# Patient Record
Sex: FEMALE | Race: WHITE | NOT HISPANIC OR LATINO | Employment: OTHER | URBAN - METROPOLITAN AREA
[De-identification: names, ages, dates, MRNs, and addresses within clinical notes are randomized per-mention and may not be internally consistent; named-entity substitution may affect disease eponyms.]

---

## 2017-01-05 RX ORDER — LEVETIRACETAM 250 MG/1
TABLET ORAL
Qty: 180 TABLET | Refills: 1 | Status: SHIPPED | OUTPATIENT
Start: 2017-01-05 | End: 2017-02-13

## 2017-01-31 RX ORDER — APIXABAN 5 MG/1
TABLET, FILM COATED ORAL
Qty: 180 TABLET | Refills: 1 | Status: SHIPPED | OUTPATIENT
Start: 2017-01-31 | End: 2017-02-13

## 2017-02-13 ENCOUNTER — OFFICE VISIT (OUTPATIENT)
Dept: INTERNAL MEDICINE | Facility: CLINIC | Age: 77
End: 2017-02-13

## 2017-02-13 VITALS
RESPIRATION RATE: 16 BRPM | TEMPERATURE: 98.7 F | WEIGHT: 101 LBS | HEART RATE: 76 BPM | BODY MASS INDEX: 16.3 KG/M2 | OXYGEN SATURATION: 97 % | DIASTOLIC BLOOD PRESSURE: 70 MMHG | SYSTOLIC BLOOD PRESSURE: 140 MMHG

## 2017-02-13 DIAGNOSIS — R63.6 LOW WEIGHT: ICD-10-CM

## 2017-02-13 DIAGNOSIS — R55 VASOVAGAL SYNCOPE: ICD-10-CM

## 2017-02-13 DIAGNOSIS — R53.83 FATIGUE, UNSPECIFIED TYPE: ICD-10-CM

## 2017-02-13 DIAGNOSIS — R26.9 ABNORMAL GAIT: ICD-10-CM

## 2017-02-13 DIAGNOSIS — I10 ESSENTIAL HYPERTENSION: ICD-10-CM

## 2017-02-13 DIAGNOSIS — R54 FRAILTY: Primary | ICD-10-CM

## 2017-02-13 DIAGNOSIS — I48.91 ATRIAL FIBRILLATION WITH RVR (HCC): ICD-10-CM

## 2017-02-13 DIAGNOSIS — K25.7 CHRONIC GASTRIC ULCER: ICD-10-CM

## 2017-02-13 PROCEDURE — 99213 OFFICE O/P EST LOW 20 MIN: CPT | Performed by: INTERNAL MEDICINE

## 2017-02-13 RX ORDER — PANTOPRAZOLE SODIUM 20 MG/1
20 TABLET, DELAYED RELEASE ORAL 2 TIMES WEEKLY
Qty: 90 TABLET | Refills: 1
Start: 2017-02-13 | End: 2017-05-11 | Stop reason: SDUPTHER

## 2017-02-13 RX ORDER — RANITIDINE 150 MG/1
150 CAPSULE ORAL NIGHTLY
Qty: 90 CAPSULE | Refills: 3 | Status: SHIPPED | OUTPATIENT
Start: 2017-02-13 | End: 2018-02-24 | Stop reason: SDUPTHER

## 2017-02-13 NOTE — PATIENT INSTRUCTIONS
1.  Limit coffee - no more than 1 cup in the morning.    2.  Drink 8 oz water - 2 - 3 times extra - every day.    3.  Use walker at all times - to maintain safety.    4.  Decrease pantoprazole - Monday & Friday morning only.  Plan to stop this medication this year.    5.  Resume ranitidine 150 mg at bedtime - for acid suppression.    6.  Use Pepto-Bismol - as needed for indigestion.    7.  Return March 15 - annual checkup fasting.

## 2017-02-13 NOTE — PROGRESS NOTES
Subjective   Mirian Sy is a 77 y.o. female.     History of Present Illness     The patient had a syncopal episode at home yesterday.  She was sitting on a chair in the kitchen and just fell to the side.  Her son who is a paramedic took her pulse rate and blood pressure and found them normal.  She quickly woke up.  He felt that she was dehydrated and did not transport her to the hospital.  She has done well since then his had no lightheadedness with change of position.  She does have hypertension and recently atrial fibrillation.  She was started on diltiazem in December and has had average blood pressures of 130/70.  She has moderate GERD but has had no indigestion nausea or heartburn.  She drinks 3-4 cups of coffee daily and feels it does make her urinate more.    The following portions of the patient's history were reviewed and updated as appropriate: allergies, current medications, past family history, past medical history, past social history, past surgical history and problem list.    Review of Systems   Constitutional: Negative for appetite change and fatigue.   Gastrointestinal: Negative for abdominal distention and nausea.        Minimal indigestion   Neurological: Positive for seizures and syncope. Negative for dizziness and light-headedness.        No seizures in the last year   Psychiatric/Behavioral: Positive for dysphoric mood. Negative for sleep disturbance.        Anxiety and depression are stable on medication       Objective   Blood pressure 140/70, pulse 76, temperature 98.7 °F (37.1 °C), temperature source Oral, resp. rate 16, weight 101 lb (45.8 kg), SpO2 97 %.    Physical Exam   Constitutional: She is oriented to person, place, and time. She appears well-developed and well-nourished. No distress.   HENT:   Right Ear: External ear normal.   Left Ear: External ear normal.   Mouth/Throat: Oropharynx is clear and moist.   Eyes: EOM are normal. Pupils are equal, round, and reactive to light.  No scleral icterus.   Neck: Normal range of motion. Neck supple. No JVD present.   Cardiovascular: Normal rate, regular rhythm and normal heart sounds.    Pulmonary/Chest: Effort normal and breath sounds normal. She has no wheezes. She has no rales.   Abdominal: Soft. Bowel sounds are normal. She exhibits no distension.   Lymphadenopathy:     She has no cervical adenopathy.   Neurological: She is alert and oriented to person, place, and time. She exhibits normal muscle tone. Coordination normal.   Psychiatric: She has a normal mood and affect. Her behavior is normal. Judgment and thought content normal.   Nursing note and vitals reviewed.    Procedures  Assessment/Plan   Mirian was seen today for fall.    Diagnoses and all orders for this visit:    Frailty    Abnormal gait    Fatigue, unspecified type    Low weight    Essential hypertension    Atrial fibrillation with RVR    Vasovagal syncope    Other orders  -     ranitidine (ZANTAC) 150 MG capsule; Take 1 capsule by mouth Every Night.  -     pantoprazole (PROTONIX) 20 MG EC tablet; Take 1 tablet by mouth 2 (Two) Times a Week. Monday & Friday morning    The patient may have had a vasovagal simple episode associated with dehydration.  I've asked her to limit coffee and increase water intake to prevent further dehydration.    The patient has moderate GERD which is in excellent control.  For long-term safety of asked her to decrease Protonix to twice weekly.  I've asked her to use ranitidine for her long-term acid suppression and use Pepto-Bismol as needed.    The patient's son is with her today and both been counseled about the above recommendations and her need for close monitoring.  The patient has a sense of frailty but her body weight has been increasing with encouragement of improve nutrition.  Mirtazapine and megestrol have both helped.    Patient Instructions   1.  Limit coffee - no more than 1 cup in the morning.    2.  Drink 8 oz water - 2 - 3 times extra -  every day.    3.  Use walker at all times - to maintain safety.    4.  Decrease pantoprazole - Monday & Friday morning only.  Plan to stop this medication this year.    5.  Resume ranitidine 150 mg at bedtime - for acid suppression.    6.  Use Pepto-Bismol - as needed for indigestion.    7.  Return March 15 - annual checkup fasting.        Electronically signed Sriram Springer M.D.2/15/2017 7:22 AM

## 2017-02-27 RX ORDER — MIRTAZAPINE 7.5 MG/1
TABLET, FILM COATED ORAL
Qty: 90 TABLET | Refills: 0 | Status: SHIPPED | OUTPATIENT
Start: 2017-02-27 | End: 2017-05-11 | Stop reason: SDUPTHER

## 2017-03-01 ENCOUNTER — TELEPHONE (OUTPATIENT)
Dept: INTERNAL MEDICINE | Facility: CLINIC | Age: 77
End: 2017-03-01

## 2017-03-01 DIAGNOSIS — I48.0 PAF (PAROXYSMAL ATRIAL FIBRILLATION) (HCC): ICD-10-CM

## 2017-03-01 RX ORDER — FLECAINIDE ACETATE 50 MG/1
25 TABLET ORAL EVERY 12 HOURS
Qty: 90 TABLET | Refills: 0 | Status: SHIPPED | OUTPATIENT
Start: 2017-03-01 | End: 2017-11-27 | Stop reason: SDUPTHER

## 2017-03-01 NOTE — TELEPHONE ENCOUNTER
----- Message from Radha Spencer sent at 3/1/2017  9:08 AM EST -----  Contact: RICHIE LAKEIDE 51 [facility]- [employer]

## 2017-03-13 ENCOUNTER — TRANSCRIBE ORDERS (OUTPATIENT)
Dept: INTERNAL MEDICINE | Facility: CLINIC | Age: 77
End: 2017-03-13

## 2017-03-13 DIAGNOSIS — Z12.31 VISIT FOR SCREENING MAMMOGRAM: Primary | ICD-10-CM

## 2017-03-15 ENCOUNTER — OFFICE VISIT (OUTPATIENT)
Dept: INTERNAL MEDICINE | Facility: CLINIC | Age: 77
End: 2017-03-15

## 2017-03-15 VITALS
SYSTOLIC BLOOD PRESSURE: 130 MMHG | RESPIRATION RATE: 16 BRPM | DIASTOLIC BLOOD PRESSURE: 70 MMHG | WEIGHT: 103 LBS | HEART RATE: 64 BPM | OXYGEN SATURATION: 97 % | TEMPERATURE: 97.4 F | BODY MASS INDEX: 17.58 KG/M2 | HEIGHT: 64 IN

## 2017-03-15 DIAGNOSIS — F41.9 ANXIETY: ICD-10-CM

## 2017-03-15 DIAGNOSIS — R55 VASOVAGAL SYNCOPE: ICD-10-CM

## 2017-03-15 DIAGNOSIS — Z00.00 PREVENTATIVE HEALTH CARE: ICD-10-CM

## 2017-03-15 DIAGNOSIS — R26.9 ABNORMAL GAIT: ICD-10-CM

## 2017-03-15 DIAGNOSIS — G40.209 PARTIAL SYMPTOMATIC EPILEPSY WITH COMPLEX PARTIAL SEIZURES, NOT INTRACTABLE, WITHOUT STATUS EPILEPTICUS (HCC): ICD-10-CM

## 2017-03-15 DIAGNOSIS — K25.7 CHRONIC GASTRIC ULCER: ICD-10-CM

## 2017-03-15 DIAGNOSIS — E55.9 VITAMIN D DEFICIENCY: ICD-10-CM

## 2017-03-15 DIAGNOSIS — R63.0 ANOREXIA: ICD-10-CM

## 2017-03-15 DIAGNOSIS — D53.9 NUTRITIONAL ANEMIA: ICD-10-CM

## 2017-03-15 DIAGNOSIS — I10 ESSENTIAL HYPERTENSION: ICD-10-CM

## 2017-03-15 DIAGNOSIS — F32.89 OTHER DEPRESSION: ICD-10-CM

## 2017-03-15 DIAGNOSIS — R63.6 LOW WEIGHT: ICD-10-CM

## 2017-03-15 DIAGNOSIS — E78.2 MIXED HYPERLIPIDEMIA: ICD-10-CM

## 2017-03-15 DIAGNOSIS — G47.9 DYSSOMNIA: ICD-10-CM

## 2017-03-15 DIAGNOSIS — I48.91 ATRIAL FIBRILLATION WITH RVR (HCC): Primary | ICD-10-CM

## 2017-03-15 DIAGNOSIS — R54 FRAILTY: ICD-10-CM

## 2017-03-15 LAB
25(OH)D3 SERPL-MCNC: 48.8 NG/ML
ALBUMIN SERPL-MCNC: 4 G/DL (ref 3.2–4.8)
ALBUMIN/GLOB SERPL: 1.3 G/DL (ref 1.5–2.5)
ALP SERPL-CCNC: 180 U/L (ref 25–100)
ALT SERPL W P-5'-P-CCNC: 12 U/L (ref 7–40)
ANION GAP SERPL CALCULATED.3IONS-SCNC: 3 MMOL/L (ref 3–11)
AST SERPL-CCNC: 28 U/L (ref 0–33)
BASOPHILS # BLD AUTO: 0.03 10*3/MM3 (ref 0–0.2)
BASOPHILS NFR BLD AUTO: 0.7 % (ref 0–1)
BILIRUB SERPL-MCNC: 0.4 MG/DL (ref 0.3–1.2)
BILIRUB UR QL STRIP: NEGATIVE
BUN BLD-MCNC: 10 MG/DL (ref 9–23)
BUN/CREAT SERPL: 12.5 (ref 7–25)
CALCIUM SPEC-SCNC: 9.9 MG/DL (ref 8.7–10.4)
CHLORIDE SERPL-SCNC: 98 MMOL/L (ref 99–109)
CLARITY UR: CLEAR
CO2 SERPL-SCNC: 32 MMOL/L (ref 20–31)
COLOR UR: YELLOW
CREAT BLD-MCNC: 0.8 MG/DL (ref 0.6–1.3)
CRP SERPL-MCNC: 4.7 MG/DL (ref 0–10)
DEPRECATED RDW RBC AUTO: 45.7 FL (ref 37–54)
EOSINOPHIL # BLD AUTO: 0.1 10*3/MM3 (ref 0.1–0.3)
EOSINOPHIL NFR BLD AUTO: 2.2 % (ref 0–3)
ERYTHROCYTE [DISTWIDTH] IN BLOOD BY AUTOMATED COUNT: 13.3 % (ref 11.3–14.5)
GFR SERPL CREATININE-BSD FRML MDRD: 70 ML/MIN/1.73
GLOBULIN UR ELPH-MCNC: 3.2 GM/DL
GLUCOSE BLD-MCNC: 77 MG/DL (ref 70–100)
GLUCOSE UR STRIP-MCNC: NEGATIVE MG/DL
HCT VFR BLD AUTO: 34.5 % (ref 34.5–44)
HGB BLD-MCNC: 11.3 G/DL (ref 11.5–15.5)
HGB UR QL STRIP.AUTO: NEGATIVE
IMM GRANULOCYTES # BLD: 0.01 10*3/MM3 (ref 0–0.03)
IMM GRANULOCYTES NFR BLD: 0.2 % (ref 0–0.6)
KETONES UR QL STRIP: NEGATIVE
LEUKOCYTE ESTERASE UR QL STRIP.AUTO: NEGATIVE
LYMPHOCYTES # BLD AUTO: 1.25 10*3/MM3 (ref 0.6–4.8)
LYMPHOCYTES NFR BLD AUTO: 27.7 % (ref 24–44)
MAGNESIUM SERPL-MCNC: 2 MG/DL (ref 1.3–2.7)
MCH RBC QN AUTO: 30.5 PG (ref 27–31)
MCHC RBC AUTO-ENTMCNC: 32.8 G/DL (ref 32–36)
MCV RBC AUTO: 93.2 FL (ref 80–99)
MONOCYTES # BLD AUTO: 0.56 10*3/MM3 (ref 0–1)
MONOCYTES NFR BLD AUTO: 12.4 % (ref 0–12)
NEUTROPHILS # BLD AUTO: 2.57 10*3/MM3 (ref 1.5–8.3)
NEUTROPHILS NFR BLD AUTO: 56.8 % (ref 41–71)
NITRITE UR QL STRIP: NEGATIVE
PH UR STRIP.AUTO: 8 [PH] (ref 5–8)
PLATELET # BLD AUTO: 299 10*3/MM3 (ref 150–450)
PMV BLD AUTO: 10.1 FL (ref 6–12)
POTASSIUM BLD-SCNC: 4.5 MMOL/L (ref 3.5–5.5)
PROT SERPL-MCNC: 7.2 G/DL (ref 5.7–8.2)
PROT UR QL STRIP: NEGATIVE
RBC # BLD AUTO: 3.7 10*6/MM3 (ref 3.89–5.14)
SODIUM BLD-SCNC: 133 MMOL/L (ref 132–146)
SP GR UR STRIP: 1.01 (ref 1–1.03)
TSH SERPL DL<=0.05 MIU/L-ACNC: 0.96 MIU/ML (ref 0.35–5.35)
UROBILINOGEN UR QL STRIP: NORMAL
WBC NRBC COR # BLD: 4.52 10*3/MM3 (ref 3.5–10.8)

## 2017-03-15 PROCEDURE — 81003 URINALYSIS AUTO W/O SCOPE: CPT | Performed by: INTERNAL MEDICINE

## 2017-03-15 PROCEDURE — 82306 VITAMIN D 25 HYDROXY: CPT | Performed by: INTERNAL MEDICINE

## 2017-03-15 PROCEDURE — 82542 COL CHROMOTOGRAPHY QUAL/QUAN: CPT | Performed by: INTERNAL MEDICINE

## 2017-03-15 PROCEDURE — 83735 ASSAY OF MAGNESIUM: CPT | Performed by: INTERNAL MEDICINE

## 2017-03-15 PROCEDURE — 80177 DRUG SCRN QUAN LEVETIRACETAM: CPT | Performed by: INTERNAL MEDICINE

## 2017-03-15 PROCEDURE — 80053 COMPREHEN METABOLIC PANEL: CPT | Performed by: INTERNAL MEDICINE

## 2017-03-15 PROCEDURE — 84443 ASSAY THYROID STIM HORMONE: CPT | Performed by: INTERNAL MEDICINE

## 2017-03-15 PROCEDURE — 93000 ELECTROCARDIOGRAM COMPLETE: CPT | Performed by: INTERNAL MEDICINE

## 2017-03-15 PROCEDURE — 85025 COMPLETE CBC W/AUTO DIFF WBC: CPT | Performed by: INTERNAL MEDICINE

## 2017-03-15 PROCEDURE — 86140 C-REACTIVE PROTEIN: CPT | Performed by: INTERNAL MEDICINE

## 2017-03-15 PROCEDURE — 36415 COLL VENOUS BLD VENIPUNCTURE: CPT | Performed by: INTERNAL MEDICINE

## 2017-03-15 PROCEDURE — 99215 OFFICE O/P EST HI 40 MIN: CPT | Performed by: INTERNAL MEDICINE

## 2017-03-15 NOTE — PROGRESS NOTES
Subjective   Mirian Sy is a 77 y.o. female.     Chief Complaint   Patient presents with   • Atrial Fibrillation       History of Present Illness     The patient has a 12 year history of atrial fibrillation.  She did initially experienced a CVA but had good recovery.  She is maintaining sinus rhythm with flecainide.  Diltiazem has helped manage her blood pressure and rate control.  She has been taken off of beta blockers.  She has been on anticoagulation in recent years and has had no further embolic events.  She did experience syncope 1 month ago and no clear reason has been discerned.  She explains coronary spasm approximately 10 years ago and had a dilated cardiomyopathy.  Her most recent echocardiogram last year showed an ejection fraction of 60% with severe mitral regurgitation.  The patient is able to take short walks without excessive shortness of breath.  Her pressures been averaging about 130/70.    The following portions of the patient's history were reviewed and updated as appropriate: allergies, current medications, past family history, past medical history, past social history, past surgical history and problem list.    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/16/2016   • Depression 05/16/2016   • Gastric ulcer 05/16/2016   • Hyperlipidemia 05/16/2016   • Hypertension 05/16/2016   • Hyponatremia 05/16/2016   • Nutritional anemia 05/16/2016   • Dyssomnia 05/16/2016   • Visual disturbance 05/16/2016   • Xeroderma 05/16/2016   • Preventative health care 07/18/2016   • Frailty 09/15/2016   • Fatigue 09/15/2016   • Tricuspid regurgitation 09/15/2016   • Cerebrovascular accident 09/22/2016   • Syncope 09/22/2016   • DVT (deep venous thrombosis) 09/22/2016   • Anorexia 09/22/2016   • Anxiety 09/22/2016   • Patent foramen ovale    • Mitral regurgitation 09/30/2016   •  Atrial fibrillation with RVR 10/09/2016   • Low weight 2016     Resolved Ambulatory Problems     Diagnosis Date Noted   • Abnormal weight loss 2016   • Atrial fibrillation 2016   • Fracture of femur 2016   • Shaky 09/15/2016   • Shortness of breath 09/15/2016   • Urinary tract infection without hematuria 2016   • Tobacco abuse 2016   • VHD (valvular heart disease) 2016   • Atrial fibrillation with rapid ventricular response 10/11/2016     Past Medical History   Diagnosis Date   • Arteriosclerotic cardiovascular disease    • Basal cell carcinoma    • Coronary artery vasospasm    • Decubitus ulcer of buttock    • Fracture of bone of forefoot    • Mitral valve prolapse    • Osteoarthritis    • Osteoporosis    • Ovarian cyst    • Pneumonia    • Scoliosis    • Senile atrophic vaginitis    • Systolic hypertension    • Varicose veins      Past Surgical History   Procedure Laterality Date   • Hip fracture surgery Left      left hip fracture with pin   • Achilles tendon surgery Left      Repair of rupture left tendon with screws   • Femur fracture surgery Right      Repair of shaft fracture   • Cardiac catheterization       Severe acute coronary spasm   • Breast surgery Left      Excision benign cyst   • Lumbar disc surgery     • Ovarian cyst removal Left      Family History   Problem Relation Age of Onset   • Breast cancer Mother       age 59   • Diabetes Mother    • Hypertension Mother    • Heart disease Father       age 80   • Other Sister      Arthrodesis cervical   • Basal cell carcinoma Sister    • Bone cancer Sister    • Breast cancer Sister    • Diabetes Sister       age 56   • Hypertension Sister    • Melanoma Sister    • Arrhythmia Sister      Sinus   • Stroke Sister    • Allergies Son      Hay fever   • Diabetes Maternal Uncle      Social History     Social History   • Marital status:      Spouse name: N/A   •  Number of children: N/A   • Years of education: N/A     Occupational History   • Not on file.     Social History Main Topics   • Smoking status: Former Smoker     Packs/day: 1.00     Years: 20.00     Types: Cigarettes   • Smokeless tobacco: Never Used      Comment: Remote history   • Alcohol use No   • Drug use: No   • Sexual activity: Defer     Other Topics Concern   • Not on file     Social History Narrative    Domestic life   lives in private home alone with good support from family locally        Jew    [employer]        Sleep hygiene  in bed 9 PM to 7 AM for 10 hours of sleep        Caffeine use 1 1/2 cups coffee daily        Exercise habits  walks daily as tolerated        Diet   well-balanced diet with protein supplementations        Occupation    retired         Hearing  no impairment        Vision  corrects with glasses        Driving   daytime only - good weather - local traffic             Review of Systems   Constitutional: Negative for appetite change and fatigue.   HENT: Negative for ear pain and sore throat.    Eyes: Negative for itching and visual disturbance.   Respiratory: Negative for cough and shortness of breath.    Cardiovascular: Negative for chest pain and palpitations.   Gastrointestinal: Negative for abdominal pain and nausea.   Endocrine: Negative for cold intolerance and heat intolerance.   Genitourinary: Negative for dysuria and hematuria.   Musculoskeletal: Negative for arthralgias and back pain.   Skin: Negative for rash and wound.   Allergic/Immunologic: Negative for environmental allergies and food allergies.   Neurological: Negative for dizziness and headaches.   Hematological: Negative for adenopathy. Does not bruise/bleed easily.   Psychiatric/Behavioral: Positive for dysphoric mood. Negative for sleep disturbance. The patient is nervous/anxious.         Depression and anxiety responding well to citalopram       Objective   Blood pressure 130/70, pulse 64,  "temperature 97.4 °F (36.3 °C), temperature source Oral, resp. rate 16, height 63.5\" (161.3 cm), weight 103 lb (46.7 kg), SpO2 97 %.    Physical Exam   Constitutional: She is oriented to person, place, and time. She appears well-developed and well-nourished. No distress.   HENT:   Right Ear: External ear normal.   Left Ear: External ear normal.   Nose: Nose normal.   Mouth/Throat: Oropharynx is clear and moist.   Upper and lower dentures   Eyes: EOM are normal. Pupils are equal, round, and reactive to light. No scleral icterus.   Neck: Normal range of motion. Neck supple. No JVD present. No thyromegaly present.   Cardiovascular: Normal rate, regular rhythm and intact distal pulses.    Systolic murmur and click at apex   Pulmonary/Chest: Effort normal and breath sounds normal. She has no wheezes. She has no rales.   Abdominal: Soft. Bowel sounds are normal. She exhibits no distension and no mass. No hernia.   Genitourinary:   Genitourinary Comments: Per women's clinic   Lymphadenopathy:     She has no cervical adenopathy.   Neurological: She is alert and oriented to person, place, and time. She displays normal reflexes. No cranial nerve deficit. She exhibits normal muscle tone. Coordination normal.   Vibratory normal  Romberg negative  Gait wide-based and shuffling but independent and safe   Plantars downgoing     Skin: Skin is warm and dry. No rash noted.   Psychiatric: She has a normal mood and affect. Her behavior is normal. Judgment and thought content normal.   Nursing note and vitals reviewed.      ECG 12 Lead  Date/Time: 3/15/2017 10:07 AM  Performed by: ESTHER FLORES  Authorized by: ESTHER FLORES   Comparison: compared with previous ECG from 12/8/2016  Similar to previous ECG  Rhythm: sinus rhythm  Rate: normal  Conduction: conduction normal  ST Segments: ST segments normal  T Waves: T waves normal  QRS axis: normal  Other findings: LAE and PRWP  Clinical impression: non-specific ECG  Comments: " Indication - atrial fibrillation  Moderate 60 cycle artifact  Baseline EKG          Assessment/Plan   Mirian was seen today for atrial fibrillation.    Diagnoses and all orders for this visit:    Atrial fibrillation with RVR  -     ECG 12 Lead  -     Magnesium  -     Flecainide Level    Mixed hyperlipidemia  -     Comprehensive Metabolic Panel  -     Cancel: Lipid Panel    Essential hypertension  -     Urinalysis With / Microscopic If Indicated    Vasovagal syncope    Nutritional anemia  -     CBC & Differential  -     C-reactive Protein  -     CBC Auto Differential    Abnormal gait    Anorexia  -     TSH    Anxiety    Other depression    Dyssomnia    Low weight    Preventative health care    Frailty    Partial symptomatic epilepsy with complex partial seizures, not intractable, without status epilepticus  -     Levetiracetam Level (Keppra)    Chronic gastric ulcer    Vitamin D deficiency  -     Vitamin D 25 Hydroxy      The patient's had severe progressive cardiovascular disease with paroxysmal atrial fibrillation, cardiomyopathy, severe mitral regurgitation, and hypertension.  Her medications must be adjusted cautiously for proper rate control and blood pressure control.  She has also had complications of vasovagal syncope apparently related to GI distress.    The patient lives in her own home but has marginal  capacity for maintaining independent living.  She has good support from local children.  I've cautioned her however could not be isolated and to get out at least 3 days a week for physical fitness and socializing.  I've encouraged her to stay well connected with her [employer].    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  She has been for remission on Keppra.  She will need to continue this regularly.    The patient's had a moderate degree of chronic anxiety and reactive depression.  She is done well on citalopram.  Because of lifelong thinness, poor appetite, and recurring spells  of weight loss she has done well on the cholesterol.  Mirtazapine has stabilized a great deal of her clinical depression and improved weight maintenance.    The patient has moderate chronic GERD.  I've asked her to gradually wean off of Protonix.  Long-term safety.  I've encouraged her to stay with ranitidine and use antacids as needed.    Patient Instructions   1.  Continue same medications and supplements - as listed.    2.  Walk every day - maintain physical fitness.    3.  Go to [place] 2 days weekly - for exercise and socializing.    4.  Maintain a well-balanced diet - maintain weight over 100 pounds.    5.  Return in 6 weeks - women's clinic, schedule ovarian screen, and wellness exam.    6.  Return in 3 months - fasting checkup.    7.  All laboratory testing is acceptable requires no change in treatment.    Current Outpatient Prescriptions:   •  apixaban (ELIQUIS) 5 MG tablet tablet, Take 1 tablet by mouth every 12 (twelve) hours., Disp: 180 tablet, Rfl: 1  •  calcium-vitamin D (OSCAL-500) 500-200 MG-UNIT per tablet, Take 1 tablet by mouth every morning., Disp: , Rfl:   •  citalopram (CeleXA) 20 MG tablet, TAKE 1 TABLET EVERY MORNING, Disp: 90 tablet, Rfl: 1  •  diltiazem XR (DILACOR XR) 120 MG 24 hr capsule, Take 1 capsule by mouth Every Night., Disp: 90 capsule, Rfl: 3  •  flecainide (TAMBOCOR) 50 MG tablet, Take 0.5 tablets by mouth Every 12 (Twelve) Hours., Disp: 90 tablet, Rfl: 0  •  levETIRAcetam (KEPPRA) 250 MG tablet, Take 1 tablet by mouth Every 12 (Twelve) Hours., Disp: 180 tablet, Rfl: 1  •  magnesium oxide (MAGOX) 400 (241.3 MG) MG tablet tablet, Take 400 mg by mouth Daily., Disp: , Rfl:   •  megestrol acetate (MEGACE) 400 MG/10ML suspension oral suspension, Take 5 mL by mouth every morning., Disp: 450 mL, Rfl: 1  •  mirtazapine (REMERON) 7.5 MG tablet, TAKE 1 TABLET AT BEDTIME (DOSE CHANGE), Disp: 90 tablet, Rfl: 0  •  pantoprazole (PROTONIX) 20 MG EC tablet, Take 1 tablet by mouth  2 (Two) Times a Week. Monday & Friday morning, Disp: 90 tablet, Rfl: 1  •  ranitidine (ZANTAC) 150 MG capsule, Take 1 capsule by mouth Every Night., Disp: 90 capsule, Rfl: 3    Allergies   Allergen Reactions   • Actonel [Risedronate Sodium] GI Intolerance   • Hctz [Hydrochlorothiazide]      hyponatremia   • Influenza Vaccines Myalgia   • Lisinopril      hyperkalemia       Immunization History   Administered Date(s) Administered   • Influenza Split High Dose Preservative Free IM 09/30/2016   • Influenza, Quadrivalent 10/28/2015   • Pneumococcal Polysaccharide 03/27/2014   • Td 02/25/2008     Electronically signed Sriram Springer M.D.3/17/2017 7:24 AM

## 2017-03-16 RX ORDER — CITALOPRAM 20 MG/1
TABLET ORAL
Qty: 90 TABLET | Refills: 1 | Status: SHIPPED | OUTPATIENT
Start: 2017-03-16 | End: 2017-09-13 | Stop reason: SDUPTHER

## 2017-03-17 LAB — FLECAINIDE SERPL-MCNC: 0.29 UG/ML (ref 0.2–1)

## 2017-03-19 LAB — LEVETIRACETAM SERPL-MCNC: 18.1 UG/ML (ref 10–40)

## 2017-03-22 ENCOUNTER — TELEPHONE (OUTPATIENT)
Dept: INTERNAL MEDICINE | Facility: CLINIC | Age: 77
End: 2017-03-22

## 2017-04-21 ENCOUNTER — HOSPITAL ENCOUNTER (OUTPATIENT)
Dept: MAMMOGRAPHY | Facility: HOSPITAL | Age: 77
Discharge: HOME | End: 2017-04-21
Attending: INTERNAL MEDICINE | Admitting: INTERNAL MEDICINE

## 2017-04-21 ENCOUNTER — TELEPHONE (OUTPATIENT)
Dept: INTERNAL MEDICINE | Facility: CLINIC | Age: 77
End: 2017-04-21

## 2017-04-21 DIAGNOSIS — Z12.31 VISIT FOR SCREENING MAMMOGRAM: ICD-10-CM

## 2017-04-21 PROCEDURE — 77063 BREAST TOMOSYNTHESIS BI: CPT | Performed by: RADIOLOGY

## 2017-04-21 PROCEDURE — G0202 SCR MAMMO BI INCL CAD: HCPCS

## 2017-04-21 PROCEDURE — G0202 SCR MAMMO BI INCL CAD: HCPCS | Performed by: RADIOLOGY

## 2017-04-21 PROCEDURE — 77063 BREAST TOMOSYNTHESIS BI: CPT

## 2017-05-11 RX ORDER — PANTOPRAZOLE SODIUM 20 MG/1
TABLET, DELAYED RELEASE ORAL
Qty: 30 TABLET | Refills: 1 | Status: SHIPPED | OUTPATIENT
Start: 2017-05-11 | End: 2017-11-04 | Stop reason: SDUPTHER

## 2017-05-11 RX ORDER — MIRTAZAPINE 7.5 MG/1
TABLET, FILM COATED ORAL
Qty: 90 TABLET | Refills: 1 | Status: SHIPPED | OUTPATIENT
Start: 2017-05-11 | End: 2017-11-04 | Stop reason: SDUPTHER

## 2017-05-17 ENCOUNTER — TELEPHONE (OUTPATIENT)
Dept: INTERNAL MEDICINE | Facility: CLINIC | Age: 77
End: 2017-05-17

## 2017-05-24 ENCOUNTER — OFFICE VISIT (OUTPATIENT)
Dept: CARDIOLOGY | Facility: CLINIC | Age: 77
End: 2017-05-24

## 2017-05-24 VITALS
DIASTOLIC BLOOD PRESSURE: 70 MMHG | SYSTOLIC BLOOD PRESSURE: 134 MMHG | HEIGHT: 66 IN | BODY MASS INDEX: 17.78 KG/M2 | HEART RATE: 57 BPM | WEIGHT: 110.6 LBS

## 2017-05-24 DIAGNOSIS — E78.2 MIXED HYPERLIPIDEMIA: ICD-10-CM

## 2017-05-24 DIAGNOSIS — Q21.12 PATENT FORAMEN OVALE: ICD-10-CM

## 2017-05-24 DIAGNOSIS — I10 ESSENTIAL HYPERTENSION: ICD-10-CM

## 2017-05-24 DIAGNOSIS — I42.9 CARDIOMYOPATHY (HCC): ICD-10-CM

## 2017-05-24 DIAGNOSIS — I48.91 ATRIAL FIBRILLATION WITH RVR (HCC): Primary | ICD-10-CM

## 2017-05-24 PROCEDURE — 99213 OFFICE O/P EST LOW 20 MIN: CPT | Performed by: NURSE PRACTITIONER

## 2017-05-24 PROCEDURE — 93000 ELECTROCARDIOGRAM COMPLETE: CPT | Performed by: INTERNAL MEDICINE

## 2017-06-07 ENCOUNTER — OFFICE VISIT (OUTPATIENT)
Dept: INTERNAL MEDICINE | Facility: CLINIC | Age: 77
End: 2017-06-07

## 2017-06-07 ENCOUNTER — APPOINTMENT (OUTPATIENT)
Dept: LAB | Facility: HOSPITAL | Age: 77
End: 2017-06-07

## 2017-06-07 VITALS
WEIGHT: 108 LBS | OXYGEN SATURATION: 98 % | TEMPERATURE: 98.1 F | HEIGHT: 62 IN | BODY MASS INDEX: 19.88 KG/M2 | SYSTOLIC BLOOD PRESSURE: 114 MMHG | HEART RATE: 88 BPM | RESPIRATION RATE: 16 BRPM | DIASTOLIC BLOOD PRESSURE: 70 MMHG

## 2017-06-07 DIAGNOSIS — N95.2 VAGINAL ATROPHY: ICD-10-CM

## 2017-06-07 DIAGNOSIS — I48.91 ATRIAL FIBRILLATION WITH RVR (HCC): Primary | ICD-10-CM

## 2017-06-07 DIAGNOSIS — M81.0 SENILE OSTEOPOROSIS: ICD-10-CM

## 2017-06-07 DIAGNOSIS — M81.0 OSTEOPOROSIS: ICD-10-CM

## 2017-06-07 DIAGNOSIS — I10 ESSENTIAL HYPERTENSION: ICD-10-CM

## 2017-06-07 DIAGNOSIS — Z00.00 PREVENTATIVE HEALTH CARE: ICD-10-CM

## 2017-06-07 LAB
ALBUMIN SERPL-MCNC: 4.3 G/DL (ref 3.2–4.8)
ALBUMIN/GLOB SERPL: 1.2 G/DL (ref 1.5–2.5)
ALP SERPL-CCNC: 153 U/L (ref 25–100)
ALT SERPL W P-5'-P-CCNC: 13 U/L (ref 7–40)
ANION GAP SERPL CALCULATED.3IONS-SCNC: 4 MMOL/L (ref 3–11)
AST SERPL-CCNC: 26 U/L (ref 0–33)
BASOPHILS # BLD AUTO: 0.04 10*3/MM3 (ref 0–0.2)
BASOPHILS NFR BLD AUTO: 0.7 % (ref 0–1)
BILIRUB SERPL-MCNC: 0.5 MG/DL (ref 0.3–1.2)
BUN BLD-MCNC: 11 MG/DL (ref 9–23)
BUN/CREAT SERPL: 12.2 (ref 7–25)
CALCIUM SPEC-SCNC: 10 MG/DL (ref 8.7–10.4)
CHLORIDE SERPL-SCNC: 99 MMOL/L (ref 99–109)
CO2 SERPL-SCNC: 32 MMOL/L (ref 20–31)
CREAT BLD-MCNC: 0.9 MG/DL (ref 0.6–1.3)
DEPRECATED RDW RBC AUTO: 46.9 FL (ref 37–54)
EOSINOPHIL # BLD AUTO: 0.11 10*3/MM3 (ref 0.1–0.3)
EOSINOPHIL NFR BLD AUTO: 1.9 % (ref 0–3)
ERYTHROCYTE [DISTWIDTH] IN BLOOD BY AUTOMATED COUNT: 13.6 % (ref 11.3–14.5)
GFR SERPL CREATININE-BSD FRML MDRD: 61 ML/MIN/1.73
GLOBULIN UR ELPH-MCNC: 3.6 GM/DL
GLUCOSE BLD-MCNC: 88 MG/DL (ref 70–100)
HCT VFR BLD AUTO: 38.6 % (ref 34.5–44)
HGB BLD-MCNC: 12.5 G/DL (ref 11.5–15.5)
IMM GRANULOCYTES # BLD: 0 10*3/MM3 (ref 0–0.03)
IMM GRANULOCYTES NFR BLD: 0 % (ref 0–0.6)
LYMPHOCYTES # BLD AUTO: 1.92 10*3/MM3 (ref 0.6–4.8)
LYMPHOCYTES NFR BLD AUTO: 32.6 % (ref 24–44)
MCH RBC QN AUTO: 30.5 PG (ref 27–31)
MCHC RBC AUTO-ENTMCNC: 32.4 G/DL (ref 32–36)
MCV RBC AUTO: 94.1 FL (ref 80–99)
MONOCYTES # BLD AUTO: 0.6 10*3/MM3 (ref 0–1)
MONOCYTES NFR BLD AUTO: 10.2 % (ref 0–12)
NEUTROPHILS # BLD AUTO: 3.22 10*3/MM3 (ref 1.5–8.3)
NEUTROPHILS NFR BLD AUTO: 54.6 % (ref 41–71)
PLATELET # BLD AUTO: 313 10*3/MM3 (ref 150–450)
PMV BLD AUTO: 10.1 FL (ref 6–12)
POTASSIUM BLD-SCNC: 4.7 MMOL/L (ref 3.5–5.5)
PROT SERPL-MCNC: 7.9 G/DL (ref 5.7–8.2)
RBC # BLD AUTO: 4.1 10*6/MM3 (ref 3.89–5.14)
SODIUM BLD-SCNC: 135 MMOL/L (ref 132–146)
WBC NRBC COR # BLD: 5.89 10*3/MM3 (ref 3.5–10.8)

## 2017-06-07 PROCEDURE — 36415 COLL VENOUS BLD VENIPUNCTURE: CPT | Performed by: NURSE PRACTITIONER

## 2017-06-07 PROCEDURE — 80053 COMPREHEN METABOLIC PANEL: CPT | Performed by: NURSE PRACTITIONER

## 2017-06-07 PROCEDURE — 99213 OFFICE O/P EST LOW 20 MIN: CPT | Performed by: NURSE PRACTITIONER

## 2017-06-07 PROCEDURE — G0439 PPPS, SUBSEQ VISIT: HCPCS | Performed by: NURSE PRACTITIONER

## 2017-06-07 PROCEDURE — 85025 COMPLETE CBC W/AUTO DIFF WBC: CPT | Performed by: NURSE PRACTITIONER

## 2017-06-07 RX ORDER — ZOLEDRONIC ACID 5 MG/100ML
5 INJECTION, SOLUTION INTRAVENOUS ONCE
Status: DISCONTINUED | OUTPATIENT
Start: 2017-06-07 | End: 2018-05-11 | Stop reason: HOSPADM

## 2017-06-07 NOTE — PROGRESS NOTES
QUICK REFERENCE INFORMATION:  The ABCs of the Annual Wellness Visit    Subsequent Medicare Wellness Visit    HEALTH RISK ASSESSMENT    1940    Recent Hospitalizations:  Recently treated at the following:  [facility].        Current Medical Providers:  Patient Care Team:  Sriram Springer MD as PCP - General  Sriram Springer MD as PCP - Family Medicine  Sriram Springer MD as PCP - Claims Attributed   Bri Sharif - Cardiology        Smoking Status:  History   Smoking Status   • Former Smoker   • Packs/day: 1.00   • Years: 20.00   • Types: Cigarettes   Smokeless Tobacco   • Never Used     Comment: Remote history       Alcohol Consumption:  History   Alcohol Use No       Depression Screen:   No flowsheet data found.    Health Habits and Functional and Cognitive Screening:  No flowsheet data found.           Does the patient have evidence of cognitive impairment? No    Aspirin use counseling: Does not need ASA (and currently is not on it)      Recent Lab Results:  CMP:  Lab Results   Component Value Date    BUN 10 03/15/2017    CREATININE 0.80 03/15/2017    EGFRIFNONA 70 03/15/2017    BCR 12.5 03/15/2017     03/15/2017    K 4.5 03/15/2017    CO2 32.0 (H) 03/15/2017    CALCIUM 9.9 03/15/2017    ALBUMIN 4.00 03/15/2017    LABIL2 1.3 (L) 03/15/2017    BILITOT 0.4 03/15/2017    ALKPHOS 180 (H) 03/15/2017    AST 28 03/15/2017    ALT 12 03/15/2017     Lipid Panel:  Lab Results   Component Value Date    CHLPL 148 05/16/2016    TRIG 77 12/08/2016    HDL 59 12/08/2016     HbA1c:  Lab Results   Component Value Date    HGBA1C 5.3 11/04/2015       Visual Acuity:  No exam data present    Age-appropriate Screening Schedule:  Refer to the list below for future screening recommendations based on patient's age, sex and/or medical conditions. Orders for these recommended tests are listed in the plan section. The patient has been provided with a written plan.    Health Maintenance   Topic Date Due    • TDAP/TD VACCINES (1 - Tdap) 02/26/2008   • PNEUMOCOCCAL VACCINES (65+ LOW/MEDIUM RISK) (2 of 2 - PCV13) 03/27/2015   • ZOSTER VACCINE  04/28/2016   • INFLUENZA VACCINE  08/01/2017   • LIPID PANEL  12/08/2017   • DXA SCAN  04/20/2018   • COLONOSCOPY  01/01/2019   • MAMMOGRAM  04/21/2019        Subjective   History of Present Illness    Mirian Sy is a 77 y.o. female who presents for an Subsequent Wellness Visit.    The following portions of the patient's history were reviewed and updated as appropriate: allergies, current medications, past family history, past medical history, past social history, past surgical history and problem list.    Outpatient Medications Prior to Visit   Medication Sig Dispense Refill   • apixaban (ELIQUIS) 5 MG tablet tablet Take 1 tablet by mouth every 12 (twelve) hours. 180 tablet 1   • calcium-vitamin D (OSCAL-500) 500-200 MG-UNIT per tablet Take 1 tablet by mouth every morning.     • citalopram (CeleXA) 20 MG tablet TAKE 1 TABLET EVERY MORNING 90 tablet 1   • diltiazem XR (DILACOR XR) 120 MG 24 hr capsule Take 1 capsule by mouth Every Night. 90 capsule 3   • flecainide (TAMBOCOR) 50 MG tablet Take 0.5 tablets by mouth Every 12 (Twelve) Hours. 90 tablet 0   • levETIRAcetam (KEPPRA) 250 MG tablet Take 1 tablet by mouth Every 12 (Twelve) Hours. 180 tablet 1   • magnesium oxide (MAGOX) 400 (241.3 MG) MG tablet tablet Take 400 mg by mouth Daily.     • megestrol acetate (MEGACE) 400 MG/10ML suspension oral suspension Take 5 mL by mouth every morning. 450 mL 1   • mirtazapine (REMERON) 7.5 MG tablet TAKE 1 TABLET AT BEDTIME 90 tablet 1   • pantoprazole (PROTONIX) 20 MG EC tablet Take two times per week Monday and Fridays 30 tablet 1   • ranitidine (ZANTAC) 150 MG capsule Take 1 capsule by mouth Every Night. 90 capsule 3     No facility-administered medications prior to visit.        Patient Active Problem List   Diagnosis   • Abnormal gait   • Cardiomyopathy   • Carpal tunnel syndrome  "  • Complex partial epilepsy   • Depression   • Gastric ulcer   • Hyperlipidemia   • Hypertension   • Hyponatremia   • Nutritional anemia   • Dyssomnia   • Visual disturbance   • Xeroderma   • Preventative health care   • Frailty   • Fatigue   • Tricuspid regurgitation   • Cerebrovascular accident   • Syncope   • DVT (deep venous thrombosis)   • Anorexia   • Anxiety   • Patent foramen ovale   • Mitral regurgitation   • Atrial fibrillation with RVR   • Low weight       Advance Care Planning:  has an advance directive - a copy HAS NOT been provided    Identification of Risk Factors:  Risk factors include: weight  and cardiovascular risk.    Review of Systems    Compared to one year ago, the patient feels her physical health is better.  Compared to one year ago, the patient feels her mental health is better.    Objective     Physical Exam    Vitals:    06/07/17 0900   BP: 94/60   BP Location: Left arm   Patient Position: Sitting   Pulse: 84  Comment: irregular   Resp: 16  Comment: normal   Temp: 98.1 °F (36.7 °C)   TempSrc: Oral   SpO2: 98%  Comment: RA   Weight: 108 lb (49 kg)   Height: 62\" (157.5 cm)       Body mass index is 19.75 kg/(m^2).  Discussed the patient's BMI with her. The BMI is in the acceptable range.    Assessment/Plan   Patient Self-Management and Personalized Health Advice  The patient has been provided with information about: diet, exercise and weight management and preventive services including:   · Colorectal cancer screening, colonoscopy referral placed, Exercise counseling provided, Fall Risk assessment done, Pneumococcal vaccine , TdaP vaccine, Zostavax vaccine (Herpes Zoster).    Visit Diagnoses:  No diagnosis found.    No orders of the defined types were placed in this encounter.      Outpatient Encounter Prescriptions as of 6/7/2017   Medication Sig Dispense Refill   • apixaban (ELIQUIS) 5 MG tablet tablet Take 1 tablet by mouth every 12 (twelve) hours. 180 tablet 1   • calcium-vitamin D " (OSCAL-500) 500-200 MG-UNIT per tablet Take 1 tablet by mouth every morning.     • citalopram (CeleXA) 20 MG tablet TAKE 1 TABLET EVERY MORNING 90 tablet 1   • diltiazem XR (DILACOR XR) 120 MG 24 hr capsule Take 1 capsule by mouth Every Night. 90 capsule 3   • flecainide (TAMBOCOR) 50 MG tablet Take 0.5 tablets by mouth Every 12 (Twelve) Hours. 90 tablet 0   • levETIRAcetam (KEPPRA) 250 MG tablet Take 1 tablet by mouth Every 12 (Twelve) Hours. 180 tablet 1   • magnesium oxide (MAGOX) 400 (241.3 MG) MG tablet tablet Take 400 mg by mouth Daily.     • megestrol acetate (MEGACE) 400 MG/10ML suspension oral suspension Take 5 mL by mouth every morning. 450 mL 1   • mirtazapine (REMERON) 7.5 MG tablet TAKE 1 TABLET AT BEDTIME 90 tablet 1   • pantoprazole (PROTONIX) 20 MG EC tablet Take two times per week Monday and Fridays 30 tablet 1   • ranitidine (ZANTAC) 150 MG capsule Take 1 capsule by mouth Every Night. 90 capsule 3     No facility-administered encounter medications on file as of 6/7/2017.        Reviewed use of high risk medication in the elderly: not applicable  Reviewed for potential of harmful drug interactions in the elderly: not applicable    Follow Up:  No Follow-up on file.     An After Visit Summary and PPPS with all of these plans were given to the patient.       The wellness exam has been reviewed in detail.  The patient has been fully counseled on preventative guidelines for vaccines, cancer screenings, and other health maintenance needs.  Functional testing has been performed to assess capacity for independent living and need for other medical interventions.   The patient was counseled on maintaining a lifestyle to promote good health and to minimize chronic diseases.  The patient has been assisted with scheduling healthcare procedures for the coming year and given a written document outlining these recommendations.    Plan colonoscopy, Prevnar vaccine, Tdap vaccine, this year.     Electronically signed  by Dana Charles, APRN 6/7/2017 12:49 PM

## 2017-06-07 NOTE — PROGRESS NOTES
Subjective   Mirian Sy is a 77 y.o. female.     History of Present Illness     1. Pt here for pelvic exam. Last pelvic was done 3 years ago. No history of abnormal Pap. Pt has ovarian cyst removal in 1996. Last mammogram was 4/21/17 - normal.     2. Osteoporosis: Pt had DEXA done in April 2016, which showed t-score of -3.7 in wrist. She has failed Alendronate due to GI distress. She has history of fractured right femur in 2015 and left hip fracture in 2104. She tries to walk 1/2 mile daily. Takes Calcium/Vit D daily, eats 1-2 servings of dairy daily.     3. Pt c/o lump on left upper back, which seems to be getting larger. No pain or itching. She has history of basal cell cancers.     4. Hypertension: Pt reports her home BP has been below 120 systolic. She does not think she has been taking Diltiazem 120 mg daily as on her med list.     The following portions of the patient's history were reviewed and updated as appropriate: allergies, current medications, past family history, past medical history, past social history, past surgical history and problem list.    Review of Systems   Constitutional: Negative for fatigue and fever.   HENT: Negative for congestion and sore throat.    Eyes: Negative for pain and itching.   Respiratory: Negative for cough and shortness of breath.    Cardiovascular: Negative for chest pain, palpitations and leg swelling.   Gastrointestinal: Negative for abdominal pain, constipation, diarrhea and nausea.   Endocrine: Negative for cold intolerance and heat intolerance.   Genitourinary: Negative for dysuria, frequency, hematuria, pelvic pain, vaginal bleeding and vaginal discharge.   Musculoskeletal: Negative for arthralgias and back pain.   Skin: Negative for rash and wound.   Allergic/Immunologic: Negative for environmental allergies and food allergies.   Neurological: Negative for dizziness and headaches.   Hematological: Negative for adenopathy. Does not bruise/bleed easily.  "  Psychiatric/Behavioral: Negative for sleep disturbance. The patient is not nervous/anxious.        Objective   Blood pressure 94/60, pulse 84, temperature 98.1 °F (36.7 °C), temperature source Oral, resp. rate 16, height 62\" (157.5 cm), weight 108 lb (49 kg), SpO2 98 %.    Physical Exam   Constitutional: She is oriented to person, place, and time. She appears well-developed and well-nourished.   HENT:   Right Ear: Tympanic membrane and ear canal normal.   Left Ear: Tympanic membrane and ear canal normal.   Mouth/Throat: Oropharynx is clear and moist.   Cardiovascular: Normal rate, regular rhythm and normal heart sounds.    No murmur heard.  Pulses:       Femoral pulses are 2+ on the right side, and 2+ on the left side.  No edema   Pulmonary/Chest: Effort normal and breath sounds normal. Right breast exhibits no tenderness. Left breast exhibits no tenderness.   2+ fibrocystic changes bilateral breasts   Abdominal: Soft. Normal appearance and bowel sounds are normal. There is no hepatosplenomegaly. There is no tenderness. Hernia confirmed negative in the right inguinal area and confirmed negative in the left inguinal area.   Genitourinary: Pelvic exam was performed with patient supine. There is no rash, tenderness or lesion on the right labia. There is no rash, tenderness or lesion on the left labia.   Lymphadenopathy:     She has no cervical adenopathy.     She has no axillary adenopathy.        Right: No inguinal adenopathy present.        Left: No inguinal adenopathy present.   Neurological: She is alert and oriented to person, place, and time.   Skin: Skin is warm and dry. Lesion (6mm raised dark pink lesion with depressed center left upper back; no drainage or bleeding) noted.   Psychiatric: She has a normal mood and affect. Her speech is normal and behavior is normal.   Vitals reviewed.    Procedures  Assessment/Plan   Mirian was seen today for annual exam.    Diagnoses and all orders for this visit:    Atrial " fibrillation with RVR  -     CBC & Differential  -     CBC Auto Differential    Essential hypertension  -     Comprehensive Metabolic Panel    Preventative health care    Osteoporosis  -     zoledronic acid (RECLAST) infusion 5 mg; Infuse 100 mL into a venous catheter 1 (One) Time.    Senile osteoporosis    Vaginal atrophy  -     Pap IG, Rfx HPV ASCU      1. Pelvic and Pap test done today. Pt has moderate dryness of vaginal mucosa. Pt was given contact information to schedule Ovarian Cancer Screening at  this Spring.     2. Osteoporosis: Pt has significant osteoporosis of wrist per DEXA with history of right femur and left hip fracture in the past 3 years. She has failed Alendronate due to GI distress. Will refer for Reclast infusion this Spring. She is to continue walking daily, taking calcium/Vit D daily and eating 2 servings of dairy daily. Plan to repeat DEXA in 2 years.    3. Hypertension: Pt's BP is adequately controlled currently. She is to check to see if she is taking Diltiazem. If not, will hold this medication at this time. She is to continue tracking her BP & P at home 3-4 times/week with readings to each visit.     4. Skin Lesion left upper back: Pt is to make appt with dermatologist soon for evaluation of lesion left upper back.    Continue other meds.    Fasting f/u in 3 months.    Patient Instructions   1. Pelvic and Pap done today.    2. Wellness done today.    3. Eye exam due.    4. Plan referral for Reclast infusion.     5. Get Shingles vaccine at pharmacy.    6. Check home medications to see if you are taking Diltiazem - will discuss when called about lab results.    7. Track BP & P 3-4 times/week with readings to each visit.    8. Ovarian Cancer Screening at  due - call [phone] to schedule.    9. Make appt with dermatologist for evaluation of skin lesion left upper back.    10. Continue other meds.    11. Fasting f/u in 3 months.              Electronically signed by Dana Charles  APRN 6/7/2017 1:06 PM

## 2017-06-07 NOTE — PATIENT INSTRUCTIONS
1. Pelvic and Pap done today.    2. Wellness done today.    3. Eye exam due.    4. Plan referral for Reclast infusion.     5. Get Shingles vaccine at pharmacy.    6. Check home medications to see if you are taking Diltiazem - will discuss when called about lab results.    7. Track BP & P 3-4 times/week with readings to each visit.    8. Ovarian Cancer Screening at  due - call [phone] to schedule.    9. Make appt with dermatologist for evaluation of skin lesion left upper back.    10. Continue other meds.    11. Fasting f/u in 3 months.

## 2017-06-08 ENCOUNTER — TELEPHONE (OUTPATIENT)
Dept: INTERNAL MEDICINE | Facility: CLINIC | Age: 77
End: 2017-06-08

## 2017-06-10 LAB
CONV .: NORMAL
CYTOLOGIST CVX/VAG CYTO: NORMAL
CYTOLOGY CVX/VAG DOC THIN PREP: NORMAL
DX ICD CODE: NORMAL
HIV 1 & 2 AB SER-IMP: NORMAL
OTHER STN SPEC: NORMAL
PATH REPORT.FINAL DX SPEC: NORMAL
STAT OF ADQ CVX/VAG CYTO-IMP: NORMAL

## 2017-06-12 ENCOUNTER — TELEPHONE (OUTPATIENT)
Dept: INTERNAL MEDICINE | Facility: CLINIC | Age: 77
End: 2017-06-12

## 2017-06-13 ENCOUNTER — TELEPHONE (OUTPATIENT)
Dept: INTERNAL MEDICINE | Facility: CLINIC | Age: 77
End: 2017-06-13

## 2017-06-22 ENCOUNTER — TELEPHONE (OUTPATIENT)
Dept: INTERNAL MEDICINE | Facility: CLINIC | Age: 77
End: 2017-06-22

## 2017-06-22 DIAGNOSIS — M81.0 OSTEOPOROSIS: Primary | ICD-10-CM

## 2017-06-22 NOTE — TELEPHONE ENCOUNTER
----- Message from Radha Spencer sent at 6/22/2017  1:56 PM EDT -----  Contact: [facility]  NEEDS REFERRAL PUT INTO [facility] FOR IV INFUSION. RECLAST ORDER IS ALREADY IN CHART. [phone]

## 2017-06-23 ENCOUNTER — INFUSION (OUTPATIENT)
Dept: ONCOLOGY | Facility: HOSPITAL | Age: 77
End: 2017-06-23

## 2017-06-23 VITALS
BODY MASS INDEX: 20.06 KG/M2 | DIASTOLIC BLOOD PRESSURE: 86 MMHG | HEART RATE: 126 BPM | TEMPERATURE: 98.9 F | SYSTOLIC BLOOD PRESSURE: 135 MMHG | WEIGHT: 109 LBS | RESPIRATION RATE: 18 BRPM | HEIGHT: 62 IN

## 2017-06-23 DIAGNOSIS — M81.0 OSTEOPOROSIS: Primary | ICD-10-CM

## 2017-06-23 LAB — CREAT BLDA-MCNC: 0.8 MG/DL (ref 0.6–1.3)

## 2017-06-23 PROCEDURE — 25010000002 ZOLEDRONIC ACID 5 MG/100ML SOLUTION: Performed by: NURSE PRACTITIONER

## 2017-06-23 PROCEDURE — 96365 THER/PROPH/DIAG IV INF INIT: CPT

## 2017-06-23 PROCEDURE — 82565 ASSAY OF CREATININE: CPT

## 2017-06-23 RX ORDER — ZOLEDRONIC ACID 5 MG/100ML
5 INJECTION, SOLUTION INTRAVENOUS ONCE
Status: CANCELLED | OUTPATIENT
Start: 2017-06-23

## 2017-06-23 RX ORDER — ZOLEDRONIC ACID 5 MG/100ML
5 INJECTION, SOLUTION INTRAVENOUS ONCE
Status: COMPLETED | OUTPATIENT
Start: 2017-06-23 | End: 2017-06-23

## 2017-06-23 RX ADMIN — ZOLEDRONIC ACID 5 MG: 0.05 INJECTION, SOLUTION INTRAVENOUS at 14:02

## 2017-07-13 RX ORDER — APIXABAN 5 MG/1
TABLET, FILM COATED ORAL
Qty: 180 TABLET | Refills: 1 | Status: SHIPPED | OUTPATIENT
Start: 2017-07-13 | End: 2018-01-03 | Stop reason: SDUPTHER

## 2017-08-16 RX ORDER — LEVETIRACETAM 250 MG/1
TABLET ORAL
Qty: 180 TABLET | Refills: 1 | Status: SHIPPED | OUTPATIENT
Start: 2017-08-16 | End: 2018-01-24 | Stop reason: SDUPTHER

## 2017-09-07 ENCOUNTER — APPOINTMENT (OUTPATIENT)
Dept: LAB | Facility: HOSPITAL | Age: 77
End: 2017-09-07

## 2017-09-07 ENCOUNTER — OFFICE VISIT (OUTPATIENT)
Dept: INTERNAL MEDICINE | Facility: CLINIC | Age: 77
End: 2017-09-07

## 2017-09-07 VITALS
WEIGHT: 106 LBS | BODY MASS INDEX: 19.39 KG/M2 | TEMPERATURE: 97.6 F | RESPIRATION RATE: 16 BRPM | HEART RATE: 100 BPM | DIASTOLIC BLOOD PRESSURE: 70 MMHG | SYSTOLIC BLOOD PRESSURE: 104 MMHG | OXYGEN SATURATION: 98 %

## 2017-09-07 DIAGNOSIS — G40.209 PARTIAL SYMPTOMATIC EPILEPSY WITH COMPLEX PARTIAL SEIZURES, NOT INTRACTABLE, WITHOUT STATUS EPILEPTICUS (HCC): ICD-10-CM

## 2017-09-07 DIAGNOSIS — F32.89 OTHER DEPRESSION: ICD-10-CM

## 2017-09-07 DIAGNOSIS — I10 ESSENTIAL HYPERTENSION: ICD-10-CM

## 2017-09-07 DIAGNOSIS — R54 FRAILTY: ICD-10-CM

## 2017-09-07 DIAGNOSIS — R26.9 ABNORMAL GAIT: ICD-10-CM

## 2017-09-07 DIAGNOSIS — I48.91 ATRIAL FIBRILLATION WITH RVR (HCC): Primary | ICD-10-CM

## 2017-09-07 DIAGNOSIS — D53.9 NUTRITIONAL ANEMIA: ICD-10-CM

## 2017-09-07 DIAGNOSIS — R63.6 LOW WEIGHT: ICD-10-CM

## 2017-09-07 LAB
ALBUMIN SERPL-MCNC: 4.3 G/DL (ref 3.2–4.8)
ALBUMIN/GLOB SERPL: 1.2 G/DL (ref 1.5–2.5)
ALP SERPL-CCNC: 131 U/L (ref 25–100)
ALT SERPL W P-5'-P-CCNC: 11 U/L (ref 7–40)
ANION GAP SERPL CALCULATED.3IONS-SCNC: 7 MMOL/L (ref 3–11)
AST SERPL-CCNC: 26 U/L (ref 0–33)
BASOPHILS # BLD AUTO: 0.05 10*3/MM3 (ref 0–0.2)
BASOPHILS NFR BLD AUTO: 0.8 % (ref 0–1)
BILIRUB SERPL-MCNC: 0.6 MG/DL (ref 0.3–1.2)
BUN BLD-MCNC: 11 MG/DL (ref 9–23)
BUN/CREAT SERPL: 12.2 (ref 7–25)
CALCIUM SPEC-SCNC: 9.9 MG/DL (ref 8.7–10.4)
CHLORIDE SERPL-SCNC: 98 MMOL/L (ref 99–109)
CO2 SERPL-SCNC: 29 MMOL/L (ref 20–31)
CREAT BLD-MCNC: 0.9 MG/DL (ref 0.6–1.3)
DEPRECATED RDW RBC AUTO: 45.9 FL (ref 37–54)
EOSINOPHIL # BLD AUTO: 0.08 10*3/MM3 (ref 0–0.3)
EOSINOPHIL NFR BLD AUTO: 1.3 % (ref 0–3)
ERYTHROCYTE [DISTWIDTH] IN BLOOD BY AUTOMATED COUNT: 13.9 % (ref 11.3–14.5)
GFR SERPL CREATININE-BSD FRML MDRD: 61 ML/MIN/1.73
GLOBULIN UR ELPH-MCNC: 3.5 GM/DL
GLUCOSE BLD-MCNC: 94 MG/DL (ref 70–100)
HCT VFR BLD AUTO: 38 % (ref 34.5–44)
HGB BLD-MCNC: 12.4 G/DL (ref 11.5–15.5)
IMM GRANULOCYTES # BLD: 0.01 10*3/MM3 (ref 0–0.03)
IMM GRANULOCYTES NFR BLD: 0.2 % (ref 0–0.6)
LYMPHOCYTES # BLD AUTO: 1.73 10*3/MM3 (ref 0.6–4.8)
LYMPHOCYTES NFR BLD AUTO: 27.6 % (ref 24–44)
MCH RBC QN AUTO: 29.7 PG (ref 27–31)
MCHC RBC AUTO-ENTMCNC: 32.6 G/DL (ref 32–36)
MCV RBC AUTO: 91.1 FL (ref 80–99)
MONOCYTES # BLD AUTO: 0.66 10*3/MM3 (ref 0–1)
MONOCYTES NFR BLD AUTO: 10.5 % (ref 0–12)
NEUTROPHILS # BLD AUTO: 3.74 10*3/MM3 (ref 1.5–8.3)
NEUTROPHILS NFR BLD AUTO: 59.6 % (ref 41–71)
PLATELET # BLD AUTO: 304 10*3/MM3 (ref 150–450)
PMV BLD AUTO: 9.8 FL (ref 6–12)
POTASSIUM BLD-SCNC: 4.4 MMOL/L (ref 3.5–5.5)
PROT SERPL-MCNC: 7.8 G/DL (ref 5.7–8.2)
RBC # BLD AUTO: 4.17 10*6/MM3 (ref 3.89–5.14)
SODIUM BLD-SCNC: 134 MMOL/L (ref 132–146)
WBC NRBC COR # BLD: 6.27 10*3/MM3 (ref 3.5–10.8)

## 2017-09-07 PROCEDURE — 36415 COLL VENOUS BLD VENIPUNCTURE: CPT | Performed by: INTERNAL MEDICINE

## 2017-09-07 PROCEDURE — 80053 COMPREHEN METABOLIC PANEL: CPT | Performed by: INTERNAL MEDICINE

## 2017-09-07 PROCEDURE — 99214 OFFICE O/P EST MOD 30 MIN: CPT | Performed by: INTERNAL MEDICINE

## 2017-09-07 PROCEDURE — 85025 COMPLETE CBC W/AUTO DIFF WBC: CPT | Performed by: INTERNAL MEDICINE

## 2017-09-07 NOTE — PROGRESS NOTES
Subjective   Mirian Sy is a 77 y.o. female.     History of Present Illness     Patient has a 12 year history of persistent hypertension and a defibrillation.  She is done well on diltiazem with blood pressures averaging 120 systolic.  She has failed HCTZ due to hyponatremia.  She has had a CVA associated with atrial fibrillation and remains on Eliquis and flecainide.  She has had no recent palpitations chest pains or dyspnea on exertion.     The following portions of the patient's history were reviewed and updated as appropriate: allergies, current medications, past family history, past medical history, past social history, past surgical history and problem list.    Review of Systems   Constitutional: Negative for appetite change and fatigue.   HENT: Negative for ear pain and sore throat.    Respiratory: Negative for cough and shortness of breath.    Cardiovascular: Negative for chest pain and palpitations.   Gastrointestinal: Negative for abdominal pain and nausea.   Musculoskeletal: Negative for arthralgias and back pain.   Neurological: Negative for dizziness, seizures and headaches.        No Seizures this year   Psychiatric/Behavioral: Positive for sleep disturbance. The patient is nervous/anxious.         Anxiety and sleep disturbance responding well to mirtazapine and citalopram       Objective   Blood pressure 104/70, pulse 100, temperature 97.6 °F (36.4 °C), temperature source Oral, resp. rate 16, weight 106 lb (48.1 kg), SpO2 98 %.    Physical Exam   Constitutional: She is oriented to person, place, and time. She appears well-developed and well-nourished.   Neck: Normal range of motion. Neck supple. No JVD present.   Cardiovascular: Normal rate and regular rhythm.    Systolic click and murmur at apex   Pulmonary/Chest: Effort normal and breath sounds normal. She has no wheezes. She has no rales.   Abdominal: Soft. Bowel sounds are normal. She exhibits no distension and no mass. There is no  tenderness.   Lymphadenopathy:     She has no cervical adenopathy.   Neurological: She is alert and oriented to person, place, and time. She exhibits normal muscle tone. Coordination normal.   Psychiatric: She has a normal mood and affect. Her behavior is normal. Judgment and thought content normal.   Nursing note and vitals reviewed.    Procedures  Assessment/Plan   Mirian was seen today for hyperlipidemia.    Diagnoses and all orders for this visit:    Atrial fibrillation with RVR    Essential hypertension  -     Comprehensive Metabolic Panel    Abnormal gait    Other depression    Partial symptomatic epilepsy with complex partial seizures, not intractable, without status epilepticus    Low weight    Frailty    Nutritional anemia  -     CBC & Differential  -     CBC Auto Differential    The patient's hypertension and a defibrillation have become a progressive cardiovascular risk for her in recent years.  Her blood pressure and heart rate of responding well to diltiazem and flecainide.  She must remain on long-term anticoagulation.    The patient's atrial fibrillation has been complicated by a CVA with associated seizure disorder.  Those have both been responsive to medical nutritional therapy including long-term Keppra.    Her health is been extremely complicated with insomnia and clinical depression.  Citalopram and mirtazapine have greatly stabilized the symptom complex.    The patient's had many years of GERD with indigestion and heartburn.  I've asked her to continue weaning off of pantoprazole for long-term safety.    Patient Instructions   1.  Continue usual medicines and supplements - as listed.    2.  Follow well-balanced diet  -  with 3 meals daily.    3.  Walk daily through your home - maintain strength and balance.    4.  The nurse will call with test results.    5.  Return visit December - nonfasting checkup.    6.  Blood count and chemistry panel are normal.    Electronically signed Sriram Springer  M.D.9/9/2017 10:45 AM

## 2017-09-07 NOTE — PATIENT INSTRUCTIONS
1.  Continue usual medicines and supplements - as listed.    2.  Follow well-balanced diet  -  with 3 meals daily.    3.  Walk daily through your home - maintain strength and balance.    4.  The nurse will call with test results.    5.  Return visit December - nonfasting checkup.

## 2017-09-12 ENCOUNTER — TELEPHONE (OUTPATIENT)
Dept: INTERNAL MEDICINE | Facility: CLINIC | Age: 77
End: 2017-09-12

## 2017-09-14 RX ORDER — CITALOPRAM 20 MG/1
TABLET ORAL
Qty: 90 TABLET | Refills: 1 | Status: SHIPPED | OUTPATIENT
Start: 2017-09-14 | End: 2018-03-30 | Stop reason: SDUPTHER

## 2017-11-06 RX ORDER — MIRTAZAPINE 7.5 MG/1
TABLET, FILM COATED ORAL
Qty: 90 TABLET | Refills: 1 | Status: SHIPPED | OUTPATIENT
Start: 2017-11-06 | End: 2018-06-05 | Stop reason: SDUPTHER

## 2017-11-06 RX ORDER — PANTOPRAZOLE SODIUM 20 MG/1
TABLET, DELAYED RELEASE ORAL
Qty: 26 TABLET | Refills: 1 | Status: SHIPPED | OUTPATIENT
Start: 2017-11-06 | End: 2017-12-08

## 2017-11-24 RX ORDER — DILTIAZEM HYDROCHLORIDE 120 MG/1
CAPSULE, EXTENDED RELEASE ORAL
Qty: 90 CAPSULE | Refills: 3 | Status: CANCELLED | OUTPATIENT
Start: 2017-11-24

## 2017-11-27 ENCOUNTER — TELEPHONE (OUTPATIENT)
Dept: INTERNAL MEDICINE | Facility: CLINIC | Age: 77
End: 2017-11-27

## 2017-11-27 DIAGNOSIS — I48.0 PAF (PAROXYSMAL ATRIAL FIBRILLATION) (HCC): ICD-10-CM

## 2017-11-27 RX ORDER — FLECAINIDE ACETATE 50 MG/1
25 TABLET ORAL EVERY 12 HOURS
Qty: 90 TABLET | Refills: 0 | Status: SHIPPED | OUTPATIENT
Start: 2017-11-27 | End: 2018-02-23 | Stop reason: SDUPTHER

## 2017-11-27 RX ORDER — DILTIAZEM HYDROCHLORIDE 120 MG/1
120 CAPSULE, EXTENDED RELEASE ORAL NIGHTLY
Qty: 90 CAPSULE | Refills: 3 | Status: SHIPPED | OUTPATIENT
Start: 2017-11-27 | End: 2019-02-05 | Stop reason: SDUPTHER

## 2017-11-27 NOTE — TELEPHONE ENCOUNTER
----- Message from Radha Spencer sent at 11/27/2017  9:04 AM EST -----  Contact: [facility]  [facility] MED RENEWAL- FLECAINIDE 50 MG- [facility]

## 2017-12-08 ENCOUNTER — OFFICE VISIT (OUTPATIENT)
Dept: INTERNAL MEDICINE | Facility: CLINIC | Age: 77
End: 2017-12-08

## 2017-12-08 VITALS
HEIGHT: 62 IN | HEART RATE: 116 BPM | WEIGHT: 108 LBS | RESPIRATION RATE: 16 BRPM | SYSTOLIC BLOOD PRESSURE: 130 MMHG | OXYGEN SATURATION: 96 % | BODY MASS INDEX: 19.88 KG/M2 | TEMPERATURE: 97.8 F | DIASTOLIC BLOOD PRESSURE: 86 MMHG

## 2017-12-08 DIAGNOSIS — R26.9 ABNORMAL GAIT: ICD-10-CM

## 2017-12-08 DIAGNOSIS — R63.6 LOW WEIGHT: ICD-10-CM

## 2017-12-08 DIAGNOSIS — G47.9 DYSSOMNIA: ICD-10-CM

## 2017-12-08 DIAGNOSIS — I48.91 ATRIAL FIBRILLATION WITH RVR (HCC): ICD-10-CM

## 2017-12-08 DIAGNOSIS — I10 ESSENTIAL HYPERTENSION: Primary | ICD-10-CM

## 2017-12-08 DIAGNOSIS — G40.209 PARTIAL SYMPTOMATIC EPILEPSY WITH COMPLEX PARTIAL SEIZURES, NOT INTRACTABLE, WITHOUT STATUS EPILEPTICUS (HCC): ICD-10-CM

## 2017-12-08 PROCEDURE — 90662 IIV NO PRSV INCREASED AG IM: CPT | Performed by: INTERNAL MEDICINE

## 2017-12-08 PROCEDURE — 99213 OFFICE O/P EST LOW 20 MIN: CPT | Performed by: INTERNAL MEDICINE

## 2017-12-08 PROCEDURE — G0008 ADMIN INFLUENZA VIRUS VAC: HCPCS | Performed by: INTERNAL MEDICINE

## 2017-12-08 NOTE — PROGRESS NOTES
"Subjective   Mirian Sy is a 77 y.o. female.     History of Present Illness     The patient's had several years of persistent GERD symptoms.  She has been on Protonix with good success.  She has been cutting down off of Protonix for long-term safety.  She does have MCI associated with a CVA.  She also has postmenopausal osteoporosis.  Her symptoms have been minimal in recent months.    The following portions of the patient's history were reviewed and updated as appropriate: allergies, current medications, past family history, past medical history, past social history, past surgical history and problem list.    Review of Systems   Constitutional: Negative for appetite change and fatigue.   Respiratory: Negative for cough and shortness of breath.    Gastrointestinal: Negative for abdominal distention and nausea.   Neurological: Negative for dizziness, seizures and light-headedness.   Psychiatric/Behavioral: Negative for sleep disturbance. The patient is not nervous/anxious.        Objective   Blood pressure 130/86, pulse 116, temperature 97.8 °F (36.6 °C), temperature source Oral, resp. rate 16, height 157.5 cm (62\"), weight 49 kg (108 lb), SpO2 96 %.    Physical Exam   Constitutional: She is oriented to person, place, and time. She appears well-developed and well-nourished. No distress.   Cardiovascular: Normal rate and regular rhythm.    Systolic click and murmur at apex   Pulmonary/Chest: Effort normal and breath sounds normal. She has no wheezes. She has no rales.   Neurological: She is alert and oriented to person, place, and time.   Psychiatric: She has a normal mood and affect. Her behavior is normal. Judgment and thought content normal.   Nursing note and vitals reviewed.    Procedures  Assessment/Plan   Mirian was seen today for heartburn.    Diagnoses and all orders for this visit:    Essential hypertension    Partial symptomatic epilepsy with complex partial seizures, not intractable, without status " epilepticus    Abnormal gait    Dyssomnia    Low weight    Atrial fibrillation with RVR    Other orders  -     Flu Vaccine High Dose PF 65YR+ ([id number])      I've asked the patient to stop Protonix completely now and rely on ranitidine and an acids.  PPIs poses significant risk to this warm and at 77 years old.    The patient's seizures are in complete remission on Keppra.  She has had no falls or loss of consciousness in recent months.    The patient has substantial clinical depression and sleep disturbance.  She is doing remarkably well on citalopram and mirtazapine.  Quality of life is excellent living in her independent home.    The patient has substantial essential hypertension and paroxysmal atrial flecainide.  Her blood pressure is doing well on diltiazem.  Her a defibrillation is in remission on flecainide.  She should continue on Eliquis to prevent repeated embolization and CVAs.    Patient Instructions   1.  Usual medicines and supplements - as listed.    2.  Stop Protonix - continue ranitidine every day - for acid suppression.    3.  Use Pepto-Bismol 1 tablespoon as needed - for indigestion.    4.  Walk daily - maintain physical fitness.    5.  Return visit 3 months - annual checkup fasting.    Electronically signed Sriram Springer M.D.12/10/2017 2:36 PM

## 2017-12-08 NOTE — PATIENT INSTRUCTIONS
1.  Usual medicines and supplements - as listed.    2.  Stop Protonix - continue ranitidine every day - for acid suppression.    3.  Use Pepto-Bismol 1 tablespoon as needed - for indigestion.    4.  Walk daily - maintain physical fitness.    5.  Return visit 3 months - annual checkup fasting.

## 2018-01-03 RX ORDER — APIXABAN 5 MG/1
TABLET, FILM COATED ORAL
Qty: 180 TABLET | Refills: 1 | Status: SHIPPED | OUTPATIENT
Start: 2018-01-03 | End: 2018-06-13 | Stop reason: SDUPTHER

## 2018-01-25 RX ORDER — LEVETIRACETAM 250 MG/1
TABLET ORAL
Qty: 180 TABLET | Refills: 1 | Status: SHIPPED | OUTPATIENT
Start: 2018-01-25 | End: 2018-09-10 | Stop reason: SDUPTHER

## 2018-02-23 DIAGNOSIS — I48.0 PAF (PAROXYSMAL ATRIAL FIBRILLATION) (HCC): ICD-10-CM

## 2018-02-23 RX ORDER — FLECAINIDE ACETATE 50 MG/1
TABLET ORAL
Qty: 90 TABLET | Refills: 1 | Status: SHIPPED | OUTPATIENT
Start: 2018-02-23 | End: 2018-03-30 | Stop reason: SDUPTHER

## 2018-02-26 RX ORDER — RANITIDINE 150 MG/1
CAPSULE ORAL
Qty: 90 CAPSULE | Refills: 1 | Status: SHIPPED | OUTPATIENT
Start: 2018-02-26 | End: 2018-08-08 | Stop reason: SDUPTHER

## 2018-03-06 ENCOUNTER — OFFICE VISIT (OUTPATIENT)
Dept: INTERNAL MEDICINE | Facility: CLINIC | Age: 78
End: 2018-03-06

## 2018-03-06 ENCOUNTER — APPOINTMENT (OUTPATIENT)
Dept: LAB | Facility: HOSPITAL | Age: 78
End: 2018-03-06

## 2018-03-06 VITALS
OXYGEN SATURATION: 96 % | DIASTOLIC BLOOD PRESSURE: 80 MMHG | WEIGHT: 107 LBS | HEIGHT: 62 IN | RESPIRATION RATE: 16 BRPM | HEART RATE: 108 BPM | BODY MASS INDEX: 19.69 KG/M2 | TEMPERATURE: 97.9 F | SYSTOLIC BLOOD PRESSURE: 124 MMHG

## 2018-03-06 DIAGNOSIS — F41.9 ANXIETY: ICD-10-CM

## 2018-03-06 DIAGNOSIS — G47.9 DYSSOMNIA: ICD-10-CM

## 2018-03-06 DIAGNOSIS — R63.0 ANOREXIA: ICD-10-CM

## 2018-03-06 DIAGNOSIS — K29.30 CHRONIC SUPERFICIAL GASTRITIS WITHOUT BLEEDING: ICD-10-CM

## 2018-03-06 DIAGNOSIS — E78.2 MIXED HYPERLIPIDEMIA: ICD-10-CM

## 2018-03-06 DIAGNOSIS — M81.0 SENILE OSTEOPOROSIS: ICD-10-CM

## 2018-03-06 DIAGNOSIS — D53.9 NUTRITIONAL ANEMIA: ICD-10-CM

## 2018-03-06 DIAGNOSIS — F32.89 OTHER DEPRESSION: ICD-10-CM

## 2018-03-06 DIAGNOSIS — I48.91 ATRIAL FIBRILLATION WITH RVR (HCC): Primary | ICD-10-CM

## 2018-03-06 DIAGNOSIS — I10 ESSENTIAL HYPERTENSION: ICD-10-CM

## 2018-03-06 DIAGNOSIS — E53.9 VITAMIN B DEFICIENCY: ICD-10-CM

## 2018-03-06 DIAGNOSIS — I42.9 CARDIOMYOPATHY, UNSPECIFIED TYPE (HCC): ICD-10-CM

## 2018-03-06 DIAGNOSIS — R63.6 LOW WEIGHT: ICD-10-CM

## 2018-03-06 DIAGNOSIS — R26.9 ABNORMAL GAIT: ICD-10-CM

## 2018-03-06 DIAGNOSIS — G40.209 PARTIAL SYMPTOMATIC EPILEPSY WITH COMPLEX PARTIAL SEIZURES, NOT INTRACTABLE, WITHOUT STATUS EPILEPTICUS (HCC): ICD-10-CM

## 2018-03-06 LAB
ALBUMIN SERPL-MCNC: 4.3 G/DL (ref 3.2–4.8)
ALBUMIN/GLOB SERPL: 1.3 G/DL (ref 1.5–2.5)
ALP SERPL-CCNC: 124 U/L (ref 25–100)
ALT SERPL W P-5'-P-CCNC: 13 U/L (ref 7–40)
ANION GAP SERPL CALCULATED.3IONS-SCNC: 9 MMOL/L (ref 3–11)
ARTICHOKE IGE QN: 130 MG/DL (ref 0–130)
AST SERPL-CCNC: 26 U/L (ref 0–33)
BACTERIA UR QL AUTO: ABNORMAL /HPF
BASOPHILS # BLD AUTO: 0.05 10*3/MM3 (ref 0–0.2)
BASOPHILS NFR BLD AUTO: 0.7 % (ref 0–1)
BILIRUB SERPL-MCNC: 0.6 MG/DL (ref 0.3–1.2)
BILIRUB UR QL STRIP: NEGATIVE
BUN BLD-MCNC: 14 MG/DL (ref 9–23)
BUN/CREAT SERPL: 14 (ref 7–25)
CALCIUM SPEC-SCNC: 9.3 MG/DL (ref 8.7–10.4)
CHLORIDE SERPL-SCNC: 97 MMOL/L (ref 99–109)
CHOLEST SERPL-MCNC: 170 MG/DL (ref 0–200)
CLARITY UR: CLEAR
CO2 SERPL-SCNC: 30 MMOL/L (ref 20–31)
COLOR UR: ABNORMAL
CREAT BLD-MCNC: 1 MG/DL (ref 0.6–1.3)
CRP SERPL-MCNC: 4.54 MG/DL (ref 0–1)
DEPRECATED RDW RBC AUTO: 44.9 FL (ref 37–54)
EOSINOPHIL # BLD AUTO: 0.27 10*3/MM3 (ref 0–0.3)
EOSINOPHIL NFR BLD AUTO: 4 % (ref 0–3)
ERYTHROCYTE [DISTWIDTH] IN BLOOD BY AUTOMATED COUNT: 13.7 % (ref 11.3–14.5)
GFR SERPL CREATININE-BSD FRML MDRD: 54 ML/MIN/1.73
GLOBULIN UR ELPH-MCNC: 3.2 GM/DL
GLUCOSE BLD-MCNC: 89 MG/DL (ref 70–100)
GLUCOSE UR STRIP-MCNC: NEGATIVE MG/DL
HCT VFR BLD AUTO: 36.2 % (ref 34.5–44)
HDLC SERPL-MCNC: 52 MG/DL (ref 40–60)
HGB BLD-MCNC: 11.8 G/DL (ref 11.5–15.5)
HGB UR QL STRIP.AUTO: NEGATIVE
HYALINE CASTS UR QL AUTO: ABNORMAL /LPF
IMM GRANULOCYTES # BLD: 0.01 10*3/MM3 (ref 0–0.03)
IMM GRANULOCYTES NFR BLD: 0.1 % (ref 0–0.6)
IRON 24H UR-MRATE: 34 MCG/DL (ref 50–175)
IRON SATN MFR SERPL: 13 % (ref 15–50)
KETONES UR QL STRIP: NEGATIVE
LEUKOCYTE ESTERASE UR QL STRIP.AUTO: ABNORMAL
LYMPHOCYTES # BLD AUTO: 1.45 10*3/MM3 (ref 0.6–4.8)
LYMPHOCYTES NFR BLD AUTO: 21.6 % (ref 24–44)
MCH RBC QN AUTO: 29.1 PG (ref 27–31)
MCHC RBC AUTO-ENTMCNC: 32.6 G/DL (ref 32–36)
MCV RBC AUTO: 89.2 FL (ref 80–99)
MONOCYTES # BLD AUTO: 0.84 10*3/MM3 (ref 0–1)
MONOCYTES NFR BLD AUTO: 12.5 % (ref 0–12)
MUCOUS THREADS URNS QL MICRO: ABNORMAL /HPF
NEUTROPHILS # BLD AUTO: 4.1 10*3/MM3 (ref 1.5–8.3)
NEUTROPHILS NFR BLD AUTO: 61.1 % (ref 41–71)
NITRITE UR QL STRIP: NEGATIVE
PH UR STRIP.AUTO: 6.5 [PH] (ref 5–8)
PLATELET # BLD AUTO: 325 10*3/MM3 (ref 150–450)
PMV BLD AUTO: 10.1 FL (ref 6–12)
POTASSIUM BLD-SCNC: 4 MMOL/L (ref 3.5–5.5)
PREALB SERPL-MCNC: 7.6 MG/DL (ref 10–40)
PROT SERPL-MCNC: 7.5 G/DL (ref 5.7–8.2)
PROT UR QL STRIP: NEGATIVE
RBC # BLD AUTO: 4.06 10*6/MM3 (ref 3.89–5.14)
RBC # UR: ABNORMAL /HPF
REF LAB TEST METHOD: ABNORMAL
SODIUM BLD-SCNC: 136 MMOL/L (ref 132–146)
SP GR UR STRIP: 1.02 (ref 1–1.03)
SQUAMOUS #/AREA URNS HPF: ABNORMAL /HPF
TIBC SERPL-MCNC: 259 MCG/DL (ref 250–450)
TRIGL SERPL-MCNC: 76 MG/DL (ref 0–150)
TSH SERPL DL<=0.05 MIU/L-ACNC: 0.88 MIU/ML (ref 0.35–5.35)
UROBILINOGEN UR QL STRIP: ABNORMAL
VIT B12 BLD-MCNC: 773 PG/ML (ref 211–911)
WBC NRBC COR # BLD: 6.72 10*3/MM3 (ref 3.5–10.8)
WBC UR QL AUTO: ABNORMAL /HPF

## 2018-03-06 PROCEDURE — 80299 QUANTITATIVE ASSAY DRUG: CPT | Performed by: INTERNAL MEDICINE

## 2018-03-06 PROCEDURE — 84134 ASSAY OF PREALBUMIN: CPT | Performed by: INTERNAL MEDICINE

## 2018-03-06 PROCEDURE — 81001 URINALYSIS AUTO W/SCOPE: CPT | Performed by: INTERNAL MEDICINE

## 2018-03-06 PROCEDURE — 85025 COMPLETE CBC W/AUTO DIFF WBC: CPT | Performed by: INTERNAL MEDICINE

## 2018-03-06 PROCEDURE — 93000 ELECTROCARDIOGRAM COMPLETE: CPT | Performed by: INTERNAL MEDICINE

## 2018-03-06 PROCEDURE — 83550 IRON BINDING TEST: CPT | Performed by: INTERNAL MEDICINE

## 2018-03-06 PROCEDURE — 83540 ASSAY OF IRON: CPT | Performed by: INTERNAL MEDICINE

## 2018-03-06 PROCEDURE — 99215 OFFICE O/P EST HI 40 MIN: CPT | Performed by: INTERNAL MEDICINE

## 2018-03-06 PROCEDURE — 86140 C-REACTIVE PROTEIN: CPT | Performed by: INTERNAL MEDICINE

## 2018-03-06 PROCEDURE — 80177 DRUG SCRN QUAN LEVETIRACETAM: CPT | Performed by: INTERNAL MEDICINE

## 2018-03-06 PROCEDURE — 36415 COLL VENOUS BLD VENIPUNCTURE: CPT | Performed by: INTERNAL MEDICINE

## 2018-03-06 PROCEDURE — 84443 ASSAY THYROID STIM HORMONE: CPT | Performed by: INTERNAL MEDICINE

## 2018-03-06 PROCEDURE — 82607 VITAMIN B-12: CPT | Performed by: INTERNAL MEDICINE

## 2018-03-06 PROCEDURE — 80061 LIPID PANEL: CPT | Performed by: INTERNAL MEDICINE

## 2018-03-06 PROCEDURE — 80053 COMPREHEN METABOLIC PANEL: CPT | Performed by: INTERNAL MEDICINE

## 2018-03-06 NOTE — PROGRESS NOTES
Subjective   Mirian Sy is a 78 y.o. female.     Chief Complaint   Patient presents with   • Atrial Fibrillation       History of Present Illness     The patient developed atrial fibrillation in 2005 and had intermittent progressive cardiovascular disease since.  She suffered a CVA - apparently embolic.  She suffered severe coronary spasm in 2006 verified with cardiac catheterization.  She has worked with cardiologists and has stabilized on flecainide.  She was in sinus rhythm last year.  She has been on anticoagulation for several years.  She has no recent chest pains or palpitations.  Recent echocardiograms have shown ejection fraction 60% with severe mitral regurgitation.    The following portions of the patient's history were reviewed and updated as appropriate: allergies, current medications, past family history, past medical history, past social history, past surgical history and problem list.    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/16/2016   • Depression 05/16/2016   • Chronic gastritis 05/16/2016   • Hyperlipidemia 05/16/2016   • Hypertension 05/16/2016   • Hyponatremia 05/16/2016   • Nutritional anemia 05/16/2016   • Dyssomnia 05/16/2016   • Xeroderma 05/16/2016   • Preventative health care 07/18/2016   • Frailty 09/15/2016   • Tricuspid regurgitation 09/15/2016   • Cerebrovascular accident 09/22/2016   • Syncope 09/22/2016   • DVT (deep venous thrombosis) 09/22/2016   • Anorexia 09/22/2016   • Anxiety 09/22/2016   • Patent foramen ovale    • Mitral regurgitation 09/30/2016   • Atrial fibrillation with RVR 10/09/2016   • Low weight 12/08/2016   • Senile osteoporosis 06/07/2017     Resolved Ambulatory Problems     Diagnosis Date Noted   • Abnormal weight loss 05/16/2016   • Atrial fibrillation 05/16/2016   • Fracture of femur 05/16/2016   • Visual disturbance  2016   • Fatigue 09/15/2016   • Shaky 09/15/2016   • Shortness of breath 09/15/2016   • Urinary tract infection without hematuria 2016   • Tobacco abuse 2016   • VHD (valvular heart disease) 2016   • Atrial fibrillation with rapid ventricular response 10/11/2016   • Osteoporosis 2017     Past Medical History:   Diagnosis Date   • Atrial fibrillation    • Basal cell carcinoma    • Cardiomyopathy    • Cerebrovascular accident 2005   • Complex partial epilepsy    • Coronary artery vasospasm    • Decubitus ulcer of buttock 2014   • Depression    • DVT (deep venous thrombosis)    • Fracture of bone of forefoot    • GERD (gastroesophageal reflux disease)    • HTN (hypertension)    • Low body weight due to inadequate caloric intake Adulthood   • Mitral valve prolapse    • Osteoarthritis    • Osteoporosis    • Patent foramen ovale    • Pneumonia    • SCC (squamous cell carcinoma), arm, right 2017   • Scoliosis    • Syncope    • Varicose veins      Past Surgical History:   Procedure Laterality Date   • ACHILLES TENDON SURGERY Left     Repair of rupture left tendon with screws   • BREAST SURGERY Left     Excision benign cyst   • CARDIAC CATHETERIZATION      Severe acute coronary spasm   • CATARACT EXTRACTION WITH INTRAOCULAR LENS IMPLANT Bilateral    • EYE CAPSULOTOMY WITH LASER Left     Marked visual improvement   • FEMUR FRACTURE SURGERY Right     Repair of shaft fracture   • HIP FRACTURE SURGERY Left     left hip fracture with pin   • LUMBAR DISC SURGERY     • OVARIAN CYST REMOVAL Left    • SKIN CANCER EXCISION Right     SCC - arm     Family History   Problem Relation Age of Onset   • Breast cancer Mother       age 59   • Diabetes Mother    • Hypertension Mother    • Heart disease Father       age 80   • Other Sister      Arthrodesis cervical   • Basal cell carcinoma Sister    • Bone  cancer Sister    • Breast cancer Sister 57   • Diabetes Sister       age 56   • Hypertension Sister    • Melanoma Sister    • Arrhythmia Sister      Sinus   • Stroke Sister    • Allergies Son      Hay fever   • Diabetes Maternal Uncle    • Breast cancer Sister 59   • Breast cancer Sister 66     Social History     Social History   • Marital status:      Spouse name: N/A   • Number of children: N/A   • Years of education: N/A     Occupational History   • Not on file.     Social History Main Topics   • Smoking status: Former Smoker     Packs/day: 1.00     Years: 20.00     Types: Cigarettes   • Smokeless tobacco: Never Used      Comment: Remote history   • Alcohol use No   • Drug use: No   • Sexual activity: Not on file     Other Topics Concern   • Not on file     Social History Narrative    Domestic life   lives in private home alone - good support from local children        Taoist    [employer]        Sleep hygiene  in bed 9 PM to 6 AM for 9 hours of sleep        Caffeine use   1 1/2 cups coffee daily        Exercise habits  walks most days of the week. - She will remain normal.        Diet   well-balanced diet with protein supplementations        Occupation    retired         Hearing  no impairment        Vision    no glasses needed after cataract surgery.          Driving   daytime only - good weather - local traffic             Review of Systems   Constitutional: Negative for appetite change and fatigue.   HENT: Negative for ear pain and sore throat.    Eyes: Negative for itching and visual disturbance.   Respiratory: Negative for cough and shortness of breath.    Cardiovascular: Negative for chest pain and palpitations.   Gastrointestinal: Negative for abdominal pain and nausea.   Endocrine: Negative for cold intolerance and heat intolerance.   Genitourinary: Negative for dysuria and hematuria.   Musculoskeletal: Negative for arthralgias and back pain.   Skin: Negative for rash and wound.  "  Allergic/Immunologic: Negative for environmental allergies and food allergies.   Neurological: Negative for dizziness, seizures and headaches.        No seizures in the last year   Hematological: Negative for adenopathy. Does not bruise/bleed easily.   Psychiatric/Behavioral: Positive for dysphoric mood. Negative for sleep disturbance. The patient is nervous/anxious.         Anxiety and depression controlled with citalopram       Objective   Blood pressure 124/80, pulse 108, temperature 97.9 °F (36.6 °C), temperature source Oral, resp. rate 16, height 157.5 cm (62\"), weight 48.5 kg (107 lb), SpO2 96 %.    Physical Exam   Constitutional: She is oriented to person, place, and time. She appears well-developed and well-nourished. No distress.   HENT:   Right Ear: External ear normal.   Left Ear: External ear normal.   Nose: Nose normal.   Mouth/Throat: Oropharynx is clear and moist.   Upper and lower dentures   Eyes: EOM are normal. Pupils are equal, round, and reactive to light. No scleral icterus.   Neck: Normal range of motion. Neck supple. No JVD present. No thyromegaly present.   Cardiovascular: Intact distal pulses.    Start murmur and click at apex.  Rhythm is irregularly irregular with mild tachycardia    Pulmonary/Chest: Effort normal and breath sounds normal. She has no wheezes. She has no rales.   Abdominal: Soft. Bowel sounds are normal. She exhibits no distension and no mass. There is no tenderness.   Genitourinary:   Genitourinary Comments: Deferred   Musculoskeletal: Normal range of motion. She exhibits no edema or tenderness.   Lymphadenopathy:     She has no cervical adenopathy.   Neurological: She is alert and oriented to person, place, and time. She displays normal reflexes. No cranial nerve deficit. She exhibits normal muscle tone. Coordination normal.   Vibratory normal  Romberg negative  Gait wide-based and shuffling but independent and safe   Plantars downgoing     Skin: Skin is warm and dry. No " rash noted.   Breast exam normal   Psychiatric: She has a normal mood and affect. Her behavior is normal. Judgment and thought content normal.   Nursing note and vitals reviewed.      ECG 12 Lead  Date/Time: 3/6/2018 11:00 AM  Performed by: SRIRAM SPRINGER  Authorized by: SRIRAM SPRINGER   Interpreted by ED physician: Sriram Springer M.D.  Comparison: compared with previous ECG from 5/20/2017  Comparison to previous ECG: Atrial fibrillation is new  Rhythm: atrial fibrillation  Rate: tachycardic  BPM: 100  Conduction: conduction normal  T flattening: I, III, II, aVR, aVL and aVF  QRS axis: normal  Other findings: PRWP  Clinical impression: abnormal ECG  Comments: Indication - atrial fibrillation  Atrial fibrillation rhythm has recurred          Assessment/Plan   Mirian was seen today for atrial fibrillation.    Diagnoses and all orders for this visit:    Atrial fibrillation with RVR  -     Flecainide Level  -     ECG 12 Lead    Cardiomyopathy, unspecified type    Mixed hyperlipidemia  -     Comprehensive Metabolic Panel  -     Lipid Panel    Essential hypertension  -     Urinalysis With / Microscopic If Indicated - Urine, Clean Catch  -     Urinalysis, Microscopic Only - Urine, Clean Catch; Future  -     Urinalysis, Microscopic Only - Urine, Clean Catch    Chronic superficial gastritis without bleeding  -     C-reactive Protein    Partial symptomatic epilepsy with complex partial seizures, not intractable, without status epilepticus  -     Levetiracetam Level (Keppra)    Senile osteoporosis    Nutritional anemia  -     CBC & Differential  -     Iron Profile  -     CBC Auto Differential    Abnormal gait    Anorexia  -     TSH    Anxiety    Other depression    Dyssomnia    Low weight  -     Prealbumin    Vitamin B deficiency  -     Vitamin B12      The patient's cardiovascular disease has severely worse and now with her back in atrial fibrillation while on flecainide.  Her heart rate is accelerated.  Flecainide  blood levels are pending.  She may benefit from higher doses, digoxin, or possibly sotalol.  She should remain on anticoagulation.    Patient's had many years of hypertension.  Diltiazem has done well for blood pressure control and has also helped maintain an appropriate heart rate.    The patient's had lifelong history of low body weight.  Her BMI remains below 20.  Her prealbumin level is mildly low and she should increase protein intake.    The patient's had moderate chronic depression since her CVA.  She has stabilized on citalopram in the day and mirtazapine at night.  Her overall quality of life is good - living in her home alone with close support from her family.    The patient has experienced partial epilepsy in the last 4 years.  It has been for remission on Keppra.      The patient's had many years of GERD and chronic superficial gastritis.  She has excellent control of symptoms with ranitidine.    Patient Instructions   1.  Continue usual medicines and supplements - as listed.    2.  Eat a well-balanced diet - maintain current weight.    3.  Walk daily - maintain strength and fitness.    4.  Obtain the Prevnar 13 vaccine - at local pharmacy.    5.  Return visit 3 months - fasting checkup and wellness exam.    6.  Consider increasing flecainide for rhythm control.    7.  Consider digoxin for rate control.    8.  Pre-albumin remains low at 7.6.  Eat one extra Glucerna protein bars every day.    9.  Iron blood level mildly low.  Start ferrous sulfate 325 MG each morning.    10.  Urinalysis has 12 white blood cells.  Drink 1 glass of water extra every morning.  No antibiotics unless significant symptoms.    11.  Keppra and flecainide blood tests are pending.    12.  Other laboratory tests are acceptable and require no change in treatment.    Current Outpatient Prescriptions:   •  ferrous sulfate 325 (65 FE) MG tablet, Take 1 tablet by mouth Daily With Breakfast., Disp: , Rfl:   •  calcium-vitamin D  (OSCAL-500) 500-200 MG-UNIT per tablet, Take 1 tablet by mouth every morning., Disp: , Rfl:   •  citalopram (CeleXA) 20 MG tablet, TAKE 1 TABLET EVERY MORNING, Disp: 90 tablet, Rfl: 1  •  diltiazem XR (DILACOR XR) 120 MG 24 hr capsule, Take 1 capsule by mouth Every Night., Disp: 90 capsule, Rfl: 3  •  ELIQUIS 5 MG tablet tablet, TAKE 1 TABLET TWICE A DAY, Disp: 180 tablet, Rfl: 1  •  flecainide (TAMBOCOR) 50 MG tablet, TAKE ONE-HALF (1/2) TABLET EVERY 12 HOURS, Disp: 90 tablet, Rfl: 1  •  levETIRAcetam (KEPPRA) 250 MG tablet, TAKE 1 TABLET EVERY 12 HOURS, Disp: 180 tablet, Rfl: 1  •  magnesium oxide (MAGOX) 400 (241.3 MG) MG tablet tablet, Take 400 mg by mouth Daily., Disp: , Rfl:   •  megestrol acetate (MEGACE) 400 MG/10ML suspension oral suspension, Take 5 mL by mouth every morning., Disp: 450 mL, Rfl: 1  •  mirtazapine (REMERON) 7.5 MG tablet, TAKE 1 TABLET AT BEDTIME, Disp: 90 tablet, Rfl: 1  •  ranitidine (ZANTAC) 150 MG capsule, TAKE 1 CAPSULE EVERY NIGHT, Disp: 90 capsule, Rfl: 1    Current Facility-Administered Medications:   •  zoledronic acid (RECLAST) infusion 5 mg, 5 mg, Intravenous, Once, GREG Lee    Allergies   Allergen Reactions   • Actonel [Risedronate Sodium] GI Intolerance   • Hctz [Hydrochlorothiazide]      hyponatremia   • Influenza Vaccines Myalgia   • Lisinopril      hyperkalemia       Immunization History   Administered Date(s) Administered   • Flu Vaccine High Dose PF 65YR+ 09/30/2016, 12/08/2017   • Influenza, Quadrivalent 10/28/2015   • Pneumococcal Polysaccharide (PPSV23) 10/05/2005, 03/27/2014   • Td 01/01/1998, 02/25/2008   • Zoster 09/01/2017     Electronically signed Sriram Springer M.D.3/7/2018 6:58 AM

## 2018-03-06 NOTE — PATIENT INSTRUCTIONS
1.  Continue usual medicines and supplements - as listed.    2.  Eat a well-balanced diet - maintain current weight.    3.  Walk daily - maintain strength and fitness.    4.  Obtain the Prevnar 13 vaccine - at local pharmacy.    5.  Return visit 3 months - fasting checkup and wellness exam.

## 2018-03-07 RX ORDER — FERROUS SULFATE 325(65) MG
1 TABLET ORAL
COMMUNITY
End: 2018-04-20 | Stop reason: SDUPTHER

## 2018-03-08 ENCOUNTER — TELEPHONE (OUTPATIENT)
Dept: INTERNAL MEDICINE | Facility: CLINIC | Age: 78
End: 2018-03-08

## 2018-03-08 NOTE — TELEPHONE ENCOUNTER
Called labs to pt.  Per TGF:    -Pre-albumin remains low at 7.6.  Eat one extra Glucerna protein bars every day.     - Iron blood level mildly low.  Start ferrous sulfate 325 MG each morning.     -Urinalysis has 12 white blood cells.  Drink 1 glass of water extra every morning.  No antibiotics unless significant symptoms.     -Keppra and flecainide blood tests are pending.  We will call pt when we have those.     -Other laboratory tests are acceptable and require no change in treatment.    Pt verb understanding.

## 2018-03-09 LAB
FLECAINIDE SERPL-MCNC: 0.3 UG/ML (ref 0.2–1)
LEVETIRACETAM SERPL-MCNC: 18.1 UG/ML (ref 10–40)

## 2018-03-09 NOTE — TELEPHONE ENCOUNTER
TGF aware of levels for flecanaide 18.1 and keppra 0.30.  He states he will f/up w adjustments this weekend .

## 2018-03-12 ENCOUNTER — TRANSCRIBE ORDERS (OUTPATIENT)
Dept: ADMINISTRATIVE | Facility: HOSPITAL | Age: 78
End: 2018-03-12

## 2018-03-12 DIAGNOSIS — Z12.31 VISIT FOR SCREENING MAMMOGRAM: Primary | ICD-10-CM

## 2018-03-30 ENCOUNTER — TELEPHONE (OUTPATIENT)
Dept: INTERNAL MEDICINE | Facility: CLINIC | Age: 78
End: 2018-03-30

## 2018-03-30 DIAGNOSIS — I48.0 PAF (PAROXYSMAL ATRIAL FIBRILLATION) (HCC): ICD-10-CM

## 2018-03-30 RX ORDER — FLECAINIDE ACETATE 50 MG/1
50 TABLET ORAL EVERY 12 HOURS SCHEDULED
Qty: 180 TABLET | Refills: 1
Start: 2018-03-30 | End: 2018-07-10 | Stop reason: SDUPTHER

## 2018-03-30 RX ORDER — CITALOPRAM 20 MG/1
TABLET ORAL
Qty: 90 TABLET | Refills: 1 | Status: SHIPPED | OUTPATIENT
Start: 2018-03-30 | End: 2018-10-17 | Stop reason: SDUPTHER

## 2018-03-30 NOTE — TELEPHONE ENCOUNTER
Called pt per TGF.  Flecanaid titer 0.3.  TGF discussed w/ Dr Olguin.  Pt to increase flecanaid to whole 50 mg table Q 12 hrs. Ox this month.  Pt verb understanding re dose increase. Transferred to  for April ox.

## 2018-04-19 RX ORDER — PANTOPRAZOLE SODIUM 20 MG/1
TABLET, DELAYED RELEASE ORAL
Qty: 26 TABLET | Refills: 1 | OUTPATIENT
Start: 2018-04-19

## 2018-04-20 ENCOUNTER — OFFICE VISIT (OUTPATIENT)
Dept: INTERNAL MEDICINE | Facility: CLINIC | Age: 78
End: 2018-04-20

## 2018-04-20 VITALS
WEIGHT: 105 LBS | HEART RATE: 80 BPM | BODY MASS INDEX: 19.2 KG/M2 | TEMPERATURE: 97.7 F | RESPIRATION RATE: 16 BRPM | OXYGEN SATURATION: 97 % | DIASTOLIC BLOOD PRESSURE: 70 MMHG | SYSTOLIC BLOOD PRESSURE: 110 MMHG

## 2018-04-20 DIAGNOSIS — I48.91 ATRIAL FIBRILLATION WITH RVR (HCC): Primary | ICD-10-CM

## 2018-04-20 DIAGNOSIS — I10 ESSENTIAL HYPERTENSION: ICD-10-CM

## 2018-04-20 DIAGNOSIS — E61.1 IRON DEFICIENCY: ICD-10-CM

## 2018-04-20 DIAGNOSIS — D53.9 NUTRITIONAL ANEMIA: ICD-10-CM

## 2018-04-20 PROCEDURE — 99213 OFFICE O/P EST LOW 20 MIN: CPT | Performed by: INTERNAL MEDICINE

## 2018-04-20 RX ORDER — FERROUS SULFATE 325(65) MG
1 TABLET ORAL
Qty: 90 TABLET | Refills: 3 | Status: SHIPPED | OUTPATIENT
Start: 2018-04-20 | End: 2018-04-30 | Stop reason: SDUPTHER

## 2018-04-20 NOTE — PATIENT INSTRUCTIONS
1.  Continue usual medicines and supplements - as listed.    2.  Follow well-balanced diet - drink Glucerna every day.    3.  Start ferrous sulfate one tablet daily - to improve iron.    4.  Walk with a walking camilla every day - maintain strength.    5.  Return visit June 6 - fasting checkup.

## 2018-04-20 NOTE — PROGRESS NOTES
Subjective   Mirian Sy is a 78 y.o. female.     History of Present Illness     The patient's had many year history of recurring anemia.  She has had iron deficiency and is on chronic anticoagulation.  She was admitted in October with acute prostration and acute anemia.  She has had episodes of severe tachycardia and is been better regulated on diltiazem.  She has had regular visits with the cardiologist.  She has had no evidence of acute bleeding this winter.    The following portions of the patient's history were reviewed and updated as appropriate: allergies, current medications, past family history, past medical history, past social history, past surgical history and problem list.    Review of Systems   Constitutional: Negative for appetite change and fatigue.   Respiratory: Negative for cough and shortness of breath.    Cardiovascular: Negative for chest pain and palpitations.   Gastrointestinal: Negative for abdominal distention and nausea.   Neurological: Negative for dizziness, seizures and light-headedness.   Psychiatric/Behavioral:        Sleep adequate on mirtazapine       Objective   Blood pressure 110/70, pulse 80, temperature 97.7 °F (36.5 °C), temperature source Oral, resp. rate 16, weight 47.6 kg (105 lb), SpO2 97 %.    Physical Exam   Constitutional: She is oriented to person, place, and time. She appears well-developed and well-nourished. No distress.   HENT:   Right Ear: External ear normal.   Left Ear: External ear normal.   Mouth/Throat: Oropharynx is clear and moist.   Neck: Normal range of motion. Neck supple. No JVD present. No thyromegaly present.   Cardiovascular: Normal rate.    Heart rhythm is irregularly irregular.  Systolic click and murmur at apex   Pulmonary/Chest: Effort normal and breath sounds normal. She has no wheezes. She has no rales.   Lymphadenopathy:     She has no cervical adenopathy.   Neurological: She is alert and oriented to person, place, and time. She exhibits  normal muscle tone. Coordination normal.   Psychiatric: She has a normal mood and affect. Her behavior is normal. Judgment and thought content normal.   Nursing note and vitals reviewed.    Procedures  Assessment/Plan   Mirian was seen today for anemia.    Diagnoses and all orders for this visit:    Atrial fibrillation with RVR    Essential hypertension    Iron deficiency    Nutritional anemia    Other orders  -     ferrous sulfate 325 (65 FE) MG tablet; Take 1 tablet by mouth Daily With Breakfast.      The patient remains significantly iron deficient.  Her hematocrit has improved this winter but she is at high risk of recurring anemia particularly when she has no iron reserve.  I've asked her to take ferrous sulfate for the remainder of this year.    The patient has chronic atrial fibrillation and remains in a regular rhythm.  It is unclear whether flecainide offers her benefit at this time.    Patient has essential hypertension which is well controlled on diltiazem.  He has chronic low body weight and likely develops dehydration easily.  She requires caution with new cardiology medication.    Patient Instructions   1.  Continue usual medicines and supplements - as listed.    2.  Follow well-balanced diet - drink Glucerna every day.    3.  Start ferrous sulfate one tablet daily - to improve iron.    4.  Walk with a walking camilla every day - maintain strength.    5.  Return visit June 6 - fasting checkup.    Electronically signed Sriram Springer M.D.4/22/2018 3:50 PM

## 2018-04-23 ENCOUNTER — HOSPITAL ENCOUNTER (OUTPATIENT)
Dept: MAMMOGRAPHY | Facility: HOSPITAL | Age: 78
Discharge: HOME | End: 2018-04-23
Attending: INTERNAL MEDICINE | Admitting: INTERNAL MEDICINE

## 2018-04-23 DIAGNOSIS — Z12.31 VISIT FOR SCREENING MAMMOGRAM: ICD-10-CM

## 2018-04-23 PROCEDURE — 77067 SCR MAMMO BI INCL CAD: CPT

## 2018-04-23 PROCEDURE — 77067 SCR MAMMO BI INCL CAD: CPT | Performed by: RADIOLOGY

## 2018-04-23 PROCEDURE — 77063 BREAST TOMOSYNTHESIS BI: CPT | Performed by: RADIOLOGY

## 2018-04-23 PROCEDURE — 77063 BREAST TOMOSYNTHESIS BI: CPT

## 2018-04-30 ENCOUNTER — TELEPHONE (OUTPATIENT)
Dept: INTERNAL MEDICINE | Facility: CLINIC | Age: 78
End: 2018-04-30

## 2018-04-30 RX ORDER — FERROUS SULFATE 325(65) MG
1 TABLET ORAL
Qty: 90 TABLET | Refills: 3 | Status: SHIPPED | OUTPATIENT
Start: 2018-04-30 | End: 2019-06-28 | Stop reason: SDUPTHER

## 2018-04-30 NOTE — TELEPHONE ENCOUNTER
PT IS CALLING AND TGF WAS GOING TO CALL HER IRON PILLS INTO [facility] AND SHE DOESN'T THINK THEY WERE CALLED IN - CAN WE PLEASE DO THIS FOR HER?

## 2018-05-08 ENCOUNTER — APPOINTMENT (OUTPATIENT)
Dept: GENERAL RADIOLOGY | Facility: HOSPITAL | Age: 78
End: 2018-05-08

## 2018-05-08 ENCOUNTER — APPOINTMENT (OUTPATIENT)
Dept: CT IMAGING | Facility: HOSPITAL | Age: 78
End: 2018-05-08

## 2018-05-08 ENCOUNTER — HOSPITAL ENCOUNTER (OUTPATIENT)
Facility: HOSPITAL | Age: 78
Setting detail: OBSERVATION
Discharge: NURSING, REHAB OR LONG-TERM CARE | End: 2018-05-11
Attending: EMERGENCY MEDICINE | Admitting: HOSPITALIST

## 2018-05-08 DIAGNOSIS — S32.392A: ICD-10-CM
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
## 2018-05-08 DIAGNOSIS — Z78.9 IMPAIRED MOBILITY AND ADLS: ICD-10-CM

## 2018-05-08 DIAGNOSIS — Z74.09 IMPAIRED MOBILITY AND ADLS: ICD-10-CM

## 2018-05-08 DIAGNOSIS — R03.0 ELEVATED BLOOD PRESSURE READING: ICD-10-CM
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
## 2018-05-08 DIAGNOSIS — Z74.09 IMPAIRED FUNCTIONAL MOBILITY, BALANCE, GAIT, AND ENDURANCE: ICD-10-CM

## 2018-05-08 PROBLEM — D64.9 ANEMIA: Status: ACTIVE | Noted: 2018-05-08

## 2018-05-08 PROBLEM — S32.302A CLOSED FRACTURE OF LEFT ILIUM (HCC): Status: ACTIVE | Noted: 2018-05-08

## 2018-05-08 PROBLEM — K21.9 GERD (GASTROESOPHAGEAL REFLUX DISEASE): Status: ACTIVE | Noted: 2018-05-08

## 2018-05-08 PROBLEM — I48.0 PAF (PAROXYSMAL ATRIAL FIBRILLATION) (HCC): Status: ACTIVE | Noted: 2018-05-08

## 2018-05-08 LAB
ALBUMIN SERPL-MCNC: 3.9 G/DL (ref 3.2–4.8)
ALBUMIN/GLOB SERPL: 1.1 G/DL (ref 1.5–2.5)
ALP SERPL-CCNC: 102 U/L (ref 25–100)
ALT SERPL W P-5'-P-CCNC: 13 U/L (ref 7–40)
ANION GAP SERPL CALCULATED.3IONS-SCNC: 5 MMOL/L (ref 3–11)
AST SERPL-CCNC: 27 U/L (ref 0–33)
BASOPHILS # BLD AUTO: 0.02 10*3/MM3 (ref 0–0.2)
BASOPHILS NFR BLD AUTO: 0.3 % (ref 0–1)
BILIRUB SERPL-MCNC: 0.7 MG/DL (ref 0.3–1.2)
BUN BLD-MCNC: 12 MG/DL (ref 9–23)
BUN/CREAT SERPL: 13.3 (ref 7–25)
CALCIUM SPEC-SCNC: 9.2 MG/DL (ref 8.7–10.4)
CHLORIDE SERPL-SCNC: 99 MMOL/L (ref 99–109)
CO2 SERPL-SCNC: 26 MMOL/L (ref 20–31)
CREAT BLD-MCNC: 0.9 MG/DL (ref 0.6–1.3)
DEPRECATED RDW RBC AUTO: 46 FL (ref 37–54)
EOSINOPHIL # BLD AUTO: 0.02 10*3/MM3 (ref 0–0.3)
EOSINOPHIL NFR BLD AUTO: 0.3 % (ref 0–3)
ERYTHROCYTE [DISTWIDTH] IN BLOOD BY AUTOMATED COUNT: 14.4 % (ref 11.3–14.5)
GFR SERPL CREATININE-BSD FRML MDRD: 61 ML/MIN/1.73
GLOBULIN UR ELPH-MCNC: 3.4 GM/DL
GLUCOSE BLD-MCNC: 117 MG/DL (ref 70–100)
HCT VFR BLD AUTO: 33.8 % (ref 34.5–44)
HGB BLD-MCNC: 11.4 G/DL (ref 11.5–15.5)
IMM GRANULOCYTES # BLD: 0.02 10*3/MM3 (ref 0–0.03)
IMM GRANULOCYTES NFR BLD: 0.3 % (ref 0–0.6)
LYMPHOCYTES # BLD AUTO: 0.83 10*3/MM3 (ref 0.6–4.8)
LYMPHOCYTES NFR BLD AUTO: 10.4 % (ref 24–44)
MCH RBC QN AUTO: 29.5 PG (ref 27–31)
MCHC RBC AUTO-ENTMCNC: 33.7 G/DL (ref 32–36)
MCV RBC AUTO: 87.6 FL (ref 80–99)
MONOCYTES # BLD AUTO: 0.6 10*3/MM3 (ref 0–1)
MONOCYTES NFR BLD AUTO: 7.5 % (ref 0–12)
NEUTROPHILS # BLD AUTO: 6.49 10*3/MM3 (ref 1.5–8.3)
NEUTROPHILS NFR BLD AUTO: 81.2 % (ref 41–71)
PLATELET # BLD AUTO: 207 10*3/MM3 (ref 150–450)
PMV BLD AUTO: 8.7 FL (ref 6–12)
POTASSIUM BLD-SCNC: 4 MMOL/L (ref 3.5–5.5)
PROT SERPL-MCNC: 7.3 G/DL (ref 5.7–8.2)
RBC # BLD AUTO: 3.86 10*6/MM3 (ref 3.89–5.14)
SODIUM BLD-SCNC: 130 MMOL/L (ref 132–146)
WBC NRBC COR # BLD: 7.98 10*3/MM3 (ref 3.5–10.8)

## 2018-05-08 PROCEDURE — G0378 HOSPITAL OBSERVATION PER HR: HCPCS

## 2018-05-08 PROCEDURE — 73552 X-RAY EXAM OF FEMUR 2/>: CPT

## 2018-05-08 PROCEDURE — 25010000002 KETOROLAC TROMETHAMINE PER 15 MG: Performed by: EMERGENCY MEDICINE

## 2018-05-08 PROCEDURE — 96374 THER/PROPH/DIAG INJ IV PUSH: CPT

## 2018-05-08 PROCEDURE — 85025 COMPLETE CBC W/AUTO DIFF WBC: CPT | Performed by: EMERGENCY MEDICINE

## 2018-05-08 PROCEDURE — 99285 EMERGENCY DEPT VISIT HI MDM: CPT

## 2018-05-08 PROCEDURE — 72192 CT PELVIS W/O DYE: CPT

## 2018-05-08 PROCEDURE — 93005 ELECTROCARDIOGRAM TRACING: CPT | Performed by: EMERGENCY MEDICINE

## 2018-05-08 PROCEDURE — 80053 COMPREHEN METABOLIC PANEL: CPT | Performed by: EMERGENCY MEDICINE

## 2018-05-08 PROCEDURE — 99219 PR INITIAL OBSERVATION CARE/DAY 50 MINUTES: CPT | Performed by: FAMILY MEDICINE

## 2018-05-08 RX ORDER — ACETAMINOPHEN 325 MG/1
650 TABLET ORAL EVERY 4 HOURS PRN
Status: DISCONTINUED | OUTPATIENT
Start: 2018-05-08 | End: 2018-05-11 | Stop reason: HOSPADM

## 2018-05-08 RX ORDER — CITALOPRAM 20 MG/1
20 TABLET ORAL EVERY MORNING
Status: DISCONTINUED | OUTPATIENT
Start: 2018-05-09 | End: 2018-05-11 | Stop reason: HOSPADM

## 2018-05-08 RX ORDER — SODIUM CHLORIDE 0.9 % (FLUSH) 0.9 %
10 SYRINGE (ML) INJECTION AS NEEDED
Status: DISCONTINUED | OUTPATIENT
Start: 2018-05-08 | End: 2018-05-08

## 2018-05-08 RX ORDER — HYDROCODONE BITARTRATE AND ACETAMINOPHEN 7.5; 325 MG/1; MG/1
1 TABLET ORAL EVERY 6 HOURS PRN
Status: DISCONTINUED | OUTPATIENT
Start: 2018-05-08 | End: 2018-05-10

## 2018-05-08 RX ORDER — ACETAMINOPHEN 500 MG
500 TABLET ORAL ONCE
Status: COMPLETED | OUTPATIENT
Start: 2018-05-08 | End: 2018-05-08

## 2018-05-08 RX ORDER — TRAMADOL HYDROCHLORIDE 50 MG/1
100 TABLET ORAL ONCE
Status: COMPLETED | OUTPATIENT
Start: 2018-05-08 | End: 2018-05-08

## 2018-05-08 RX ORDER — HYDROMORPHONE HYDROCHLORIDE 1 MG/ML
0.5 INJECTION, SOLUTION INTRAMUSCULAR; INTRAVENOUS; SUBCUTANEOUS EVERY 4 HOURS PRN
Status: DISCONTINUED | OUTPATIENT
Start: 2018-05-08 | End: 2018-05-10

## 2018-05-08 RX ORDER — KETOROLAC TROMETHAMINE 15 MG/ML
7.5 INJECTION, SOLUTION INTRAMUSCULAR; INTRAVENOUS ONCE
Status: COMPLETED | OUTPATIENT
Start: 2018-05-08 | End: 2018-05-08

## 2018-05-08 RX ORDER — DILTIAZEM HYDROCHLORIDE 120 MG/1
120 CAPSULE, COATED, EXTENDED RELEASE ORAL NIGHTLY
Status: DISCONTINUED | OUTPATIENT
Start: 2018-05-09 | End: 2018-05-11 | Stop reason: HOSPADM

## 2018-05-08 RX ORDER — FAMOTIDINE 20 MG/1
20 TABLET, FILM COATED ORAL 2 TIMES DAILY
Status: DISCONTINUED | OUTPATIENT
Start: 2018-05-09 | End: 2018-05-11 | Stop reason: HOSPADM

## 2018-05-08 RX ORDER — MIRTAZAPINE 15 MG/1
7.5 TABLET, FILM COATED ORAL NIGHTLY
Status: DISCONTINUED | OUTPATIENT
Start: 2018-05-09 | End: 2018-05-11 | Stop reason: HOSPADM

## 2018-05-08 RX ORDER — SODIUM CHLORIDE 0.9 % (FLUSH) 0.9 %
1-10 SYRINGE (ML) INJECTION AS NEEDED
Status: DISCONTINUED | OUTPATIENT
Start: 2018-05-08 | End: 2018-05-11 | Stop reason: HOSPADM

## 2018-05-08 RX ORDER — FERROUS SULFATE 325(65) MG
325 TABLET ORAL
Status: DISCONTINUED | OUTPATIENT
Start: 2018-05-09 | End: 2018-05-11 | Stop reason: HOSPADM

## 2018-05-08 RX ORDER — LEVETIRACETAM 250 MG/1
250 TABLET ORAL EVERY 12 HOURS SCHEDULED
Status: DISCONTINUED | OUTPATIENT
Start: 2018-05-08 | End: 2018-05-11 | Stop reason: HOSPADM

## 2018-05-08 RX ORDER — FLECAINIDE ACETATE 50 MG/1
50 TABLET ORAL EVERY 12 HOURS SCHEDULED
Status: DISCONTINUED | OUTPATIENT
Start: 2018-05-09 | End: 2018-05-11 | Stop reason: HOSPADM

## 2018-05-08 RX ADMIN — KETOROLAC TROMETHAMINE 7.5 MG: 15 INJECTION, SOLUTION INTRAMUSCULAR; INTRAVENOUS at 17:41

## 2018-05-08 RX ADMIN — ACETAMINOPHEN 500 MG: 500 TABLET ORAL at 17:42

## 2018-05-08 RX ADMIN — Medication 10 ML: at 17:34

## 2018-05-08 RX ADMIN — Medication 10 ML: at 17:42

## 2018-05-08 RX ADMIN — TRAMADOL HYDROCHLORIDE 100 MG: 50 TABLET, COATED ORAL at 17:42

## 2018-05-08 NOTE — ED PROVIDER NOTES
Subjective   Mirian Sy is a 78 y.o. female who presents to the ED with c/o hip pain secondary to a fall which occurred around 1200 today. The patient states that when walking out to her car today she forgot that her porch was under construction, causing her to slip through a gap in between boards of the porch floor. She expresses that she landed on her left hip and bottom after falling about 3 ft. The patient adamantly denies striking her head or sustaining any trauma during the incident. She states that following her fall she was able to crawl over to her vehicle and drive herself to the bank, experiencing only mild pain within her left hip. When going to get out of her vehicle at the bank she claims she could not walk independently due to exacerbated left hip pain, prompting her to go home to receive help from her  and ultimately report to the ED. She does note that she was able to ambulate to the restroom with the use of a walker since arrival in the ED. The patient denies any noticed ecchymosis, pain within her neck, back, chest, right hip, or any other acute complaints at this time. Additionally, it is noted that the patient has PMHx of a broken left femur which was repaired through kristina placement.           History provided by:  Patient  Fall   Mechanism of injury: fall    Injury location:  Pelvis  Pelvic injury location:  L hip  Incident location:  Outdoors  Time since incident:  5 hours  Arrived directly from scene: no    Fall:     Fall occurred:  Walking (From a porch )    Height of fall:  Appr. 3 ft    Impact surface:  Unable to specify    Point of impact:  Buttocks    Entrapped after fall: no    Protective equipment: none    Suspicion of alcohol use: no    Suspicion of drug use: no    Prior to arrival data:     Loss of consciousness: no      Amnesic to event: no    Associated symptoms: no back pain, no chest pain, no headaches, no loss of consciousness and no neck pain    Risk factors: not  pregnant        Review of Systems   Cardiovascular: Negative for chest pain.   Musculoskeletal: Positive for arthralgias (Left hip pain secondary to fall.). Negative for back pain and neck pain.   Skin: Negative for color change (No ecchymosis).   Neurological: Negative for loss of consciousness and headaches.   All other systems reviewed and are negative.      Past Medical History:   Diagnosis Date   • Atrial fibrillation 2005    Chronic anticoagulation and rate control   • Basal cell carcinoma 2007    Left leg and nose   • Cardiomyopathy 2006   • Cerebrovascular accident 01/2005    CT right parietal CVA. Carotid duplex -50% to 79% left ICS 15% right ICS stenosis. MRI of the neck showed no significant carotid bifurcations of these. Negative CT of the head, 09/10/2012. Carotid duplex, 02/24/2014:  Shelf-like plaque in the CECE without significant elevation and velocities.  Patent vertebral arteries.   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     • Fracture of bone of forefoot 1972    Right foot   • GERD (gastroesophageal reflux disease) 2014    Chronic superficial gastritis   • HTN (hypertension) 2005   • Iron deficiency anemia due to chronic blood loss 2018    Predisposed by chronic anticoagulation and GERD   • Low body weight due to inadequate caloric intake Adulthood   • Mitral valve prolapse 1997    Mild MR   • Osteoarthritis    • Osteoporosis    • Patent foramen ovale 2005   • Pneumonia 1947   • SCC (squamous cell carcinoma), arm, right 2017   • Scoliosis    • Syncope 2014     undermined cause - [facility]   • Varicose veins 2005    Symptomatic       Allergies   Allergen Reactions   • Actonel [Risedronate  Sodium] GI Intolerance   • Hctz [Hydrochlorothiazide]      hyponatremia   • Influenza Vaccines Myalgia   • Lisinopril      hyperkalemia       Past Surgical History:   Procedure Laterality Date   • ACHILLES TENDON SURGERY Left     Repair of rupture left tendon with screws   • BREAST CYST EXCISION Left    • BREAST SURGERY Left     Excision benign cyst   • CARDIAC CATHETERIZATION  2006    Severe acute coronary spasm   • CATARACT EXTRACTION WITH INTRAOCULAR LENS IMPLANT Bilateral 2015   • EYE CAPSULOTOMY WITH LASER Left 2016    Marked visual improvement   • FEMUR FRACTURE SURGERY Right 2015    Repair of shaft fracture   • HIP FRACTURE SURGERY Left     left hip fracture with pin   • LUMBAR DISC SURGERY  1983   • OVARIAN CYST REMOVAL Left    • SKIN CANCER EXCISION Right 2017    SCC - arm       Family History   Problem Relation Age of Onset   • Breast cancer Mother       age 59   • Diabetes Mother    • Hypertension Mother    • Heart disease Father       age 80   • Other Sister      Arthrodesis cervical   • Basal cell carcinoma Sister    • Bone cancer Sister    • Breast cancer Sister 57   • Diabetes Sister       age 56   • Hypertension Sister    • Melanoma Sister    • Arrhythmia Sister      Sinus   • Stroke Sister    • Allergies Son      Hay fever   • Diabetes Maternal Uncle    • Breast cancer Sister 59   • Breast cancer Sister 66   • Ovarian cancer Sister      DX AGE UNKNOWN       Social History     Social History   • Marital status:      Social History Main Topics   • Smoking status: Former Smoker     Packs/day: 1.00     Years: 20.00     Types: Cigarettes   • Smokeless tobacco: Never Used      Comment: Remote history   • Alcohol use No   • Drug use: No     Other Topics Concern   • Not on file     Social History Narrative    Domestic life   lives in private home alone - good support from local children        Catholic    Gnosticism        Sleep hygiene  in bed 9 PM to 6 AM for 9 hours of  sleep        Caffeine use   1 1/2 cups coffee daily        Exercise habits  walks most days of the week. - She will remain normal.        Diet   well-balanced diet with protein supplementations        Occupation    retired         Hearing  no impairment        Vision    no glasses needed after cataract surgery.          Driving   daytime only - good weather - local traffic             Objective   Physical Exam   Constitutional: She is oriented to person, place, and time. She appears well-developed and well-nourished. No distress.   HENT:   Head: Normocephalic and atraumatic.   Right Ear: External ear normal.   Left Ear: External ear normal.   Nose: Nose normal.   Eyes: Conjunctivae are normal. No scleral icterus.   Neck: Normal range of motion. Neck supple.   Cardiovascular: Normal rate, regular rhythm, normal heart sounds and intact distal pulses.    No murmur heard.  Pulmonary/Chest: Effort normal and breath sounds normal. No respiratory distress.   Abdominal: Soft. Bowel sounds are normal. There is no tenderness.   Musculoskeletal: Normal range of motion. She exhibits no edema, tenderness or deformity.   No TTP within the LLE. Pain is mentioned with medial rotation and adduction of the LLE/hip. No foreshortening of the LLE when compared to the RLE. Patient localizes pain exclusively within the left hip.    Neurological: She is alert and oriented to person, place, and time.   BLE neuro-vascular function intact.   Skin: Skin is warm and dry.   Psychiatric: She has a normal mood and affect. Her behavior is normal.   Nursing note and vitals reviewed.      Procedures         ED Course  ED Course   Comment By Time   I discussed the case with Dr. Stover who is currently operating room.  He advised that he will come down to the ER and evaluated the patient and review the images. Radhames Helm MD 05/08 6760   Dr. Stover reviewed the images and advised the patient can be maintained as partial  weightbearing on that left lower extremity.  Advised the patient can go home if she has adequate assistance and also can stay in the hospital if needed if she does not have adequate resources.  I'll talk with the patient about her preferences with the discussion of disposition. Radhames Helm MD 05/08 1937   Case discussed with Dr. Cotton who agrees to plan to admit.  I talked with the patient and family and they do not feel she will be safe to go home.  Feel the patient will likely need some rehabilitation and then discharge planning. Radhames Helm MD 05/08 2020       Recent Results (from the past 24 hour(s))   CBC Auto Differential    Collection Time: 05/08/18  8:55 PM   Result Value Ref Range    WBC 7.98 3.50 - 10.80 10*3/mm3    RBC 3.86 (L) 3.89 - 5.14 10*6/mm3    Hemoglobin 11.4 (L) 11.5 - 15.5 g/dL    Hematocrit 33.8 (L) 34.5 - 44.0 %    MCV 87.6 80.0 - 99.0 fL    MCH 29.5 27.0 - 31.0 pg    MCHC 33.7 32.0 - 36.0 g/dL    RDW 14.4 11.3 - 14.5 %    RDW-SD 46.0 37.0 - 54.0 fl    MPV 8.7 6.0 - 12.0 fL    Platelets 207 150 - 450 10*3/mm3    Neutrophil % 81.2 (H) 41.0 - 71.0 %    Lymphocyte % 10.4 (L) 24.0 - 44.0 %    Monocyte % 7.5 0.0 - 12.0 %    Eosinophil % 0.3 0.0 - 3.0 %    Basophil % 0.3 0.0 - 1.0 %    Immature Grans % 0.3 0.0 - 0.6 %    Neutrophils, Absolute 6.49 1.50 - 8.30 10*3/mm3    Lymphocytes, Absolute 0.83 0.60 - 4.80 10*3/mm3    Monocytes, Absolute 0.60 0.00 - 1.00 10*3/mm3    Eosinophils, Absolute 0.02 0.00 - 0.30 10*3/mm3    Basophils, Absolute 0.02 0.00 - 0.20 10*3/mm3    Immature Grans, Absolute 0.02 0.00 - 0.03 10*3/mm3     Note: In addition to lab results from this visit, the labs listed above may include labs taken at another facility or during a different encounter within the last 24 hours. Please correlate lab times with ED admission and discharge times for further clarification of the services performed during this visit.    CT Pelvis Without Contrast   Preliminary Result  "  Nondisplaced fracture of the mid left ilium.              XR Femur 2 View Left    (Results Pending)     Vitals:    05/08/18 1729 05/08/18 1930   BP: (!) 162/114 158/99   BP Location: Right arm    Patient Position: Lying    Pulse: 118 113   Resp: 18 18   Temp: 98.6 °F (37 °C)    TempSrc: Oral    SpO2: 91% 93%   Weight: 47.6 kg (105 lb)    Height: 170.2 cm (67\")      Medications   traMADol (ULTRAM) tablet 100 mg (100 mg Oral Given 5/8/18 1742)   acetaminophen (TYLENOL) tablet 500 mg (500 mg Oral Given 5/8/18 1742)   ketorolac (TORADOL) injection 7.5 mg (7.5 mg Intravenous Given 5/8/18 1741)     ECG/EMG Results (last 24 hours)     ** No results found for the last 24 hours. **                        MDM  Number of Diagnoses or Management Options  Elevated blood pressure reading:   Fall in home, initial encounter:   Other closed fracture of left ilium, initial encounter:      Amount and/or Complexity of Data Reviewed  Tests in the radiology section of CPT®: reviewed  Independent visualization of images, tracings, or specimens: yes        Final diagnoses:   Fall in home, initial encounter   Other closed fracture of left ilium, initial encounter   Elevated blood pressure reading       Documentation assistance provided by derian Lopez.  Information recorded by the scribe was done at my direction and has been verified and validated by me.     Aryan Lopez  05/08/18 0153       Radhames Helm MD  05/08/18 2125    "

## 2018-05-09 LAB
ANION GAP SERPL CALCULATED.3IONS-SCNC: 8 MMOL/L (ref 3–11)
BILIRUB UR QL STRIP: NEGATIVE
BUN BLD-MCNC: 13 MG/DL (ref 9–23)
BUN/CREAT SERPL: 13 (ref 7–25)
CALCIUM SPEC-SCNC: 9.3 MG/DL (ref 8.7–10.4)
CHLORIDE SERPL-SCNC: 96 MMOL/L (ref 99–109)
CLARITY UR: CLEAR
CO2 SERPL-SCNC: 28 MMOL/L (ref 20–31)
COLOR UR: YELLOW
CREAT BLD-MCNC: 1 MG/DL (ref 0.6–1.3)
DEPRECATED RDW RBC AUTO: 47.9 FL (ref 37–54)
ERYTHROCYTE [DISTWIDTH] IN BLOOD BY AUTOMATED COUNT: 14.9 % (ref 11.3–14.5)
GFR SERPL CREATININE-BSD FRML MDRD: 54 ML/MIN/1.73
GLUCOSE BLD-MCNC: 97 MG/DL (ref 70–100)
GLUCOSE UR STRIP-MCNC: NEGATIVE MG/DL
HCT VFR BLD AUTO: 34.9 % (ref 34.5–44)
HGB BLD-MCNC: 11.5 G/DL (ref 11.5–15.5)
HGB UR QL STRIP.AUTO: NEGATIVE
KETONES UR QL STRIP: NEGATIVE
LEUKOCYTE ESTERASE UR QL STRIP.AUTO: NEGATIVE
MCH RBC QN AUTO: 29 PG (ref 27–31)
MCHC RBC AUTO-ENTMCNC: 33 G/DL (ref 32–36)
MCV RBC AUTO: 88.1 FL (ref 80–99)
NITRITE UR QL STRIP: NEGATIVE
PH UR STRIP.AUTO: 8.5 [PH] (ref 5–8)
PLATELET # BLD AUTO: 217 10*3/MM3 (ref 150–450)
PMV BLD AUTO: 9.4 FL (ref 6–12)
POTASSIUM BLD-SCNC: 4.1 MMOL/L (ref 3.5–5.5)
PROT UR QL STRIP: NEGATIVE
RBC # BLD AUTO: 3.96 10*6/MM3 (ref 3.89–5.14)
SODIUM BLD-SCNC: 132 MMOL/L (ref 132–146)
SP GR UR STRIP: 1.01 (ref 1–1.03)
UROBILINOGEN UR QL STRIP: ABNORMAL
WBC NRBC COR # BLD: 6.53 10*3/MM3 (ref 3.5–10.8)

## 2018-05-09 PROCEDURE — G0378 HOSPITAL OBSERVATION PER HR: HCPCS

## 2018-05-09 PROCEDURE — 85027 COMPLETE CBC AUTOMATED: CPT | Performed by: PHYSICIAN ASSISTANT

## 2018-05-09 PROCEDURE — G8978 MOBILITY CURRENT STATUS: HCPCS

## 2018-05-09 PROCEDURE — 81003 URINALYSIS AUTO W/O SCOPE: CPT | Performed by: EMERGENCY MEDICINE

## 2018-05-09 PROCEDURE — 99225 PR SBSQ OBSERVATION CARE/DAY 25 MINUTES: CPT | Performed by: INTERNAL MEDICINE

## 2018-05-09 PROCEDURE — 80048 BASIC METABOLIC PNL TOTAL CA: CPT | Performed by: PHYSICIAN ASSISTANT

## 2018-05-09 PROCEDURE — 97161 PT EVAL LOW COMPLEX 20 MIN: CPT

## 2018-05-09 PROCEDURE — G8979 MOBILITY GOAL STATUS: HCPCS

## 2018-05-09 RX ADMIN — MIRTAZAPINE 7.5 MG: 15 TABLET, FILM COATED ORAL at 20:49

## 2018-05-09 RX ADMIN — APIXABAN 5 MG: 5 TABLET, FILM COATED ORAL at 00:16

## 2018-05-09 RX ADMIN — CITALOPRAM HYDROBROMIDE 20 MG: 20 TABLET ORAL at 08:36

## 2018-05-09 RX ADMIN — LEVETIRACETAM 250 MG: 250 TABLET, FILM COATED ORAL at 20:49

## 2018-05-09 RX ADMIN — DILTIAZEM HYDROCHLORIDE 120 MG: 120 CAPSULE, COATED, EXTENDED RELEASE ORAL at 00:16

## 2018-05-09 RX ADMIN — FLECAINIDE ACETATE 50 MG: 50 TABLET ORAL at 20:50

## 2018-05-09 RX ADMIN — HYDROCODONE BITARTRATE AND ACETAMINOPHEN 1 TABLET: 7.5; 325 TABLET ORAL at 16:41

## 2018-05-09 RX ADMIN — DILTIAZEM HYDROCHLORIDE 120 MG: 120 CAPSULE, COATED, EXTENDED RELEASE ORAL at 20:49

## 2018-05-09 RX ADMIN — FAMOTIDINE 20 MG: 20 TABLET ORAL at 20:50

## 2018-05-09 RX ADMIN — APIXABAN 5 MG: 5 TABLET, FILM COATED ORAL at 08:36

## 2018-05-09 RX ADMIN — FLECAINIDE ACETATE 50 MG: 50 TABLET ORAL at 00:16

## 2018-05-09 RX ADMIN — APIXABAN 5 MG: 5 TABLET, FILM COATED ORAL at 20:49

## 2018-05-09 RX ADMIN — LEVETIRACETAM 250 MG: 250 TABLET, FILM COATED ORAL at 08:36

## 2018-05-09 RX ADMIN — HYDROCODONE BITARTRATE AND ACETAMINOPHEN 1 TABLET: 7.5; 325 TABLET ORAL at 08:40

## 2018-05-09 RX ADMIN — MIRTAZAPINE 7.5 MG: 15 TABLET, FILM COATED ORAL at 00:16

## 2018-05-09 RX ADMIN — FLECAINIDE ACETATE 50 MG: 50 TABLET ORAL at 08:36

## 2018-05-09 RX ADMIN — FERROUS SULFATE TAB 325 MG (65 MG ELEMENTAL FE) 325 MG: 325 (65 FE) TAB at 08:36

## 2018-05-09 RX ADMIN — FAMOTIDINE 20 MG: 20 TABLET ORAL at 00:16

## 2018-05-09 RX ADMIN — LEVETIRACETAM 250 MG: 250 TABLET, FILM COATED ORAL at 00:21

## 2018-05-09 RX ADMIN — FAMOTIDINE 20 MG: 20 TABLET ORAL at 08:36

## 2018-05-09 NOTE — PROGRESS NOTES
Clinical Nutrition   Reason For Visit: Identified at risk by screening criteria, BMI    Patient Name: Mirian Sy  YOB: 1940  MRN: [id number]  Date of Encounter: 05/09/18 10:27 AM  Admission date: 5/8/2018      Nutrition Assessment     Hospital Problem List  Principal Problem:    Closed fracture of left ilium  Active Problems:    Hypertension    Patent foramen ovale    PAF (paroxysmal atrial fibrillation)    GERD (gastroesophageal reflux disease)    Fall at home      PMH: She  has a past medical history of Atrial fibrillation (2005); Basal cell carcinoma (2007); Cardiomyopathy (2006); Cerebrovascular accident (01/2005); Complex partial epilepsy (2014); Coronary artery vasospasm (2006); Decubitus ulcer of buttock (2014); Depression (2002); DVT (deep venous thrombosis); Fracture of bone of forefoot (1972); GERD (gastroesophageal reflux disease) (2014); HTN (hypertension) (2005); Iron deficiency anemia due to chronic blood loss (2018); Low body weight due to inadequate caloric intake (Adulthood); Mitral valve prolapse (1997); Osteoarthritis; Osteoporosis; Patent foramen ovale (2005); Pneumonia (1947); SCC (squamous cell carcinoma), arm, right (2017); Scoliosis; Syncope (2014); and Varicose veins (2005).   PSxH: She  has a past surgical history that includes Hip fracture surgery (Left, 2014); Achilles tendon surgery (Left, 1997); Femur fracture surgery (Right, 2015); Cardiac catheterization (2006); Breast surgery (Left, 1982); Lumbar disc surgery (1983); Ovarian cyst removal (Left, 1996); Skin cancer excision (Right, 2017); Cataract extraction w/  intraocular lens implant (Bilateral, 2015); Eye capsulotomy (Left, 2016); and Breast cyst excision (Left).     Other Applicable Diagnosis:  S/p fall  L iliac wing fracture- conservative treatment      Reported/Observed/Food/Nutrition Related History     Pt reports that she ate well this Am. Family states that pt doesn't eat a lot which is pt's baseline.        Anthropometrics   Height: 67 in  Weight: 105 lb per states wt on (5/8)  BMI: 16.5  BMI classification: Underweight:<18.5kg/m2   UBW: 100-105 lb per pt  Weight change: Pt reports no recent wt loss or wt changes. EMR wt hx reviewed.     Date Weight (lb) Weight Method   5/8/2018 105 lb Stated   4/20/2018 105 lb -   3/6/2018 107 lb -   12/8/2017 108 lb -   9/7/2017 106 lb -   6/23/2017 109 lb -   6/7/2017 108 lb -   5/24/2017 110 lb 9.6 oz -   3/15/2017 103 lb -   2/13/2017 101 lb -   12/8/2016 100 lb 2 oz -   11/16/2016 99 lb 6.4 oz -   10/27/2016 93 lb -   10/10/2016 101 lb -   10/9/2016 101 lb 4.8 oz -       Labs reviewed   Labs reviewed: Yes    Medications reviewed   Medications reviewed: Yes    Current Nutrition Prescription   PO: Diet Regular    Evaluation of Received Nutrient/Fluid Intake: Insufficient data      Nutrition Diagnosis     No nutrition diagnosis at this time. Pt is underweight according to BMI,  however pt reports WWW=[id number] lb, no recent wt loss.     Intervention     Goal:   General: Nutrition support treatment  PO: Establish PO    Intervention: Follow treatment progress, Care plan reviewed, Menu provided, Encourage intake, Supplement provided   -Will send Boost Plus 1x/day- vary flavors      Monitoring/Evaluation:       Monitoring/Evaluation: Per protocol, PO intake, Supplement intake, Weight, Symptoms  Will Continue to follow per protocol  Mylene Hylton, MS RD/LD CNSC  Time Spent: 30 minutes

## 2018-05-09 NOTE — THERAPY EVALUATION
Acute Care - Physical Therapy Initial Evaluation  [facility]     Patient Name: Mirian Sy  : 1940  MRN: [id number]  Today's Date: 2018   Onset of Illness/Injury or Date of Surgery: 18     Referring Physician: Rafi QUIROZ      Admit Date: 2018    Visit Dx:     ICD-10-CM ICD-9-CM   1. Fall in home, initial encounter W19.XXXA E888.9    Y92.099 E849.0   2. Other closed fracture of left ilium, initial encounter S32.392A 808.41   3. Elevated blood pressure reading R03.0 796.2   4. Impaired functional mobility, balance, gait, and endurance Z74.09 V49.89     Patient Active Problem List   Diagnosis   • Abnormal gait   • Cardiomyopathy   • Carpal tunnel syndrome   • Complex partial epilepsy   • Depression   • Chronic gastritis   • Hypertension   • Hyponatremia   • Nutritional anemia   • Dyssomnia   • Xeroderma   • Preventative health care   • Frailty   • Tricuspid regurgitation   • Cerebrovascular accident   • Syncope   • DVT (deep venous thrombosis)   • Anorexia   • Anxiety   • Patent foramen ovale   • Mitral regurgitation   • Atrial fibrillation with RVR   • Low weight   • Senile osteoporosis   • Iron deficiency   • PAF (paroxysmal atrial fibrillation)   • Closed fracture of left ilium   • GERD (gastroesophageal reflux disease)   • Fall at home     Past Medical History:   Diagnosis Date   • Atrial fibrillation     Chronic anticoagulation and rate control   • Basal cell carcinoma     Left leg and nose   • Cardiomyopathy    • Cerebrovascular accident 2005    CT right parietal CVA. Carotid duplex -50% to 79% left ICS 15% right ICS stenosis. MRI of the neck showed no significant carotid bifurcations of these. Negative CT of the head, 09/10/2012. Carotid duplex, 2014:  Shelf-like plaque in the CECE without significant elevation and velocities.  Patent vertebral arteries.   • Complex partial epilepsy    • Coronary artery vasospasm     With dilated cardiomyopathy   •  Decubitus ulcer of buttock 2014    Transient during fracture rehabilitation   • Depression 2002    Good response to citalopram and mirtazapine   • DVT (deep venous thrombosis)     Superficial - right lesser saphenus vein following ORIF of the left hip.     • Fracture of bone of forefoot 1972    Right foot   • GERD (gastroesophageal reflux disease) 2014    Chronic superficial gastritis   • HTN (hypertension) 2005   • Iron deficiency anemia due to chronic blood loss 2018    Predisposed by chronic anticoagulation and GERD   • Low body weight due to inadequate caloric intake Adulthood   • Mitral valve prolapse 1997    Mild MR   • Osteoarthritis    • Osteoporosis    • Patent foramen ovale 2005   • Pneumonia 1947   • SCC (squamous cell carcinoma), arm, right 2017   • Scoliosis    • Syncope 2014     undermined cause - [facility]   • Varicose veins 2005    Symptomatic     Past Surgical History:   Procedure Laterality Date   • ACHILLES TENDON SURGERY Left 1997    Repair of rupture left tendon with screws   • BREAST CYST EXCISION Left    • BREAST SURGERY Left 1982    Excision benign cyst   • CARDIAC CATHETERIZATION  2006    Severe acute coronary spasm   • CATARACT EXTRACTION WITH INTRAOCULAR LENS IMPLANT Bilateral 2015   • EYE CAPSULOTOMY WITH LASER Left 2016    Marked visual improvement   • FEMUR FRACTURE SURGERY Right 2015    Repair of shaft fracture   • HIP FRACTURE SURGERY Left 2014    left hip fracture with pin   • LUMBAR DISC SURGERY  1983   • OVARIAN CYST REMOVAL Left 1996   • SKIN CANCER EXCISION Right 2017    SCC - arm        PT ASSESSMENT (last 12 hours)      Physical Therapy Evaluation     Row Name 05/09/18 1000          PT Evaluation Time/Intention    Subjective Information complains of;pain  -SC     Document Type evaluation  -SC     Mode of Treatment physical therapy  -SC     Patient Effort good  -SC     Symptoms Noted During/After Treatment increased pain;fatigue;dizziness  -SC     Row Name 05/09/18 1000           General Information    Patient Profile Reviewed? yes  -SC     Onset of Illness/Injury or Date of Surgery 05/08/18  -SC     Referring Physician Rafi QUIROZ  -SC     Patient Observations alert;agree to therapy;cooperative  -SC     Patient/Family Observations sister  -SC     General Observations of Patient in bed  -SC     Prior Level of Function independent:;all household mobility  -SC     Equipment Currently Used at Home walker, rolling  -SC     Pertinent History of Current Functional Problem fell at home resulting in L ilium fx  -SC     Existing Precautions/Restrictions fall  -SC     Risks Reviewed patient:;increased discomfort;change in vital signs  -SC     Benefits Reviewed patient:;improve function;increase independence;increase strength  -SC     Barriers to Rehab none identified  -SC     Row Name 05/09/18 1000          Relationship/Environment    Primary Source of Support/Comfort sibling(s)   sister  -SC     Lives With alone  -SC     Row Name 05/09/18 1000          Resource/Environmental Concerns    Current Living Arrangements home/apartment/condo  -SC     Row Name 05/09/18 1000          Cognitive Assessment/Intervention- PT/OT    Orientation Status (Cognition) oriented x 4  -SC     Follows Commands (Cognition) WNL  -SC     Row Name 05/09/18 1000          Safety Issues, Functional Mobility    Impairments Affecting Function (Mobility) balance;pain  -SC     Row Name 05/09/18 1000          Mobility Assessment/Treatment    Extremity Weight-bearing Status left lower extremity  -SC     Left Lower Extremity (Weight-bearing Status) weight-bearing as tolerated (WBAT)  -SC     Row Name 05/09/18 1000          Bed Mobility Assessment/Treatment    Bed Mobility Assessment/Treatment supine-sit  -SC     Supine-Sit [place] (Bed Mobility) minimum assist (75% patient effort);verbal cues  -SC     Bed Mobility, Safety Issues decreased use of legs for bridging/pushing  -SC     Assistive Device (Bed Mobility) bed rails  -SC      Comment (Bed Mobility) got up to edge of bed with some cueing  -[facility] Name 05/09/18 1000          Transfer Assessment/Treatment    Transfer Assessment/Treatment sit-stand transfer;stand-sit transfer  -SC     Comment (Transfers) able to stand up from edge of bed wtih cueing for safety.   -SC     Sit-Stand [place] (Transfers) contact guard;verbal cues  -SC     Stand-Sit [place] (Transfers) contact guard;verbal cues  -[facility] Name 05/09/18 1000          Sit-Stand Transfer    Assistive Device (Sit-Stand Transfers) walker, front-wheeled  -[facility] Name 05/09/18 1000          Stand-Sit Transfer    Assistive Device (Stand-Sit Transfers) walker, front-wheeled  -[facility] Name 05/09/18 1000          Gait/Stairs Assessment/Training    Gait/Stairs Assessment/Training gait/ambulation independence  -SC     [place] Level (Gait) contact guard;verbal cues  -SC     Assistive Device (Gait) walker, front-wheeled  -SC     Distance in Feet (Gait) 30  -SC     Pattern (Gait) step-to  -SC     Deviations/Abnormal Patterns (Gait) antalgic;el decreased  -SC     Comment (Gait/Stairs) required cues for sequencing walker and for wbat.  Cues to stay in walker on turns.  Some fatigue and  SOA noted at end of walk  -[facility] Name 05/09/18 1000          General ROM    LT Lower Ext Lt Hip Flexion;Comment   full hip flexion limited by pain 20%  -[facility] Name 05/09/18 1000          General Assessment (Manual Muscle Testing)    General Manual Muscle Testing (MMT) Assessment lower extremity strength deficits identified  -SC     Row Name 05/09/18 1000          Lower Extremity (Manual Muscle Testing)    Lower Extremity: Manual Muscle Testing (MMT) left hip strength deficit  -[facility] Name 05/09/18 1000          Left Hip (Manual Muscle Testing)    MMT, Left Hip: Manual Muscle Testing (MMT) hip flexion -3/5 -painful  -[facility] Name 05/09/18 1000          Motor Assessment/Intervention    Additional Documentation Balance  (Group);Therapeutic Exercise (Group);Therapeutic Exercise Interventions (Group)   L quads 3+/5, l tib and 3+/5  -SC     [facility] Name 05/09/18 1000          Therapeutic Exercise    Lower Extremity (Therapeutic Exercise) gluteal sets;heel slides, bilateral;LAQ (long arc quad), bilateral;quad sets, bilateral  -SC     Exercise Type (Therapeutic Exercise) AROM (active range of motion);isometric contraction, static  -SC     Position (Therapeutic Exercise) seated;supine  -SC     Sets/Reps (Therapeutic Exercise) 10  -[facility] Name 05/09/18 1000          Balance    Balance dynamic standing balance  -SC     Row Name 05/09/18 1000          Dynamic Standing Balance    Level of [place], Reaches Outside Midline (Standing, Dynamic Balance) contact guard assist   walker  -[facility] Name 05/09/18 1000          Sensory Assessment/Intervention    Sensory General Assessment no sensation deficits identified  -SC     Row Name 05/09/18 1000          Pain Assessment    Additional Documentation Pain Scale: Numbers Pre/Post-Treatment (Group)  -SC     Row Name 05/09/18 1000          Pain Scale: Numbers Pre/Post-Treatment    Pain Scale: Numbers, Pretreatment 7/10  -SC     Pain Scale: Numbers, Post-Treatment 7/10  -SC     Pain Location - Side Left  -SC     Pain Location - Orientation lower  -SC     Pain Location back  -SC     Pain Intervention(s) Repositioned  -SC     Row Name 05/09/18 1000          Physical Therapy Goals    Bed Mobility Goal Selection (PT) bed mobility, PT goal 1  -SC     Transfer Goal Selection (PT) transfer, PT goal 1  -SC     Gait Training Goal Selection (PT) gait training, PT goal 1  -SC     Row Name 05/09/18 1000          Bed Mobility Goal 1 (PT)    Activity/Assistive Device (Bed Mobility Goal 1, PT) sit to supine  -SC     [place] Level/Cues Needed (Bed Mobility Goal 1, PT) conditional independence  -SC     Time Frame (Bed Mobility Goal 1, PT) long term goal (LTG);10 days  -[facility] Name 05/09/18 1000           Transfer Goal 1 (PT)    Activity/Assistive Device (Transfer Goal 1, PT) sit-to-stand/stand-to-sit;walker, rolling  -SC     [place] Level/Cues Needed (Transfer Goal 1, PT) supervision required  -SC     Time Frame (Transfer Goal 1, PT) long term goal (LTG);10 days  -SC     Row Name 05/09/18 1000          Gait Training Goal 1 (PT)    Activity/Assistive Device (Gait Training Goal 1, PT) gait (walking locomotion);walker, rolling  -SC     [place] Level (Gait Training Goal 1, PT) contact guard assist  -SC     Distance (Gait Goal 1, PT) 75  -SC     Time Frame (Gait Training Goal 1, PT) long term goal (LTG);10 days  -SC     Row Name 05/09/18 1000          Positioning and Restraints    Pre-Treatment Position in bed  -SC     Post Treatment Position chair  -SC     In Chair notified nsg;reclined;sitting;call light within reach;encouraged to call for assist;exit alarm on;with family/caregiver  -SC       User Key  (r) = Recorded By, (t) = Taken By, (c) = Cosigned By    Initials Name Provider Type    SC Eduardo Rowe, PT Physical Therapist          Physical Therapy Education     Title: PT OT SLP Therapies (Active)     Topic: Physical Therapy (Active)     Point: Mobility training (Active)    Learning Progress Summary     Learner Status Readiness Method Response Comment Documented by    Patient Active Eager E NR reviewed safety with mobility and benefits of activity SC 05/09/18 1326          Point: Home exercise program (Active)    Learning Progress Summary     Learner Status Readiness Method Response Comment Documented by    Patient Active Eager E NR reviewed safety with mobility and benefits of activity SC 05/09/18 1326          Point: Body mechanics (Active)    Learning Progress Summary     Learner Status Readiness Method Response Comment Documented by    Patient Active Eager E NR reviewed safety with mobility and benefits of activity SC 05/09/18 1326          Point: Precautions (Active)    Learning Progress Summary      Learner Status Readiness Method Response Comment Documented by    Patient Active Omi KRAFT NR reviewed safety with mobility and benefits of activity SC 05/09/18 1326                      User Key     Initials Effective Dates Name Provider Type Discipline    SC 06/19/15 -  Eduardo Rowe, PT Physical Therapist PT                PT Recommendation and Plan  Planned Therapy Interventions (PT Eval): bed mobility training, gait training, home exercise program, patient/family education, strengthening, transfer training  Plan of Care Reviewed With: patient  Progress: improving  Outcome Summary: Patient demonstrated ability to mobilize out of bed and in room with walker. She is llimited by  pain and  is below her baseline. Recommend continued skilled PT.          Outcome Measures     Row Name 05/09/18 1000             How much help from another person do you currently need...    Turning from your back to your side while in flat bed without using bedrails? 4  -SC      Moving from lying on back to sitting on the side of a flat bed without bedrails? 3  -SC      Moving to and from a bed to a chair (including a wheelchair)? 3  -SC      Standing up from a chair using your arms (e.g., wheelchair, bedside chair)? 3  -SC      Climbing 3-5 steps with a railing? 2  -SC      To walk in hospital room? 3  -SC      AM-PAC 6 Clicks Score 18  -SC         Functional Assessment    Outcome Measure Options AM-PAC 6 Clicks Basic Mobility (PT)  -SC        User Key  (r) = Recorded By, (t) = Taken By, (c) = Cosigned By    Initials Name Provider Type    SC Eduardo A Soledad, PT Physical Therapist           Time Calculation:         PT Charges     Row Name 05/09/18 1330             Time Calculation    Start Time 1000  -SC      PT Received On 05/09/18  -SC      PT Goal Re-Cert Due Date 05/19/18  -SC        User Key  (r) = Recorded By, (t) = Taken By, (c) = Cosigned By    Initials Name Provider Type    SC Eduardo Rowe, PT Physical Therapist           Therapy Charges for Today     Code Description Service Date Service Provider Modifiers Qty    03684131815 HC PT MOBILITY CURRENT 5/9/2018 Shearrobin Gutierrezp, PT GP, CK 1    86715756633 HC PT MOBILITY PROJECTED 5/9/2018 Shearrobin HURT Soledad, PT GP, CJ 1    53988665064 HC PT EVAL LOW COMPLEXITY 4 5/9/2018 Shearrobin Gutierrezp, PT GP 1    57123494777 HC PT THER SUPP EA 15 MIN 5/9/2018 Eduardo Gutierrezp, PT GP 2          PT G-Codes  Outcome Measure Options: AM-PAC 6 Clicks Basic Mobility (PT)  Score: 18  Functional Limitation: Mobility: Walking and moving around  Mobility: Walking and Moving Around Current Status (): At least 40 percent but less than 60 percent impaired, limited or restricted  Mobility: Walking and Moving Around Goal Status (): At least 20 percent but less than 40 percent impaired, limited or restricted      Eduardo Rowe, PT  5/9/2018

## 2018-05-09 NOTE — PROGRESS NOTES
[facility] Medicine Services  PROGRESS NOTE    Patient Name: Mirian Sy  : 1940  MRN: [id number]    Date of Admission: 2018  Length of Stay: 0  Primary Care Physician: Sriram Springer MD    Subjective   Subjective     CC:  Fall    HPI:  Feels okay today. Pain is worse this morning, but has just taken pain pill and waiting for it to work. Okay with rehab. Prefers [facility] as she has been there several times before.    Review of Systems  Gen- No fevers, chills  CV- No chest pain, palpitations  Resp- No cough, dyspnea  GI- No N/V/D, abd pain    Otherwise ROS is negative except as mentioned in the HPI.    Objective   Objective     Vital Signs:   Temp:  [97.8 °F (36.6 °C)-98.6 °F (37 °C)] 98.2 °F (36.8 °C)  Heart Rate:  [100-118] 100  Resp:  [16-18] 18  BP: (129-169)/() 133/78        Physical Exam:  Constitutional: No acute distress, awake, alert  HENT: NCAT, mucous membranes moist  Respiratory: Clear to auscultation bilaterally, respiratory effort normal   Cardiovascular: RRR, no murmurs, rubs, or gallops, palpable pedal pulses bilaterally  Gastrointestinal: Positive bowel sounds, soft, nontender, nondistended  Musculoskeletal: No bilateral ankle edema  Psychiatric: Appropriate affect, cooperative  Neurologic: Oriented x 3, strength symmetric in all extremities, Cranial Nerves grossly intact to confrontation, speech clear  Skin: No rashes    Results Reviewed:  I have personally reviewed current lab, radiology, and data and agree.      Results from last 7 days  Lab Units 18  0618   WBC 10*3/mm3 6.53 7.98   HEMOGLOBIN g/dL 11.5 11.4*   HEMATOCRIT % 34.9 33.8*   PLATELETS 10*3/mm3 217 207       Results from last 7 days  Lab Units 18  0618   SODIUM mmol/L 132 130*   POTASSIUM mmol/L 4.1 4.0   CHLORIDE mmol/L 96* 99   CO2 mmol/L 28.0 26.0   BUN mg/dL 13 12   CREATININE mg/dL 1.00 0.90   GLUCOSE mg/dL 97 117*   CALCIUM mg/dL  9.3 9.2   ALT (SGPT) U/L  --  13   AST (SGOT) U/L  --  27     Estimated Creatinine Clearance: 35.4 mL/min (by C-G formula based on SCr of 1 mg/dL).  No results found for: BNP  No results found for: PHART    Microbiology Results Abnormal     None          Imaging Results (last 24 hours)     Procedure Component Value Units Date/Time    CT Pelvis Without Contrast [[id number]] Collected:  05/08/18 1834     Updated:  05/09/18 0825    Narrative:       EXAMINATION: CT PELVIS WO CONTRAST-      INDICATION: Fall with buttock and left hip pain.      TECHNIQUE: 5 mm unenhanced images of the pelvis and proximal thighs with  sagittal and coronal 2-D reconstructions.     The radiation dose reduction device was turned on for each scan per the  ALARA (As Low as Reasonably Achievable) protocol.     COMPARISON: None.     FINDINGS: Patient history indicates fall with buttock and left hip pain.  Bone window images show a nondisplaced fracture through the mid left  ilium, extending from approximately the same axial level as the upper  margin of the sacrum, in the iliac fossa region inferiorly down to just  above the posterior margin of the left acetabulum, not involving the  acetabulum itself. Fracture extends to the margin of the SI joint, where  there is minimal comminution. No underlying pathologic lesion is  appreciated. No evidence of pubic ramus fracture or proximal femur  fracture is seen. The sacrum, right ilium, and proximal right femur  appear to be intact.     Soft tissue axial images show only mild soft tissue swelling at the  fracture site and no significant hematoma. No evidence of hematoma is  seen elsewhere. No intrapelvic free fluid is identified. A few  gallstones appear to be present in the dependent portion of gallbladder  at the upper margin of this scan.       Impression:       Nondisplaced fracture of the mid left ilium.     D:  05/08/2018  E:  05/09/2018       XR Femur 2 View Left [[id number]] Collected:  05/09/18  0823     Updated:  05/09/18 0823    Narrative:       EXAMINATION: XR FEMUR 2 VW LEFT-      INDICATION: Fall with left hip pain.      COMPARISON: Previous AP pelvis and right femur 06/22/2015.     FINDINGS: Left hip is included on the old right femur series, joint  space remains generally well maintained. No pelvic or proximal femur  fracture is seen. The mid and distal femur appear intact. No stress  riser fracture is identified. There is no obvious loosening of the  prosthesis and appears stable today, with kristina and screw fixation left  hip.       Impression:       Stable postop appearance of left hip. No evidence of new  left femur trauma.     D:  05/08/2018  E:  05/09/2018           Results for orders placed during the hospital encounter of 09/20/16   Adult Transthoracic Echo Complete    Narrative · Left ventricular function is normal. Estimated EF = 60%.  · Moderate-to-severe mitral valve regurgitation is present  · Moderate tricuspid valve regurgitation is present.          I have reviewed the medications.    Assessment/Plan   Assessment / Plan     Hospital Problem List     * (Principal)Closed fracture of left ilium    Hypertension    Patent foramen ovale    Overview Signed 9/26/2016  1:36 PM by GREG Greer     Patent foramen ovale by transesophageal echocardiogram, January 2005, documented again today by echocardiogram, 07/13/2006.         PAF (paroxysmal atrial fibrillation)    GERD (gastroesophageal reflux disease)    Fall at home             Brief Hospital Course to date:  Mirian Sy is a 78 y.o. female with PMH significant for HTN, HLD, PFO, anemia, and PAF anticoagulated on Eliquis.  She presents to [facility] this evening with left hip pain and inability to ambulate well after a fall. Found to have closed fracture of the left ileum. Currently there is no plan for surgical intervention.     Assessment & Plan:  - continue non-operative management per Dr. Stover. TTWB with progression to WBAT. Will  need follow up in 2-3 weeks for repeat XR.  - PT/OT/CM and rehab referrals  - PRN pain control  - continue Eliquis for PAF, rate controlled with Flecainide, cardizem    DVT Prophylaxis:  Eliquis    CODE STATUS: Full Code    Disposition: I expect the patient to be discharged when rehab bed available      Electronically signed by Cristina Lucio DO, 05/09/18, 12:15 PM.

## 2018-05-09 NOTE — H&P
[facility] Medicine Services  HISTORY AND PHYSICAL    Patient Name: Mirian Sy  : 1940  MRN: [id number]  Primary Care Physician: Sriram Springer MD    Subjective   Subjective     Chief Complaint:  Left hip pain    HPI:  Mirian Sy is a 78 y.o. female with a past medical history significant for HTN, HLD, PFO, anemia, and PAF anticoagulated Eliquis who presents s/p mechanical fall this afternoon around 12pm. Patient states she forgot her son was working on her back deck and there are missing boards. States she opened the door and stepped in the gap and landed on her left hip. She denies head trauma or loss of consciousness. No bleeding or bruising. She reports immediate hip pain but was able to lift herself up and bear weight. Patient states she was able to drive to the bank but once she started walking around she realized the pain was unbearable. She called her  to come get her. Was able to ambulate with a walker upon arrival. No complaints of back pain, chest pain, SOB, focal weakness/parathesias or loss of sensation. She has a PMHX of broken left femur which was repaired through kristina placement.     Emergency Department Evaluation; hypertensive. Vitals otherwise stable. Chemistry and hematology favorable. Imagining demonstrates closed left ilium fracture.    Review of Systems   Constitutional: Negative for chills and fever.   HENT: Negative for congestion and trouble swallowing.    Eyes: Negative for photophobia and visual disturbance.   Respiratory: Negative for cough and shortness of breath.    Cardiovascular: Negative for chest pain and leg swelling.   Gastrointestinal: Negative for abdominal pain and nausea.   Endocrine: Negative for cold intolerance and heat intolerance.   Genitourinary: Negative for dysuria and flank pain.   Musculoskeletal: Positive for arthralgias. Negative for back pain.   Skin: Negative for pallor and rash.   Allergic/Immunologic:  Negative for immunocompromised state.   Neurological: Negative for dizziness and headaches.   Hematological: Negative for adenopathy.   Psychiatric/Behavioral: Negative for agitation and confusion.          Otherwise 10-system ROS reviewed and is negative except as mentioned in the HPI.    Personal History     Past Medical History:   Diagnosis Date   • Atrial fibrillation 2005    Chronic anticoagulation and rate control   • Basal cell carcinoma 2007    Left leg and nose   • Cardiomyopathy 2006   • Cerebrovascular accident 01/2005    CT right parietal CVA. Carotid duplex -50% to 79% left ICS 15% right ICS stenosis. MRI of the neck showed no significant carotid bifurcations of these. Negative CT of the head, 09/10/2012. Carotid duplex, 02/24/2014:  Shelf-like plaque in the CECE without significant elevation and velocities.  Patent vertebral arteries.   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     • Fracture of bone of forefoot 1972    Right foot   • GERD (gastroesophageal reflux disease) 2014    Chronic superficial gastritis   • HTN (hypertension) 2005   • Iron deficiency anemia due to chronic blood loss 2018    Predisposed by chronic anticoagulation and GERD   • Low body weight due to inadequate caloric intake Adulthood   • Mitral valve prolapse 1997    Mild MR   • Osteoarthritis    • Osteoporosis    • Patent foramen ovale 2005   • Pneumonia 1947   • SCC (squamous cell carcinoma), arm, right 2017   • Scoliosis    • Syncope 2014     undermined cause - [facility]   • Varicose veins 2005    Symptomatic       Past Surgical History:   Procedure Laterality Date   • ACHILLES TENDON SURGERY Left 1997    Repair of rupture  left tendon with screws   • BREAST CYST EXCISION Left    • BREAST SURGERY Left 1982    Excision benign cyst   • CARDIAC CATHETERIZATION  2006    Severe acute coronary spasm   • CATARACT EXTRACTION WITH INTRAOCULAR LENS IMPLANT Bilateral 2015   • EYE CAPSULOTOMY WITH LASER Left 2016    Marked visual improvement   • FEMUR FRACTURE SURGERY Right 2015    Repair of shaft fracture   • HIP FRACTURE SURGERY Left 2014    left hip fracture with pin   • LUMBAR DISC SURGERY  1983   • OVARIAN CYST REMOVAL Left 1996   • SKIN CANCER EXCISION Right 2017    SCC - arm       Family History: family history includes Allergies in her son; Arrhythmia in her sister; Basal cell carcinoma in her sister; Bone cancer in her sister; Breast cancer in her mother; Breast cancer (age of onset: 57) in her sister; Breast cancer (age of onset: 59) in her sister; Breast cancer (age of onset: 66) in her sister; Diabetes in her maternal uncle, mother, and sister; Heart disease in her father; Hypertension in her mother and sister; Melanoma in her sister; Other in her sister; Ovarian cancer in her sister; Stroke in her sister.     Social History:  reports that she has quit smoking. Her smoking use included Cigarettes. She has a 20.00 pack-year smoking history. She has never used smokeless tobacco. She reports that she does not drink alcohol or use drugs.  Social History     Social History Narrative    Domestic life   lives in private home alone - good support from local children        [employer]    [employer]        Sleep hygiene  in bed 9 PM to 6 AM for 9 hours of sleep        Caffeine use   1 1/2 cups coffee daily        Exercise habits  walks most days of the week. - She will remain normal.        Diet   well-balanced diet with protein supplementations        Occupation    retired         Hearing  no impairment        Vision    no glasses needed after cataract surgery.          Driving   daytime only - good weather - local traffic            Medications:    (Not in a hospital admission)    Allergies   Allergen Reactions   • Actonel [Risedronate Sodium] GI Intolerance   • Hctz [Hydrochlorothiazide]      hyponatremia   • Influenza Vaccines Myalgia   • Lisinopril      hyperkalemia       Objective   Objective     Vital Signs:   Temp:  [98.6 °F (37 °C)] 98.6 °F (37 °C)  Heart Rate:  [113-118] 113  Resp:  [18] 18  BP: (158-162)/() 158/99        Physical Exam   Constitutional: No acute distress, awake, alert  Eyes: PERRLA, sclerae anicteric, no conjunctival injection  HENT: NCAT, mucous membranes moist  Neck: Supple, no thyromegaly, no lymphadenopathy, trachea midline  Respiratory: Clear to auscultation bilaterally, nonlabored respirations   Cardiovascular: RRR, no murmurs, rubs, or gallops, palpable pedal pulses bilaterally  Gastrointestinal: Positive bowel sounds, soft, nontender, nondistended  Musculoskeletal: No bilateral ankle edema, no clubbing or cyanosis to extremities  N/V intact distal to injury  Psychiatric: Appropriate affect, cooperative  Neurologic: Oriented x 3, strength symmetric in all extremities, Cranial Nerves grossly intact to confrontation, speech clear  Skin: No rashes      Results Reviewed:  I have personally reviewed current lab, radiology, and data and agree.      Results from last 7 days  Lab Units 05/08/18  2055   WBC 10*3/mm3 7.98   HEMOGLOBIN g/dL 11.4*   HEMATOCRIT % 33.8*   PLATELETS 10*3/mm3 207           Invalid input(s):  ALKPHOS, TROPONININT  CrCl cannot be calculated (Patient's most recent lab result is older than the maximum 30 days allowed.).  Brief Urine Lab Results  (Last result in the past 365 days)      Color   Clarity   Blood   Leuk Est   Nitrite   Protein   CREAT   Urine HCG        03/06/18 1132 Dark Yellow(A) Clear Negative Small (1+)(A) Negative Negative             No results found for: BNP  No results found for: PHART  Imaging Results (last 24 hours)     Procedure Component Value Units Date/Time     CT Pelvis Without Contrast [[id number]] Collected:  05/08/18 1834     Updated:  05/08/18 1839    Narrative:       EXAMINATION: CT PELVIS WO CONTRAST-      INDICATION: fall with buttock and left hip pain         TECHNIQUE: 5 mm unenhanced images of the pelvis and proximal thighs with  sagittal and coronal 2-D reconstructions     The radiation dose reduction device was turned on for each scan per the  ALARA (As Low as Reasonably Achievable) protocol.     COMPARISON: NONE     FINDINGS: Patient history indicates fall with buttock and left hip pain.  Bone window images show a nondisplaced fracture through the mid left  ilium, extending from approximately the same axial level as the upper  margin of the sacrum, in the iliac fossa region inferiorly down to just  above the posterior margin of the left acetabulum, not involving the  acetabulum itself. Fracture extends to the margin of the SI joint, where  there is minimal comminution. No underlying pathologic lesion is  appreciated. No evidence of pubic ramus fracture or proximal femur  fracture is seen. The sacrum, right ilium, and proximal right femur  appear to be intact.     Soft tissue axial images show only mild soft tissue swelling at the  fracture site and no significant hematoma. No evidence of hematoma is  seen elsewhere. No intrapelvic free fluid is identified. A few  gallstones appear to be present in the dependent portion of gallbladder  at the upper margin of this scan.                   Impression:       Nondisplaced fracture of the mid left ilium.          XR Femur 2 View Left [[id number]] Updated:  05/08/18 1813        Results for orders placed during the hospital encounter of 09/20/16   Adult Transthoracic Echo Complete    Narrative · Left ventricular function is normal. Estimated EF = 60%.  · Moderate-to-severe mitral valve regurgitation is present  · Moderate tricuspid valve regurgitation is present.          Assessment/Plan   Assessment / Plan     Hospital  Problem List     * (Principal)Closed fracture of left ilium    Hypertension    Patent foramen ovale    Overview Signed 9/26/2016  1:36 PM by GREG Greer     Patent foramen ovale by transesophageal echocardiogram, January 2005, documented again today by echocardiogram, 07/13/2006.         PAF (paroxysmal atrial fibrillation)    GERD (gastroesophageal reflux disease)    Fall at home            Assessment & Plan:  1. Closed fracture of left Ilium:  - secondary to mechanical fall.  - patient discussed with ortho in [place]. It was determined that injury was non operable and likely to be managed with conservative measures. Dr. Stover will see in am.  - PT/OT treat and eval  - case management for outpatient PT or rehab placement  - am labs  - pain control as needed  - UA pending    2. PAF:  - stable and rate controlled. Will continue Eliquis.      DVT prophylaxis: eliquis    CODE STATUS:  Full code    Admission Status:  I believe this patient meets OBSERVATION status, however if further evaluation or treatment plans warrant, status may change.  Based upon current information, I predict patient's care encounter to be less than or equal to 2 midnights.    Electronically signed by Missy Mckee PA-C, 05/08/18, 9:12 PM.      Brief Attending Admission Attestation     I have seen and examined the patient, performing an independent face-to-face diagnostic evaluation with plan of care reviewed and developed with the advanced practice clinician SAMIR Mckee PA-C.      Brief Summary Statement/HPI:   Mirian Sy is a delightful 78 y.o. female with PMH significant for HTN, HLD, PFO, anemia, and PAF anticoagulated on Eliquis.  She presents to [facility] this evening with left hip pain and inability to ambulate well after a fall.  Her son is working on her ramp at the front of the house and the patient forgot the board were missing.  She walked out the door and fell.  She had immediate pain in the left hip but was able to  get herself up and make it to her car, after which she drove herself to the bank but was unable to get out of the car.  She drove home and her son had returned; he helped her into the house where she was able to function for a couple of hours on a walker.  Later in the afternoon her pain worsened and she came to the ER because her pain was worse and she was unable to ambulate.      Attending Physical Exam:  Constitutional: No acute distress, awake, alert  Eyes: PERRLA, sclerae anicteric, no conjunctival injection  HENT: NCAT, mucous membranes moist  Neck: Supple, no thyromegaly, no lymphadenopathy, trachea midline  Respiratory: Clear to auscultation bilaterally, nonlabored respirations   Cardiovascular: RRR, palpable pedal pulses bilaterally  Gastrointestinal: Positive bowel sounds, soft, nontender, nondistended  Musculoskeletal: No bilateral ankle edema, no clubbing or cyanosis to extremities  Psychiatric: Appropriate affect, cooperative  Neurologic: Oriented x 3, left leg with limited ROM, Cranial Nerves grossly intact to confrontation, speech clear  Skin: No rashes    Brief Assessment/Plan :  See above for further detailed assessment and plan developed with APC which I have reviewed and/or edited.      Electronically signed by Aure Cotton MD, 05/08/18, 10:24 PM.

## 2018-05-09 NOTE — PROGRESS NOTES
Discharge Planning Assessment  [facility]     Patient Name: Mirian Sy  MRN: [id number]  Today's Date: 5/9/2018    Admit Date: 5/8/2018          Discharge Needs Assessment     Row Name 05/09/18 1111       Living Environment    Lives With alone    Current Living Arrangements home/apartment/condo    Primary Care Provided by self    Provides Primary Care For no one    Family Caregiver if Needed child(tammy), adult    Quality of Family Relationships unable to assess    Able to Return to Prior Arrangements no       Resource/Environmental Concerns    Resource/Environmental Concerns none    Transportation Concerns car, none       Transition Planning    Patient/Family Anticipates Transition to inpatient rehabilitation facility    Patient/Family Anticipated Services at Transition rehabilitation services    Transportation Anticipated family or friend will provide       Discharge Needs Assessment    Equipment Currently Used at Home walker, rolling    Outpatient/Agency/Support Group Needs inpatient rehabilitation facility    Discharge Facility/Level of Care Needs rehabilitation facility    Current Discharge Risk dependent with mobility/activities of daily living            Discharge Plan     Row Name 05/09/18 1120       Plan    Plan to be determined    Patient/Family in Agreement with Plan yes    Plan Comments I met with Ms Sy and sister at bedside to initiate d/c planning. She lives alone, states prior to admit she was independent with all ADL's,driving, etc. She uses a rolling walker prn in her home. Her homeis one level with a ramp access to enter. We discussed d/c options: acute rehab unit at [facility] vs SNF pvt pay vs home with Home Health and hired help. I also spoke with daughter & POA by phone( Kayla Vasquez [phone]) with patient's permission. Ms Gudino would like to go to [facility] short term, daughter is in agreement and stated family could provide assistance at home after rehab.I have spoken with Ewa at  [facility],who accepted referral. Case mgt will follow and assist with final D/C arrangements.        Destination     No service coordination in this encounter.      Durable Medical Equipment     No service coordination in this encounter.      Dialysis/Infusion     No service coordination in this encounter.      Home Medical Care     No service coordination in this encounter.      Social Care     No service coordination in this encounter.        Expected Discharge Date and Time     Expected Discharge Date Expected Discharge Time    May 10, 2018               Demographic Summary     Row Name 05/09/18 1119       Healthcare Power of  Information    Name, Healthcare Power of  Kayla Vasquez ( daughter)    Phone, Healthcare Power of  [phone]    Row Name 05/09/18 1106       General Information    Admission Type observation    Arrived From home    Referral Source physician    Reason for Consult discharge planning    Preferred Language English    General Information Comments PCP- Dr. Sriram Springer       Contact Information    Permission Granted to Share Info With ;family/designee    Contact Information Obtained for power of  for healthcare       Healthcare Power of  Information    Name, Healthcare Power of  Ke Story (son)     Phone, Healthcare Power of  [phone]            Functional Status     Row Name 05/09/18 1110       Functional Status    Usual Activity Tolerance good       Functional Status, IADL    Medications independent    Meal Preparation independent    Laundry independent    Shopping independent       Mental Status    General Appearance WDL WDL       Mental Status Summary    Recent Changes in Mental Status/Cognitive Functioning no changes       Employment/    Employment Status retired    Employment/ Comments insurance- Medicare & [place]            Psychosocial    No documentation.           Abuse/Neglect    No  documentation.           Legal    No documentation.           Substance Abuse    No documentation.           Patient Forms    No documentation.         Sonja C Kellerman, RN

## 2018-05-09 NOTE — CONSULTS
Orthopedic Consult      Patient: Mirian Sy    Date of Admission: 5/8/2018  5:22 PM    YOB: 1940    Medical Record Number: [id number]    Attending Physician: Cristina Lucio DO    Consulting Physician: Lenny Stover Jr., MD      Chief Complaints: Fall in home, initial encounter [W19.XXXA, Y92.099]      History of Present Illness: 78 y.o. female admitted to [facility] with Fall in home, initial encounter [W19.XXXA, Y92.099]. I was consulted for further evaluation and treatment of left iliac wing fracture. Onset of symptoms was abrupt starting several hours ago.  Symptoms are associated with rest.  Symptoms are aggravated by movement of her left lower extremity.   Symptoms improve with rest.        Allergies   Allergen Reactions   • Actonel [Risedronate Sodium] GI Intolerance   • Hctz [Hydrochlorothiazide]      hyponatremia   • Influenza Vaccines Myalgia   • Lisinopril      hyperkalemia        Home Medications:  Facility-Administered Medications Prior to Admission   Medication Dose Route Frequency Provider Last Rate Last Dose   • zoledronic acid (RECLAST) infusion 5 mg  5 mg Intravenous Once GREG Lee         Prescriptions Prior to Admission   Medication Sig Dispense Refill Last Dose   • calcium-vitamin D (OSCAL-500) 500-200 MG-UNIT per tablet Take 1 tablet by mouth every morning.   Taking   • citalopram (CeleXA) 20 MG tablet TAKE 1 TABLET EVERY MORNING 90 tablet 1    • diltiazem XR (DILACOR XR) 120 MG 24 hr capsule Take 1 capsule by mouth Every Night. 90 capsule 3    • ELIQUIS 5 MG tablet tablet TAKE 1 TABLET TWICE A  tablet 1    • ferrous sulfate 325 (65 FE) MG tablet Take 1 tablet by mouth Daily With Breakfast. 90 tablet 3    • flecainide (TAMBOCOR) 50 MG tablet Take 1 tablet by mouth Every 12 (Twelve) Hours. 180 tablet 1    • levETIRAcetam (KEPPRA) 250 MG tablet TAKE 1 TABLET EVERY 12 HOURS 180 tablet 1    • magnesium oxide (MAGOX) 400 (241.3 MG) MG tablet  tablet Take 400 mg by mouth Daily.   Taking   • megestrol acetate (MEGACE) 400 MG/10ML suspension oral suspension Take 5 mL by mouth every morning. 450 mL 1 Taking   • mirtazapine (REMERON) 7.5 MG tablet TAKE 1 TABLET AT BEDTIME 90 tablet 1    • ranitidine (ZANTAC) 150 MG capsule TAKE 1 CAPSULE EVERY NIGHT 90 capsule 1          Past Medical History:   Diagnosis Date   • Atrial fibrillation 2005    Chronic anticoagulation and rate control   • Basal cell carcinoma 2007    Left leg and nose   • Cardiomyopathy 2006   • Cerebrovascular accident 01/2005    CT right parietal CVA. Carotid duplex -50% to 79% left ICS 15% right ICS stenosis. MRI of the neck showed no significant carotid bifurcations of these. Negative CT of the head, 09/10/2012. Carotid duplex, 02/24/2014:  Shelf-like plaque in the CECE without significant elevation and velocities.  Patent vertebral arteries.   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     • Fracture of bone of forefoot 1972    Right foot   • GERD (gastroesophageal reflux disease) 2014    Chronic superficial gastritis   • HTN (hypertension) 2005   • Iron deficiency anemia due to chronic blood loss 2018    Predisposed by chronic anticoagulation and GERD   • Low body weight due to inadequate caloric intake Adulthood   • Mitral valve prolapse 1997    Mild MR   • Osteoarthritis    • Osteoporosis    • Patent foramen ovale 2005   • Pneumonia 1947   • SCC (squamous cell carcinoma), arm, right 2017   • Scoliosis    • Syncope 2014     undermined cause - [facility]   • Varicose veins 2005    Symptomatic        Past Surgical History:   Procedure Laterality Date   • ACHILLES TENDON SURGERY Left 1997     Repair of rupture left tendon with screws   • BREAST CYST EXCISION Left    • BREAST SURGERY Left 1982    Excision benign cyst   • CARDIAC CATHETERIZATION  2006    Severe acute coronary spasm   • CATARACT EXTRACTION WITH INTRAOCULAR LENS IMPLANT Bilateral 2015   • EYE CAPSULOTOMY WITH LASER Left 2016    Marked visual improvement   • FEMUR FRACTURE SURGERY Right 2015    Repair of shaft fracture   • HIP FRACTURE SURGERY Left 2014    left hip fracture with pin   • LUMBAR DISC SURGERY  1983   • OVARIAN CYST REMOVAL Left 1996   • SKIN CANCER EXCISION Right 2017    SCC - arm        Social History     Occupational History   • Not on file.     Social History Main Topics   • Smoking status: Former Smoker     Packs/day: 1.00     Years: 20.00     Types: Cigarettes   • Smokeless tobacco: Never Used      Comment: Remote history   • Alcohol use No   • Drug use: No   • Sexual activity: Not on file    Social History     Social History Narrative    Domestic life   lives in private home alone - good support from local children        Mandaen    Jain        Sleep hygiene  in bed 9 PM to 6 AM for 9 hours of sleep        Caffeine use   1 1/2 cups coffee daily        Exercise habits  walks most days of the week. - She will remain normal.        Diet   well-balanced diet with protein supplementations        Occupation    retired         Hearing  no impairment        Vision    no glasses needed after cataract surgery.          Driving   daytime only - good weather - local traffic            Family History   Problem Relation Age of Onset   • Breast cancer Mother       age 59   • Diabetes Mother    • Hypertension Mother    • Heart disease Father       age 80   • Other Sister      Arthrodesis cervical   • Basal cell carcinoma Sister    • Bone cancer Sister    • Breast cancer Sister 57   • Diabetes Sister       age 56   • Hypertension Sister    • Melanoma Sister    • Arrhythmia Sister      Sinus   • Stroke Sister     • Allergies Son      Hay fever   • Diabetes Maternal Uncle    • Breast cancer Sister 59   • Breast cancer Sister 66   • Ovarian cancer Sister      DX AGE UNKNOWN         Review of Systems:   HEENT: Patient denies any headaches, vision changes, change in hearing, or tinnitus, Patient denies any rhinorrhea,epistaxis, sinus pain, mouth or dental problems, sore throat or hoarseness, or dysphagia  Pulmonary: Patient denies any cough, congestion, SOA, or wheezing  Cardiovascular: Patient denies any chest pain, dyspnea, palpitations, weakness, intolerance of exercise, varicosities, swelling of extremities, known murmur  Gastrointestinal:  Patient denies nausea, vomiting, diarrhea, constipation, loss  of appetite, change in appetite, dysphagia, gas, heartburn, melena, change in bowel habits, use of laxatives or other drugs to alter the function of the gastrointestinal tract.  Genital/Urinary: Patient denies dysuria, change in color of urine, change in frequency of urination, pain with urgency, incontinence, retention, or nocturia.  Musculoskeletal: Patient denies increased warmth; redness; or swelling of joints; limitation of function; deformity; crepitation: pain in a joint or an extremity, the neck, or the back, especially with movement.  Neurological: Patient denies dizziness, tremor, ataxia, difficulty in speaking, change in speech, paresthesia, loss of sensation, seizures, syncope, changes in memory.  Endocrine system: Patient denies tremors, palpitations, intolerance of heat or cold, polyuria, polydipsia, polyphagia, diaphoresis, exophthalmos, or goiter.  Psychological: Patient denies thoughts/plans or harming self or other; depression,  insomnia, night terrors, tyree, memory loss, disorientation.  Skin: Patient denies any bruising, rashes, discoloration, pruritus, wounds, ulcers, decubiti, changes in the hair or nails  Hematopoietic: Patient denies history of spontaneous or excessive bleeding, epistaxis,  hematuria, melena, fatigue, enlarged or tender lymph nodes, pallor, history of anemia.    Physical Exam: 78 y.o. female  General Appearance:    Alert, cooperative, in no acute distress                 Vitals:    05/08/18 2130 05/08/18 2200 05/08/18 2230 05/09/18 0304   BP: (!) 136/103 (!) 147/102 169/83 129/70   BP Location:   Right arm Left arm   Patient Position:   Lying Lying   Pulse:  108 102 107   Resp:  18 16 16   Temp:   97.8 °F (36.6 °C) 97.9 °F (36.6 °C)   TempSrc:   Oral Oral   SpO2: 96% 95% 91% 91%   Weight:       Height:            Head:    Normocephalic, without obvious abnormality, atraumatic      Right Upper Extremity:  No obvious deformity, painless ROM shoulder, elbow, wrist, no joint instability, normal distal strength and sensation to light touch, skin intact without cyanosis, clubbing, edema; +2 radial pulse  Left Upper Extremity:  No obvious deformity, painless ROM shoulder, elbow, wrist, no joint instability, normal distal strength and sensation to light touch, skin intact without cyanosis, clubbing, edema; +2 radial pulse  Right Lower Extremity:  No obvious deformity, painless ROM hip, knee, ankle, compartments soft, normal distal strength and sensation to light touch, skin intact without cyanosis, clubbing, edema; +2 dorsalis pedis pulse  Left Lower Extremity:  No obvious deformity, painless ROM hip, knee, ankle, compartments soft, normal distal strength and sensation to light touch, skin intact without cyanosis, clubbing, edema; +2 dorsalis pedis pulse         Diagnostic Tests:    I have reviewed the labs, radiology results and diagnostic studies:CT of pelvis demonstrates minimally displaced iliac wing fracture on left without signs of SI disruption or joint line involvement      Results from last 7 days  Lab Units 05/09/18  0607   WBC 10*3/mm3 6.53   HEMOGLOBIN g/dL 11.5   PLATELETS 10*3/mm3 217       Results from last 7 days  Lab Units 05/09/18  0607   SODIUM mmol/L 132   POTASSIUM mmol/L  4.1   CO2 mmol/L 28.0   CREATININE mg/dL 1.00   GLUCOSE mg/dL 97           Assessment:  Patient Active Problem List   Diagnosis   • Abnormal gait   • Cardiomyopathy   • Carpal tunnel syndrome   • Complex partial epilepsy   • Depression   • Chronic gastritis   • Hypertension   • Hyponatremia   • Nutritional anemia   • Dyssomnia   • Xeroderma   • Preventative health care   • Frailty   • Tricuspid regurgitation   • Cerebrovascular accident   • Syncope   • DVT (deep venous thrombosis)   • Anorexia   • Anxiety   • Patent foramen ovale   • Mitral regurgitation   • Atrial fibrillation with RVR   • Low weight   • Senile osteoporosis   • Iron deficiency   • PAF (paroxysmal atrial fibrillation)   • Closed fracture of left ilium   • GERD (gastroesophageal reflux disease)   • Fall at home           Plan:  The patient voiced understanding of the risks, benefits, and alternative forms of treatment that were discussed and the patient consents to proceed with conservative treatment of left iliac wing fracture.    TTWB LLE. Can progress to WBAT.  PT/OT  No need for DVT ppx from Ortho standpoint.    F/u in 2-3 weeks for repeat exam and XRs.             Lenny Stover Jr., MD  05/09/18  8:54 AM

## 2018-05-09 NOTE — PLAN OF CARE
Problem: Fractured Hip (Adult)  Goal: Signs and Symptoms of Listed Potential Problems Will be Absent, Minimized or Managed (Fractured Hip)   05/09/18 1328   Goal/Outcome Evaluation   Problems Assessed (Fractured Hip) pain   Problems Present (Fractured Hip) pain       Problem: Patient Care Overview  Goal: Plan of Care Review  Outcome: Ongoing (interventions implemented as appropriate)   05/09/18 1328   Coping/Psychosocial   Plan of Care Reviewed With patient   Coping/Psychosocial   Patient Agreement with Plan of Care agrees   Plan of Care Review   Progress improving   OTHER   Outcome Summary Patient demonstrated ability to mobilize out of bed and in room with walker. She is limited by pain and is below her baseline. Recommend continued skilled PT.

## 2018-05-09 NOTE — PLAN OF CARE
Problem: Fractured Hip (Adult)  Goal: Signs and Symptoms of Listed Potential Problems Will be Absent, Minimized or Managed (Fractured Hip)  Outcome: Ongoing (interventions implemented as appropriate)   05/08/18 2320   Goal/Outcome Evaluation   Problems Assessed (Fractured Hip) all   Problems Present (Fractured Hip) functional deficit/self-care deficit;pain       Problem: Patient Care Overview  Goal: Plan of Care Review  Outcome: Ongoing (interventions implemented as appropriate)    Goal: Individualization and Mutuality  Outcome: Ongoing (interventions implemented as appropriate)      Problem: Fall Risk (Adult)  Goal: Absence of Fall  Outcome: Ongoing (interventions implemented as appropriate)   05/08/18 2320   Fall Risk (Adult)   Absence of Fall making progress toward outcome      05/08/18 2320   Fall Risk (Adult)   Absence of Fall making progress toward outcome

## 2018-05-10 PROCEDURE — G0378 HOSPITAL OBSERVATION PER HR: HCPCS

## 2018-05-10 PROCEDURE — 97116 GAIT TRAINING THERAPY: CPT

## 2018-05-10 PROCEDURE — 97165 OT EVAL LOW COMPLEX 30 MIN: CPT

## 2018-05-10 PROCEDURE — G8988 SELF CARE GOAL STATUS: HCPCS

## 2018-05-10 PROCEDURE — G8987 SELF CARE CURRENT STATUS: HCPCS

## 2018-05-10 PROCEDURE — 94799 UNLISTED PULMONARY SVC/PX: CPT

## 2018-05-10 PROCEDURE — 99225 PR SBSQ OBSERVATION CARE/DAY 25 MINUTES: CPT | Performed by: PHYSICIAN ASSISTANT

## 2018-05-10 RX ORDER — TRAMADOL HYDROCHLORIDE 50 MG/1
50 TABLET ORAL EVERY 6 HOURS PRN
Status: DISCONTINUED | OUTPATIENT
Start: 2018-05-10 | End: 2018-05-11 | Stop reason: HOSPADM

## 2018-05-10 RX ORDER — BISACODYL 10 MG
10 SUPPOSITORY, RECTAL RECTAL DAILY PRN
Status: DISCONTINUED | OUTPATIENT
Start: 2018-05-10 | End: 2018-05-11 | Stop reason: HOSPADM

## 2018-05-10 RX ORDER — SENNA AND DOCUSATE SODIUM 50; 8.6 MG/1; MG/1
2 TABLET, FILM COATED ORAL NIGHTLY
Status: DISCONTINUED | OUTPATIENT
Start: 2018-05-10 | End: 2018-05-11 | Stop reason: HOSPADM

## 2018-05-10 RX ORDER — BISACODYL 5 MG/1
10 TABLET, DELAYED RELEASE ORAL DAILY PRN
Status: DISCONTINUED | OUTPATIENT
Start: 2018-05-10 | End: 2018-05-11 | Stop reason: HOSPADM

## 2018-05-10 RX ADMIN — DILTIAZEM HYDROCHLORIDE 120 MG: 120 CAPSULE, COATED, EXTENDED RELEASE ORAL at 20:16

## 2018-05-10 RX ADMIN — ACETAMINOPHEN 650 MG: 325 TABLET ORAL at 20:16

## 2018-05-10 RX ADMIN — LEVETIRACETAM 250 MG: 250 TABLET, FILM COATED ORAL at 08:22

## 2018-05-10 RX ADMIN — LEVETIRACETAM 250 MG: 250 TABLET, FILM COATED ORAL at 20:15

## 2018-05-10 RX ADMIN — Medication 2 TABLET: at 20:16

## 2018-05-10 RX ADMIN — APIXABAN 5 MG: 5 TABLET, FILM COATED ORAL at 08:23

## 2018-05-10 RX ADMIN — ACETAMINOPHEN 650 MG: 325 TABLET ORAL at 08:23

## 2018-05-10 RX ADMIN — FLECAINIDE ACETATE 50 MG: 50 TABLET ORAL at 08:23

## 2018-05-10 RX ADMIN — MIRTAZAPINE 7.5 MG: 15 TABLET, FILM COATED ORAL at 20:17

## 2018-05-10 RX ADMIN — FERROUS SULFATE TAB 325 MG (65 MG ELEMENTAL FE) 325 MG: 325 (65 FE) TAB at 08:22

## 2018-05-10 RX ADMIN — APIXABAN 5 MG: 5 TABLET, FILM COATED ORAL at 20:16

## 2018-05-10 RX ADMIN — POLYETHYLENE GLYCOL (3350) 17 G: 17 POWDER, FOR SOLUTION ORAL at 13:39

## 2018-05-10 RX ADMIN — FAMOTIDINE 20 MG: 20 TABLET ORAL at 08:23

## 2018-05-10 RX ADMIN — CITALOPRAM HYDROBROMIDE 20 MG: 20 TABLET ORAL at 08:23

## 2018-05-10 RX ADMIN — FLECAINIDE ACETATE 50 MG: 50 TABLET ORAL at 20:16

## 2018-05-10 RX ADMIN — BISACODYL 10 MG: 5 TABLET, COATED ORAL at 13:39

## 2018-05-10 RX ADMIN — FAMOTIDINE 20 MG: 20 TABLET ORAL at 20:16

## 2018-05-10 NOTE — PROGRESS NOTES
Continued Stay Note  [facility]     Patient Name: Mirian Sy  MRN: [id number]  Today's Date: 5/10/2018    Admit Date: 5/8/2018          Discharge Plan     Row Name 05/10/18 1430       Plan    Plan [facility]    Patient/Family in Agreement with Plan yes    Plan Comments Per Ewa at [facility],they have an acute rehab bed available and can accept Ms Sy tomorrow 5/11/18. Ms Sy and family in agreement with plan. Arrangements made for [facility] wheelchair van to transport  at 3pm. Call report to [phone]    Final Discharge Disposition Code 62 - inpatient rehab facility              Discharge Codes    No documentation.       Expected Discharge Date and Time     Expected Discharge Date Expected Discharge Time    May 10, 2018             Sonja C Kellerman, RN

## 2018-05-10 NOTE — PLAN OF CARE
Problem: Fractured Hip (Adult)  Goal: Signs and Symptoms of Listed Potential Problems Will be Absent, Minimized or Managed (Fractured Hip)  Outcome: Ongoing (interventions implemented as appropriate)   05/10/18 0310   Goal/Outcome Evaluation   Problems Assessed (Fractured Hip) all   Problems Present (Fractured Hip) pain       Problem: Patient Care Overview  Goal: Plan of Care Review  Outcome: Ongoing (interventions implemented as appropriate)   05/10/18 0310   Coping/Psychosocial   Plan of Care Reviewed With patient   Plan of Care Review   Progress improving       Problem: Fall Risk (Adult)  Goal: Absence of Fall  Outcome: Ongoing (interventions implemented as appropriate)   05/10/18 0310   Fall Risk (Adult)   Absence of Fall making progress toward outcome       Problem: Patient Care Overview  Goal: Plan of Care Review  Outcome: Ongoing (interventions implemented as appropriate)   05/10/18 0310   Coping/Psychosocial   Plan of Care Reviewed With patient   Plan of Care Review   Progress improving

## 2018-05-10 NOTE — PLAN OF CARE
Problem: Constipation (Adult)  Goal: Identify Related Risk Factors and Signs and Symptoms  Outcome: Ongoing (interventions implemented as appropriate)   05/10/18 1200   Constipation (Adult)   Related Risk Factors (Constipation) diet/fluid restriction;hospitalization;impaired mobility   Signs and Symptoms (Constipation) nausea and vomiting;indigestion

## 2018-05-10 NOTE — PLAN OF CARE
Problem: Fall Risk (Adult)  Goal: Absence of Fall  Outcome: Ongoing (interventions implemented as appropriate)   05/10/18 182   Fall Risk (Adult)   Absence of Fall making progress toward outcome

## 2018-05-10 NOTE — THERAPY EVALUATION
Acute Care - Occupational Therapy Initial Evaluation  [facility]     Patient Name: Mirian Sy  : 1940  MRN: [id number]  Today's Date: 5/10/2018  Onset of Illness/Injury or Date of Surgery: 18  Date of Referral to OT: 18  Referring Physician: Rafi QUIROZ    Admit Date: 2018       ICD-10-CM ICD-9-CM   1. Fall in home, initial encounter W19.XXXA E888.9    Y92.099 E849.0   2. Other closed fracture of left ilium, initial encounter S32.392A 808.41   3. Elevated blood pressure reading R03.0 796.2   4. Impaired functional mobility, balance, gait, and endurance Z74.09 V49.89   5. Impaired mobility and ADLs Z74.09 799.89     Patient Active Problem List   Diagnosis   • Abnormal gait   • Cardiomyopathy   • Carpal tunnel syndrome   • Complex partial epilepsy   • Depression   • Chronic gastritis   • Hypertension   • Hyponatremia   • Nutritional anemia   • Dyssomnia   • Xeroderma   • Preventative health care   • Frailty   • Tricuspid regurgitation   • Cerebrovascular accident   • Syncope   • DVT (deep venous thrombosis)   • Anorexia   • Anxiety   • Patent foramen ovale   • Mitral regurgitation   • Atrial fibrillation with RVR   • Low weight   • Senile osteoporosis   • Iron deficiency   • PAF (paroxysmal atrial fibrillation)   • Closed fracture of left ilium   • GERD (gastroesophageal reflux disease)   • Fall at home     Past Medical History:   Diagnosis Date   • Atrial fibrillation     Chronic anticoagulation and rate control   • Basal cell carcinoma     Left leg and nose   • Cardiomyopathy    • Cerebrovascular accident 2005    CT right parietal CVA. Carotid duplex -50% to 79% left ICS 15% right ICS stenosis. MRI of the neck showed no significant carotid bifurcations of these. Negative CT of the head, 09/10/2012. Carotid duplex, 2014:  Shelf-like plaque in the CECE without significant elevation and velocities.  Patent vertebral arteries.   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     • Fracture of bone of forefoot 1972    Right foot   • GERD (gastroesophageal reflux disease) 2014    Chronic superficial gastritis   • HTN (hypertension) 2005   • Iron deficiency anemia due to chronic blood loss 2018    Predisposed by chronic anticoagulation and GERD   • Low body weight due to inadequate caloric intake Adulthood   • Mitral valve prolapse 1997    Mild MR   • Osteoarthritis    • Osteoporosis    • Patent foramen ovale 2005   • Pneumonia 1947   • SCC (squamous cell carcinoma), arm, right 2017   • Scoliosis    • Syncope 2014     undermined cause - BHL   • Varicose veins 2005    Symptomatic     Past Surgical History:   Procedure Laterality Date   • ACHILLES TENDON SURGERY Left 1997    Repair of rupture left tendon with screws   • BREAST CYST EXCISION Left    • BREAST SURGERY Left 1982    Excision benign cyst   • CARDIAC CATHETERIZATION  2006    Severe acute coronary spasm   • CATARACT EXTRACTION WITH INTRAOCULAR LENS IMPLANT Bilateral 2015   • EYE CAPSULOTOMY WITH LASER Left 2016    Marked visual improvement   • FEMUR FRACTURE SURGERY Right 2015    Repair of shaft fracture   • HIP FRACTURE SURGERY Left 2014    left hip fracture with pin   • LUMBAR DISC SURGERY  1983   • OVARIAN CYST REMOVAL Left 1996   • SKIN CANCER EXCISION Right 2017    SCC - arm          OT ASSESSMENT FLOWSHEET (last 72 hours)      Occupational Therapy Evaluation     Row Name 05/10/18 0918                   OT Evaluation Time/Intention    Subjective Information complains of;pain   with certain mvmts  -SUSANNAH        Document Type evaluation  -SUSANNAH        Mode of Treatment individual therapy;occupational therapy  -SUSANNAH         Patient Effort excellent  -SUSANNAH        Symptoms Noted During/After Treatment increased pain  -SUSANNAH        Comment Pt. got dizzy with low BP earlier with PT when mobilizing a long distance.  -SUSANNAH           General Information    Patient Profile Reviewed? yes  -SUSANNAH        Onset of Illness/Injury or Date of Surgery 05/08/18  -SUSANNAH        Referring Physician Rafi QUIROZ  -SUSANNAH        Patient Observations alert;cooperative;agree to therapy  -SUSANNAH        General Observations of Patient Up in recliner.  -SUSANNAH        Prior Level of Function independent:;all household mobility;community mobility;ADL's;home management;driving;shopping;yard work  -SUSANNAH        Equipment Currently Used at Home walker, rolling;sock aid;shoe horn;raised toilet;grab bar;bath bench;cane, straight   was not currently using AD/AD, also with reacher  -SUSANNAH        Pertinent History of Current Functional Problem Fell at home going down steps resulting in a L ilium pelvis fx. L.  -SUSANNAH        Existing Precautions/Restrictions fall  -SUSANNAH        Risks Reviewed patient:;increased discomfort;change in vital signs  -SUSANNAH        Benefits Reviewed patient:;improve function;increase independence;increase balance;increase knowledge  -SUSANNAH        Barriers to Rehab none identified  -SUSANNAH           Relationship/Environment    Primary Source of Support/Comfort sibling(s);child(tammy)   youngest son Ke  -SUSANNAH        Lives With alone  -SUSANNAH        Family Caregiver if Needed child(tammy), adult  -SUSANNAH           Resource/Environmental Concerns    Current Living Arrangements home/apartment/condo  -SUSANNAH           Cognitive Assessment/Intervention- PT/OT    Orientation Status (Cognition) oriented x 4  -SUSANNAH        Follows Commands (Cognition) WNL  -SUSANNAH           Mobility Assessment/Treatment    Extremity Weight-bearing Status left lower extremity  -SUSANNAH        Left Lower Extremity (Weight-bearing Status) weight-bearing as tolerated (WBAT)  -SUSANNAH           Bed Mobility Assessment/Treatment    Comment (Bed Mobility) [facility]   -SUSANNAH           Functional Mobility    Functional Mobility- Ind. Level contact guard assist  -SUSANNAH        Functional Mobility- Device rolling walker  -SUSANNAH        Functional Mobility-Distance (Feet) 24   to bathroom and back to chair  -SUSANNAH           Transfer Assessment/Treatment    Transfer Assessment/Treatment sit-stand transfer;stand-sit transfer;toilet transfer  -SUSANNAH        Comment (Transfers) Pt. needing cues each time for hand placement and to line up to surface.  -SUSANNAH        Sit-Stand Leslie (Transfers) contact guard;verbal cues  -SUSANNAH        Stand-Sit Leslie (Transfers) contact guard;verbal cues  -SUSANNAH        Leslie Level (Toilet Transfer) contact guard;verbal cues  -SUSANNAH        Assistive Device (Toilet Transfer) grab bars/safety frame;raised toilet seat  -SUSANNAH           Sit-Stand Transfer    Assistive Device (Sit-Stand Transfers) walker, front-wheeled  -SUSANNAH           Stand-Sit Transfer    Assistive Device (Stand-Sit Transfers) walker, front-wheeled  -SUSANNAH           Toilet Transfer    Type (Toilet Transfer) sit-stand;stand-sit  -SUSANNAH           ADL Assessment/Intervention    BADL Assessment/Intervention lower body dressing;toileting;grooming  -SUSANNAH           Lower Body Dressing Assessment/Training    Lower Body Dressing Leslie Level doff;don;socks  -        Assistive Devices (Lower Body Dressing) --   pt. with AE at home.  Educated may need use again for pain  -SUSANNAH        Lower Body Dressing Position supported sitting  -SUSANNAH        Comment (Lower Body Dressing) Pt. doffed socks and got L sock on, but with increased pain and could not do right.  -SUSANNAH           Grooming Assessment/Training    Leslie Level (Grooming) oral care regimen;wash face, hands;supervision;verbal cues  -SUSANNAH        Grooming Position supported sitting   sitting on BSC at sink  -SUSANNAH        Comment (Grooming) Pt. needed cues not to miss food on dentures when cleaning.  -SUSANNAH           Toileting Assessment/Training    Leslie Level  (Toileting) adjust/manage clothing;change pad/brief;supervision;minimum assist (75% patient effort)  -SUSANNAH        Assistive Devices (Toileting) raised toilet seat;grab bar/safety frame  -SUSANNAH        Toileting Position unsupported sitting  -SUSANNAH        Comment (Toileting) Pt. able to wipe self, but some assist for back gown.  -SUSANNAH           BADL Safety/Performance    Impairments, BADL Safety/Performance balance;pain  -SUSANNAH           General ROM    GENERAL ROM COMMENTS No UE AROM deficits noted.  -SUSANNAH           General Assessment (Manual Muscle Testing)    Comment, General Manual Muscle Testing (MMT) Assessment BUE strength 4+ to 5/5  -SUSANNAH           Dynamic Standing Balance    Level of [place], Reaches Outside Midline (Standing, Dynamic Balance) contact guard assist  -SUSANNAH           Sensory Assessment/Intervention    Sensory General Assessment no sensation deficits identified  -SUSANNAH           Positioning and Restraints    Pre-Treatment Position sitting in chair/recliner  -SUSANNAH        Post Treatment Position chair  -SUSANNAH        In Chair reclined;call light within reach;encouraged to call for assist;exit alarm on  -SUSANNAH           Pain Scale: Numbers Pre/Post-Treatment    Pain Scale: Numbers, Pretreatment 0/10 - no pain   up to 5/10 with activity  -SUSANNAH        Pain Scale: Numbers, Post-Treatment 3/10  -SUSANNAH        Pain Location - Side Left  -SUSANNAH        Pain Location - Orientation --   pelvis  -SUSANNAH        Pain Intervention(s) Repositioned  -SUSANNAH           Coping    Observed Emotional State calm;cooperative  -SUSANNAH           Plan of Care Review    Plan of Care Reviewed With patient  -SUSANNAH           Clinical Impression (OT)    Date of Referral to OT 05/08/18  -SUSANNAH        OT Diagnosis Impaired ADL and transfer indep.  due to pelvis fx with pain and decreased balance and flexibility.  -SUSANNAH        Patient/Family Goals Statement (OT Eval) Pt. want to continue therapy so can return home  -SUSANNAH        Criteria for Skilled Therapeutic Interventions Met (OT Eval)  yes;treatment indicated  -SUSANNAH        Rehab Potential (OT Eval) good, to achieve stated therapy goals  -SUSANNAH        Therapy Frequency (OT Eval) daily  -SUSANNAH        Care Plan Review (OT) evaluation/treatment results reviewed;care plan/treatment goals reviewed;risks/benefits reviewed;current/potential barriers reviewed;patient/other agree to care plan  -SUSANNAH        Anticipated Discharge Disposition (OT) inpatient rehab facility   due to lives alone and needs to perform IADL tasks.  -SUSANNAH           Vital Signs    Pre Systolic BP Rehab --   no dizziness this session  -SUSANNAH           Planned OT Interventions    Planned Therapy Interventions (OT Eval) activity tolerance training;adaptive equipment training;BADL retraining;IADL retraining;patient/caregiver education/training;transfer/mobility retraining  -SUSANNAH           OT Goals    Bed Mobility Goal Selection (OT) bed mobility, OT goal 1  -SUSANNAH        Transfer Goal Selection (OT) transfer, OT goal 1  -SUSANNAH        Dressing Goal Selection (OT) dressing, OT goal 1  -SUSANNAH        Toileting Goal Selection (OT) toileting, OT goal 1  -SUSANNAH        Balance Goal Selection (OT) balance, OT goal 1  -SUSANNAH        Additional Documentation Balance Goal Selection (OT) (Row)  -SUSANNAH           Bed Mobility Goal 1 (OT)    Activity/Assistive Device (Bed Mobility Goal 1, OT) bed mobility activities, all  -SUSANNAH        [place] Level/Cues Needed (Bed Mobility Goal 1, OT) conditional independence  -SUSANNAH        Time Frame (Bed Mobility Goal 1, OT) long term goal (LTG);5 days  -SUSANNAH        Progress/Outcomes (Bed Mobility Goal 1, OT) goal ongoing  -SUSANNAH           Transfer Goal 1 (OT)    Activity/Assistive Device (Transfer Goal 1, OT) sit-to-stand/stand-to-sit;toilet;walker, rolling   RTS, grab bar  -SUSANNAH        [place] Level/Cues Needed (Transfer Goal 1, OT) supervision required  -SUSANNAH        Time Frame (Transfer Goal 1, OT) long term goal (LTG);5 days  -SUSANNAH        Progress/Outcome (Transfer Goal 1, OT) goal ongoing  -SUSANNAH            Dressing Goal 1 (OT)    Activity/Assistive Device (Dressing Goal 1, OT) lower body dressing   AE prn  -SUSANNAH        [place]/Cues Needed (Dressing Goal 1, OT) supervision required  -SUSANNAH        Time Frame (Dressing Goal 1, OT) long term goal (LTG);5 days  -SUSANNAH        Progress/Outcome (Dressing Goal 1, OT) goal ongoing  -SUSANNAH           Toileting Goal 1 (OT)    Activity/Device (Toileting Goal 1, OT) toileting skills, all  -SUSANNAH        [place] Level/Cues Needed (Toileting Goal 1, OT) supervision required  -SUSANNAH        Time Frame (Toileting Goal 1, OT) long term goal (LTG);5 days  -SUSANNAH        Progress/Outcome (Toileting Goal 1, OT) goal ongoing  -SUSANNAH           Balance Goal 1 (OT)    Activity/Assistive Device (Balance Goal 1, OT) standing, dynamic;walker, rolling  -SUSANNAH        [place] Level/Cues Needed (Balance Goal 1, OT) contact guard assist   with simple item retrieval and basic HM task  -SUSANNAH        Time Frame (Balance Goal 1, OT) long term goal (LTG);5 days  -SUSANNAH        Progress/Outcomes (Balance Goal 1, OT) goal ongoing  -SUSANNAH           Living Environment    Home Accessibility tub/shower is not walk in   bars by toilet and in tub  -SUSANNAH          User Key  (r) = Recorded By, (t) = Taken By, (c) = Cosigned By    Initials Name Effective Dates    SUSANNAH Coronel, OT 06/22/15 -            Occupational Therapy Education     Title: PT OT SLP Therapies (Active)     Topic: Occupational Therapy (Active)     Point: ADL training (Done)     Description: Instruct learner(s) on proper safety adaptation and remediation techniques during self care or transfers.   Instruct in proper use of assistive devices.   Learning Progress Summary     Learner Status Readiness Method Response Comment Documented by    Patient Done Acceptance E VU,NR role OT, reason for consult, AE benefits, transfer safety SUSANNAH 05/10/18 1005          Point: Precautions (Done)     Description: Instruct learner(s) on prescribed precautions during self-care and functional  transfers.   Learning Progress Summary     Learner Status Readiness Method Response Comment Documented by    Patient Done Acceptance E VU,NR role OT, reason for consult, AE benefits, transfer safety  05/10/18 1005                      User Key     Initials Effective Dates Name Provider Type Discipline     06/22/15 -  Jessica Coronel, OT Occupational Therapist OT                  OT Recommendation and Plan  Outcome Summary/Treatment Plan (OT)  Anticipated Discharge Disposition (OT): inpatient rehab facility (due to lives alone and needs to perform IADL tasks.)  Planned Therapy Interventions (OT Eval): activity tolerance training, adaptive equipment training, BADL retraining, IADL retraining, patient/caregiver education/training, transfer/mobility retraining  Therapy Frequency (OT Eval): daily  Plan of Care Review  Plan of Care Reviewed With: patient  Plan of Care Reviewed With: patient  Outcome Summary: OT evaluation completed.  Pt. CGA with trasnfers, but cues for safety.  Min assist overall with ADL task. Pt. will all  needed AD/AE.  Due to lives alone will benefit from rehab.          Outcome Measures     Row Name 05/10/18 0918 05/10/18 0850 05/09/18 1000       How much help from another person do you currently need...    Turning from your back to your side while in flat bed without using bedrails?  -- 4  -EH 4  -SC    Moving from lying on back to sitting on the side of a flat bed without bedrails?  -- 3  -EH 3  -SC    Moving to and from a bed to a chair (including a wheelchair)?  -- 3  -EH 3  -SC    Standing up from a chair using your arms (e.g., wheelchair, bedside chair)?  -- 3  -EH 3  -SC    Climbing 3-5 steps with a railing?  -- 2  -EH 2  -SC    To walk in hospital room?  -- 3  -EH 3  -SC    AM-PAC 6 Clicks Score  -- 18  -EH 18  -SC       How much help from another is currently needed...    Putting on and taking off regular lower body clothing? 3  -SUSANNAH  --  --    Bathing (including washing, rinsing, and  drying) 3  -SUSANNAH  --  --    Toileting (which includes using toilet bed pan or urinal) 3  -SUSANNAH  --  --    Putting on and taking off regular upper body clothing 4  -SUSANNAH  --  --    Taking care of personal grooming (such as brushing teeth) 3  -SUSANNAH  --  --    Eating meals 4  -SUSANNAH  --  --    Score 20  -SUSANNAH  --  --       Functional Assessment    Outcome Measure Options AM-PAC 6 Clicks Daily Activity (OT)  -SUSANNAH AM-PAC 6 Clicks Basic Mobility (PT)  - AM-PAC 6 Clicks Basic Mobility (PT)  -SC      User Key  (r) = Recorded By, (t) = Taken By, (c) = Cosigned By    Initials Name Provider Type    SC Eduardo Rowe, PT Physical Therapist    SUSANNAH Coronel, OT Occupational Therapist    NESTOR Hdz, PT Physical Therapist          Time Calculation:   OT Start Time: 0918    Therapy Charges for Today     Code Description Service Date Service Provider Modifiers Qty    [id number]  OT SELFCARE CURRENT 5/10/2018 Jessica Coronel OT GO, CJ 1    [id number] HC OT SELFCARE PROJECTED 5/10/2018 Jessica Coronel OT GO, CI 1    [id number]  OT EVAL LOW COMPLEXITY 4 5/10/2018 Jessica Coronel OT GO 1          OT G-codes  OT Professional Judgement Used?: Yes  OT Functional Scales Options: AM-PAC 6 Clicks Daily Activity (OT)  Score: 20  Functional Limitation: Self care  Self Care Current Status (): At least 20 percent but less than 40 percent impaired, limited or restricted  Self Care Goal Status (): At least 1 percent but less than 20 percent impaired, limited or restricted    Jessica Coronel OT  5/10/2018

## 2018-05-10 NOTE — THERAPY TREATMENT NOTE
Acute Care - Physical Therapy Treatment Note  [facility]     Patient Name: Mirian Sy  : 1940  MRN: [id number]  Today's Date: 5/10/2018  Onset of Illness/Injury or Date of Surgery: 18     Referring Physician: Rafi QUIROZ    Admit Date: 2018    Visit Dx:    ICD-10-CM ICD-9-CM   1. Fall in home, initial encounter W19.XXXA E888.9    Y92.099 E849.0   2. Other closed fracture of left ilium, initial encounter S32.392A 808.41   3. Elevated blood pressure reading R03.0 796.2   4. Impaired functional mobility, balance, gait, and endurance Z74.09 V49.89     Patient Active Problem List   Diagnosis   • Abnormal gait   • Cardiomyopathy   • Carpal tunnel syndrome   • Complex partial epilepsy   • Depression   • Chronic gastritis   • Hypertension   • Hyponatremia   • Nutritional anemia   • Dyssomnia   • Xeroderma   • Preventative health care   • Frailty   • Tricuspid regurgitation   • Cerebrovascular accident   • Syncope   • DVT (deep venous thrombosis)   • Anorexia   • Anxiety   • Patent foramen ovale   • Mitral regurgitation   • Atrial fibrillation with RVR   • Low weight   • Senile osteoporosis   • Iron deficiency   • PAF (paroxysmal atrial fibrillation)   • Closed fracture of left ilium   • GERD (gastroesophageal reflux disease)   • Fall at home       Therapy Treatment          Rehabilitation Treatment Summary     Row Name 05/10/18 0850             Treatment Time/Intention    Discipline physical therapist  -      Document Type therapy note (daily note)  -EH      Subjective Information complains of   soreness  -EH      Mode of Treatment physical therapy;individual therapy  -      Patient Effort good  -EH      Existing Precautions/Restrictions fall  -EH      Recorded by [] Akanksha Hdz, PT 05/10/18 0922 05/10/18 0922      Row Name 05/10/18 0850             Cognitive Assessment/Intervention- PT/OT    Orientation Status (Cognition) oriented x 4  -EH      Follows Commands (Cognition) WNL  -EH       Recorded by [] Akanksha Hdz, PT 05/10/18 0922 05/10/18 0922      Row Name 05/10/18 0850             Safety Issues, Functional Mobility    Impairments Affecting Function (Mobility) pain;balance  -EH      Recorded by [] Akanksha Hdz, PT 05/10/18 0922 05/10/18 0922      Row Name 05/10/18 0850             Mobility Assessment/Intervention    Extremity Weight-bearing Status left lower extremity  -EH      Left Lower Extremity (Weight-bearing Status) weight-bearing as tolerated (WBAT)  -EH      Recorded by [] Akanksha Hdz, PT 05/10/18 0922 05/10/18 0922      Row Name 05/10/18 0850             Bed Mobility Assessment/Treatment    Bed Mobility Assessment/Treatment supine-sit  -EH      Supine-Sit [place] (Bed Mobility) contact guard  -      Bed Mobility, Safety Issues decreased use of legs for bridging/pushing  -EH      Assistive Device (Bed Mobility) bed rails;head of bed elevated  -EH      Recorded by [] Akanksha Hdz, PT 05/10/18 0922 05/10/18 0922      Row Name 05/10/18 0850             Transfer Assessment/Treatment    Transfer Assessment/Treatment sit-stand transfer;stand-sit transfer  -EH      Sit-Stand [place] (Transfers) contact guard  -EH      Stand-Sit [place] (Transfers) contact guard  -EH      Recorded by [] Akanksha Hdz, PT 05/10/18 0922 05/10/18 0922      Row Name 05/10/18 0850             Sit-Stand Transfer    Assistive Device (Sit-Stand Transfers) walker, front-wheeled  -EH      Recorded by [] Akanksha Hdz, PT 05/10/18 0922 05/10/18 0922      Row Name 05/10/18 0850             Stand-Sit Transfer    Assistive Device (Stand-Sit Transfers) walker, front-wheeled  -EH      Recorded by [] Akanksha Hdz, PT 05/10/18 0922 05/10/18 0922      Row Name 05/10/18 0850             Gait/Stairs Assessment/Training    Gait/Stairs Assessment/Training gait/ambulation independence  -EH      [place] Level (Gait) contact guard  -      Assistive Device  (Gait) walker, front-wheeled  -EH      Distance in Feet (Gait) 35  -EH      Pattern (Gait) step-to  -EH      Deviations/Abnormal Patterns (Gait) antalgic;el decreased  -EH      Recorded by [] Akanksha Hdz, PT 05/10/18 0922 05/10/18 0922      Row Name 05/10/18 0850             General ROM    LT Lower Ext --  -EH      Recorded by [] Akanksha Hdz, PT 05/10/18 0922 05/10/18 0922      Row Name 05/10/18 0850             Therapeutic Exercise    Sets/Reps (Therapeutic Exercise) --   deferred 2/2 low BP  -EH      Recorded by [] Akanksha Hdz, PT 05/10/18 0922 05/10/18 0922      Row Name 05/10/18 0850             Positioning and Restraints    Pre-Treatment Position in bed  -EH      Post Treatment Position chair  -EH      In Chair notified nsg;reclined;encouraged to call for assist;exit alarm on;with family/caregiver  -EH      Recorded by [] Akanksha Hdz, PT 05/10/18 0922 05/10/18 0922      Row Name 05/10/18 0850             Pain Assessment    Additional Documentation Pain Scale: FACES Pre/Post-Treatment (Group)  -EH      Recorded by [] Akanksha Hdz, PT 05/10/18 0922 05/10/18 0922      Row Name 05/10/18 0850             Pain Scale: Numbers Pre/Post-Treatment    Pain Location - Side Left  -EH      Pain Location - Orientation lower  -EH      Pain Location back  -EH      Pain Intervention(s) Repositioned;Ambulation/increased activity  -EH      Recorded by [] Akanksha Hdz, PT 05/10/18 0922 05/10/18 0922      Row Name 05/10/18 0850             Pain Scale: FACES Pre/Post-Treatment    Pain: FACES Scale, Pretreatment 6-->hurts even more  -EH      Pain: FACES Scale, Post-Treatment 6-->hurts even more  -EH      Recorded by [] Akanksha Hdz, PT 05/10/18 0922 05/10/18 0922      Row Name 05/10/18 0850             Sensory Assessment/Intervention    Sensory General Assessment no sensation deficits identified  -EH      Recorded by [] Akanksha Hdz, PT 05/10/18 0922 05/10/18  0922      Row Name 05/10/18 0850             Outcome Summary/Treatment Plan (PT)    Daily Summary of Progress (PT) progress toward functional goals is good   limited by dizziness, low BP  -EH      Recorded by [] Akanksha Hdz, PT 05/10/18 0922 05/10/18 0922        User Key  (r) = Recorded By, (t) = Taken By, (c) = Cosigned By    Initials Name Effective Dates Discipline     Akanksha Hdz, PT 06/19/15 -  PT                     Physical Therapy Education     Title: PT OT SLP Therapies (Active)     Topic: Physical Therapy (Active)     Point: Mobility training (Active)    Learning Progress Summary     Learner Status Readiness Method Response Comment Documented by    Patient Active Acceptance E NR   05/10/18 0925     Active Eager E NR reviewed safety with mobility and benefits of activity SC 05/09/18 1326          Point: Home exercise program (Active)    Learning Progress Summary     Learner Status Readiness Method Response Comment Documented by    Patient Active Acceptance E NR   05/10/18 0925     Active Eager E NR reviewed safety with mobility and benefits of activity SC 05/09/18 1326          Point: Body mechanics (Active)    Learning Progress Summary     Learner Status Readiness Method Response Comment Documented by    Patient Active Acceptance E NR   05/10/18 0925     Active Eager E NR reviewed safety with mobility and benefits of activity SC 05/09/18 1326          Point: Precautions (Active)    Learning Progress Summary     Learner Status Readiness Method Response Comment Documented by    Patient Active Acceptance E NR   05/10/18 0925     Active Eager E NR reviewed safety with mobility and benefits of activity SC 05/09/18 1326                      User Key     Initials Effective Dates Name Provider Type Discipline    SC 06/19/15 -  Eduardo Rowe, PT Physical Therapist PT     06/19/15 -  Akanksha Hdz, PT Physical Therapist PT                    PT Recommendation and Plan     Outcome  Summary/Treatment Plan (PT)  Daily Summary of Progress (PT): progress toward functional goals is good (limited by dizziness, low BP)  Plan of Care Reviewed With: patient  Progress: improving  Outcome Summary: Pt ambulated 35 ft with RWx and CGA for all mobility. Pt limited by dizziness, found to have lower BP. See RN for charting.          Outcome Measures     Row Name 05/10/18 0850 05/09/18 1000          How much help from another person do you currently need...    Turning from your back to your side while in flat bed without using bedrails? 4  - 4  -SC     Moving from lying on back to sitting on the side of a flat bed without bedrails? 3  - 3  -SC     Moving to and from a bed to a chair (including a wheelchair)? 3  - 3  -SC     Standing up from a chair using your arms (e.g., wheelchair, bedside chair)? 3  - 3  -SC     Climbing 3-5 steps with a railing? 2  - 2  -SC     To walk in hospital room? 3  - 3  -SC     AM-PAC 6 Clicks Score 18  - 18  -SC        Functional Assessment    Outcome Measure Options AM-PAC 6 Clicks Basic Mobility (PT)  - AM-[facility] 6 Clicks Basic Mobility (PT)  -SC       User Key  (r) = Recorded By, (t) = Taken By, (c) = Cosigned By    Initials Name Provider Type    SC Eduardo Rowe, PT Physical Therapist     Akanksha Hdz, PT Physical Therapist           Time Calculation:         PT Charges     Row Name 05/10/18 0925             Time Calculation    Start Time 0850  -      PT Received On 05/10/18  [facility]      PT Goal Re-Cert Due Date 05/19/18  [facility]         Time Calculation- PT    Total Timed Code Minutes- PT 24 minute(s)  -        User Key  (r) = Recorded By, (t) = Taken By, (c) = Cosigned By    Initials Name Provider Type     Akanksha Hdz, PT Physical Therapist          Therapy Charges for Today     Code Description Service Date Service Provider Modifiers Qty    [id number]  PT THER SUPP EA 15 MIN 5/10/2018 Akanksha Hdz, PT GP 2    [id number]  GAIT TRAINING  EA 15 MIN 5/10/2018 Akanksha Hdz, PT GP 2          PT G-Codes  Outcome Measure Options: AM-PAC 6 Clicks Basic Mobility (PT)  Score: 18  Functional Limitation: Mobility: Walking and moving around  Mobility: Walking and Moving Around Current Status (): At least 40 percent but less than 60 percent impaired, limited or restricted  Mobility: Walking and Moving Around Goal Status (): At least 20 percent but less than 40 percent impaired, limited or restricted    Akanksha Hdz, PT  5/10/2018

## 2018-05-10 NOTE — PLAN OF CARE
Problem: Patient Care Overview  Goal: Plan of Care Review  Outcome: Ongoing (interventions implemented as appropriate)   05/10/18 0900   Coping/Psychosocial   Plan of Care Reviewed With patient   Plan of Care Review   Progress improving   OTHER   Outcome Summary Pt ambulated 35 ft with RWx and CGA for all mobility. Pt limited by dizziness, found to have lower BP. See RN for charting.

## 2018-05-10 NOTE — PLAN OF CARE
Problem: Patient Care Overview  Goal: Plan of Care Review  Outcome: Ongoing (interventions implemented as appropriate)   05/10/18 1006   Coping/Psychosocial   Plan of Care Reviewed With patient   Plan of Care Review   Progress improving   OTHER   Outcome Summary OT evaluation completed. Pt. CGA with trasnfers, but cues for safety. Min assist overall with ADL task. Pt. will all needed AD/AE. Due to lives alone will benefit from rehab.

## 2018-05-10 NOTE — PROGRESS NOTES
"    [facility] Medicine Services  PROGRESS NOTE    Patient Name: Mirian Sy  : 1940  MRN: [id number]    Date of Admission: 2018  Length of Stay: 0  Primary Care Physician: Sriram Springer MD    Subjective     CC: f/u pelvic fracture    HPI:  Up in chair. Got a Norco overnight and slept \"like I was in a coma.\" Generally doesn't take more than APAP or Tylenol for pain. Reports she got dizzy when working with therapy this morning. Bowels haven't moved but she is passing gas. Patient has a bed at [facility] today. Discussed plan with patient and her son (LUIS Dempsey) and both are in agreement to stay today and transfer tomorrow.     Review of Systems  Gen- No fevers, chills  CV- No chest pain, palpitations  Resp- No cough, dyspnea  GI- No N/V/D, abd pain    Otherwise ROS is negative except as mentioned in the HPI.    Objective     Vital Signs:   Temp:  [96.5 °F (35.8 °C)-98.3 °F (36.8 °C)] 98.3 °F (36.8 °C)  Heart Rate:  [] 110  Resp:  [14-18] 16  BP: (113-130)/(70-78) 125/70        Physical Exam:  Constitutional: No acute distress, awake, alert  HENT: NCAT, mucous membranes moist  Respiratory: Clear to auscultation bilaterally, respiratory effort normal   Cardiovascular: RRR, no murmurs, rubs, or gallops, palpable pedal pulses bilaterally  Gastrointestinal: Positive bowel sounds, soft, nontender, nondistended  Musculoskeletal: No bilateral ankle edema  Psychiatric: Appropriate affect, cooperative  Neurologic: Oriented x 3, strength symmetric in all extremities, Cranial Nerves grossly intact to confrontation, speech clear  Skin: No rashes    Results Reviewed:  I have personally reviewed current lab, radiology, and data and agree.      Results from last 7 days  Lab Units 18   WBC 10*3/mm3 6.53 7.98   HEMOGLOBIN g/dL 11.5 11.4*   HEMATOCRIT % 34.9 33.8*   PLATELETS 10*3/mm3 217 207       Results from last 7 days  Lab Units 18 "   SODIUM mmol/L 132 130*   POTASSIUM mmol/L 4.1 4.0   CHLORIDE mmol/L 96* 99   CO2 mmol/L 28.0 26.0   BUN mg/dL 13 12   CREATININE mg/dL 1.00 0.90   GLUCOSE mg/dL 97 117*   CALCIUM mg/dL 9.3 9.2   ALT (SGPT) U/L  --  13   AST (SGOT) U/L  --  27     Estimated Creatinine Clearance: 35.4 mL/min (by C-G formula based on SCr of 1 mg/dL).     No results found for: BNP  No results found for: PHART    Microbiology Results Abnormal     None        Imaging Results (last 24 hours)     Procedure Component Value Units Date/Time    XR Femur 2 View Left [[id number]] Collected:  05/09/18 0823     Updated:  05/09/18 2245    Narrative:       EXAMINATION: XR FEMUR 2 VW LEFT-      INDICATION: Fall with left hip pain.      COMPARISON: Previous AP pelvis and right femur 06/22/2015.     FINDINGS: Left hip is included on the old right femur series, joint  space remains generally well maintained. No pelvic or proximal femur  fracture is seen. The mid and distal femur appear intact.  There is no  obvious loosening of the prosthesis and it appears stable today, with  kristina and screw fixation left hip. No stress riser fracture is identified.       Impression:       Stable postop appearance of left hip. No evidence of new  left femur trauma.     D:  05/08/2018  E:  05/09/2018           Results for orders placed during the hospital encounter of 09/20/16   Adult Transthoracic Echo Complete    Narrative · Left ventricular function is normal. Estimated EF = 60%.  · Moderate-to-severe mitral valve regurgitation is present  · Moderate tricuspid valve regurgitation is present.        I have reviewed the medications.    Assessment / Plan     Hospital Problem List     * (Principal)Closed fracture of left ilium    Hypertension    Patent foramen ovale    Overview Signed 9/26/2016  1:36 PM by GREG Greer     Patent foramen ovale by transesophageal echocardiogram, January 2005, documented again today by echocardiogram, 07/13/2006.         PAF  (paroxysmal atrial fibrillation)    GERD (gastroesophageal reflux disease)    Fall at home        Brief Hospital Course to date:  Mirian Sy is a 78 y.o. female with PMH significant for HTN, HLD, anemia, PFO and PAF (on Eliquis). She presented to [facility] ED on 5/8/18 with left hip pain and inability to ambulate well after a fall. Found to have closed fracture of the left ileum. Orthopedics recommends non-operative management and rehab.      Assessment & Plan:  - Appreciate orthopedic evaluation. Dr. Stover recommends non-operative management. TTWB with progression to WBAT.   - Follow up with ortho in 2-3 weeks for repeat XR.   - Continue Eliquis for PAF. Rate-controlled with Flecanide and Cardizem.   - Given her HR and afib, hesitate to decrease Cardizem despite her dizziness with ambulation. Will instead, decrease Norco to Tramadol and see how she does.   - Work on bowels today, needs to have a BM before transfter     DVT Prophylaxis:  Eliquis  CODE STATUS: Full Code    Disposition: I expect the patient to be discharged to [facility] tomorrow if doing well     Electronically signed by Carmelita Ontiveros PA-C, 05/10/18, 12:47 PM.

## 2018-05-11 VITALS
BODY MASS INDEX: 16.48 KG/M2 | SYSTOLIC BLOOD PRESSURE: 111 MMHG | HEIGHT: 67 IN | HEART RATE: 109 BPM | DIASTOLIC BLOOD PRESSURE: 73 MMHG | WEIGHT: 105 LBS | RESPIRATION RATE: 17 BRPM | OXYGEN SATURATION: 98 % | TEMPERATURE: 98.2 F

## 2018-05-11 PROCEDURE — 97530 THERAPEUTIC ACTIVITIES: CPT

## 2018-05-11 PROCEDURE — 97116 GAIT TRAINING THERAPY: CPT

## 2018-05-11 PROCEDURE — 94799 UNLISTED PULMONARY SVC/PX: CPT

## 2018-05-11 PROCEDURE — 99217 PR OBSERVATION CARE DISCHARGE MANAGEMENT: CPT | Performed by: PHYSICIAN ASSISTANT

## 2018-05-11 PROCEDURE — G8979 MOBILITY GOAL STATUS: HCPCS

## 2018-05-11 PROCEDURE — G0378 HOSPITAL OBSERVATION PER HR: HCPCS

## 2018-05-11 PROCEDURE — 97110 THERAPEUTIC EXERCISES: CPT

## 2018-05-11 PROCEDURE — G8980 MOBILITY D/C STATUS: HCPCS

## 2018-05-11 PROCEDURE — G8978 MOBILITY CURRENT STATUS: HCPCS

## 2018-05-11 RX ORDER — SENNA AND DOCUSATE SODIUM 50; 8.6 MG/1; MG/1
2 TABLET, FILM COATED ORAL 2 TIMES DAILY PRN
Start: 2018-05-11 | End: 2018-12-18

## 2018-05-11 RX ORDER — TRAMADOL HYDROCHLORIDE 50 MG/1
25-50 TABLET ORAL EVERY 6 HOURS PRN
Qty: 20 TABLET | Refills: 0 | Status: SHIPPED | OUTPATIENT
Start: 2018-05-11 | End: 2018-12-18

## 2018-05-11 RX ORDER — ACETAMINOPHEN 325 MG/1
650 TABLET ORAL EVERY 4 HOURS PRN
Start: 2018-05-11

## 2018-05-11 RX ADMIN — APIXABAN 5 MG: 5 TABLET, FILM COATED ORAL at 08:25

## 2018-05-11 RX ADMIN — ACETAMINOPHEN 650 MG: 325 TABLET ORAL at 08:25

## 2018-05-11 RX ADMIN — POLYETHYLENE GLYCOL (3350) 17 G: 17 POWDER, FOR SOLUTION ORAL at 08:25

## 2018-05-11 RX ADMIN — FLECAINIDE ACETATE 50 MG: 50 TABLET ORAL at 08:25

## 2018-05-11 RX ADMIN — LEVETIRACETAM 250 MG: 250 TABLET, FILM COATED ORAL at 08:25

## 2018-05-11 RX ADMIN — CITALOPRAM HYDROBROMIDE 20 MG: 20 TABLET ORAL at 08:25

## 2018-05-11 RX ADMIN — FERROUS SULFATE TAB 325 MG (65 MG ELEMENTAL FE) 325 MG: 325 (65 FE) TAB at 08:25

## 2018-05-11 RX ADMIN — FAMOTIDINE 20 MG: 20 TABLET ORAL at 08:25

## 2018-05-11 NOTE — THERAPY DISCHARGE NOTE
Acute Care - Occupational Therapy Treatment Note/Discharge   [place]     Patient Name: Mirian Sy  : 1940  MRN: [id number]  Today's Date: 2018  Onset of Illness/Injury or Date of Surgery: 18  Date of Referral to OT: 18  Referring Physician: Rafi QUIROZ      Admit Date: 2018    Visit Dx:     ICD-10-CM ICD-9-CM   1. Fall in home, initial encounter W19.XXXA E888.9    Y92.099 E849.0   2. Other closed fracture of left ilium, initial encounter S32.392A 808.41   3. Elevated blood pressure reading R03.0 796.2   4. Impaired functional mobility, balance, gait, and endurance Z74.09 V49.89   5. Impaired mobility and ADLs Z74.09 799.89     Patient Active Problem List   Diagnosis   • Abnormal gait   • Cardiomyopathy   • Carpal tunnel syndrome   • Complex partial epilepsy   • Depression   • Chronic gastritis   • Hypertension   • Hyponatremia   • Nutritional anemia   • Dyssomnia   • Xeroderma   • Preventative health care   • Frailty   • Tricuspid regurgitation   • Cerebrovascular accident   • Syncope   • DVT (deep venous thrombosis)   • Anorexia   • Anxiety   • Patent foramen ovale   • Mitral regurgitation   • Atrial fibrillation with RVR   • Low weight   • Senile osteoporosis   • Iron deficiency   • PAF (paroxysmal atrial fibrillation)   • Closed fracture of left ilium   • GERD (gastroesophageal reflux disease)   • Fall at home       Therapy Treatment          Rehabilitation Treatment Summary     Row Name 18 1145 18 0848          Treatment Time/Intention    Discipline occupational therapist  -SUSANNAH physical therapist  -LR     Document Type therapy note (daily note);discharge treatment  -JB2 therapy note (daily note);discharge evaluation/summary  -LR     Subjective Information no complaints  -SUSANNAH no complaints   denies pain at rest  -LR     Mode of Treatment individual therapy;occupational therapy  -SUSANNAH physical therapy;individual therapy  -LR     Patient/Family Observations Pt. up in  recliner.  -SUSANNAH Patient supine in bed upon arrival. Exit alarm, SCDs.   -LR     Care Plan Review  -- care plan/treatment goals reviewed;risks/benefits reviewed;current/potential barriers reviewed;patient/other agree to care plan  -LR     Patient Effort excellent  -SUSANNAH excellent  -LR     Comment Per ortho now pt. TTWB LLE per PT note and progress to WBAT  -JB2  --     Existing Precautions/Restrictions fall  -SUSANNAH fall;weight bearing;other (see comments)   TTWB per ortho MD, may progress to WBAT later  -LR     Equipment Issued to Patient dressing equipment   reacher and sockaid, needs shoe horn and elastic laces also.  -JB2  --     Recorded by [SUSANNAH] Jessica Coronel, OT 05/11/18 1308 05/11/18 1308  [JB2] Jessica Coronel, OT 05/11/18 1316 05/11/18 1316 [LR] Aliza Colón, PT 05/11/18 0924 05/11/18 0924     Row Name 05/11/18 1145             Vital Signs    Pre Systolic BP Rehab 111  -SUSANNAH      Pre Treatment Diastolic BP 73  -SUSANNAH      Pretreatment Heart Rate (beats/min) 86  -SUSANNAH      Recorded by [SUSANNAH] Jessica Coronel, OT 05/11/18 1316 05/11/18 1316      Row Name 05/11/18 0848             Cognitive Assessment/Intervention- PT/OT    Orientation Status (Cognition) oriented x 4  -LR      Follows Commands (Cognition) WFL;follows one step commands;over 90% accuracy;verbal cues/prompting required;repetition of directions required  -LR      Recorded by [LR] Aliza Colón, PT 05/11/18 0924 05/11/18 0924      Row Name 05/11/18 0848             Safety Issues, Functional Mobility    Safety Issues Affecting Function (Mobility) other (see comments)   none  -LR      Recorded by [LR] Aliza Colón, PT 05/11/18 0924 05/11/18 0924      Row Name 05/11/18 1145 05/11/18 0848          Mobility Assessment/Intervention    Extremity Weight-bearing Status left lower extremity  -SUSANNAH left lower extremity  -LR     Left Lower Extremity (Weight-bearing Status) toe touch weight-bearing (TTWB)  -SUSANNAH toe touch weight-bearing (TTWB);other (see  comments)   TTWB, may progress to WBAT  -LR     Recorded by [SUSANNAH] Jessica Coronel, OT 05/11/18 1316 05/11/18 1316 [LR] Aliza Colón, PT 05/11/18 0924 05/11/18 0924     Row Name 05/11/18 0848             Bed Mobility Assessment/Treatment    Supine-Sit [place] (Bed Mobility) verbal cues;contact guard  -LR      Bed Mobility, Safety Issues decreased use of legs for bridging/pushing  -LR      Assistive Device (Bed Mobility) head of bed elevated  -LR      Comment (Bed Mobility) Verbal cues to move LEs towards EOB and to push up from bed from behind to raise trunk into sitting and to scoot hips out to get feet on floor. Required increased time to perform with rest breaks. Denied dizziness upon sitting up.   -LR      Recorded by [LR] Aliza Colón, PT 05/11/18 0924 05/11/18 0924      Row Name 05/11/18 1145             Functional Mobility    Functional Mobility- Comment deferred lunch arrived  -SUSANNAH      Recorded by [SUSANNAH] Jessica Coronel, OT 05/11/18 1316 05/11/18 1316      Row Name 05/11/18 1145 05/11/18 0848          Transfer Assessment/Treatment    Transfer Assessment/Treatment sit-stand transfer;stand-sit transfer  -SUSANNAH sit-stand transfer;stand-sit transfer  -LR     Maintains Weight-bearing Status (Transfers)  -- able to maintain;verbal cues to maintain  -LR     Comment (Transfers)  -- Verbal cues to push up from bed to stand and to reach back for chair to lower into sitting. Verbal cues to step L LE out before t/f for comfort. Mild unsteadiness upon standing initially.   -LR     Sit-Stand [place] (Transfers) contact guard  -SUSANNAH verbal cues;contact guard  -LR     Stand-Sit [place] (Transfers) contact guard  -SUSANNAH verbal cues;contact guard  -LR     Recorded by [SUSANNAH] Jessica Coronel, OT 05/11/18 1316 05/11/18 1316 [LR] Aliza Colón, PT 05/11/18 0924 05/11/18 0924     Row Name 05/11/18 1145 05/11/18 0848          Sit-Stand Transfer    Assistive Device (Sit-Stand Transfers) walker, front-wheeled   -SUSANNAH walker, front-wheeled  -LR     Recorded by [SUSANNAH] Jessica Coronel, OT 05/11/18 1316 05/11/18 1316 [LR] Aliza Colón, PT 05/11/18 0924 05/11/18 0924     Row Name 05/11/18 1145 05/11/18 0848          Stand-Sit Transfer    Assistive Device (Stand-Sit Transfers) walker, front-wheeled  -SUSANNAH walker, front-wheeled  -LR     Recorded by [SUSANNAH] Jessica Coronel, OT 05/11/18 1316 05/11/18 1316 [LR] Aliza Colón, PT 05/11/18 0924 05/11/18 0924     Row Name 05/11/18 0848             Gait/Stairs Assessment/Training    [place] Level (Gait) verbal cues;contact guard  -LR      Assistive Device (Gait) walker, front-wheeled  -LR      Distance in Feet (Gait) 70  -LR      Pattern (Gait) step-to;other (see comments)   periods of step through  -LR      Deviations/Abnormal Patterns (Gait) left sided deviations;antalgic;bilateral deviations;el decreased;gait speed decreased;stride length decreased  -LR      Bilateral Gait Deviations forward flexed posture;heel strike decreased  -LR      Left Sided Gait Deviations weight shift ability decreased  -LR      Maintains Weight-bearing Status (Gait) verbal cues to maintain;able to maintain  -LR      Comment (Gait/Stairs) Patient ambulated with step to gait pattern at slow pace, periods of step through gait. Required verbal cues for correct sequencing of steps and for TTWB on L. Gait limited by fatigue.   -LR      Recorded by [LR] Aliza Colón, PT 05/11/18 0924 05/11/18 0924      Row Name 05/11/18 1145             ADL Assessment/Intervention    BADL Assessment/Intervention upper body dressing  -SUSANNAH      Recorded by [SUSANNAH] Jessica Coronel, OT 05/11/18 1320 05/11/18 1320      Row Name 05/11/18 1145             Upper Body Dressing Assessment/Training    Upper Body Dressing [place] Level pull-over garment;independent  -SUSANNAH      Upper Body Dressing Position supported sitting  -SUSANNAH      Recorded by [SUSANNAH] Jessica Coronel, OT 05/11/18 1320 05/11/18 1320      Row Name 05/11/18  1145             Lower Body Dressing Assessment/Training    Lower Body Dressing [place] Level doff;don;socks;pants/bottoms;verbal cues  -SUSANNAH      Assistive Devices (Lower Body Dressing) one hand technique;sock-aid  -SUSANNAH      Lower Body Dressing Position supported sitting  -SUSANNAH      Comment (Lower Body Dressing) Pt. shown how to tonya and doff pants and socks using AE.  Pt. first raising LLE with leg  to get underwear on, but then underwear with leg  inside it.  Pt. with reacher able to start pants legs over feet, but neena hose clinging inside pants leg and could not pull on without assist.  Pt. doffed socks verbal and tactile cues with reacher.  Pt. donned socks with cues using sock aid.  MOd assist donning shoes without shoe horn and elastic laces.  -SUSANNAH      Recorded by [SUSANNAH] Jessica Coronel, OT 05/11/18 1316 05/11/18 1316      Row Name 05/11/18 1145             BADL Safety/Performance    Impairments, BADL Safety/Performance balance;pain  -SUSANNAH      Recorded by [SUSANNAH] Jessica Coronel, OT 05/11/18 1316 05/11/18 1316      Row Name 05/11/18 0848             Motor Skills Assessment/Interventions    Additional Documentation Therapeutic Exercise (Group);Therapeutic Exercise Interventions (Group)  -LR      Recorded by [LR] Aliza Colón, PT 05/11/18 0924 05/11/18 0924      Row Name 05/11/18 0848             Therapeutic Exercise    Lower Extremity (Therapeutic Exercise) gluteal sets;heel slides, left;quad sets, left;SAQ (short arc quad), left  -LR      Lower Extremity Range of Motion (Therapeutic Exercise) hip abduction/adduction, left;ankle dorsiflexion/plantar flexion, left  -LR      Exercise Type (Therapeutic Exercise) AAROM (active assistive range of motion);AROM (active range of motion);isometric contraction, static;isotonic contraction, concentric  -LR      Position (Therapeutic Exercise) seated  -LR      Sets/Reps (Therapeutic Exercise) x15 reps each  -LR      Comment (Therapeutic Exercise) cues for  technique; CGA only  -LR      Recorded by [LR] Aliza Colón, PT 05/11/18 0924 05/11/18 0924      Row Name 05/11/18 1145 05/11/18 0848          Positioning and Restraints    Pre-Treatment Position sitting in chair/recliner  -SUSANNAH in bed  -LR     Post Treatment Position chair  -SUSANNAH chair  -LR     In Chair sitting;call light within reach;encouraged to call for assist;exit alarm on   eating lunch  -SUSANNAH notified nsg;reclined;sitting;call light within reach;encouraged to call for assist;exit alarm on;legs elevated;compression device   pillow under knees  -LR     Recorded by [SUSANNAH] Jessica Coronel, OT 05/11/18 1316 05/11/18 1316 [LR] Aliza Colón, PT 05/11/18 0924 05/11/18 0924     Row Name 05/11/18 0848             Pain Assessment    Additional Documentation Pain Scale: Numbers Pre/Post-Treatment (Group)  -LR      Recorded by [LR] Aliza Colón, PT 05/11/18 0924 05/11/18 0924      Row Name 05/11/18 1145 05/11/18 0848          Pain Scale: Numbers Pre/Post-Treatment    Pain Scale: Numbers, Pretreatment 0/10 - no pain  -SUSANNAH 0/10 - no pain  -LR     Pain Scale: Numbers, Post-Treatment 0/10 - no pain  -SUSANNAH 0/10 - no pain  -LR     Pre/Post Treatment Pain Comment  -- Denies pain at rest  -LR     Recorded by [SUSANNAH] Jessica Coronel, OT 05/11/18 1316 05/11/18 1316 [LR] Aliza Colón, PT 05/11/18 0924 05/11/18 0924     Row Name 05/11/18 0848             Coping    Observed Emotional State accepting;cooperative  -LR      Verbalized Emotional State acceptance  -LR      Recorded by [LR] Aliza Colón, PT 05/11/18 0924 05/11/18 0924      Row Name 05/11/18 0848             Plan of Care Review    Plan of Care Reviewed With patient  -LR      Recorded by [LR] Aliza Colón, PT 05/11/18 0924 05/11/18 0924      Row Name 05/11/18 1145             Outcome Summary/Treatment Plan (OT)    Daily Summary of Progress (OT) progress toward functional goals is good  -SUSANNAH      Barriers to Overall Progress (OT) limited  tx sessons prior to dishcarge.  -SUSANNAH      Plan for Continued Treatment (OT) discharging to rehab today  -SUSANNAH      Recorded by [SUSANNAH] Jessica Jessica Coronel, OT 05/11/18 1316 05/11/18 1316      Row Name 05/11/18 0848             Outcome Summary/Treatment Plan (PT)    Daily Summary of Progress (PT) progress toward functional goals as expected  -LR      Recorded by [LR] Aliza Colón, PT 05/11/18 0924 05/11/18 0924        User Key  (r) = Recorded By, (t) = Taken By, (c) = Cosigned By    Initials Name Effective Dates Discipline    SUSANNAH Jessica Lopez [place], OT 06/22/15 -  OT    LR Aliza Colón, PT 06/19/15 -  PT                   OT Rehab Goals     Row Name 05/11/18 1145             Bed Mobility Goal 1 (OT)    Progress/Outcomes (Bed Mobility Goal 1, OT) goal not met;discharged from facility  -SUSANNAH         Transfer Goal 1 (OT)    Progress/Outcome (Transfer Goal 1, OT) goal not met;discharged from facility   pt., changed from WBAT to TTWB  -SUSANNAH         Dressing Goal 1 (OT)    Progress/Outcome (Dressing Goal 1, OT) discharged from facility;goal not met   min level  -SUSANNAH         Toileting Goal 1 (OT)    Progress/Outcome (Toileting Goal 1, OT) goal not met;discharged from facility  -SUSANNAH        User Key  (r) = Recorded By, (t) = Taken By, (c) = Cosigned By    Initials Name Provider Type Discipline    SUSANNAH Jessica Coronel, OT Occupational Therapist OT          Occupational Therapy Education     Title: PT OT SLP Therapies (Active)     Topic: Occupational Therapy (Active)     Point: ADL training (Done)     Description: Instruct learner(s) on proper safety adaptation and remediation techniques during self care or transfers.   Instruct in proper use of assistive devices.   Learning Progress Summary     Learner Status Readiness Method Response Comment Documented by    Patient Done Acceptance E,D KATTY,NR AE educating for increased LBD SUSANNAH 05/11/18 1317     Done Acceptance E KATTY,EDWARD role OT, reason for consult, AE benefits, transfer safety SUSANNAH  05/10/18 1005          Point: Precautions (Done)     Description: Instruct learner(s) on prescribed precautions during self-care and functional transfers.   Learning Progress Summary     Learner Status Readiness Method Response Comment Documented by    Patient Done Acceptance E KATTY,NR role OT, reason for consult, AE benefits, transfer safety  05/10/18 1005                      User Key     Initials Effective Dates Name Provider Type Discipline     06/22/15 -  Jessica Coronel, OT Occupational Therapist OT                OT Recommendation and Plan  Outcome Summary/Treatment Plan (OT)  Daily Summary of Progress (OT): progress toward functional goals is good  Barriers to Overall Progress (OT): limited tx sessons prior to dishcarge.  Plan for Continued Treatment (OT): discharging to rehab today  Anticipated Discharge Disposition (OT): inpatient rehab facility (due to lives alone and needs to perform IADL tasks.)  Planned Therapy Interventions (OT Eval): activity tolerance training, adaptive equipment training, BADL retraining, IADL retraining, patient/caregiver education/training, transfer/mobility retraining  Therapy Frequency (OT Eval): daily  Daily Summary of Progress (OT): progress toward functional goals is good  Plan of Care Review  Plan of Care Reviewed With: patient  Plan of Care Reviewed With: patient  Outcome Summary: Pt. changed from WBAT to TTWB LLE today.  Pt. with less pain and used AE to dress self min assist LBD and setup UBD.  Plans to rehab today.          Outcome Measures     Row Name 05/11/18 1145 05/11/18 0848 05/10/18 0918       How much help from another person do you currently need...    Turning from your back to your side while in flat bed without using bedrails?  -- 3  -LR  --    Moving from lying on back to sitting on the side of a flat bed without bedrails?  -- 3  -LR  --    Moving to and from a bed to a chair (including a wheelchair)?  -- 3  -LR  --    Standing up from a chair using your  arms (e.g., wheelchair, bedside chair)?  -- 3  -LR  --    Climbing 3-5 steps with a railing?  -- 3  -LR  --    To walk in hospital room?  -- 3  -LR  --    AM-PAC 6 Clicks Score  -- 18  -LR  --       How much help from another is currently needed...    Putting on and taking off regular lower body clothing? 3  -SUSANNAH  -- 3  -SUSANNAH    Bathing (including washing, rinsing, and drying) 3  -SUSANNAH  -- 3  -SUSANNAH    Toileting (which includes using toilet bed pan or urinal) 3  -SUSANNAH  -- 3  -SUSANNAH    Putting on and taking off regular upper body clothing 4  -SUSANNAH  -- 4  -SUSANNAH    Taking care of personal grooming (such as brushing teeth) 3  -SUSANNAH  -- 3  -SUSANNAH    Eating meals 4  -SUSANNAH  -- 4  -SUSANNAH    Score 20  -SUSANNAH  -- 20  -SUSANNAH       Functional Assessment    Outcome Measure Options  -- AM-PAC 6 Clicks Basic Mobility (PT)  -LR AM-PAC 6 Clicks Daily Activity (OT)  -SUSANNAH    Row Name 05/10/18 0850 05/09/18 1000          How much help from another person do you currently need...    Turning from your back to your side while in flat bed without using bedrails? 4  - 4  -SC     Moving from lying on back to sitting on the side of a flat bed without bedrails? 3  -EH 3  -SC     Moving to and from a bed to a chair (including a wheelchair)? 3  - 3  -SC     Standing up from a chair using your arms (e.g., wheelchair, bedside chair)? 3  - 3  -SC     Climbing 3-5 steps with a railing? 2  - 2  -SC     To walk in hospital room? 3  -EH 3  -SC     AM-PAC 6 Clicks Score 18  -EH 18  -SC        Functional Assessment    Outcome Measure Options AM-PAC 6 Clicks Basic Mobility (PT)  -EH AM-PAC 6 Clicks Basic Mobility (PT)  -SC       User Key  (r) = Recorded By, (t) = Taken By, (c) = Cosigned By    Initials Name Provider Type    SC Eduardo Rowe, PT Physical Therapist    SUSANNAH Jessica Coronel, OT Occupational Therapist    EH Akanksha Hdz, PT Physical Therapist    LR Aliza Colón, PT Physical Therapist           Time Calculation:          Time Calculation- OT     Row Name  05/11/18 1321             Time Calculation- OT    OT Start Time 1145  -SUSANNAH      Total Timed Code Minutes- OT 21 minute(s)  -SUSANNAH      OT Received On 05/11/18  -SUSANNAH      OT Goal Re-Cert Due Date 05/20/18  -SUSANNAH        User Key  (r) = Recorded By, (t) = Taken By, (c) = Cosigned By    Initials Name Provider Type    SUSANNAH Coronel OT Occupational Therapist          Therapy Charges for Today     Code Description Service Date Service Provider Modifiers Qty    55649213888 HC OT SELFCARE CURRENT 5/10/2018 Jessica Coronel OT GO, CJ 1    01411772365 HC OT SELFCARE PROJECTED 5/10/2018 Jessica Coronel OT GO, CI 1    16599925509 HC OT EVAL LOW COMPLEXITY 4 5/10/2018 Jessica Coronel OT GO 1    [id number]  OT THERAPEUTIC ACT EA 15 MIN 5/11/2018 Jessica Coronel OT GO 1          OT G-codes  OT Professional Judgement Used?: Yes  OT Functional Scales Options: AM-PAC 6 Clicks Daily Activity (OT)  Score: 20  Functional Limitation: Self care  Self Care Current Status (): At least 20 percent but less than 40 percent impaired, limited or restricted  Self Care Goal Status (): At least 1 percent but less than 20 percent impaired, limited or restricted    OT Discharge Summary  Anticipated Discharge Disposition (OT): inpatient rehab facility (due to lives alone and needs to perform IADL tasks.)  Reason for Discharge: Discharge from facility  Outcomes Achieved: Other (Pt. only seen for evaluation and one treatment session prior to dishcarge with WB status change.  NO goals met.)  Discharge Destination: Inpatient rehabilitation facility    Jessica Coronel OT  5/11/2018

## 2018-05-11 NOTE — PLAN OF CARE
Problem: Patient Care Overview  Goal: Plan of Care Review  Outcome: Unable to achieve outcome(s) by discharge Date Met: 05/11/18 05/11/18 2409   Coping/Psychosocial   Plan of Care Reviewed With patient   Plan of Care Review   Progress improving   OTHER   Outcome Summary Pt. changed from WBAT to TTWB LLE today. Pt. with less pain and used AE to dress self min assist LBD and setup UBD. Plans to rehab today.

## 2018-05-11 NOTE — THERAPY DISCHARGE NOTE
Acute Care - Physical Therapy Treatment Note/Discharge  [facility]     Patient Name: Mirian Sy  : 1940  MRN: [id number]  Today's Date: 2018  Onset of Illness/Injury or Date of Surgery: 18     Referring Physician: Rafi QUIROZ    Admit Date: 2018    Visit Dx:    ICD-10-CM ICD-9-CM   1. Fall in home, initial encounter W19.XXXA E888.9    Y92.099 E849.0   2. Other closed fracture of left ilium, initial encounter S32.392A 808.41   3. Elevated blood pressure reading R03.0 796.2   4. Impaired functional mobility, balance, gait, and endurance Z74.09 V49.89   5. Impaired mobility and ADLs Z74.09 799.89     Patient Active Problem List   Diagnosis   • Abnormal gait   • Cardiomyopathy   • Carpal tunnel syndrome   • Complex partial epilepsy   • Depression   • Chronic gastritis   • Hypertension   • Hyponatremia   • Nutritional anemia   • Dyssomnia   • Xeroderma   • Preventative health care   • Frailty   • Tricuspid regurgitation   • Cerebrovascular accident   • Syncope   • DVT (deep venous thrombosis)   • Anorexia   • Anxiety   • Patent foramen ovale   • Mitral regurgitation   • Atrial fibrillation with RVR   • Low weight   • Senile osteoporosis   • Iron deficiency   • PAF (paroxysmal atrial fibrillation)   • Closed fracture of left ilium   • GERD (gastroesophageal reflux disease)   • Fall at home       Physical Therapy Education     Title: PT OT SLP Therapies (Active)     Topic: Physical Therapy (Done)     Point: Mobility training (Done)    Learning Progress Summary     Learner Status Readiness Method Response Comment Documented by    Patient Done Acceptance E,CHIARA PITTS Educated on precautions, TTWB status, HEP, and correct gait mechanics. Educated on correct positioning for comfort. LR 18     Active Acceptance E NR  EH 05/10/18 0925     Active Eager E NR reviewed safety with mobility and benefits of activity SC 18 1326          Point: Home exercise program (Done)    Learning Progress  Summary     Learner Status Readiness Method Response Comment Documented by    Patient Done Acceptance LANEY KRAFT DU Educated on precautions, TTWB status, HEP, and correct gait mechanics. Educated on correct positioning for comfort. LR 05/11/18 0924     Active Acceptance E NR   05/10/18 0925     Active Eager E NR reviewed safety with mobility and benefits of activity SC 05/09/18 1326          Point: Body mechanics (Done)    Learning Progress Summary     Learner Status Readiness Method Response Comment Documented by    Patient Done Acceptance LANEY KRATF DU Educated on precautions, TTWB status, HEP, and correct gait mechanics. Educated on correct positioning for comfort. LR 05/11/18 0924     Active Acceptance E NR   05/10/18 0925     Active Eager E NR reviewed safety with mobility and benefits of activity SC 05/09/18 1326          Point: Precautions (Done)    Learning Progress Summary     Learner Status Readiness Method Response Comment Documented by    Patient Done Acceptance LANEY KRAFT DU Educated on precautions, TTWB status, HEP, and correct gait mechanics. Educated on correct positioning for comfort. LR 05/11/18 0924     Active Acceptance E NR   05/10/18 0925     Active Eager E NR reviewed safety with mobility and benefits of activity SC 05/09/18 1326                      User Key     Initials Effective Dates Name Provider Type Discipline    SC 06/19/15 -  Eduardo Rowe, PT Physical Therapist PT     06/19/15 -  Akanksha Hdz, PT Physical Therapist PT     06/19/15 -  Aliza Colón, PT Physical Therapist PT                    PT Rehab Goals     Row Name 05/11/18 0848             Bed Mobility Goal 1 (PT)    Activity/Assistive Device (Bed Mobility Goal 1, PT) sit to supine  -LR      [place] Level/Cues Needed (Bed Mobility Goal 1, PT) conditional independence  -LR      Time Frame (Bed Mobility Goal 1, PT) long term goal (LTG);10 days  -LR      Progress/Outcomes (Bed Mobility Goal 1, PT) goal not  met;discharged from facility  -LR         Transfer Goal 1 (PT)    Activity/Assistive Device (Transfer Goal 1, PT) sit-to-stand/stand-to-sit;walker, rolling  -LR      [place] Level/Cues Needed (Transfer Goal 1, PT) supervision required  -LR      Time Frame (Transfer Goal 1, PT) long term goal (LTG);10 days  -LR      Progress/Outcome (Transfer Goal 1, PT) goal not met;discharged from facility  -LR         Gait Training Goal 1 (PT)    Activity/Assistive Device (Gait Training Goal 1, PT) gait (walking locomotion);walker, rolling  -LR      [place] Level (Gait Training Goal 1, PT) contact guard assist  -LR      Distance (Gait Goal 1, PT) 75  -LR      Time Frame (Gait Training Goal 1, PT) long term goal (LTG);10 days  -LR      Progress/Outcome (Gait Training Goal 1, PT) goal not met;discharged from facility  -LR        User Key  (r) = Recorded By, (t) = Taken By, (c) = Cosigned By    Initials Name Provider Type Discipline    LR Aliza Colón, PT Physical Therapist PT        Therapy Treatment        Rehabilitation Treatment Summary     Row Name 05/11/18 0848             Treatment Time/Intention    Discipline physical therapist  -LR      Document Type therapy note (daily note);discharge evaluation/summary  -LR      Subjective Information no complaints   denies pain at rest  -LR      Mode of Treatment physical therapy;individual therapy  -LR      Patient/Family Observations Patient supine in bed upon arrival. Exit alarm, SCDs.   -LR      Care Plan Review care plan/treatment goals reviewed;risks/benefits reviewed;current/potential barriers reviewed;patient/other agree to care plan  -LR      Patient Effort excellent  -LR      Existing Precautions/Restrictions fall;weight bearing;other (see comments)   TTWB per ortho MD, may progress to WBAT later  -LR      Recorded by [LR] Aliza Colón, PT 05/11/18 0924 05/11/18 0924      Row Name 05/11/18 0848             Cognitive Assessment/Intervention- PT/OT     Orientation Status (Cognition) oriented x 4  -LR      Follows Commands (Cognition) WFL;follows one step commands;over 90% accuracy;verbal cues/prompting required;repetition of directions required  -LR      Recorded by [LR] Aliza Colón, PT 05/11/18 0924 05/11/18 0924      Row Name 05/11/18 08             Safety Issues, Functional Mobility    Safety Issues Affecting Function (Mobility) other (see comments)   none  -LR      Recorded by [LR] Aliza Colón, PT 05/11/18 0924 05/11/18 0924      Row Name 05/11/18 08             Mobility Assessment/Intervention    Extremity Weight-bearing Status left lower extremity  -LR      Left Lower Extremity (Weight-bearing Status) toe touch weight-bearing (TTWB);other (see comments)   TTWB, may progress to WBAT  -LR      Recorded by [LR] Aliza Colón, PT 05/11/18 0924 05/11/18 0924      Row Name 05/11/18 08             Bed Mobility Assessment/Treatment    Supine-Sit [place] (Bed Mobility) verbal cues;contact guard  -LR      Bed Mobility, Safety Issues decreased use of legs for bridging/pushing  -LR      Assistive Device (Bed Mobility) head of bed elevated  -LR      Comment (Bed Mobility) Verbal cues to move LEs towards EOB and to push up from bed from behind to raise trunk into sitting and to scoot hips out to get feet on floor. Required increased time to perform with rest breaks. Denied dizziness upon sitting up.   -LR      Recorded by [LR] Aliza Colón, PT 05/11/18 0924 05/11/18 0924      Row Name 05/11/18 0848             Transfer Assessment/Treatment    Transfer Assessment/Treatment sit-stand transfer;stand-sit transfer  -LR      Maintains Weight-bearing Status (Transfers) able to maintain;verbal cues to maintain  -LR      Comment (Transfers) Verbal cues to push up from bed to stand and to reach back for chair to lower into sitting. Verbal cues to step L LE out before t/f for comfort. Mild unsteadiness upon standing initially.   -LR       Sit-Stand [place] (Transfers) verbal cues;contact guard  -LR      Stand-Sit [place] (Transfers) verbal cues;contact guard  -LR      Recorded by [LR] Aliza Colón, PT 05/11/18 0924 05/11/18 0924      Row Name 05/11/18 0848             Sit-Stand Transfer    Assistive Device (Sit-Stand Transfers) walker, front-wheeled  -LR      Recorded by [LR] Aliza Colón, PT 05/11/18 0924 05/11/18 0924      Row Name 05/11/18 0848             Stand-Sit Transfer    Assistive Device (Stand-Sit Transfers) walker, front-wheeled  -LR      Recorded by [LR] Aliza Colón, PT 05/11/18 0924 05/11/18 0924      Row Name 05/11/18 0848             Gait/Stairs Assessment/Training    [place] Level (Gait) verbal cues;contact guard  -LR      Assistive Device (Gait) walker, front-wheeled  -LR      Distance in Feet (Gait) 70  -LR      Pattern (Gait) step-to;other (see comments)   periods of step through  -LR      Deviations/Abnormal Patterns (Gait) left sided deviations;antalgic;bilateral deviations;el decreased;gait speed decreased;stride length decreased  -LR      Bilateral Gait Deviations forward flexed posture;heel strike decreased  -LR      Left Sided Gait Deviations weight shift ability decreased  -LR      Maintains Weight-bearing Status (Gait) verbal cues to maintain;able to maintain  -LR      Comment (Gait/Stairs) Patient ambulated with step to gait pattern at slow pace, periods of step through gait. Required verbal cues for correct sequencing of steps and for TTWB on L. Gait limited by fatigue.   -LR      Recorded by [LR] Aliza Colón, PT 05/11/18 0924 05/11/18 0924      Row Name 05/11/18 0848             Motor Skills Assessment/Interventions    Additional Documentation Therapeutic Exercise (Group);Therapeutic Exercise Interventions (Group)  -LR      Recorded by [LR] Aliza Colón, PT 05/11/18 0924 05/11/18 0924      Row Name 05/11/18 0848             Therapeutic Exercise     Lower Extremity (Therapeutic Exercise) gluteal sets;heel slides, left;quad sets, left;SAQ (short arc quad), left  -LR      Lower Extremity Range of Motion (Therapeutic Exercise) hip abduction/adduction, left;ankle dorsiflexion/plantar flexion, left  -LR      Exercise Type (Therapeutic Exercise) AAROM (active assistive range of motion);AROM (active range of motion);isometric contraction, static;isotonic contraction, concentric  -LR      Position (Therapeutic Exercise) seated  -LR      Sets/Reps (Therapeutic Exercise) x15 reps each  -LR      Comment (Therapeutic Exercise) cues for technique; CGA only  -LR      Recorded by [LR] Aliza Colón, PT 05/11/18 0924 05/11/18 0924      Row Name 05/11/18 0848             Positioning and Restraints    Pre-Treatment Position in bed  -LR      Post Treatment Position chair  -LR      In Chair notified nsg;reclined;sitting;call light within reach;encouraged to call for assist;exit alarm on;legs elevated;compression device   pillow under knees  -LR      Recorded by [LR] Aliza Colón, PT 05/11/18 0924 05/11/18 0924      Row Name 05/11/18 0848             Pain Assessment    Additional Documentation Pain Scale: Numbers Pre/Post-Treatment (Group)  -LR      Recorded by [LR] Aliza Colón, PT 05/11/18 0924 05/11/18 0924      Row Name 05/11/18 0848             Pain Scale: Numbers Pre/Post-Treatment    Pain Scale: Numbers, Pretreatment 0/10 - no pain  -LR      Pain Scale: Numbers, Post-Treatment 0/10 - no pain  -LR      Pre/Post Treatment Pain Comment Denies pain at rest  -LR      Recorded by [LR] Aliza Colón, PT 05/11/18 0924 05/11/18 0924      Row Name 05/11/18 0848             Coping    Observed Emotional State accepting;cooperative  -LR      Verbalized Emotional State acceptance  -LR      Recorded by [LR] Aliza Colón, PT 05/11/18 0924 05/11/18 0924      Row Name 05/11/18 0848             Plan of Care Review    Plan of Care Reviewed With patient   -LR      Recorded by [LR] Aliza Colón, PT 05/11/18 0924 05/11/18 0924      Row Name 05/11/18 0848             Outcome Summary/Treatment Plan (PT)    Daily Summary of Progress (PT) progress toward functional goals as expected  -LR      Recorded by [LR] Aliza Colón, PT 05/11/18 0924 05/11/18 0924        User Key  (r) = Recorded By, (t) = Taken By, (c) = Cosigned By    Initials Name Effective Dates Discipline    LR Aliza Colón, PT 06/19/15 -  PT             PT Recommendation and Plan  Anticipated Discharge Disposition (PT): skilled nursing facility (SNF)  Outcome Summary/Treatment Plan (PT)  Daily Summary of Progress (PT): progress toward functional goals as expected  Anticipated Discharge Disposition (PT): skilled nursing facility (SNF)  Plan of Care Reviewed With: patient  Progress: improving  Outcome Summary: Patient ambulated 70 feet with RW and TTWB, limited by fatigue. Denies pain at rest. CGA for all mobility. Patient has been d/c to rehab today.           Outcome Measures     Row Name 05/11/18 0848 05/10/18 0918 05/10/18 0850       How much help from another person do you currently need...    Turning from your back to your side while in flat bed without using bedrails? 3  -LR  -- 4  -EH    Moving from lying on back to sitting on the side of a flat bed without bedrails? 3  -LR  -- 3  -EH    Moving to and from a bed to a chair (including a wheelchair)? 3  -LR  -- 3  -EH    Standing up from a chair using your arms (e.g., wheelchair, bedside chair)? 3  -LR  -- 3  -EH    Climbing 3-5 steps with a railing? 3  -LR  -- 2  -EH    To walk in hospital room? 3  -LR  -- 3  -EH    AM-PAC 6 Clicks Score 18  -LR  -- 18  -EH       How much help from another is currently needed...    Putting on and taking off regular lower body clothing?  -- 3  -SUSANNAH  --    Bathing (including washing, rinsing, and drying)  -- 3  -SUSANNAH  --    Toileting (which includes using toilet bed pan or urinal)  -- 3  -SUSANNAH  --     Putting on and taking off regular upper body clothing  -- 4  -SUSANNAH  --    Taking care of personal grooming (such as brushing teeth)  -- 3  -SUSANNAH  --    Eating meals  -- 4  -SUSANNAH  --    Score  -- 20  -SUSANNAH  --       Functional Assessment    Outcome Measure Options AM-PAC 6 Clicks Basic Mobility (PT)  -LR AM-PAC 6 Clicks Daily Activity (OT)  -SUSANNAH -[facility] 6 Clicks Basic Mobility (PT)  -    Row Name 05/09/18 1000             How much help from another person do you currently need...    Turning from your back to your side while in flat bed without using bedrails? 4  -SC      Moving from lying on back to sitting on the side of a flat bed without bedrails? 3  -SC      Moving to and from a bed to a chair (including a wheelchair)? 3  -SC      Standing up from a chair using your arms (e.g., wheelchair, bedside chair)? 3  -SC      Climbing 3-5 steps with a railing? 2  -SC      To walk in hospital room? 3  -SC      AM-PAC 6 Clicks Score 18  -SC         Functional Assessment    Outcome Measure Options AM-PAC 6 Clicks Basic Mobility (PT)  -SC        User Key  (r) = Recorded By, (t) = Taken By, (c) = Cosigned By    Initials Name Provider Type    SC Eduardo Rowe, PT Physical Therapist    SUSANNAH Coronel, OT Occupational Therapist    EH Akanksha Hdz, PT Physical Therapist    LR Aliza Colón, PT Physical Therapist           Time Calculation:         PT Charges     Row Name 05/11/18 0928             Time Calculation    Start Time 0848  -LR      PT Received On 05/11/18  -LR      PT Goal Re-Cert Due Date 05/19/18  -LR         Time Calculation- PT    Total Timed Code Minutes- PT 26 minute(s)  -LR        User Key  (r) = Recorded By, (t) = Taken By, (c) = Cosigned By    Initials Name Provider Type    LR Aliza Colón, PT Physical Therapist          Therapy Charges for Today     Code Description Service Date Service Provider Modifiers Qty    74186547014  PT MOBILITY CURRENT 5/11/2018 Aliza Colón, PT GP, CK 1     12082742297 HC PT MOBILITY PROJECTED 5/11/2018 Aliza Colón, PT GP, CJ 1    06193695713 HC PT MOBILITY DISCHARGE 5/11/2018 Aliza Colón, PT GP, CK 1    21809625193 HC GAIT TRAINING EA 15 MIN 5/11/2018 Aliza Colón, PT GP 1    66225990326 HC PT THER PROC EA 15 MIN 5/11/2018 Aliza Colón, PT GP 1          PT G-Codes  PT Professional Judgement Used?: Yes  Outcome Measure Options: AM-PAC 6 Clicks Basic Mobility (PT)  Score: 18  Functional Limitation: Mobility: Walking and moving around  Mobility: Walking and Moving Around Current Status (): At least 40 percent but less than 60 percent impaired, limited or restricted  Mobility: Walking and Moving Around Goal Status (): At least 20 percent but less than 40 percent impaired, limited or restricted  Mobility: Walking and Moving Around Discharge Status (): At least 40 percent but less than 60 percent impaired, limited or restricted    PT Discharge Summary  Anticipated Discharge Disposition (PT): skilled nursing facility (SNF)  Reason for Discharge: Discharge from facility  Outcomes Achieved: Refer to plan of care for updates on goals achieved  Discharge Destination: SNF    Aliza Colón, PT  5/11/2018

## 2018-05-11 NOTE — PLAN OF CARE
Problem: Patient Care Overview  Goal: Plan of Care Review  Outcome: Ongoing (interventions implemented as appropriate)   05/11/18 3946   Coping/Psychosocial   Plan of Care Reviewed With patient   Plan of Care Review   Progress improving   OTHER   Outcome Summary Patient ambulated 70 feet with RW and TTWB, limited by fatigue. Denies pain at rest. CGA for all mobility. Patient has been d/c to rehab today.

## 2018-05-11 NOTE — DISCHARGE SUMMARY
[facility] Medicine Services  DISCHARGE SUMMARY    Patient Name: Mirian Sy  : 1940  MRN: [id number]    Date of Admission: 2018  Date of Discharge:  2018  Primary Care Physician: Sriram Springer MD    Consults     Date and Time Order Name Status Description    2018 2315 Inpatient Orthopedic Surgery Consult Completed     2018 1851 Inpatient Orthopedic Surgery Consult          Hospital Course     Presenting Problem:   Fall in home, initial encounter [W19.XXXA, Y92.099]    Active Hospital Problems (** Indicates Principal Problem)    Diagnosis Date Noted   • **Closed fracture of left ilium [S32.302A] 2018   • PAF (paroxysmal atrial fibrillation) [I48.0] 2018   • GERD (gastroesophageal reflux disease) [K21.9] 2018   • Fall at home [W19.XXXA, Y92.099] 2018   • Patent foramen ovale [Q21.1]    • Hypertension [I10] 2016      Resolved Hospital Problems    Diagnosis Date Noted Date Resolved   • Hyperlipidemia [E78.5] 2016      Hospital Course:  Ms. Mirian Sy is a 77yo female with PMH significant for HTN, hyperlipidemia, PFO and PAF (on Eliquis), dilated cardiomyopathy, depression, prior DVT following R hip ORIF, HTN, chronic anemia, osteoarthritis and osteopenia. She presented to [facility] ED on 18 after a fall that afternoon. Patient reported she forgot that her son was working on her back deck and that there were boards missing. She opened the door, stepped outside into a gap in the deck and landed on her left hip. She reported immediate hip pain but was able to lift herself up and bear weight. She was able to drive to the bank but when she started walking around, realized the pain was unbearable.     ED evaluation revealed a left iliac wing fracture. Orthopedics was consulted and Dr. Stover recommended conservative treatment. Patient is toe-touch weight-bearing and can progress to weight bearing as tolerated. Per  Dr. Stover, no need for DVT prophylaxis at this time. Follow up with him in 2-3 weeks for repeat exam and XRs.     Patient's hospitalization was stable. PT/OT recommended rehab and she will transfer to [facility] in stable condition on 5/11/18.     Day of Discharge     HPI:   Feeling better today. No further episodes of light-headedness. Bowels moved yesterday. Denies nausea or vomiting. Pain is controlled with current regimen. She feels ready to go to [facility] today.     Review of Systems  Gen- No fevers, chills  CV- No chest pain, palpitations  Resp- No cough, dyspnea  GI- No N/V/D, abd pain    Otherwise ROS is negative except as mentioned in the HPI.    Vital Signs:   Temp:  [98.1 °F (36.7 °C)-99.5 °F (37.5 °C)] 98.1 °F (36.7 °C)  Heart Rate:  [76-94] 92  Resp:  [16-17] 17  BP: (102-140)/(55-78) 132/68     Physical Exam:  Constitutional: No acute distress, awake, alert  HENT: NCAT, mucous membranes moist  Respiratory: Clear to auscultation bilaterally, respiratory effort normal   Cardiovascular: RRR, no murmurs, rubs, or gallops, palpable pedal pulses bilaterally  Gastrointestinal: Positive bowel sounds, soft, nontender, nondistended  Musculoskeletal: No bilateral ankle edema  Psychiatric: Appropriate affect, cooperative  Neurologic: Oriented x 3, strength symmetric in all extremities, Cranial Nerves grossly intact to confrontation, speech clear  Skin: No rashes    Pertinent  and/or Most Recent Results       Results from last 7 days  Lab Units 05/09/18 0607 05/08/18 2055   WBC 10*3/mm3 6.53 7.98   HEMOGLOBIN g/dL 11.5 11.4*   HEMATOCRIT % 34.9 33.8*   PLATELETS 10*3/mm3 217 207   SODIUM mmol/L 132 130*   POTASSIUM mmol/L 4.1 4.0   CHLORIDE mmol/L 96* 99   CO2 mmol/L 28.0 26.0   BUN mg/dL 13 12   CREATININE mg/dL 1.00 0.90   GLUCOSE mg/dL 97 117*   CALCIUM mg/dL 9.3 9.2       Results from last 7 days  Lab Units 05/08/18 2055   BILIRUBIN mg/dL 0.7   ALK PHOS U/L 102*   ALT (SGPT) U/L 13   AST (SGOT) U/L 27          Brief Urine Lab Results  (Last result in the past 365 days)      Color   Clarity   Blood   Leuk Est   Nitrite   Protein   CREAT   Urine HCG        05/09/18 0051 Yellow Clear Negative Negative Negative Negative             Microbiology Results Abnormal     None        Imaging Results (all)     Procedure Component Value Units Date/Time    CT Pelvis Without Contrast [[id number]] Collected:  05/08/18 1834     Updated:  05/10/18 2239    Narrative:       EXAMINATION: CT PELVIS WO CONTRAST-      INDICATION: Fall with buttock and left hip pain.      TECHNIQUE: 5 mm unenhanced images of the pelvis and proximal thighs with  sagittal and coronal 2-D reconstructions.     The radiation dose reduction device was turned on for each scan per the  ALARA (As Low as Reasonably Achievable) protocol.     COMPARISON: None.     FINDINGS: Patient history indicates fall with buttock and left hip pain.  Bone window images show a nondisplaced fracture through the mid left  ilium, extending from approximately the same axial level as the upper  margin of the sacrum, in the iliac fossa region inferiorly down to just  above the posterior margin of the left acetabulum, not involving the  acetabulum itself. Fracture extends to the margin of the SI joint, where  there is minimal comminution. No underlying pathologic lesion is  appreciated. No evidence of pubic ramus fracture or proximal femur  fracture is seen. The sacrum, right ilium, and proximal right femur  appear to be intact.     Soft tissue axial images show only mild soft tissue swelling at the  fracture site and no significant hematoma. No evidence of hematoma is  seen elsewhere. No intrapelvic free fluid is identified. A few  gallstones appear to be present in the dependent portion of gallbladder  at the upper margin of this scan.       Impression:       Nondisplaced fracture of the mid left ilium.     D:  05/08/2018  E:  05/09/2018     This report was finalized on 5/10/2018 10:37 PM by  DR. Justin Aj MD.       XR Femur 2 View Left [[id number]] Collected:  05/09/18 0823     Updated:  05/10/18 2238    Narrative:       EXAMINATION: XR FEMUR 2 VW LEFT-      INDICATION: Fall with left hip pain.      COMPARISON: Previous AP pelvis and right femur 06/22/2015.     FINDINGS: Left hip is included on the old right femur series, joint  space remains generally well maintained. No pelvic or proximal femur  fracture is seen. The mid and distal femur appear intact.  There is no  obvious loosening of the prosthesis and it appears stable today, with  kristina and screw fixation left hip. No stress riser fracture is identified.       Impression:       Stable postop appearance of left hip. No evidence of new  left femur trauma.     D:  05/08/2018  E:  05/09/2018     This report was finalized on 5/10/2018 10:36 PM by DR. Justin Aj MD.           Results for orders placed during the hospital encounter of 09/20/16   Adult Transthoracic Echo Complete    Narrative · Left ventricular function is normal. Estimated EF = 60%.  · Moderate-to-severe mitral valve regurgitation is present  · Moderate tricuspid valve regurgitation is present.        Discharge Details      Mirian Sy   Home Medication Instructions KRISTI:[id number]    Printed on:05/11/18 0937   Medication Information                      acetaminophen (TYLENOL) 325 MG tablet  Take 2 tablets by mouth Every 4 (Four) Hours As Needed for Mild Pain .             calcium-vitamin D (OSCAL-500) 500-200 MG-UNIT per tablet  Take 1 tablet by mouth every morning.             citalopram (CeleXA) 20 MG tablet  TAKE 1 TABLET EVERY MORNING             diltiazem XR (DILACOR XR) 120 MG 24 hr capsule  Take 1 capsule by mouth Every Night.             ELIQUIS 5 MG tablet tablet  TAKE 1 TABLET TWICE A DAY             ferrous sulfate 325 (65 FE) MG tablet  Take 1 tablet by mouth Daily With Breakfast.             flecainide (TAMBOCOR) 50 MG tablet  Take 1 tablet by mouth Every 12 (Twelve)  Hours.             levETIRAcetam (KEPPRA) 250 MG tablet  TAKE 1 TABLET EVERY 12 HOURS             magnesium oxide (MAGOX) 400 (241.3 MG) MG tablet tablet  Take 400 mg by mouth Daily.             megestrol acetate (MEGACE) 400 MG/10ML suspension oral suspension  Take 5 mL by mouth every morning.             mirtazapine (REMERON) 7.5 MG tablet  TAKE 1 TABLET AT BEDTIME             polyethylene glycol (MIRALAX) pack packet  Take 17 g by mouth Daily.             ranitidine (ZANTAC) 150 MG capsule  TAKE 1 CAPSULE EVERY NIGHT             sennosides-docusate sodium (SENOKOT-S) 8.6-50 MG tablet  Take 2 tablets by mouth 2 (Two) Times a Day As Needed for Constipation.             traMADol (ULTRAM) 50 MG tablet  Take 0.5-1 tablets by mouth Every 6 (Six) Hours As Needed for Moderate Pain  or Severe Pain .               Discharge Disposition:  Rehab Facility or Unit (DC - External)    Discharge Diet:  Diet Instructions     Diet: Regular; Thin       Discharge Diet:  Regular    Fluid Consistency:  Thin        Discharge Activity:   Activity Instructions     Discharge Activity Restrictions       Toe-touch weight bearing, can advance to weight-bearing as tolerated        Future Appointments  Date Time Provider Department Center   5/30/2018 10:15 AM Bri Olguin MD [facility] None   6/7/2018 11:00 AM Sriram Springer MD [facility] None     Additional Instructions for the Follow-ups that You Need to Schedule     Discharge Follow-up with Specified Provider: Follow up with Dr. Lenny Stover in 2-3 weeks    As directed      To:  Follow up with Dr. Lenny Stover in 2-3 weeks             Time Spent on Discharge:  40 minutes    Electronically signed by Carmelita Ontiveros PA-C, 05/11/18, 9:37 AM.

## 2018-05-11 NOTE — PLAN OF CARE
Problem: Fractured Hip (Adult)  Goal: Signs and Symptoms of Listed Potential Problems Will be Absent, Minimized or Managed (Fractured Hip)  Outcome: Ongoing (interventions implemented as appropriate)      Problem: Fall Risk (Adult)  Goal: Identify Related Risk Factors and Signs and Symptoms  Outcome: Ongoing (interventions implemented as appropriate)    Goal: Absence of Fall  Outcome: Ongoing (interventions implemented as appropriate)      Problem: Patient Care Overview  Goal: Plan of Care Review  Outcome: Ongoing (interventions implemented as appropriate)   05/11/18 0311   Coping/Psychosocial   Plan of Care Reviewed With patient   Plan of Care Review   Progress improving   OTHER   Outcome Summary pain not a problem during night; vss; up to bsc; walker and gait belt     Goal: Individualization and Mutuality  Outcome: Ongoing (interventions implemented as appropriate)    Goal: Discharge Needs Assessment  Outcome: Ongoing (interventions implemented as appropriate)    Goal: Interprofessional Rounds/Family Conf  Outcome: Ongoing (interventions implemented as appropriate)      Problem: Constipation (Adult)  Goal: Identify Related Risk Factors and Signs and Symptoms  Outcome: Ongoing (interventions implemented as appropriate)    Goal: Effective Bowel Elimination  Outcome: Ongoing (interventions implemented as appropriate)    Goal: Comfort  Outcome: Ongoing (interventions implemented as appropriate)

## 2018-05-23 ENCOUNTER — TREATMENT (OUTPATIENT)
Dept: INTERNAL MEDICINE | Facility: CLINIC | Age: 78
End: 2018-05-23

## 2018-05-23 ENCOUNTER — TRANSITIONAL CARE MANAGEMENT TELEPHONE ENCOUNTER (OUTPATIENT)
Dept: INTERNAL MEDICINE | Facility: CLINIC | Age: 78
End: 2018-05-23

## 2018-05-23 DIAGNOSIS — I10 IDIOPATHIC HYPERTENSION: ICD-10-CM

## 2018-05-23 DIAGNOSIS — S32.392D: Primary | ICD-10-CM

## 2018-06-05 ENCOUNTER — APPOINTMENT (OUTPATIENT)
Dept: LAB | Facility: HOSPITAL | Age: 78
End: 2018-06-05

## 2018-06-05 ENCOUNTER — OFFICE VISIT (OUTPATIENT)
Dept: INTERNAL MEDICINE | Facility: CLINIC | Age: 78
End: 2018-06-05

## 2018-06-05 VITALS
RESPIRATION RATE: 16 BRPM | BODY MASS INDEX: 16.13 KG/M2 | TEMPERATURE: 98.2 F | SYSTOLIC BLOOD PRESSURE: 130 MMHG | OXYGEN SATURATION: 97 % | WEIGHT: 103 LBS | HEART RATE: 92 BPM | DIASTOLIC BLOOD PRESSURE: 80 MMHG

## 2018-06-05 DIAGNOSIS — R63.6 LOW WEIGHT: ICD-10-CM

## 2018-06-05 DIAGNOSIS — F32.89 OTHER DEPRESSION: ICD-10-CM

## 2018-06-05 DIAGNOSIS — R79.9 ABNORMAL FINDING OF BLOOD CHEMISTRY: ICD-10-CM

## 2018-06-05 DIAGNOSIS — R26.9 ABNORMAL GAIT: ICD-10-CM

## 2018-06-05 DIAGNOSIS — I10 ESSENTIAL HYPERTENSION: ICD-10-CM

## 2018-06-05 DIAGNOSIS — K21.9 GASTROESOPHAGEAL REFLUX DISEASE, ESOPHAGITIS PRESENCE NOT SPECIFIED: ICD-10-CM

## 2018-06-05 DIAGNOSIS — I42.9 CARDIOMYOPATHY, UNSPECIFIED TYPE (HCC): ICD-10-CM

## 2018-06-05 DIAGNOSIS — D53.9 NUTRITIONAL ANEMIA: Primary | ICD-10-CM

## 2018-06-05 DIAGNOSIS — G40.209 PARTIAL SYMPTOMATIC EPILEPSY WITH COMPLEX PARTIAL SEIZURES, NOT INTRACTABLE, WITHOUT STATUS EPILEPTICUS (HCC): ICD-10-CM

## 2018-06-05 DIAGNOSIS — E61.1 IRON DEFICIENCY: ICD-10-CM

## 2018-06-05 LAB
ALBUMIN SERPL-MCNC: 4.3 G/DL (ref 3.2–4.8)
ALBUMIN/GLOB SERPL: 1.3 G/DL (ref 1.5–2.5)
ALP SERPL-CCNC: 155 U/L (ref 25–100)
ALT SERPL W P-5'-P-CCNC: 17 U/L (ref 7–40)
ANION GAP SERPL CALCULATED.3IONS-SCNC: 9 MMOL/L (ref 3–11)
ARTICHOKE IGE QN: 122 MG/DL (ref 0–130)
AST SERPL-CCNC: 27 U/L (ref 0–33)
BASOPHILS # BLD AUTO: 0.03 10*3/MM3 (ref 0–0.2)
BASOPHILS NFR BLD AUTO: 0.5 % (ref 0–1)
BILIRUB SERPL-MCNC: 0.4 MG/DL (ref 0.3–1.2)
BUN BLD-MCNC: 15 MG/DL (ref 9–23)
BUN/CREAT SERPL: 13.5 (ref 7–25)
CALCIUM SPEC-SCNC: 9.4 MG/DL (ref 8.7–10.4)
CHLORIDE SERPL-SCNC: 103 MMOL/L (ref 99–109)
CHOLEST SERPL-MCNC: 165 MG/DL (ref 0–200)
CO2 SERPL-SCNC: 27 MMOL/L (ref 20–31)
CREAT BLD-MCNC: 1.11 MG/DL (ref 0.6–1.3)
CRP SERPL-MCNC: 2.01 MG/DL (ref 0–1)
DEPRECATED RDW RBC AUTO: 48.6 FL (ref 37–54)
EOSINOPHIL # BLD AUTO: 0.17 10*3/MM3 (ref 0–0.3)
EOSINOPHIL NFR BLD AUTO: 2.8 % (ref 0–3)
ERYTHROCYTE [DISTWIDTH] IN BLOOD BY AUTOMATED COUNT: 14.9 % (ref 11.3–14.5)
GFR SERPL CREATININE-BSD FRML MDRD: 48 ML/MIN/1.73
GLOBULIN UR ELPH-MCNC: 3.4 GM/DL
GLUCOSE BLD-MCNC: 95 MG/DL (ref 70–100)
HCT VFR BLD AUTO: 35.6 % (ref 34.5–44)
HDLC SERPL-MCNC: 48 MG/DL (ref 40–60)
HGB BLD-MCNC: 11.5 G/DL (ref 11.5–15.5)
IMM GRANULOCYTES # BLD: 0.01 10*3/MM3 (ref 0–0.03)
IMM GRANULOCYTES NFR BLD: 0.2 % (ref 0–0.6)
IRON 24H UR-MRATE: 23 MCG/DL (ref 50–175)
IRON SATN MFR SERPL: 8 % (ref 15–50)
LYMPHOCYTES # BLD AUTO: 1.8 10*3/MM3 (ref 0.6–4.8)
LYMPHOCYTES NFR BLD AUTO: 29.6 % (ref 24–44)
MAGNESIUM SERPL-MCNC: 2.3 MG/DL (ref 1.3–2.7)
MCH RBC QN AUTO: 28.9 PG (ref 27–31)
MCHC RBC AUTO-ENTMCNC: 32.3 G/DL (ref 32–36)
MCV RBC AUTO: 89.4 FL (ref 80–99)
MONOCYTES # BLD AUTO: 0.7 10*3/MM3 (ref 0–1)
MONOCYTES NFR BLD AUTO: 11.5 % (ref 0–12)
NEUTROPHILS # BLD AUTO: 3.38 10*3/MM3 (ref 1.5–8.3)
NEUTROPHILS NFR BLD AUTO: 55.6 % (ref 41–71)
PLATELET # BLD AUTO: 325 10*3/MM3 (ref 150–450)
PMV BLD AUTO: 10 FL (ref 6–12)
POTASSIUM BLD-SCNC: 4.9 MMOL/L (ref 3.5–5.5)
PROT SERPL-MCNC: 7.7 G/DL (ref 5.7–8.2)
RBC # BLD AUTO: 3.98 10*6/MM3 (ref 3.89–5.14)
SODIUM BLD-SCNC: 139 MMOL/L (ref 132–146)
TIBC SERPL-MCNC: 292 MCG/DL (ref 250–450)
TRIGL SERPL-MCNC: 54 MG/DL (ref 0–150)
WBC NRBC COR # BLD: 6.08 10*3/MM3 (ref 3.5–10.8)

## 2018-06-05 PROCEDURE — 85025 COMPLETE CBC W/AUTO DIFF WBC: CPT | Performed by: INTERNAL MEDICINE

## 2018-06-05 PROCEDURE — 83550 IRON BINDING TEST: CPT | Performed by: INTERNAL MEDICINE

## 2018-06-05 PROCEDURE — 80061 LIPID PANEL: CPT | Performed by: INTERNAL MEDICINE

## 2018-06-05 PROCEDURE — 80177 DRUG SCRN QUAN LEVETIRACETAM: CPT | Performed by: INTERNAL MEDICINE

## 2018-06-05 PROCEDURE — 99495 TRANSJ CARE MGMT MOD F2F 14D: CPT | Performed by: INTERNAL MEDICINE

## 2018-06-05 PROCEDURE — 36415 COLL VENOUS BLD VENIPUNCTURE: CPT | Performed by: INTERNAL MEDICINE

## 2018-06-05 PROCEDURE — 83735 ASSAY OF MAGNESIUM: CPT | Performed by: INTERNAL MEDICINE

## 2018-06-05 PROCEDURE — 80053 COMPREHEN METABOLIC PANEL: CPT | Performed by: INTERNAL MEDICINE

## 2018-06-05 PROCEDURE — 86140 C-REACTIVE PROTEIN: CPT | Performed by: INTERNAL MEDICINE

## 2018-06-05 PROCEDURE — 83540 ASSAY OF IRON: CPT | Performed by: INTERNAL MEDICINE

## 2018-06-05 RX ORDER — MIRTAZAPINE 15 MG/1
7.5 TABLET, FILM COATED ORAL
Start: 2018-06-05 | End: 2018-06-22 | Stop reason: SDUPTHER

## 2018-06-05 NOTE — PROGRESS NOTES
Subjective   Mirian Sy is a 78 y.o. female.     History of Present Illness     The patient had an accidental fall at home one month ago fracturing her left ileum.  She was hospitalized several days and rehabilitated at [facility].  She's been home now for just over 1 week and feels she is making good progress.  She is using the walker at all times.  Her long-term health has been complicated by a previous CVA and an associated complex partial seizure disorder.  She has much less pain and is occasionally taking Tylenol or tramadol.  She has not taken tramadol in 3 days.  She is taking usual medications regularly and feels her wellbeing and strength are approaching normal.    The following portions of the patient's history were reviewed and updated as appropriate: allergies, current medications, past family history, past medical history, past social history, past surgical history and problem list.    Review of Systems   Constitutional: Negative for appetite change and fatigue.   HENT: Negative for ear pain and sore throat.    Respiratory: Negative for cough and shortness of breath.    Cardiovascular: Negative for chest pain and palpitations.   Gastrointestinal: Positive for constipation. Negative for abdominal pain and nausea.   Musculoskeletal: Positive for gait problem. Negative for arthralgias and back pain.   Neurological: Negative for dizziness, seizures and headaches.   Psychiatric/Behavioral:        Sleep and anxiety stable on medication.       Objective   Blood pressure 130/80, pulse 92, temperature 98.2 °F (36.8 °C), temperature source Oral, resp. rate 16, weight 46.7 kg (103 lb), SpO2 97 %.    Physical Exam   Constitutional: She is oriented to person, place, and time. She appears well-developed and well-nourished. No distress.   Cardiovascular: Normal rate and regular rhythm.    Systolic click and murmur at apex.  Rhythm is irregularly irregular   Pulmonary/Chest: Effort normal and breath sounds  normal. She has no wheezes. She has no rales.   Musculoskeletal:   Hips and knees good range of motion without pain.  Motor testing normal without guarding.   Neurological: She is alert and oriented to person, place, and time. She exhibits normal muscle tone. Coordination normal.   Psychiatric: She has a normal mood and affect. Her behavior is normal. Judgment and thought content normal.   Nursing note and vitals reviewed.    Procedures  Assessment/Plan   Mirian was seen today for pelvic pain.    Diagnoses and all orders for this visit:    Nutritional anemia    Low weight  -     Comprehensive Metabolic Panel  -     Lipid Panel  -     Magnesium    Abnormal gait    Other depression    Partial symptomatic epilepsy with complex partial seizures, not intractable, without status epilepticus  -     Levetiracetam Level (Keppra)    Iron deficiency  -     CBC & Differential  -     C-reactive Protein  -     Iron Profile  -     CBC Auto Differential    Gastroesophageal reflux disease, esophagitis presence not specified    Essential hypertension    Cardiomyopathy, unspecified type    Abnormal finding of blood chemistry   -     Lipid Panel    Other orders  -     mirtazapine (REMERON) 15 MG tablet; Take 0.5 tablets by mouth every night at bedtime.      The patient is recovering well from her ileum fracture.  I've told her to stay active each day on the walker to rebuild strength and endurance.  She should lie on Tylenol primarily for pain.    Patient's accidental fall has much to do with her frailty and her instability following complex progressive medical problems.  She may do best in the coming year by living with family.    The patient has a chronic low body weight and has been counseled on adequate calorie intake.  She has generally improved on megestrol which may be a permanent medication.  I've counseled her to increase her daily protein intake.    The patient has chronic atrial fibrillation and his.  Stable on diltiazem,  Eliquis, and his neck and night.  She has routine visits with the cardiologist.  Her anticoagulation will make her appointment risk of iron deficiency which she has experienced in recent years.     Patient Instructions   1.  Continue usual medicines and supplements - as listed.    2.  Follow well-balanced diet - low in salt low in sugar.    3.  Drink 1 can a protein supplement  -  extra every day - gain 2 pounds of weight.    4.  Continue rehabilitation - rebuild strength and endurance.    5.  Next checkup - 3 months fasting.    6.  Resume ranitidine at night for stomach acid control.    7.  Keppra blood level - low therapeutic at 14.    8.  Iron level low at 8% binding.  Continue ferrous sulfate 325 mg supplement daily on a permanent basis.    9.  C-reactive protein mildly elevated at 2.  Monitor at next visit.    10.  CBC, chemistry panel, magnesium, and LDL cholesterol acceptable.    Electronically signed Sriram Springer M.D.6/9/2018 12:55 PM

## 2018-06-09 LAB — LEVETIRACETAM SERPL-MCNC: 13.7 UG/ML (ref 10–40)

## 2018-06-12 ENCOUNTER — TELEPHONE (OUTPATIENT)
Dept: INTERNAL MEDICINE | Facility: CLINIC | Age: 78
End: 2018-06-12

## 2018-06-12 PROCEDURE — G0180 MD CERTIFICATION HHA PATIENT: HCPCS | Performed by: INTERNAL MEDICINE

## 2018-06-12 NOTE — TELEPHONE ENCOUNTER
Called labs to pt. Left VM on home per  TIA. Per TGF:  -Keppra blood level - low therapeutic at 14.  -Iron level low at 8% binding.  Cont ferrous sulfate 325 mg supplement daily on a permanent basis.  -CRP mildly elevated at 2.  Monitor at next visit.  -CBC, chemistry panel, magnesium, and LDL acceptable.  Pt to call w Danis.

## 2018-06-13 RX ORDER — APIXABAN 5 MG/1
TABLET, FILM COATED ORAL
Qty: 180 TABLET | Refills: 1 | Status: SHIPPED | OUTPATIENT
Start: 2018-06-13 | End: 2018-12-18

## 2018-06-22 RX ORDER — MIRTAZAPINE 7.5 MG/1
TABLET, FILM COATED ORAL
Qty: 90 TABLET | Refills: 1 | Status: SHIPPED | OUTPATIENT
Start: 2018-06-22 | End: 2018-12-24 | Stop reason: SDUPTHER

## 2018-07-10 DIAGNOSIS — I48.0 PAF (PAROXYSMAL ATRIAL FIBRILLATION) (HCC): ICD-10-CM

## 2018-07-10 RX ORDER — FLECAINIDE ACETATE 50 MG/1
50 TABLET ORAL EVERY 12 HOURS SCHEDULED
Qty: 180 TABLET | Refills: 1 | Status: SHIPPED | OUTPATIENT
Start: 2018-07-10 | End: 2018-12-24 | Stop reason: SDUPTHER

## 2018-08-09 RX ORDER — RANITIDINE 150 MG/1
CAPSULE ORAL
Qty: 90 CAPSULE | Refills: 0 | Status: SHIPPED | OUTPATIENT
Start: 2018-08-09 | End: 2018-11-02 | Stop reason: SDUPTHER

## 2018-09-12 RX ORDER — LEVETIRACETAM 250 MG/1
TABLET ORAL
Qty: 180 TABLET | Refills: 0 | Status: SHIPPED | OUTPATIENT
Start: 2018-09-12 | End: 2018-11-21 | Stop reason: SDUPTHER

## 2018-09-13 ENCOUNTER — OFFICE VISIT (OUTPATIENT)
Dept: FAMILY MEDICINE CLINIC | Facility: CLINIC | Age: 78
End: 2018-09-13

## 2018-09-13 ENCOUNTER — LAB (OUTPATIENT)
Dept: LAB | Facility: HOSPITAL | Age: 78
End: 2018-09-13

## 2018-09-13 VITALS
HEIGHT: 67 IN | HEART RATE: 97 BPM | OXYGEN SATURATION: 94 % | BODY MASS INDEX: 16.64 KG/M2 | SYSTOLIC BLOOD PRESSURE: 110 MMHG | WEIGHT: 106 LBS | DIASTOLIC BLOOD PRESSURE: 70 MMHG

## 2018-09-13 DIAGNOSIS — G47.9 DYSSOMNIA: ICD-10-CM

## 2018-09-13 DIAGNOSIS — F32.89 OTHER DEPRESSION: ICD-10-CM

## 2018-09-13 DIAGNOSIS — F41.9 ANXIETY: ICD-10-CM

## 2018-09-13 DIAGNOSIS — E78.2 MIXED HYPERLIPIDEMIA: ICD-10-CM

## 2018-09-13 DIAGNOSIS — D53.9 NUTRITIONAL ANEMIA: ICD-10-CM

## 2018-09-13 DIAGNOSIS — D53.9 NUTRITIONAL ANEMIA: Primary | ICD-10-CM

## 2018-09-13 DIAGNOSIS — I10 ESSENTIAL HYPERTENSION: ICD-10-CM

## 2018-09-13 LAB
ALBUMIN SERPL-MCNC: 4.21 G/DL (ref 3.2–4.8)
ALBUMIN/GLOB SERPL: 1.5 G/DL (ref 1.5–2.5)
ALP SERPL-CCNC: 108 U/L (ref 25–100)
ALT SERPL W P-5'-P-CCNC: 9 U/L (ref 7–40)
ANION GAP SERPL CALCULATED.3IONS-SCNC: 8 MMOL/L (ref 3–11)
ARTICHOKE IGE QN: 112 MG/DL (ref 0–130)
AST SERPL-CCNC: 25 U/L (ref 0–33)
BASOPHILS # BLD AUTO: 0.05 10*3/MM3 (ref 0–0.2)
BASOPHILS NFR BLD AUTO: 0.9 % (ref 0–1)
BILIRUB SERPL-MCNC: 0.7 MG/DL (ref 0.3–1.2)
BUN BLD-MCNC: 15 MG/DL (ref 9–23)
BUN/CREAT SERPL: 13.5 (ref 7–25)
CALCIUM SPEC-SCNC: 9.5 MG/DL (ref 8.7–10.4)
CHLORIDE SERPL-SCNC: 98 MMOL/L (ref 99–109)
CHOLEST SERPL-MCNC: 164 MG/DL (ref 0–200)
CO2 SERPL-SCNC: 27 MMOL/L (ref 20–31)
CREAT BLD-MCNC: 1.11 MG/DL (ref 0.6–1.3)
DEPRECATED RDW RBC AUTO: 48.4 FL (ref 37–54)
EOSINOPHIL # BLD AUTO: 0.14 10*3/MM3 (ref 0–0.3)
EOSINOPHIL NFR BLD AUTO: 2.5 % (ref 0–3)
ERYTHROCYTE [DISTWIDTH] IN BLOOD BY AUTOMATED COUNT: 14.3 % (ref 11.3–14.5)
FERRITIN SERPL-MCNC: 156 NG/ML (ref 10–291)
GFR SERPL CREATININE-BSD FRML MDRD: 48 ML/MIN/1.73
GLOBULIN UR ELPH-MCNC: 2.8 GM/DL
GLUCOSE BLD-MCNC: 84 MG/DL (ref 70–100)
HCT VFR BLD AUTO: 35.6 % (ref 34.5–44)
HDLC SERPL-MCNC: 53 MG/DL (ref 40–60)
HGB BLD-MCNC: 11.4 G/DL (ref 11.5–15.5)
IMM GRANULOCYTES # BLD: 0.01 10*3/MM3 (ref 0–0.03)
IMM GRANULOCYTES NFR BLD: 0.2 % (ref 0–0.6)
IRON 24H UR-MRATE: 83 MCG/DL (ref 50–175)
IRON SATN MFR SERPL: 35 % (ref 15–50)
LYMPHOCYTES # BLD AUTO: 1.38 10*3/MM3 (ref 0.6–4.8)
LYMPHOCYTES NFR BLD AUTO: 24.3 % (ref 24–44)
MCH RBC QN AUTO: 29.5 PG (ref 27–31)
MCHC RBC AUTO-ENTMCNC: 32 G/DL (ref 32–36)
MCV RBC AUTO: 92.2 FL (ref 80–99)
MONOCYTES # BLD AUTO: 0.66 10*3/MM3 (ref 0–1)
MONOCYTES NFR BLD AUTO: 11.6 % (ref 0–12)
NEUTROPHILS # BLD AUTO: 3.44 10*3/MM3 (ref 1.5–8.3)
NEUTROPHILS NFR BLD AUTO: 60.5 % (ref 41–71)
PLATELET # BLD AUTO: 287 10*3/MM3 (ref 150–450)
PMV BLD AUTO: 9.8 FL (ref 6–12)
POTASSIUM BLD-SCNC: 4.3 MMOL/L (ref 3.5–5.5)
PROT SERPL-MCNC: 7 G/DL (ref 5.7–8.2)
RBC # BLD AUTO: 3.86 10*6/MM3 (ref 3.89–5.14)
SODIUM BLD-SCNC: 133 MMOL/L (ref 132–146)
TIBC SERPL-MCNC: 240 MCG/DL (ref 250–450)
TRIGL SERPL-MCNC: 57 MG/DL (ref 0–150)
WBC NRBC COR # BLD: 5.68 10*3/MM3 (ref 3.5–10.8)

## 2018-09-13 PROCEDURE — 80053 COMPREHEN METABOLIC PANEL: CPT | Performed by: FAMILY MEDICINE

## 2018-09-13 PROCEDURE — 85025 COMPLETE CBC W/AUTO DIFF WBC: CPT | Performed by: FAMILY MEDICINE

## 2018-09-13 PROCEDURE — 83540 ASSAY OF IRON: CPT | Performed by: FAMILY MEDICINE

## 2018-09-13 PROCEDURE — 80061 LIPID PANEL: CPT | Performed by: FAMILY MEDICINE

## 2018-09-13 PROCEDURE — 99213 OFFICE O/P EST LOW 20 MIN: CPT | Performed by: FAMILY MEDICINE

## 2018-09-13 PROCEDURE — 80177 DRUG SCRN QUAN LEVETIRACETAM: CPT | Performed by: FAMILY MEDICINE

## 2018-09-13 PROCEDURE — 83550 IRON BINDING TEST: CPT | Performed by: FAMILY MEDICINE

## 2018-09-13 PROCEDURE — 36415 COLL VENOUS BLD VENIPUNCTURE: CPT

## 2018-09-13 PROCEDURE — 82728 ASSAY OF FERRITIN: CPT | Performed by: FAMILY MEDICINE

## 2018-09-17 LAB — LEVETIRACETAM SERPL-MCNC: 6.9 UG/ML (ref 10–40)

## 2018-10-17 RX ORDER — CITALOPRAM 20 MG/1
TABLET ORAL
Qty: 90 TABLET | Refills: 1 | Status: SHIPPED | OUTPATIENT
Start: 2018-10-17 | End: 2019-05-07

## 2018-11-02 RX ORDER — RANITIDINE 150 MG/1
CAPSULE ORAL
Qty: 90 CAPSULE | Refills: 1 | Status: SHIPPED | OUTPATIENT
Start: 2018-11-02 | End: 2019-05-07

## 2018-11-21 RX ORDER — LEVETIRACETAM 250 MG/1
TABLET ORAL
Qty: 180 TABLET | Refills: 1 | Status: SHIPPED | OUTPATIENT
Start: 2018-11-21 | End: 2019-05-07

## 2018-12-17 ENCOUNTER — OFFICE VISIT (OUTPATIENT)
Dept: FAMILY MEDICINE CLINIC | Facility: CLINIC | Age: 78
End: 2018-12-17

## 2018-12-17 VITALS
HEART RATE: 94 BPM | BODY MASS INDEX: 16.44 KG/M2 | DIASTOLIC BLOOD PRESSURE: 70 MMHG | WEIGHT: 105 LBS | SYSTOLIC BLOOD PRESSURE: 110 MMHG | OXYGEN SATURATION: 98 % | RESPIRATION RATE: 18 BRPM | TEMPERATURE: 97.4 F

## 2018-12-17 DIAGNOSIS — E78.2 MIXED HYPERLIPIDEMIA: ICD-10-CM

## 2018-12-17 DIAGNOSIS — I10 ESSENTIAL HYPERTENSION: ICD-10-CM

## 2018-12-17 DIAGNOSIS — F32.89 OTHER DEPRESSION: ICD-10-CM

## 2018-12-17 DIAGNOSIS — D53.9 NUTRITIONAL ANEMIA: Primary | ICD-10-CM

## 2018-12-17 LAB
ALBUMIN SERPL-MCNC: 4.28 G/DL (ref 3.2–4.8)
ALBUMIN/GLOB SERPL: 1.6 G/DL (ref 1.5–2.5)
ALP SERPL-CCNC: 92 U/L (ref 25–100)
ALT SERPL W P-5'-P-CCNC: 17 U/L (ref 7–40)
ANION GAP SERPL CALCULATED.3IONS-SCNC: 8 MMOL/L (ref 3–11)
ARTICHOKE IGE QN: 124 MG/DL (ref 0–130)
AST SERPL-CCNC: 26 U/L (ref 0–33)
BASOPHILS # BLD AUTO: 0.04 10*3/MM3 (ref 0–0.2)
BASOPHILS NFR BLD AUTO: 0.6 % (ref 0–1)
BILIRUB SERPL-MCNC: 0.6 MG/DL (ref 0.3–1.2)
BUN BLD-MCNC: 21 MG/DL (ref 9–23)
BUN/CREAT SERPL: 15.6 (ref 7–25)
CALCIUM SPEC-SCNC: 9.6 MG/DL (ref 8.7–10.4)
CHLORIDE SERPL-SCNC: 102 MMOL/L (ref 99–109)
CHOLEST SERPL-MCNC: 182 MG/DL (ref 0–200)
CO2 SERPL-SCNC: 28 MMOL/L (ref 20–31)
CREAT BLD-MCNC: 1.35 MG/DL (ref 0.6–1.3)
DEPRECATED RDW RBC AUTO: 46.1 FL (ref 37–54)
EOSINOPHIL # BLD AUTO: 0.1 10*3/MM3 (ref 0–0.3)
EOSINOPHIL NFR BLD AUTO: 1.6 % (ref 0–3)
ERYTHROCYTE [DISTWIDTH] IN BLOOD BY AUTOMATED COUNT: 13.9 % (ref 11.3–14.5)
GFR SERPL CREATININE-BSD FRML MDRD: 38 ML/MIN/1.73
GLOBULIN UR ELPH-MCNC: 2.6 GM/DL
GLUCOSE BLD-MCNC: 83 MG/DL (ref 70–100)
HCT VFR BLD AUTO: 36.7 % (ref 34.5–44)
HDLC SERPL-MCNC: 58 MG/DL (ref 40–60)
HGB BLD-MCNC: 11.9 G/DL (ref 11.5–15.5)
IMM GRANULOCYTES # BLD: 0 10*3/MM3 (ref 0–0.03)
IMM GRANULOCYTES NFR BLD: 0 % (ref 0–0.6)
LYMPHOCYTES # BLD AUTO: 1.82 10*3/MM3 (ref 0.6–4.8)
LYMPHOCYTES NFR BLD AUTO: 29.4 % (ref 24–44)
MCH RBC QN AUTO: 29.6 PG (ref 27–31)
MCHC RBC AUTO-ENTMCNC: 32.4 G/DL (ref 32–36)
MCV RBC AUTO: 91.3 FL (ref 80–99)
MONOCYTES # BLD AUTO: 0.66 10*3/MM3 (ref 0–1)
MONOCYTES NFR BLD AUTO: 10.7 % (ref 0–12)
NEUTROPHILS # BLD AUTO: 3.57 10*3/MM3 (ref 1.5–8.3)
NEUTROPHILS NFR BLD AUTO: 57.7 % (ref 41–71)
PLATELET # BLD AUTO: 315 10*3/MM3 (ref 150–450)
PMV BLD AUTO: 10.1 FL (ref 6–12)
POTASSIUM BLD-SCNC: 4.8 MMOL/L (ref 3.5–5.5)
PROT SERPL-MCNC: 6.9 G/DL (ref 5.7–8.2)
RBC # BLD AUTO: 4.02 10*6/MM3 (ref 3.89–5.14)
SODIUM BLD-SCNC: 138 MMOL/L (ref 132–146)
TRIGL SERPL-MCNC: 72 MG/DL (ref 0–150)
WBC NRBC COR # BLD: 6.19 10*3/MM3 (ref 3.5–10.8)

## 2018-12-17 PROCEDURE — 80053 COMPREHEN METABOLIC PANEL: CPT | Performed by: FAMILY MEDICINE

## 2018-12-17 PROCEDURE — 80061 LIPID PANEL: CPT | Performed by: FAMILY MEDICINE

## 2018-12-17 PROCEDURE — 80177 DRUG SCRN QUAN LEVETIRACETAM: CPT | Performed by: FAMILY MEDICINE

## 2018-12-17 PROCEDURE — 85025 COMPLETE CBC W/AUTO DIFF WBC: CPT | Performed by: FAMILY MEDICINE

## 2018-12-17 PROCEDURE — 99214 OFFICE O/P EST MOD 30 MIN: CPT | Performed by: FAMILY MEDICINE

## 2018-12-18 ENCOUNTER — APPOINTMENT (OUTPATIENT)
Dept: CARDIOLOGY | Facility: HOSPITAL | Age: 78
End: 2018-12-18
Attending: INTERNAL MEDICINE

## 2018-12-18 ENCOUNTER — APPOINTMENT (OUTPATIENT)
Dept: CT IMAGING | Facility: HOSPITAL | Age: 78
End: 2018-12-18

## 2018-12-18 ENCOUNTER — APPOINTMENT (OUTPATIENT)
Dept: GENERAL RADIOLOGY | Facility: HOSPITAL | Age: 78
End: 2018-12-18

## 2018-12-18 ENCOUNTER — HOSPITAL ENCOUNTER (OUTPATIENT)
Facility: HOSPITAL | Age: 78
Setting detail: OBSERVATION
Discharge: HOME HEALTH | End: 2018-12-19
Attending: EMERGENCY MEDICINE | Admitting: EMERGENCY MEDICINE

## 2018-12-18 DIAGNOSIS — H81.10 BENIGN PAROXYSMAL POSITIONAL VERTIGO, UNSPECIFIED LATERALITY: Primary | ICD-10-CM

## 2018-12-18 DIAGNOSIS — Z74.09 IMPAIRED MOBILITY AND ADLS: ICD-10-CM

## 2018-12-18 DIAGNOSIS — Z74.09 IMPAIRED FUNCTIONAL MOBILITY, BALANCE, GAIT, AND ENDURANCE: ICD-10-CM

## 2018-12-18 DIAGNOSIS — Z78.9 IMPAIRED MOBILITY AND ADLS: ICD-10-CM

## 2018-12-18 PROBLEM — R42 VERTIGO: Status: ACTIVE | Noted: 2018-12-18

## 2018-12-18 PROBLEM — H51.21 INTERNUCLEAR OPHTHALMOPLEGIA, RIGHT EYE: Status: ACTIVE | Noted: 2018-12-18

## 2018-12-18 LAB
ALBUMIN SERPL-MCNC: 4.33 G/DL (ref 3.2–4.8)
ALBUMIN/GLOB SERPL: 1.4 G/DL (ref 1.5–2.5)
ALP SERPL-CCNC: 101 U/L (ref 25–100)
ALT SERPL W P-5'-P-CCNC: 11 U/L (ref 7–40)
ANION GAP SERPL CALCULATED.3IONS-SCNC: 7 MMOL/L (ref 3–11)
AST SERPL-CCNC: 26 U/L (ref 0–33)
BACTERIA UR QL AUTO: ABNORMAL /HPF
BASOPHILS # BLD AUTO: 0.04 10*3/MM3 (ref 0–0.2)
BASOPHILS NFR BLD AUTO: 0.8 % (ref 0–1)
BH CV ECHO MEAS - AO ROOT AREA (BSA CORRECTED): 2.2
BH CV ECHO MEAS - AO ROOT AREA: 8.8 CM^2
BH CV ECHO MEAS - AO ROOT DIAM: 3.3 CM
BH CV ECHO MEAS - BSA(HAYCOCK): 1.5 M^2
BH CV ECHO MEAS - BSA(HAYCOCK): 1.5 M^2
BH CV ECHO MEAS - BSA: 1.5 M^2
BH CV ECHO MEAS - BSA: 1.5 M^2
BH CV ECHO MEAS - BZI_BMI: 17.6 KILOGRAMS/M^2
BH CV ECHO MEAS - BZI_BMI: 17.6 KILOGRAMS/M^2
BH CV ECHO MEAS - BZI_METRIC_HEIGHT: 165.1 CM
BH CV ECHO MEAS - BZI_METRIC_HEIGHT: 165.1 CM
BH CV ECHO MEAS - BZI_METRIC_WEIGHT: 48.1 KG
BH CV ECHO MEAS - BZI_METRIC_WEIGHT: 48.1 KG
BH CV ECHO MEAS - EDV(CUBED): 78.5 ML
BH CV ECHO MEAS - EDV(TEICH): 82.2 ML
BH CV ECHO MEAS - EF(CUBED): 76.4 %
BH CV ECHO MEAS - EF(TEICH): 68.7 %
BH CV ECHO MEAS - ESV(CUBED): 18.5 ML
BH CV ECHO MEAS - ESV(TEICH): 25.7 ML
BH CV ECHO MEAS - FS: 38.2 %
BH CV ECHO MEAS - IVS/LVPW: 1.3
BH CV ECHO MEAS - IVSD: 1.3 CM
BH CV ECHO MEAS - LA DIMENSION: 4.3 CM
BH CV ECHO MEAS - LA/AO: 1.3
BH CV ECHO MEAS - LAD MAJOR: 6.8 CM
BH CV ECHO MEAS - LAT PEAK E' VEL: 11.5 CM/SEC
BH CV ECHO MEAS - LATERAL E/E' RATIO: 7.5
BH CV ECHO MEAS - LV MASS(C)D: 182.1 GRAMS
BH CV ECHO MEAS - LV MASS(C)DI: 120.5 GRAMS/M^2
BH CV ECHO MEAS - LVIDD: 4.3 CM
BH CV ECHO MEAS - LVIDS: 2.6 CM
BH CV ECHO MEAS - LVPWD: 1 CM
BH CV ECHO MEAS - MED PEAK E' VEL: 10.9 CM/SEC
BH CV ECHO MEAS - MEDIAL E/E' RATIO: 7.9
BH CV ECHO MEAS - MV DEC SLOPE: 534.6 CM/SEC^2
BH CV ECHO MEAS - MV DEC TIME: 0.13 SEC
BH CV ECHO MEAS - MV E MAX VEL: 87.4 CM/SEC
BH CV ECHO MEAS - MV P1/2T MAX VEL: 99.9 CM/SEC
BH CV ECHO MEAS - MV P1/2T: 54.7 MSEC
BH CV ECHO MEAS - MVA P1/2T LCG: 2.2 CM^2
BH CV ECHO MEAS - MVA(P1/2T): 4 CM^2
BH CV ECHO MEAS - PA ACC SLOPE: 685.3 CM/SEC^2
BH CV ECHO MEAS - PA ACC TIME: 0.09 SEC
BH CV ECHO MEAS - PA PR(ACCEL): 39.8 MMHG
BH CV ECHO MEAS - PI END-D VEL: 104.5 CM/SEC
BH CV ECHO MEAS - RAP SYSTOLE: 3 MMHG
BH CV ECHO MEAS - RV MAX PG: 0.79 MMHG
BH CV ECHO MEAS - RV V1 MAX: 44.4 CM/SEC
BH CV ECHO MEAS - RVSP: 42 MMHG
BH CV ECHO MEAS - SI(CUBED): 39.7 ML/M^2
BH CV ECHO MEAS - SI(TEICH): 37.4 ML/M^2
BH CV ECHO MEAS - SV(CUBED): 59.9 ML
BH CV ECHO MEAS - SV(TEICH): 56.5 ML
BH CV ECHO MEAS - TAPSE (>1.6): 2.1 CM2
BH CV ECHO MEAS - TR MAX PG: 32 MMHG
BH CV ECHO MEAS - TR MAX VEL: 312 CM/SEC
BH CV ECHO MEASUREMENTS AVERAGE E/E' RATIO: 7.8
BH CV XLRA - RV BASE: 4.1 CM
BH CV XLRA - RV LENGTH: 5.8 CM
BH CV XLRA - RV MID: 2.5 CM
BH CV XLRA - TDI S': 9.87 CM/SEC
BH CV XLRA MEAS LEFT CCA RATIO VEL: 56.5 CM/SEC
BH CV XLRA MEAS LEFT DIST CCA EDV: 13.8 CM/SEC
BH CV XLRA MEAS LEFT DIST CCA PSV: 57 CM/SEC
BH CV XLRA MEAS LEFT DIST ICA EDV: 14 CM/SEC
BH CV XLRA MEAS LEFT DIST ICA PSV: 44.2 CM/SEC
BH CV XLRA MEAS LEFT ICA RATIO VEL: 64.3 CM/SEC
BH CV XLRA MEAS LEFT ICA/CCA RATIO: 1.1
BH CV XLRA MEAS LEFT MID CCA EDV: 15.2 CM/SEC
BH CV XLRA MEAS LEFT MID CCA PSV: 55.5 CM/SEC
BH CV XLRA MEAS LEFT MID ICA EDV: 21.1 CM/SEC
BH CV XLRA MEAS LEFT MID ICA PSV: 64.8 CM/SEC
BH CV XLRA MEAS LEFT PROX CCA EDV: 16.7 CM/SEC
BH CV XLRA MEAS LEFT PROX CCA PSV: 56 CM/SEC
BH CV XLRA MEAS LEFT PROX ECA PSV: 91.3 CM/SEC
BH CV XLRA MEAS LEFT PROX ICA EDV: 13.3 CM/SEC
BH CV XLRA MEAS LEFT PROX ICA PSV: 50.6 CM/SEC
BH CV XLRA MEAS LEFT PROX SCLA PSV: 50.8 CM/SEC
BH CV XLRA MEAS LEFT VERTEBRAL A EDV: 10.8 CM/SEC
BH CV XLRA MEAS LEFT VERTEBRAL A PSV: 58.4 CM/SEC
BH CV XLRA MEAS RIGHT CCA RATIO VEL: 49.3 CM/SEC
BH CV XLRA MEAS RIGHT DIST CCA EDV: 13.5 CM/SEC
BH CV XLRA MEAS RIGHT DIST CCA PSV: 49.8 CM/SEC
BH CV XLRA MEAS RIGHT DIST ICA EDV: 21.4 CM/SEC
BH CV XLRA MEAS RIGHT DIST ICA PSV: 59.1 CM/SEC
BH CV XLRA MEAS RIGHT ICA RATIO VEL: 53.7 CM/SEC
BH CV XLRA MEAS RIGHT ICA/CCA RATIO: 1.1
BH CV XLRA MEAS RIGHT MID CCA EDV: 15.3 CM/SEC
BH CV XLRA MEAS RIGHT MID CCA PSV: 55.9 CM/SEC
BH CV XLRA MEAS RIGHT MID ICA EDV: 18.3 CM/SEC
BH CV XLRA MEAS RIGHT MID ICA PSV: 54.1 CM/SEC
BH CV XLRA MEAS RIGHT PROX CCA EDV: 10 CM/SEC
BH CV XLRA MEAS RIGHT PROX CCA PSV: 42.8 CM/SEC
BH CV XLRA MEAS RIGHT PROX ECA PSV: 77.9 CM/SEC
BH CV XLRA MEAS RIGHT PROX ICA EDV: 13.5 CM/SEC
BH CV XLRA MEAS RIGHT PROX ICA PSV: 43.7 CM/SEC
BH CV XLRA MEAS RIGHT PROX SCLA PSV: 57.3 CM/SEC
BH CV XLRA MEAS RIGHT VERTEBRAL A EDV: 12.3 CM/SEC
BH CV XLRA MEAS RIGHT VERTEBRAL A PSV: 40.3 CM/SEC
BILIRUB SERPL-MCNC: 0.6 MG/DL (ref 0.3–1.2)
BILIRUB UR QL STRIP: NEGATIVE
BUN BLD-MCNC: 20 MG/DL (ref 9–23)
BUN/CREAT SERPL: 15.9 (ref 7–25)
CALCIUM SPEC-SCNC: 9.8 MG/DL (ref 8.7–10.4)
CHLORIDE SERPL-SCNC: 103 MMOL/L (ref 99–109)
CLARITY UR: ABNORMAL
CO2 SERPL-SCNC: 29 MMOL/L (ref 20–31)
COLOR UR: YELLOW
CREAT BLD-MCNC: 1.26 MG/DL (ref 0.6–1.3)
DEPRECATED RDW RBC AUTO: 45.2 FL (ref 37–54)
EOSINOPHIL # BLD AUTO: 0.13 10*3/MM3 (ref 0–0.3)
EOSINOPHIL NFR BLD AUTO: 2.5 % (ref 0–3)
ERYTHROCYTE [DISTWIDTH] IN BLOOD BY AUTOMATED COUNT: 13.7 % (ref 11.3–14.5)
GFR SERPL CREATININE-BSD FRML MDRD: 41 ML/MIN/1.73
GLOBULIN UR ELPH-MCNC: 3.1 GM/DL
GLUCOSE BLD-MCNC: 83 MG/DL (ref 70–100)
GLUCOSE UR STRIP-MCNC: NEGATIVE MG/DL
HCT VFR BLD AUTO: 37 % (ref 34.5–44)
HGB BLD-MCNC: 12 G/DL (ref 11.5–15.5)
HGB UR QL STRIP.AUTO: NEGATIVE
HOLD SPECIMEN: NORMAL
HOLD SPECIMEN: NORMAL
HYALINE CASTS UR QL AUTO: ABNORMAL /LPF
IMM GRANULOCYTES # BLD: 0 10*3/MM3 (ref 0–0.03)
IMM GRANULOCYTES NFR BLD: 0 % (ref 0–0.6)
KETONES UR QL STRIP: NEGATIVE
LEFT ATRIUM VOLUME INDEX: 57 ML/M2
LEUKOCYTE ESTERASE UR QL STRIP.AUTO: NEGATIVE
LV EF 2D ECHO EST: 60 %
LYMPHOCYTES # BLD AUTO: 1.38 10*3/MM3 (ref 0.6–4.8)
LYMPHOCYTES NFR BLD AUTO: 26.7 % (ref 24–44)
MAGNESIUM SERPL-MCNC: 2.2 MG/DL (ref 1.3–2.7)
MAXIMAL PREDICTED HEART RATE: 143 BPM
MCH RBC QN AUTO: 29.3 PG (ref 27–31)
MCHC RBC AUTO-ENTMCNC: 32.4 G/DL (ref 32–36)
MCV RBC AUTO: 90.5 FL (ref 80–99)
MONOCYTES # BLD AUTO: 0.47 10*3/MM3 (ref 0–1)
MONOCYTES NFR BLD AUTO: 9.1 % (ref 0–12)
NEUTROPHILS # BLD AUTO: 3.15 10*3/MM3 (ref 1.5–8.3)
NEUTROPHILS NFR BLD AUTO: 60.9 % (ref 41–71)
NITRITE UR QL STRIP: NEGATIVE
PH UR STRIP.AUTO: 8.5 [PH] (ref 5–8)
PLATELET # BLD AUTO: 286 10*3/MM3 (ref 150–450)
PMV BLD AUTO: 9.4 FL (ref 6–12)
POTASSIUM BLD-SCNC: 4.5 MMOL/L (ref 3.5–5.5)
PROT SERPL-MCNC: 7.4 G/DL (ref 5.7–8.2)
PROT UR QL STRIP: NEGATIVE
RBC # BLD AUTO: 4.09 10*6/MM3 (ref 3.89–5.14)
RBC # UR: ABNORMAL /HPF
REF LAB TEST METHOD: ABNORMAL
SODIUM BLD-SCNC: 139 MMOL/L (ref 132–146)
SP GR UR STRIP: 1.02 (ref 1–1.03)
SQUAMOUS #/AREA URNS HPF: ABNORMAL /HPF
STRESS TARGET HR: 122 BPM
TROPONIN I SERPL-MCNC: 0 NG/ML (ref 0–0.07)
TSH SERPL DL<=0.05 MIU/L-ACNC: 1.06 MIU/ML (ref 0.35–5.35)
UROBILINOGEN UR QL STRIP: ABNORMAL
WBC NRBC COR # BLD: 5.17 10*3/MM3 (ref 3.5–10.8)
WBC UR QL AUTO: ABNORMAL /HPF
WHOLE BLOOD HOLD SPECIMEN: NORMAL
WHOLE BLOOD HOLD SPECIMEN: NORMAL

## 2018-12-18 PROCEDURE — 99220 PR INITIAL OBSERVATION CARE/DAY 70 MINUTES: CPT | Performed by: INTERNAL MEDICINE

## 2018-12-18 PROCEDURE — 93306 TTE W/DOPPLER COMPLETE: CPT | Performed by: INTERNAL MEDICINE

## 2018-12-18 PROCEDURE — 71045 X-RAY EXAM CHEST 1 VIEW: CPT

## 2018-12-18 PROCEDURE — 93306 TTE W/DOPPLER COMPLETE: CPT

## 2018-12-18 PROCEDURE — 83735 ASSAY OF MAGNESIUM: CPT | Performed by: EMERGENCY MEDICINE

## 2018-12-18 PROCEDURE — 96360 HYDRATION IV INFUSION INIT: CPT

## 2018-12-18 PROCEDURE — 93880 EXTRACRANIAL BILAT STUDY: CPT | Performed by: INTERNAL MEDICINE

## 2018-12-18 PROCEDURE — 93880 EXTRACRANIAL BILAT STUDY: CPT

## 2018-12-18 PROCEDURE — G0378 HOSPITAL OBSERVATION PER HR: HCPCS

## 2018-12-18 PROCEDURE — 85025 COMPLETE CBC W/AUTO DIFF WBC: CPT | Performed by: EMERGENCY MEDICINE

## 2018-12-18 PROCEDURE — 80053 COMPREHEN METABOLIC PANEL: CPT | Performed by: EMERGENCY MEDICINE

## 2018-12-18 PROCEDURE — 84484 ASSAY OF TROPONIN QUANT: CPT

## 2018-12-18 PROCEDURE — 84443 ASSAY THYROID STIM HORMONE: CPT | Performed by: INTERNAL MEDICINE

## 2018-12-18 PROCEDURE — 70450 CT HEAD/BRAIN W/O DYE: CPT

## 2018-12-18 PROCEDURE — 99285 EMERGENCY DEPT VISIT HI MDM: CPT

## 2018-12-18 PROCEDURE — 81001 URINALYSIS AUTO W/SCOPE: CPT | Performed by: EMERGENCY MEDICINE

## 2018-12-18 PROCEDURE — 93005 ELECTROCARDIOGRAM TRACING: CPT | Performed by: EMERGENCY MEDICINE

## 2018-12-18 RX ORDER — DILTIAZEM HYDROCHLORIDE 120 MG/1
120 CAPSULE, COATED, EXTENDED RELEASE ORAL NIGHTLY
Status: DISCONTINUED | OUTPATIENT
Start: 2018-12-18 | End: 2018-12-19 | Stop reason: HOSPADM

## 2018-12-18 RX ORDER — SODIUM CHLORIDE 0.9 % (FLUSH) 0.9 %
3-10 SYRINGE (ML) INJECTION AS NEEDED
Status: DISCONTINUED | OUTPATIENT
Start: 2018-12-18 | End: 2018-12-19 | Stop reason: HOSPADM

## 2018-12-18 RX ORDER — SODIUM CHLORIDE 9 MG/ML
125 INJECTION, SOLUTION INTRAVENOUS CONTINUOUS
Status: DISCONTINUED | OUTPATIENT
Start: 2018-12-18 | End: 2018-12-19 | Stop reason: HOSPADM

## 2018-12-18 RX ORDER — MECLIZINE HYDROCHLORIDE 25 MG/1
25 TABLET ORAL 3 TIMES DAILY PRN
Status: DISCONTINUED | OUTPATIENT
Start: 2018-12-18 | End: 2018-12-18

## 2018-12-18 RX ORDER — MIRTAZAPINE 15 MG/1
7.5 TABLET, FILM COATED ORAL NIGHTLY
Status: DISCONTINUED | OUTPATIENT
Start: 2018-12-18 | End: 2018-12-19 | Stop reason: HOSPADM

## 2018-12-18 RX ORDER — ATORVASTATIN CALCIUM 40 MG/1
80 TABLET, FILM COATED ORAL NIGHTLY
Status: DISCONTINUED | OUTPATIENT
Start: 2018-12-18 | End: 2018-12-19 | Stop reason: HOSPADM

## 2018-12-18 RX ORDER — FLECAINIDE ACETATE 50 MG/1
50 TABLET ORAL EVERY 12 HOURS SCHEDULED
Status: DISCONTINUED | OUTPATIENT
Start: 2018-12-18 | End: 2018-12-19 | Stop reason: HOSPADM

## 2018-12-18 RX ORDER — FERROUS SULFATE 325(65) MG
325 TABLET ORAL
Status: DISCONTINUED | OUTPATIENT
Start: 2018-12-18 | End: 2018-12-19 | Stop reason: HOSPADM

## 2018-12-18 RX ORDER — CITALOPRAM 20 MG/1
20 TABLET ORAL EVERY MORNING
Status: DISCONTINUED | OUTPATIENT
Start: 2018-12-19 | End: 2018-12-19 | Stop reason: HOSPADM

## 2018-12-18 RX ORDER — SODIUM CHLORIDE 0.9 % (FLUSH) 0.9 %
10 SYRINGE (ML) INJECTION AS NEEDED
Status: DISCONTINUED | OUTPATIENT
Start: 2018-12-18 | End: 2018-12-19 | Stop reason: HOSPADM

## 2018-12-18 RX ORDER — FAMOTIDINE 20 MG/1
20 TABLET, FILM COATED ORAL NIGHTLY
Status: DISCONTINUED | OUTPATIENT
Start: 2018-12-18 | End: 2018-12-19 | Stop reason: HOSPADM

## 2018-12-18 RX ORDER — ACETAMINOPHEN 325 MG/1
650 TABLET ORAL EVERY 4 HOURS PRN
Status: DISCONTINUED | OUTPATIENT
Start: 2018-12-18 | End: 2018-12-19 | Stop reason: HOSPADM

## 2018-12-18 RX ORDER — LEVETIRACETAM 250 MG/1
250 TABLET ORAL EVERY 12 HOURS
Status: DISCONTINUED | OUTPATIENT
Start: 2018-12-18 | End: 2018-12-19 | Stop reason: HOSPADM

## 2018-12-18 RX ORDER — SODIUM CHLORIDE 0.9 % (FLUSH) 0.9 %
3 SYRINGE (ML) INJECTION EVERY 12 HOURS SCHEDULED
Status: DISCONTINUED | OUTPATIENT
Start: 2018-12-18 | End: 2018-12-19 | Stop reason: HOSPADM

## 2018-12-18 RX ORDER — MECLIZINE HYDROCHLORIDE 25 MG/1
25 TABLET ORAL ONCE
Status: COMPLETED | OUTPATIENT
Start: 2018-12-18 | End: 2018-12-18

## 2018-12-18 RX ORDER — ASPIRIN 300 MG/1
300 SUPPOSITORY RECTAL DAILY
Status: DISCONTINUED | OUTPATIENT
Start: 2018-12-18 | End: 2018-12-19 | Stop reason: HOSPADM

## 2018-12-18 RX ORDER — ASPIRIN 81 MG/1
81 TABLET, CHEWABLE ORAL DAILY
Status: DISCONTINUED | OUTPATIENT
Start: 2018-12-18 | End: 2018-12-19 | Stop reason: HOSPADM

## 2018-12-18 RX ORDER — MEGESTROL ACETATE 40 MG/ML
200 SUSPENSION ORAL EVERY MORNING
Status: DISCONTINUED | OUTPATIENT
Start: 2018-12-19 | End: 2018-12-19 | Stop reason: HOSPADM

## 2018-12-18 RX ORDER — DIAZEPAM 5 MG/1
5 TABLET ORAL EVERY 6 HOURS PRN
Status: DISCONTINUED | OUTPATIENT
Start: 2018-12-18 | End: 2018-12-18

## 2018-12-18 RX ADMIN — Medication 325 MG: at 14:22

## 2018-12-18 RX ADMIN — FLECAINIDE ACETATE 50 MG: 50 TABLET ORAL at 20:38

## 2018-12-18 RX ADMIN — SODIUM CHLORIDE, PRESERVATIVE FREE 3 ML: 5 INJECTION INTRAVENOUS at 14:20

## 2018-12-18 RX ADMIN — SODIUM CHLORIDE 125 ML/HR: 9 INJECTION, SOLUTION INTRAVENOUS at 14:20

## 2018-12-18 RX ADMIN — APIXABAN 5 MG: 5 TABLET, FILM COATED ORAL at 16:54

## 2018-12-18 RX ADMIN — MECLIZINE HYDROCHLORIDE 25 MG: 25 TABLET ORAL at 09:42

## 2018-12-18 RX ADMIN — SODIUM CHLORIDE, PRESERVATIVE FREE 3 ML: 5 INJECTION INTRAVENOUS at 20:41

## 2018-12-18 RX ADMIN — SODIUM CHLORIDE 125 ML/HR: 9 INJECTION, SOLUTION INTRAVENOUS at 11:20

## 2018-12-18 RX ADMIN — DESMOPRESSIN ACETATE 80 MG: 0.2 TABLET ORAL at 20:38

## 2018-12-18 RX ADMIN — SODIUM CHLORIDE 125 ML/HR: 9 INJECTION, SOLUTION INTRAVENOUS at 23:15

## 2018-12-18 RX ADMIN — DILTIAZEM HYDROCHLORIDE 120 MG: 120 CAPSULE, EXTENDED RELEASE ORAL at 20:38

## 2018-12-18 RX ADMIN — Medication 400 MG: at 14:19

## 2018-12-18 RX ADMIN — ASPIRIN 81 MG 81 MG: 81 TABLET ORAL at 16:54

## 2018-12-18 RX ADMIN — SODIUM CHLORIDE, PRESERVATIVE FREE 3 ML: 5 INJECTION INTRAVENOUS at 20:42

## 2018-12-18 RX ADMIN — DIAZEPAM 5 MG: 5 TABLET ORAL at 11:19

## 2018-12-18 RX ADMIN — MIRTAZAPINE 7.5 MG: 15 TABLET, FILM COATED ORAL at 20:38

## 2018-12-18 RX ADMIN — FAMOTIDINE 20 MG: 20 TABLET ORAL at 20:38

## 2018-12-18 RX ADMIN — FLECAINIDE ACETATE 50 MG: 50 TABLET ORAL at 14:19

## 2018-12-18 RX ADMIN — LEVETIRACETAM 250 MG: 250 TABLET, FILM COATED ORAL at 16:54

## 2018-12-18 NOTE — ED PROVIDER NOTES
Subjective   Patient is a 78-year-old female who presents with increased weakness and dizziness accompanied by double vision that began last night prior to bed.  Patient states that the dizziness and double vision is only when she stands and walks around, but feels weak continuously. Has h/o anemia, atrial flutter, CVA, and vavlular heart disease. Denies h/a, numbness or tingling or recent illness or fall.         History provided by:  Patient and relative  Dizziness   Quality:  Lightheadedness and room spinning  Severity:  Moderate  Onset quality:  Sudden  Duration:  12 hours  Timing:  Intermittent  Progression:  Waxing and waning  Chronicity:  Recurrent  Context: standing up    Relieved by: sitting down and being still.  Worsened by:  Standing up  Associated symptoms: vision changes (double vision) and weakness (generalized)    Associated symptoms: no chest pain, no diarrhea, no headaches, no hearing loss, no nausea, no palpitations, no shortness of breath, no syncope and no tinnitus    Risk factors: anemia, heart disease, hx of stroke and hx of vertigo        Review of Systems   Constitutional: Negative for chills and fever.   HENT: Negative for hearing loss and tinnitus.    Eyes: Positive for visual disturbance.   Respiratory: Negative for cough, chest tightness and shortness of breath.    Cardiovascular: Negative for chest pain, palpitations and syncope.   Gastrointestinal: Negative for constipation, diarrhea and nausea.   Genitourinary: Negative for dysuria, flank pain and frequency.   Neurological: Positive for dizziness, weakness (generalized) and light-headedness. Negative for speech difficulty, numbness and headaches.   All other systems reviewed and are negative.      Past Medical History:   Diagnosis Date   • Atrial fibrillation (CMS/HCC) 2005    Chronic anticoagulation and rate control   • Basal cell carcinoma 2007    Left leg and nose   • Cardiomyopathy (CMS/HCC) 2006   • Cerebrovascular accident  (CMS/[facility]) 01/2005    CT right parietal CVA. Carotid duplex -50% to 79% left ICS 15% right ICS stenosis. MRI of the neck showed no significant carotid bifurcations of these. Negative CT of the head, 09/10/2012. Carotid duplex, 02/24/2014:  Shelf-like plaque in the CECE without significant elevation and velocities.  Patent vertebral arteries.   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    With dilated cardiomyopathy   • Decubitus ulcer of buttock 2014    Transient during fracture rehabilitation   • Depression 2002    Good response to citalopram and mirtazapine   • DVT (deep venous thrombosis) (CMS/[facility]) 2014    Superficial - right lesser saphenus vein - after hip fracture    • Fracture of bone of forefoot 1972    Right foot   • Fracture of ilium, left (CMS/[facility]) 05/2018    Accidental fall - Uncomplicated recovery   • GERD (gastroesophageal reflux disease) 2014    Chronic superficial gastritis   • HTN (hypertension) 2005   • Iron deficiency anemia due to chronic blood loss 2018    Predisposed by chronic anticoagulation and GERD   • Low body weight due to inadequate caloric intake Adulthood   • Mitral valve prolapse 1997    Mild MR   • Osteoarthritis    • Osteoporosis    • Patent foramen ovale 2005   • Pneumonia 1947   • SCC (squamous cell carcinoma), arm, right 2017   • Scoliosis    • Syncope 2014     undermined cause - [facility]   • Varicose veins 2005    Symptomatic       Allergies   Allergen Reactions   • Actonel [Risedronate Sodium] GI Intolerance   • Hctz [Hydrochlorothiazide]      hyponatremia   • Influenza Vaccines Myalgia   • Lisinopril      hyperkalemia       Past Surgical History:   Procedure Laterality Date   • ACHILLES TENDON SURGERY Left 1997    Repair of rupture left tendon with screws   • BREAST CYST EXCISION Left    • BREAST SURGERY Left 1982    Excision benign cyst   • CARDIAC CATHETERIZATION  2006    Severe acute coronary spasm   • CATARACT EXTRACTION WITH  INTRAOCULAR LENS IMPLANT Bilateral 2015   • EYE CAPSULOTOMY WITH LASER Left 2016    Marked visual improvement   • FEMUR FRACTURE SURGERY Right 2015    Repair of shaft fracture   • HIP FRACTURE SURGERY Left 2014    left hip fracture with pin   • LUMBAR DISC SURGERY     • OVARIAN CYST REMOVAL Left    • SKIN CANCER EXCISION Right 2017    SCC - arm       Family History   Problem Relation Age of Onset   • Breast cancer Mother          age 59   • Diabetes Mother    • Hypertension Mother    • Heart disease Father          age 80   • Other Sister         Arthrodesis cervical   • Basal cell carcinoma Sister    • Bone cancer Sister    • Breast cancer Sister 57   • Diabetes Sister          age 56   • Hypertension Sister    • Melanoma Sister    • Arrhythmia Sister         Sinus   • Stroke Sister    • Allergies Son         Hay fever   • Diabetes Maternal Uncle    • Breast cancer Sister 59   • Breast cancer Sister 66   • Ovarian cancer Sister         DX AGE UNKNOWN       Social History     Socioeconomic History   • Marital status:      Spouse name: Not on file   • Number of children: Not on file   • Years of education: Not on file   • Highest education level: Not on file   Tobacco Use   • Smoking status: Former Smoker     Packs/day: 1.00     Years: 20.00     Pack years: 20.00     Types: Cigarettes   • Smokeless tobacco: Never Used   • Tobacco comment: Remote history   Substance and Sexual Activity   • Alcohol use: No   • Drug use: No   Social History Narrative    Domestic life   lives in private home alone - good support from local children        Muslim    [employer]        Sleep hygiene  in bed 9 PM to 6 AM for 9 hours of sleep        Caffeine use   1 1/2 cups coffee daily        Exercise habits  walks most days of the week. - Stretching each morning.          Diet   well-balanced diet with protein supplementations        Occupation    retired         Hearing  no impairment         Vision    no glasses needed after cataract surgery.          Driving   daytime only - good weather - local traffic           Objective   Physical Exam   Constitutional: She is oriented to person, place, and time. She appears well-developed. She appears cachectic. She is cooperative.   HENT:   Head: Normocephalic and atraumatic.   Right Ear: External ear normal.   Left Ear: External ear normal.   Nose: Nose normal.   Mouth/Throat: Uvula is midline, oropharynx is clear and moist and mucous membranes are normal.   Multiple small areas of TM erosion Bilateral TMs   Eyes: Conjunctivae are normal. Pupils are equal, round, and reactive to light.   Right eye lateral amblyopia    Neck: Normal range of motion. Neck supple.   Cardiovascular: Normal rate and regular rhythm.   Pulmonary/Chest: Effort normal and breath sounds normal.   Abdominal: Soft. Bowel sounds are normal. She exhibits no distension and no mass. There is no tenderness.   Neurological: She is alert and oriented to person, place, and time.   Ivna-Hallpike test -neg   Skin: Skin is warm and dry. Capillary refill takes less than 2 seconds.   Psychiatric: She has a normal mood and affect. Her behavior is normal.       Procedures           ED Course       Orthostatics within normal limits.  Reexamination: After medications patient still symptomatic and unable to move her head without extreme nausea and dizziness.    Discussed case with Dr. AMOR who will admit to tele.          [facility] [place]      Final diagnoses:   Benign paroxysmal positional vertigo, unspecified laterality            Daria Hartley APRN  12/18/18 [id number]

## 2018-12-18 NOTE — SIGNIFICANT NOTE
12/18/18 1526   SLP Deferred Reason   SLP Deferred Reason Patient unavailable for evaluation  (Received consult for communication eval per stroke protocol. Chart reviewed. Spoke w/ RN. Pt currently unavailable, getting carotid eval and MRI. SLP will f/u tomorrow.)

## 2018-12-18 NOTE — H&P
[facility] Medicine Services  HISTORY AND PHYSICAL    Patient Name: Mirian Sy  : 1940  MRN: [id number]  Primary Care Physician: Oren Engel DO  Date of admission: 2018      Subjective   Subjective     Chief Complaint: Dizziness    HPI:  Mirian Sy is a 78 y.o. female with A-fib, history of CVA, remote history of vertigo presenting to the ED today due to dizziness and weakness.  She reports feeling weak and dizzy since Friday but symptoms were much worse this morning when she awoke with double vision.  Dizziness is worse with upright position.  She denies fever, chills, shortness of breath, chest pain, dysuria.  She was admitted for further evaluation due to weakness.    Review of Systems   Gen- No fevers, chills  CV- No chest pain, palpitations  Resp- No cough, dyspnea  GI- No N/V/D, abd pain    Otherwise 10-system ROS reviewed and is negative except as mentioned in the HPI.    Personal History     Past Medical History:   Diagnosis Date   • Atrial fibrillation (CMS/HCC)     Chronic anticoagulation and rate control   • Basal cell carcinoma     Left leg and nose   • Cardiomyopathy (CMS/HCC)    • Cerebrovascular accident (CMS/HCC) 2005    CT right parietal CVA. Carotid duplex -50% to 79% left ICS 15% right ICS stenosis. MRI of the neck showed no significant carotid bifurcations of these. Negative CT of the head, 09/10/2012. Carotid duplex, 2014:  Shelf-like plaque in the CECE without significant elevation and velocities.  Patent vertebral arteries.   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   • Decubitus ulcer of buttock 2014    Transient during fracture rehabilitation   • Depression 2002    Good response to citalopram and mirtazapine   • DVT (deep venous thrombosis) (CMS/HCC)     Superficial - right lesser saphenus vein - after hip fracture    • Fracture  of bone of forefoot 1972    Right foot   • Fracture of ilium, left (CMS/HCC) 05/2018    Accidental fall - Uncomplicated recovery   • GERD (gastroesophageal reflux disease) 2014    Chronic superficial gastritis   • HTN (hypertension) 2005   • Iron deficiency anemia due to chronic blood loss 2018    Predisposed by chronic anticoagulation and GERD   • Low body weight due to inadequate caloric intake Adulthood   • Mitral valve prolapse 1997    Mild MR   • Osteoarthritis    • Osteoporosis    • Patent foramen ovale 2005   • Pneumonia 1947   • SCC (squamous cell carcinoma), arm, right 2017   • Scoliosis    • Syncope 2014     undermined cause - [facility]   • Varicose veins 2005    Symptomatic       Past Surgical History:   Procedure Laterality Date   • ACHILLES TENDON SURGERY Left 1997    Repair of rupture left tendon with screws   • BREAST CYST EXCISION Left    • BREAST SURGERY Left 1982    Excision benign cyst   • CARDIAC CATHETERIZATION  2006    Severe acute coronary spasm   • CATARACT EXTRACTION WITH INTRAOCULAR LENS IMPLANT Bilateral 2015   • EYE CAPSULOTOMY WITH LASER Left 2016    Marked visual improvement   • FEMUR FRACTURE SURGERY Right 2015    Repair of shaft fracture   • HIP FRACTURE SURGERY Left 2014    left hip fracture with pin   • LUMBAR DISC SURGERY  1983   • OVARIAN CYST REMOVAL Left 1996   • SKIN CANCER EXCISION Right 2017    SCC - arm       Family History: family history includes Allergies in her son; Arrhythmia in her sister; Basal cell carcinoma in her sister; Bone cancer in her sister; Breast cancer in her mother; Breast cancer (age of onset: 57) in her sister; Breast cancer (age of onset: 59) in her sister; Breast cancer (age of onset: 66) in her sister; Diabetes in her maternal uncle, mother, and sister; Heart disease in her father; Hypertension in her mother and sister; Melanoma in her sister; Other in her sister; Ovarian cancer in her sister; Stroke in her sister. Otherwise pertinent FHx was reviewed and  unremarkable.     Social History:  reports that she has quit smoking. Her smoking use included cigarettes. She has a 20.00 pack-year smoking history. she has never used smokeless tobacco. She reports that she does not drink alcohol or use drugs.  Social History     Social History Narrative    Domestic life   lives in private home alone - good support from local children        [employer]    [employer]        Sleep hygiene  in bed 9 PM to 6 AM for 9 hours of sleep        Caffeine use   1 1/2 cups coffee daily        Exercise habits  walks most days of the week. - Stretching each morning.          Diet   well-balanced diet with protein supplementations        Occupation    retired         Hearing  no impairment        Vision    no glasses needed after cataract surgery.          Driving   daytime only - good weather - local traffic       Medications:    Available home medication information reviewed.  Medications Prior to Admission   Medication Sig Dispense Refill Last Dose   • acetaminophen (TYLENOL) 325 MG tablet Take 2 tablets by mouth Every 4 (Four) Hours As Needed for Mild Pain .   Taking   • apixaban (ELIQUIS) 5 MG tablet tablet Take 5 mg by mouth 2 (Two) Times a Day.      • calcium-vitamin D (OSCAL-500) 500-200 MG-UNIT per tablet Take 1 tablet by mouth every morning.   Taking   • citalopram (CeleXA) 20 MG tablet TAKE 1 TABLET EVERY MORNING 90 tablet 1 Taking   • diltiazem XR (DILACOR XR) 120 MG 24 hr capsule Take 1 capsule by mouth Every Night. 90 capsule 3 Taking   • ferrous sulfate 325 (65 FE) MG tablet Take 1 tablet by mouth Daily With Breakfast. 90 tablet 3 Taking   • flecainide (TAMBOCOR) 50 MG tablet Take 1 tablet by mouth Every 12 (Twelve) Hours. 180 tablet 1 Taking   • levETIRAcetam (KEPPRA) 250 MG tablet TAKE 1 TABLET EVERY 12 HOURS 180 tablet 1 Taking   • magnesium oxide (MAGOX) 400 (241.3 MG) MG tablet tablet Take 400 mg by mouth Daily.   Taking   • megestrol acetate (MEGACE) 400 MG/10ML  suspension oral suspension Take 5 mL by mouth every morning. 450 mL 1 Taking   • mirtazapine (REMERON) 7.5 MG tablet TAKE 1 TABLET AT BEDTIME 90 tablet 1 Taking   • ranitidine (ZANTAC) 150 MG capsule TAKE 1 CAPSULE EVERY NIGHT 90 capsule 1 Taking       Allergies   Allergen Reactions   • Actonel [Risedronate Sodium] GI Intolerance   • Hctz [Hydrochlorothiazide]      hyponatremia   • Influenza Vaccines Myalgia   • Lisinopril      hyperkalemia       Objective   Objective     Vital Signs:   Temp:  [97 °F (36.1 °C)-98.3 °F (36.8 °C)] 97 °F (36.1 °C)  Heart Rate:  [] 108  Resp:  [16-18] 16  BP: (121-156)/() 146/90        Physical Exam   Constitutional: No acute distress, awake, alert, laying in bed, thin, elderly female  Eyes: PERRLA, sclerae anicteric, no conjunctival injection, inability to adduct right eye with horizontal nystagmus of left eye with abduction  HENT: NCAT, mucous membranes moist  Neck: Supple, trachea midline  Respiratory: Clear to auscultation bilaterally, nonlabored respirations   Cardiovascular: Irregularly irregular, no murmurs, rubs, or gallops  Gastrointestinal: Positive bowel sounds, soft, nontender, nondistended  Musculoskeletal: No bilateral ankle edema, no clubbing or cyanosis to extremities  Psychiatric: Appropriate affect, cooperative  Neurologic: Oriented x 3, strength symmetric in all extremities, internuclear ophthalmoplegia of right eye, speech clear  Skin: No rashes    Results Reviewed:  I have personally reviewed current lab, radiology, and data and agree.    Results from last 7 days   Lab Units  12/18/18   0754   WBC 10*3/mm3  5.17   HEMOGLOBIN g/dL  12.0   HEMATOCRIT %  37.0   PLATELETS 10*3/mm3  286     Results from last 7 days   Lab Units  12/18/18   0755   SODIUM mmol/L  139   POTASSIUM mmol/L  4.5   CHLORIDE mmol/L  103   CO2 mmol/L  29.0   BUN mg/dL  20   CREATININE mg/dL  1.26   GLUCOSE mg/dL  83   CALCIUM mg/dL  9.8   ALT (SGPT) U/L  11   AST (SGOT) U/L  26      Estimated Creatinine Clearance: 28.4 mL/min (by C-G formula based on SCr of 1.26 mg/dL).  Brief Urine Lab Results  (Last result in the past 365 days)      Color   Clarity   Blood   Leuk Est   Nitrite   Protein   CREAT   Urine HCG        12/18/18 0908 Yellow Cloudy Negative Negative Negative Negative             No results found for: BNP  Imaging Results (last 24 hours)     Procedure Component Value Units Date/Time    XR Chest 1 View [[id number]] Collected:  12/18/18 1209     Updated:  12/18/18 1209    Narrative:       EXAMINATION: XR CHEST 1 VW- 12/18/2018     INDICATION: Weak/Dizzy/AMS triage protocol      COMPARISON: 10/09/2016     FINDINGS: The heart is slightly large. The heart is compensated. There  are chronic obstructive and postinflammatory pulmonary changes. There is  no acute pulmonary process and there has been no change when compared  with 10/09/2016.           Impression:       Advanced chronic obstructive and postinflammatory changes.  The heart is slightly large. There are no acute findings and there has  been no change when compared with 10/09/2016.     D:  12/18/2018  E:  12/18/2018          CT Head Without Contrast [[id number]] Updated:  12/18/18 1111        Results for orders placed during the hospital encounter of 09/20/16   Adult Transthoracic Echo Complete    Narrative · Left ventricular function is normal. Estimated EF = 60%.  · Moderate-to-severe mitral valve regurgitation is present  · Moderate tricuspid valve regurgitation is present.          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] 12/18/2018   • GERD (gastroesophageal reflux disease) [K21.9] 05/08/2018   • Diplopia [H53.2] 05/16/2016   • Complex partial epilepsy (CMS/HCC) [G40.209] 05/16/2016   • Atrial fibrillation (CMS/HCC) [I48.91] 05/16/2016     Chadsvasc 5     • Depression [F32.9] 05/16/2016  "        Dizziness/diplopia/Internuclear ophthalmoplegia of right eye  -Concerning for CVA   -CT head pending but dictated report states \"normal for age\"  -Will obtain MRI brain  -Echo, carotid duplex  -SLP, PT/OT consult  -Add ASA, statin, continue Eliquis  -Neurology consult in am    Epilepsy  -Continue Keppra    Atrial fibrillation  -Continue diltiazem, flecainide, Eliquis  -If she remains in A-fib, may need to discuss with cardiology for recommendations regarding flecainide dosing (follows with Dr. Olguin)    Depression  -Continue citalopram    DVT prophylaxis:  Eliquis    CODE STATUS:    Code Status and Medical Interventions:   Ordered at: 12/18/18 1233     Level Of Support Discussed With:    Patient     Code Status:    CPR     Medical Interventions (Level of Support Prior to Arrest):    Full       Admission Status:  I believe this patient meets OBSERVATION status, however if further evaluation or treatment plans warrant, status may change.  Based upon current information, I predict patient's care encounter to be less than or equal to 2 midnights.    Electronically signed by Sandra Lay MD, 12/18/18, 1:23 PM.      "

## 2018-12-19 ENCOUNTER — APPOINTMENT (OUTPATIENT)
Dept: NEUROLOGY | Facility: HOSPITAL | Age: 78
End: 2018-12-19
Attending: PSYCHIATRY & NEUROLOGY

## 2018-12-19 ENCOUNTER — APPOINTMENT (OUTPATIENT)
Dept: MRI IMAGING | Facility: HOSPITAL | Age: 78
End: 2018-12-19

## 2018-12-19 VITALS
SYSTOLIC BLOOD PRESSURE: 158 MMHG | BODY MASS INDEX: 17.66 KG/M2 | HEART RATE: 97 BPM | OXYGEN SATURATION: 91 % | WEIGHT: 106 LBS | RESPIRATION RATE: 18 BRPM | HEIGHT: 65 IN | TEMPERATURE: 97.5 F | DIASTOLIC BLOOD PRESSURE: 92 MMHG

## 2018-12-19 LAB
ANION GAP SERPL CALCULATED.3IONS-SCNC: 3 MMOL/L (ref 3–11)
ARTICHOKE IGE QN: 115 MG/DL (ref 0–130)
BUN BLD-MCNC: 16 MG/DL (ref 9–23)
BUN/CREAT SERPL: 15.4 (ref 7–25)
CALCIUM SPEC-SCNC: 8.8 MG/DL (ref 8.7–10.4)
CHLORIDE SERPL-SCNC: 107 MMOL/L (ref 99–109)
CHOLEST SERPL-MCNC: 165 MG/DL (ref 0–200)
CO2 SERPL-SCNC: 28 MMOL/L (ref 20–31)
CREAT BLD-MCNC: 1.04 MG/DL (ref 0.6–1.3)
DEPRECATED RDW RBC AUTO: 45.5 FL (ref 37–54)
ERYTHROCYTE [DISTWIDTH] IN BLOOD BY AUTOMATED COUNT: 13.7 % (ref 11.3–14.5)
GFR SERPL CREATININE-BSD FRML MDRD: 51 ML/MIN/1.73
GLUCOSE BLD-MCNC: 86 MG/DL (ref 70–100)
GLUCOSE BLDC GLUCOMTR-MCNC: 87 MG/DL (ref 70–130)
GLUCOSE BLDC GLUCOMTR-MCNC: 95 MG/DL (ref 70–130)
GLUCOSE BLDC GLUCOMTR-MCNC: 95 MG/DL (ref 70–130)
HBA1C MFR BLD: 5.6 % (ref 4.8–5.6)
HCT VFR BLD AUTO: 37.2 % (ref 34.5–44)
HDLC SERPL-MCNC: 51 MG/DL (ref 40–60)
HGB BLD-MCNC: 12 G/DL (ref 11.5–15.5)
MCH RBC QN AUTO: 29.3 PG (ref 27–31)
MCHC RBC AUTO-ENTMCNC: 32.3 G/DL (ref 32–36)
MCV RBC AUTO: 90.7 FL (ref 80–99)
PLATELET # BLD AUTO: 267 10*3/MM3 (ref 150–450)
PMV BLD AUTO: 9.5 FL (ref 6–12)
POTASSIUM BLD-SCNC: 4.3 MMOL/L (ref 3.5–5.5)
RBC # BLD AUTO: 4.1 10*6/MM3 (ref 3.89–5.14)
SODIUM BLD-SCNC: 138 MMOL/L (ref 132–146)
TRIGL SERPL-MCNC: 44 MG/DL (ref 0–150)
WBC NRBC COR # BLD: 7.02 10*3/MM3 (ref 3.5–10.8)

## 2018-12-19 PROCEDURE — G8987 SELF CARE CURRENT STATUS: HCPCS

## 2018-12-19 PROCEDURE — 80061 LIPID PANEL: CPT | Performed by: INTERNAL MEDICINE

## 2018-12-19 PROCEDURE — 97162 PT EVAL MOD COMPLEX 30 MIN: CPT

## 2018-12-19 PROCEDURE — G8978 MOBILITY CURRENT STATUS: HCPCS

## 2018-12-19 PROCEDURE — 82962 GLUCOSE BLOOD TEST: CPT

## 2018-12-19 PROCEDURE — 96361 HYDRATE IV INFUSION ADD-ON: CPT

## 2018-12-19 PROCEDURE — G0378 HOSPITAL OBSERVATION PER HR: HCPCS

## 2018-12-19 PROCEDURE — 85027 COMPLETE CBC AUTOMATED: CPT | Performed by: INTERNAL MEDICINE

## 2018-12-19 PROCEDURE — 95816 EEG AWAKE AND DROWSY: CPT

## 2018-12-19 PROCEDURE — 70551 MRI BRAIN STEM W/O DYE: CPT

## 2018-12-19 PROCEDURE — 99217 PR OBSERVATION CARE DISCHARGE MANAGEMENT: CPT | Performed by: INTERNAL MEDICINE

## 2018-12-19 PROCEDURE — 99204 OFFICE O/P NEW MOD 45 MIN: CPT | Performed by: PSYCHIATRY & NEUROLOGY

## 2018-12-19 PROCEDURE — G8979 MOBILITY GOAL STATUS: HCPCS

## 2018-12-19 PROCEDURE — 80048 BASIC METABOLIC PNL TOTAL CA: CPT | Performed by: INTERNAL MEDICINE

## 2018-12-19 PROCEDURE — 97166 OT EVAL MOD COMPLEX 45 MIN: CPT

## 2018-12-19 PROCEDURE — 83036 HEMOGLOBIN GLYCOSYLATED A1C: CPT | Performed by: INTERNAL MEDICINE

## 2018-12-19 PROCEDURE — G8988 SELF CARE GOAL STATUS: HCPCS

## 2018-12-19 RX ADMIN — LEVETIRACETAM 250 MG: 250 TABLET, FILM COATED ORAL at 02:11

## 2018-12-19 RX ADMIN — FLECAINIDE ACETATE 50 MG: 50 TABLET ORAL at 10:04

## 2018-12-19 RX ADMIN — Medication 325 MG: at 10:05

## 2018-12-19 RX ADMIN — SODIUM CHLORIDE, PRESERVATIVE FREE 3 ML: 5 INJECTION INTRAVENOUS at 10:03

## 2018-12-19 RX ADMIN — LEVETIRACETAM 250 MG: 250 TABLET, FILM COATED ORAL at 10:04

## 2018-12-19 RX ADMIN — LEVETIRACETAM 250 MG: 250 TABLET, FILM COATED ORAL at 14:27

## 2018-12-19 RX ADMIN — APIXABAN 5 MG: 5 TABLET, FILM COATED ORAL at 10:04

## 2018-12-19 RX ADMIN — ASPIRIN 81 MG 81 MG: 81 TABLET ORAL at 10:04

## 2018-12-19 RX ADMIN — CITALOPRAM HYDROBROMIDE 20 MG: 20 TABLET ORAL at 05:44

## 2018-12-19 RX ADMIN — Medication 400 MG: at 10:04

## 2018-12-19 RX ADMIN — MEGESTROL ACETATE 200 MG: 40 SUSPENSION ORAL at 05:43

## 2018-12-19 NOTE — DISCHARGE SUMMARY
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          Hospital Course:    Mirian Sy is a 78 y.o. female with A-fib, history of CVA, remote history of vertigo presenting to the ED today due to dizziness and weakness. She was tachycardic on presentation but otherwise VS were WNl. Exam on admission was positive for internuclear ophthalmoplegia or right eye but otherwise neurological exam was non focal. CT head was negative for acute process. MRI obtained and was negative for acute stroke. Neurology was consulted and recommended EEG and close follow-up with neuro-ophthalmologist as outpatient. EEG was negative for seizure activity.     On discharge, patient is not longer having diplopia but does continue to have intermittent dizziness. Per neurology recommendations, she will follow-up with Dr. Dr. Carmona at  for neuro-ophthalmology. She will continue on her home medications. Follow-up in 1 week with PCP. Patient instructed to contact PCP is she continues to have dizziness. Home health  with PT and OT has been established.     Discharge Follow Up Recommendations for labs/diagnostics:  -- Follow-up with PCP in 1 week.   -- Follow-up with Dr. Carmona first available.     Day of Discharge     HPI:   Diplopia resolved. She continues to have intermittent dizziness.     Review of Systems  General: denies fevers or chills  CV: denies chest pain  Resp: denies shortness of breath  Abd: denies abd pain, nausea      Vital Signs:   Temp:  [97.3 °F (36.3 °C)-97.6 °F (36.4 °C)] 97.5 °F (36.4 °C)  Heart Rate:  [] 97  Resp:  [18] 18  BP: (130-158)/(85-98) 158/92     Physical Exam:  General: No acute distress  Eyes: no scleral icterus, PERRL  CV: RRR, no murmurs, no LE edema  Lungs: CTAB, no wheezing or crackles. Normal respiratory effort  Abd: Soft, non-tender, non-distedned, bowel sounds normoactive   Neuro: impaired adduction or right eye  Psych: Alert and oriented x 3, following commands  Skin: no lesions or rashes noted on exposed arms and legs    Pertinent  and/or Most Recent Results     Results from last 7 days   Lab Units  12/19/18   0544  12/18/18   0755  12/18/18   0754  12/17/18   1150   WBC 10*3/mm3  7.02   --   5.17  6.19   HEMOGLOBIN g/dL  12.0   --   12.0  11.9   HEMATOCRIT %  37.2   --   37.0  36.7   PLATELETS 10*3/mm3  267   --   286  315   SODIUM mmol/L  138  139   --   138   POTASSIUM mmol/L  4.3  4.5   --   4.8   CHLORIDE mmol/L  107  103   --   102   CO2 mmol/L  28.0  29.0   --   28.0   BUN mg/dL  16  20   --   21   CREATININE mg/dL  1.04  1.26   --   1.35*   GLUCOSE mg/dL  86  83   --   83   CALCIUM mg/dL  8.8  9.8   --   9.6     Results from last 7 days   Lab Units  12/18/18   0755  12/17/18   1150   BILIRUBIN mg/dL  0.6  0.6   ALK PHOS U/L  101*  92   ALT (SGPT) U/L  11  17   AST (SGOT) U/L  26  26     Results from last 7 days   Lab Units  12/19/18   0544  12/17/18   1150   CHOLESTEROL mg/dL  165  182   TRIGLYCERIDES mg/dL  44  72   HDL CHOL mg/dL  51  58     Results from last 7 days    Lab Units  12/19/18   0544  12/18/18   0755   TSH mIU/mL   --   1.060   HEMOGLOBIN A1C %  5.60   --      Brief Urine Lab Results  (Last result in the past 365 days)      Color   Clarity   Blood   Leuk Est   Nitrite   Protein   CREAT   Urine HCG        12/18/18 0908 Yellow Cloudy Negative Negative Negative Negative               Microbiology Results Abnormal     None          Imaging Results (all)     Procedure Component Value Units Date/Time    MRI Brain Without Contrast [[id number]] Collected:  12/19/18 1049     Updated:  12/19/18 1100    Narrative:       EXAMINATION: MRI BRAIN WO CONTRAST- 12/19/2018     INDICATION: Cranial nerve palsy; H81.10-Benign paroxysmal vertigo,  unspecified ear     TECHNIQUE:  Multiplanar multisequence MRI of the brain was performed  without contrast.     COMPARISONS:  Noncontrast CT head 12/18/2018, MRI 11/04/2015     FINDINGS:       Advanced parenchymal volume loss is noted by prominence of the  ventricles and sulci. There is extensive confluent T2 prolongation in  the supratentorial white matter, probably attributed to chronic  microangiopathy although nonspecific. Old lacunar infarcts are seen in  the basal ganglia on the right and the right thalamus. Cisternal  portions of the cranial nerves III and V-XII are normal. [place] of  [place]oster flow voids are preserved. Scattered ethmoid opacifications.  Minimal right mastoid effusion.       Impression:       Chronic findings without acute intracranial abnormality.      D:  12/19/2018  E:  12/19/2018     This report was finalized on 12/19/2018 10:57 AM by Vikram Torres.       CT Head Without Contrast [[id number]] Collected:  12/18/18 1500     Updated:  12/18/18 1533    Narrative:       EXAMINATION: CT HEAD WO CONTRAST-      INDICATION: Dizziness.     TECHNIQUE: CT scan of the head was performed at 5 mm intervals. No  intravenous contrast was utilized.     The radiation dose reduction device was turned on for each scan per the  ALARA (As Low as  Reasonably Achievable) protocol.     COMPARISON: 12/22/2015.     FINDINGS: There is no intracranial mass. There is no hemorrhage. There  are periventricular white matter changes as well as central cortical  atrophy, consistent with aging. The calvarium is intact.       Impression:       Age-related change without focal abnormality.     D:  12/18/2018  E:  12/18/2018     This report was finalized on 12/18/2018 3:31 PM by Dr. Hunter Russo MD.       XR Chest 1 View [[id number]] Collected:  12/18/18 1209     Updated:  12/18/18 1333    Narrative:       EXAMINATION: XR CHEST 1 VW- 12/18/2018     INDICATION: Weak/Dizzy/AMS triage protocol      COMPARISON: 10/09/2016     FINDINGS: The heart is slightly large. The heart is compensated. There  are chronic obstructive and postinflammatory pulmonary changes. There is  no acute pulmonary process and there has been no change when compared  with 10/09/2016.           Impression:       Advanced chronic obstructive and postinflammatory changes.  The heart is slightly large. There are no acute findings and there has  been no change when compared with 10/09/2016.     D:  12/18/2018  E:  12/18/2018     This report was finalized on 12/18/2018 1:31 PM by Dr. Hunter Russo MD.             Results for orders placed during the hospital encounter of 12/18/18   Duplex Carotid Ultrasound CAR    Narrative · Right internal carotid artery stenosis of 0-49%.  · Left internal carotid artery stenosis of 0-49%.  · Mild bilateral heterogeneous carotid artery disease.          Results for orders placed during the hospital encounter of 12/18/18   Duplex Carotid Ultrasound CAR    Narrative · Right internal carotid artery stenosis of 0-49%.  · Left internal carotid artery stenosis of 0-49%.  · Mild bilateral heterogeneous carotid artery disease.          Results for orders placed during the hospital encounter of 12/18/18   Adult Transthoracic Echo Complete W/ Cont if Necessary Per Protocol    Narrative ·  Estimated EF = 60%.  · Left ventricular systolic function is normal.  · Mild mitral valve regurgitation is present  · Mild tricuspid valve regurgitation is present.  · Calculated right ventricular systolic pressure from tricuspid   regurgitation is 42 mmHg.  · Atrial fibrillation is noted throughout this study.            Discharge Details        Discharge Medications      Continue These Medications      Instructions Start Date   acetaminophen 325 MG tablet  Commonly known as:  TYLENOL   650 mg, Oral, Every 4 Hours PRN      apixaban 5 MG tablet tablet  Commonly known as:  ELIQUIS   5 mg, Oral, 2 Times Daily      calcium-vitamin D 500-200 MG-UNIT per tablet  Commonly known as:  OSCAL-500   1 tablet, Oral, Every Morning      citalopram 20 MG tablet  Commonly known as:  CeleXA   TAKE 1 TABLET EVERY MORNING      diltiazem  MG 24 hr capsule  Commonly known as:  DILACOR XR   120 mg, Oral, Nightly      flecainide 50 MG tablet  Commonly known as:  TAMBOCOR   50 mg, Oral, Every 12 Hours Scheduled      levETIRAcetam 250 MG tablet  Commonly known as:  KEPPRA   TAKE 1 TABLET EVERY 12 HOURS      magnesium oxide 400 (241.3 Mg) MG tablet tablet  Commonly known as:  MAGOX   400 mg, Oral, Daily      megestrol acetate 400 MG/10ML suspension oral suspension  Commonly known as:  MEGACE   200 mg, Oral, Every Morning      mirtazapine 7.5 MG tablet  Commonly known as:  REMERON   TAKE 1 TABLET AT BEDTIME      ranitidine 150 MG capsule  Commonly known as:  ZANTAC   TAKE 1 CAPSULE EVERY NIGHT         ASK your doctor about these medications      Instructions Start Date   ferrous sulfate 325 (65 FE) MG tablet   1 tablet, Oral, Daily With Breakfast             Allergies   Allergen Reactions   • Actonel [Risedronate Sodium] GI Intolerance   • Hctz [Hydrochlorothiazide]      hyponatremia   • Influenza Vaccines Myalgia   • Lisinopril      hyperkalemia         Discharge Disposition:  Home-Health Care Svc    Discharge Diet:  Dietary Orders  (From admission, onward)    Start     Ordered    12/19/18 1126  Diet Regular  Diet Effective Now     Question:  Diet Texture / Consistency  Answer:  Regular    12/19/18 1125    12/18/18 1820  DIET MESSAGE Please send dahlia mcdermott RN  Once     Comments:  Please send dahlia mcdermott RN    12/18/18 1821          Discharge Activity:     CODE STATUS:    Code Status and Medical Interventions:   Ordered at: 12/18/18 1233     Level Of Support Discussed With:    Patient     Code Status:    CPR     Medical Interventions (Level of Support Prior to Arrest):    Full         Future Appointments   Date Time Provider Department Center   12/21/2018  1:15 PM Regino Albarran APRN [facility] None   3/19/2019  8:45 AM Oren Engel DO [facility] None       Additional Instructions for the Follow-ups that You Need to Schedule     Discharge Follow-up with PCP   As directed       Currently Documented PCP:    Oren Engel DO    PCP Phone Number:    [phone]     Follow Up Details:  1 week         Discharge Follow-up with Specified Provider: Dr. Pandya ( opthalmology); 1 Week   As directed      To:  Dr. Pandya ( opthalmology)    Follow Up:  1 Week    Follow Up Details:  first availible         Additional information on Labs and Follow-ups:      Spoke to Luisa @ dr Pandya office [phone]  she stated to fax information to [phone]. She would review information and  contact patient . Dr pandya is presently out of the country  and will be back  The  First of Jan. Dr Sidhu notified                    Time Spent on Discharge:  40 minutes    Electronically signed by Lisbeth Sidhu MD, 12/19/18, 1:41 PM.

## 2018-12-19 NOTE — PLAN OF CARE
Problem: Patient Care Overview  Goal: Plan of Care Review  Outcome: Ongoing (interventions implemented as appropriate)   12/19/18 5949   Coping/Psychosocial   Plan of Care Reviewed With sibling;patient   Plan of Care Review   Progress no change   OTHER   Outcome Summary OT eval complete. Pt supervsion with supine to sit, supervsion to doff socks, min A to don shoes, CGA STS with RW, CGA to ambulate with RW. Pt with dizziness with ambulation. Pt able to tell time on clock and able to id correct # fingers, but states she still has double vision at times. Pt with post lean with dynamic balance activities EOB. OT will follow to advance pt toward PLOF with ADLs. Reccommend home with HHOT.

## 2018-12-19 NOTE — PLAN OF CARE
Problem: Patient Care Overview  Goal: Plan of Care Review  Outcome: Ongoing (interventions implemented as appropriate)   12/19/18 1410   Coping/Psychosocial   Plan of Care Reviewed With patient;sibling   Plan of Care Review   Progress improving   OTHER   Outcome Summary PT eval completed. Pt. sam, w/ good effort. Pt. presents w/ generalized weakness, mild balance and coordination deficits, and mildly unsteady gait. Pt. performed sit to stand transfers w/ RW and ambulated 150 ft w/ RW and CGA. Pt. will benefit from skilled IPPT to promote return to PLOF. Recommend HHPT at D/C.       12/19/18 1410   Coping/Psychosocial   Plan of Care Reviewed With patient;sibling   Plan of Care Review   Progress improving   OTHER   Outcome Summary PT eval completed. Pt. sam, w/ good effort. Pt. presents w/ generalized weakness, mild balance and coordination deficits, and mildly unsteady gait. Pt. performed sit to stand transfers w/ RW, CGA and ambulated 150 ft w/ RW and CGA. Pt. will benefit from skilled IPPT to promote return to PLOF. Recommend HHPT at D/C.       12/19/18 1410   Coping/Psychosocial   Plan of Care Reviewed With patient;sibling   Plan of Care Review   Progress improving   OTHER   Outcome Summary PT eval completed. Pt. pleasant, w/ good effort. Pt. presents w/ intermittent dizziness, generalized weakness, mild balance and coordination deficits, and mildly unsteady gait. Pt. performed sit to stand transfers w/ RW, CGA and ambulated 150 ft w/ RW and CGA. Pt. will benefit from skilled IPPT to promote return to PLOF. Recommend HHPT at D/C.

## 2018-12-19 NOTE — PROGRESS NOTES
Clinical Nutrition   Reason For Visit: Admission assessment, BMI    Patient Name: Mirian Sy  YOB: 1940  MRN: [id number]  Date of Encounter: 12/19/18 1:18 PM  Admission date: 12/18/2018      Patient currently does not meet screen criteria for nutrition risk based on report of good appetite/PO intake without any recent unintentional wt loss prior to admission. Patient states her body weight has been in the low 100s for ~14 years and she has maintained. RD will continue to follow per protocol.      Nutrition Assessment     Admission Problem List:  Vertigo  Right eye internuclear opthalmoplegia        PMH: She  has a past medical history of Atrial fibrillation (CMS/[facility]) (2005), Basal cell carcinoma (2007), Cardiomyopathy (CMS/[facility]) (2006), Cerebrovascular accident (CMS/[facility]) (01/2005), Complex partial epilepsy (CMS/[facility]) (2014), Coronary artery vasospasm (CMS/[facility]) (2006), Decubitus ulcer of buttock (2014), Depression (2002), DVT (deep venous thrombosis) (CMS/[facility]) (2014), Fracture of bone of forefoot (1972), Fracture of ilium, left (CMS/[facility]) (05/2018), GERD (gastroesophageal reflux disease) (2014), HTN (hypertension) (2005), Iron deficiency anemia due to chronic blood loss (2018), Low body weight due to inadequate caloric intake (Adulthood), Mitral valve prolapse (1997), Osteoarthritis, Osteoporosis, Patent foramen ovale (2005), Pneumonia (1947), SCC (squamous cell carcinoma), arm, right (2017), Scoliosis, Syncope (2014), and Varicose veins (2005).   PSxH: She  has a past surgical history that includes Hip fracture surgery (Left, 2014); Achilles tendon surgery (Left, 1997); Femur fracture surgery (Right, 2015); Cardiac catheterization (2006); Breast surgery (Left, 1982); Lumbar disc surgery (1983); Ovarian cyst removal (Left, 1996); Skin cancer excision (Right, 2017); Cataract extraction w/  intraocular lens implant (Bilateral, 2015); Eye capsulotomy (Left, 2016); and Breast cyst excision (Left).         Reported/Observed/Food/Nutrition Related History   Patient reports good appetite/PO intake prior to admission. Denies recent unintentional wt loss. Has maintained weight ~low 100s x 14 years. Prior to this, she had weighed 118 lbs and reached a low of 88 lbs while caring for her  and after his death - struggled with decreased appetite. Was eventually able to gain wt from 88 lbs to ~low 100s and has maintained for numerous years. Used to drink oral nutrition supplements but became burnt out on them and declines them during this admission. States she has been taking Remeron and Megace for ~5 years. At home she eats 3 meals daily. Her normal meal intake is ~50% of a [facility] meal tray.      Anthropometrics   Height: 65 in  Weight: 106 lbs (standing wt 12/18 per nsg doc)  BMI: 17.6  BMI classification: Underweight:<18.5kg/m2   UBW: 118 lbs (highest weight ~15 years ago per patient)  IBW: 130 lbs      Weight change: No recent unintentional wt loss; 15 years ago she went from 118 lbs to low of 88 lbs due to caring for  and eventually his death; intentional wt gain from 88 lbs to low 100s and has maintained this wt x ~14 years      Labs reviewed   Labs reviewed: Yes    Medications reviewed   Medications reviewed: Yes  Pertinent: Megace, Remeron    Current Nutrition Prescription   PO: Diet Regular    Evaluation of Received Nutrient/Fluid Intake: 75% / 2 meals      Nutrition Diagnosis     No nutrition dx at this time    Intervention   Intervention: Follow treatment progress, Care plan reviewed, Supplement offered/refused      Goal:   General: Nutrition support treatment      Monitoring/Evaluation:     Monitoring/Evaluation: Per protocol  Will Continue to follow per protocol  Miguelina Quevedo RD  Time Spent: 25 min

## 2018-12-19 NOTE — CONSULTS
"Neurology    Referring Provider: Dr. Lay    Reason for Consultation: stroke      Chief complaint: vertigo    Subjective .     History of present illness:  Ms. Sy is a pleasant 78-year-old female with a past medical history significant for A. fib on Eliquis, prior history of ischemic stroke and remote history of vertigo, hypertension who is admitted to the hospitalist service for dizziness.  She stated that she had onset of mild dizziness beginning Friday, 12/14/2018.  It was mild at that point but worsened on the day of presentation where she said sitting in an upright position made her dizziness worse. She denies any room-spinning sensation, but reports feeling \"off\" and unable to \"think straight\" during her symptoms.    Review of Systems: Positive for dizziness.Otherwise complete review of systems was discussed with the patient and found to be negative except for that mentioned in history of present illness.    History  Past Medical History:   Diagnosis Date   • Atrial fibrillation (CMS/[facility]) 2005    Chronic anticoagulation and rate control   • Basal cell carcinoma 2007    Left leg and nose   • Cardiomyopathy (CMS/[facility]) 2006   • Cerebrovascular accident (CMS/[facility]) 01/2005    CT right parietal CVA. Carotid duplex -50% to 79% left ICS 15% right ICS stenosis. MRI of the neck showed no significant carotid bifurcations of these. Negative CT of the head, 09/10/2012. Carotid duplex, 02/24/2014:  Shelf-like plaque in the CECE without significant elevation and velocities.  Patent vertebral arteries.   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    With dilated cardiomyopathy   • Decubitus ulcer of buttock 2014    Transient during fracture rehabilitation   • Depression 2002    Good response to citalopram and mirtazapine   • DVT (deep venous thrombosis) (CMS/[facility]) 2014    Superficial - right lesser saphenus vein - after hip fracture    • Fracture of bone of forefoot 1972    " Right foot   • Fracture of ilium, left (CMS/[facility]) 2018    Accidental fall - Uncomplicated recovery   • GERD (gastroesophageal reflux disease)     Chronic superficial gastritis   • HTN (hypertension)    • Iron deficiency anemia due to chronic blood loss 2018    Predisposed by chronic anticoagulation and GERD   • Low body weight due to inadequate caloric intake Adulthood   • Mitral valve prolapse     Mild MR   • Osteoarthritis    • Osteoporosis    • Patent foramen ovale    • Pneumonia    • SCC (squamous cell carcinoma), arm, right 2017   • Scoliosis    • Syncope      undermined cause - [facility]   • Varicose veins     Symptomatic   ,   Past Surgical History:   Procedure Laterality Date   • ACHILLES TENDON SURGERY Left     Repair of rupture left tendon with screws   • BREAST CYST EXCISION Left    • BREAST SURGERY Left     Excision benign cyst   • CARDIAC CATHETERIZATION  2006    Severe acute coronary spasm   • CATARACT EXTRACTION WITH INTRAOCULAR LENS IMPLANT Bilateral    • EYE CAPSULOTOMY WITH LASER Left 2016    Marked visual improvement   • FEMUR FRACTURE SURGERY Right 2015    Repair of shaft fracture   • HIP FRACTURE SURGERY Left     left hip fracture with pin   • LUMBAR DISC SURGERY  1983   • OVARIAN CYST REMOVAL Left    • SKIN CANCER EXCISION Right 2017    SCC - arm   ,   Family History   Problem Relation Age of Onset   • Breast cancer Mother          age 59   • Diabetes Mother    • Hypertension Mother    • Heart disease Father          age 80   • Other Sister         Arthrodesis cervical   • Basal cell carcinoma Sister    • Bone cancer Sister    • Breast cancer Sister 57   • Diabetes Sister          age 56   • Hypertension Sister    • Melanoma Sister    • Arrhythmia Sister         Sinus   • Stroke Sister    • Allergies Son         Hay fever   • Diabetes Maternal Uncle    • Breast cancer Sister 59   • Breast cancer Sister 66   • Ovarian cancer Sister          "DX AGE UNKNOWN   ,   Social History     Tobacco Use   • Smoking status: Former Smoker     Packs/day: 1.00     Years: 20.00     Pack years: 20.00     Types: Cigarettes   • Smokeless tobacco: Never Used   • Tobacco comment: Remote history   Substance Use Topics   • Alcohol use: No   • Drug use: No   ,   Medications Prior to Admission   Medication Sig Dispense Refill Last Dose   • acetaminophen (TYLENOL) 325 MG tablet Take 2 tablets by mouth Every 4 (Four) Hours As Needed for Mild Pain .   Taking   • apixaban (ELIQUIS) 5 MG tablet tablet Take 5 mg by mouth 2 (Two) Times a Day.      • calcium-vitamin D (OSCAL-500) 500-200 MG-UNIT per tablet Take 1 tablet by mouth every morning.   Taking   • citalopram (CeleXA) 20 MG tablet TAKE 1 TABLET EVERY MORNING 90 tablet 1 Taking   • diltiazem XR (DILACOR XR) 120 MG 24 hr capsule Take 1 capsule by mouth Every Night. 90 capsule 3 Taking   • ferrous sulfate 325 (65 FE) MG tablet Take 1 tablet by mouth Daily With Breakfast. 90 tablet 3 Taking   • flecainide (TAMBOCOR) 50 MG tablet Take 1 tablet by mouth Every 12 (Twelve) Hours. 180 tablet 1 Taking   • levETIRAcetam (KEPPRA) 250 MG tablet TAKE 1 TABLET EVERY 12 HOURS 180 tablet 1 Taking   • magnesium oxide (MAGOX) 400 (241.3 MG) MG tablet tablet Take 400 mg by mouth Daily.   Taking   • megestrol acetate (MEGACE) 400 MG/10ML suspension oral suspension Take 5 mL by mouth every morning. 450 mL 1 Taking   • mirtazapine (REMERON) 7.5 MG tablet TAKE 1 TABLET AT BEDTIME 90 tablet 1 Taking   • ranitidine (ZANTAC) 150 MG capsule TAKE 1 CAPSULE EVERY NIGHT 90 capsule 1 Taking    and Allergies:  Actonel [risedronate sodium]; Hctz [hydrochlorothiazide]; Influenza vaccines; and Lisinopril    Objective     Vital Signs   Blood pressure 158/92, pulse 97, temperature 97.5 °F (36.4 °C), temperature source Oral, resp. rate 18, height 165.1 cm (65\"), weight 48.1 kg (106 lb), SpO2 91 %.    Physical Exam:      Gen: Sitting in chair with eyes open. In NAD. " Appears stated age   Eyes: PERRL, conjuntivae/lids normal, no reproducible diplopia with lateral gaze   ENT: External canals normal bilaterally. Dentition normal   Neck: Supple. No thyroid enlargement noted   Respiratory: CTA bilaterally. Respirations unlabored   CV: RRR, S1 and S2 nml. Radial pulses 2+ bilaterally.    Skin: No rashes noted on exposed skin. Normal turgor.   MSK: Normal bulk and tone. Nml ROM     Neurologic:   Mental status: Awake, alert, oriented x4. Follows commands. Speech fluent.    CN: PERRL, right eye is deviated laterally with impaired adduction, sensation intact in upper/mid/lower face bilaterally, facial movements symmetric, hearing intact to finger rub bilaterally, palate elevates symmetrically, tongue movements and SCMs strong bilaterally    Motor: Strength full (5/5) throughout in BUE and BLE    Reflexes: 2+ throughout   Sensory: Intact to LT throughout   Coordination: No dysmetria noted   Gait: Not tested        Results Reviewed:     Labs reviewed.  A1c 5.6,   MRI brain personally reviewed.  No acute process seen.  There is extensive ischemic gliotic changes in the paraventricular white matter however  Carotid duplex and TTE reports reviewed  EKG report reviewed                 Assessment/Plan     1.  Dizziness = unclear etiology. Patient denies room spinning sensation. Impaired cerebral perfusion is a possibility. No acute infarct seen on MRI, however she does have extensive small vessel changes in the periventricular WM. Recommend continue her Eliquis and ASA. On atorvastatin. Exam demonstrates mild exotropia of the right eye. Recommend outpatient neuro-ophtho evaluation. Will check routine EEG.    2.  HLD =     3.  HTN = continue meds    4.  Afib = on Eliquis    Okay from neuro standpoint for discharge when okay with primary team and if EEG is not concerning. [facility] first available      Dinorah Rodriguez MD  12/19/18  12:40  PM

## 2018-12-19 NOTE — PLAN OF CARE
Problem: Patient Care Overview  Goal: Plan of Care Review   12/19/18 0338   Coping/Psychosocial   Plan of Care Reviewed With patient   Plan of Care Review   Progress no change   OTHER   Outcome Summary VSS. Pt still c/o dizziness and double vision. Up to bedside commode with assist. UOP good. Awating MRI of brain this AM due to MRI's pt load. No major events overnight. Pt swallowing without difficulty, pt passed nursing bedside swallowing screen. Pt very pleasant and cooperative.

## 2018-12-19 NOTE — PROGRESS NOTES
Continued Stay Note  [facility]     Patient Name: Mirian Sy  MRN: [id number]  Today's Date: 12/19/2018    Admit Date: 12/18/2018    Discharge Plan     Row Name 12/19/18 5640       Plan    Plan  Home with     Patient/Family in Agreement with Plan  yes    Final Note  I spoke with the pt and her son in the room. She will be staying at her sons home in [place] at [address] I called the referral to Maciej at [facility] per pt choice. Maciej is aware of the sons adress. No other DC needs per pt.    Row Name 12/19/18 2544       Plan    Final Discharge Disposition Code  06 - home with home health care        Discharge Codes    No documentation.       Expected Discharge Date and Time     Expected Discharge Date Expected Discharge Time    Dec 19, 2018             Jackie Durán RN

## 2018-12-19 NOTE — PROGRESS NOTES
Discharge Planning Assessment  [facility]     Patient Name: Mirian Sy  MRN: [id number]  Today's Date: 12/19/2018    Admit Date: 12/18/2018    Discharge Needs Assessment     Row Name 12/19/18 0935       Living Environment    Lives With  alone    Current Living Arrangements  home/apartment/condo    Living Arrangement Comments  One level. Has a ramp. Supportive sons live close.       Discharge Needs Assessment    Equipment Currently Used at Home  shower chair;walker, rolling;cane, straight;grab bar    Equipment Needed After Discharge  none    Discharge Coordination/Progress  The pt has had lots of ortho issues in the past. Has been inpt at [facility] x 4, outpt [place] at [facility] in the past and both [facility] and [facility] in the past. No HH currently.        Discharge Plan     Row Name 12/19/18 0939       Plan    Plan  Home at DC    Patient/Family in Agreement with Plan  yes    Plan Comments  I spoke with the pt. She states she has no DC needs at this time. Goal is home at DC.    Row Name 12/19/18 0825       Plan    Final Discharge Disposition Code  01 - home or self-care        Destination      No service coordination in this encounter.      Durable Medical Equipment      No service coordination in this encounter.      Dialysis/Infusion      No service coordination in this encounter.      Home Medical Care      No service coordination in this encounter.      Community Resources      No service coordination in this encounter.        Expected Discharge Date and Time     Expected Discharge Date Expected Discharge Time    Dec 22, 2018         Demographic Summary    No documentation.       Functional Status     Row Name 12/19/18 0935       Functional Status    Usual Activity Tolerance  good       Functional Status, IADL    Medications  independent    Meal Preparation  independent    Housekeeping  independent    Laundry  independent    Shopping  independent        Psychosocial    No documentation.       Abuse/Neglect    No  documentation.       Legal    No documentation.       Substance Abuse    No documentation.       Patient Forms    No documentation.           Jackie Durán RN

## 2018-12-19 NOTE — PROGRESS NOTES
[facility] Medicine Services  PROGRESS NOTE    Patient Name: Mirian Sy  : 1940  MRN: [id number]    Date of Admission: 2018  Length of Stay: 1  Primary Care Physician: Oren Engel DO    Subjective   Subjective     CC:  Diplopia, vertigo    HPI:  Diplopia resolved. She continues to have intermittent dizziness.         Review of Systems  General: denies fevers or chills  CV: denies chest pain  Resp: denies shortness of breath  Abd: denies abd pain, nausea      Objective   Objective     Vital Signs:   Temp:  [97.3 °F (36.3 °C)-97.6 °F (36.4 °C)] 97.5 °F (36.4 °C)  Heart Rate:  [] 97  Resp:  [18] 18  BP: (130-158)/(85-98) 158/92        Physical Exam:  General: No acute distress  Eyes: no scleral icterus, PERRL  CV: RRR, no murmurs, no LE edema  Lungs: CTAB, no wheezing or crackles. Normal respiratory effort  Abd: Soft, non-tender, non-distedned, bowel sounds normoactive   Neuro: impaired adduction or right eye  Psych: Alert and oriented x 3, following commands  Skin: no lesions or rashes noted on exposed arms and legs        Results Reviewed:  I have personally reviewed current lab, radiology, and data and agree.    Results from last 7 days   Lab Units  18   0544  18   0754  18   1150   WBC 10*3/mm3  7.02  5.17  6.19   HEMOGLOBIN g/dL  12.0  12.0  11.9   HEMATOCRIT %  37.2  37.0  36.7   PLATELETS 10*3/mm3  267  286  315     Results from last 7 days   Lab Units  18   0544  18   0755  18   1150   SODIUM mmol/L  138  139  138   POTASSIUM mmol/L  4.3  4.5  4.8   CHLORIDE mmol/L  107  103  102   CO2 mmol/L  28.0  29.0  28.0   BUN mg/dL  16  20  21   CREATININE mg/dL  1.04  1.26  1.35*   GLUCOSE mg/dL  86  83  83   CALCIUM mg/dL  8.8  9.8  9.6   ALT (SGPT) U/L   --   11  17   AST (SGOT) U/L   --   26  26     Estimated Creatinine Clearance: 34.4 mL/min (by C-G formula based on SCr of 1.04 mg/dL).  No results found for:  BNP    Microbiology Results Abnormal     None          Imaging Results (last 24 hours)     Procedure Component Value Units Date/Time    MRI Brain Without Contrast [[id number]] Collected:  12/19/18 1049     Updated:  12/19/18 1100    Narrative:       EXAMINATION: MRI BRAIN WO CONTRAST- 12/19/2018     INDICATION: Cranial nerve palsy; H81.10-Benign paroxysmal vertigo,  unspecified ear     TECHNIQUE:  Multiplanar multisequence MRI of the brain was performed  without contrast.     COMPARISONS:  Noncontrast CT head 12/18/2018, MRI 11/04/2015     FINDINGS:       Advanced parenchymal volume loss is noted by prominence of the  ventricles and sulci. There is extensive confluent T2 prolongation in  the supratentorial white matter, probably attributed to chronic  microangiopathy although nonspecific. Old lacunar infarcts are seen in  the basal ganglia on the right and the right thalamus. Cisternal  portions of the cranial nerves III and V-XII are normal. [place] of  [place] flow voids are preserved. Scattered ethmoid opacifications.  Minimal right mastoid effusion.       Impression:       Chronic findings without acute intracranial abnormality.      D:  12/19/2018  E:  12/19/2018     This report was finalized on 12/19/2018 10:57 AM by Vikram Torres.       CT Head Without Contrast [[id number]] Collected:  12/18/18 1500     Updated:  12/18/18 1533    Narrative:       EXAMINATION: CT HEAD WO CONTRAST-      INDICATION: Dizziness.     TECHNIQUE: CT scan of the head was performed at 5 mm intervals. No  intravenous contrast was utilized.     The radiation dose reduction device was turned on for each scan per the  ALARA (As Low as Reasonably Achievable) protocol.     COMPARISON: 12/22/2015.     FINDINGS: There is no intracranial mass. There is no hemorrhage. There  are periventricular white matter changes as well as central cortical  atrophy, consistent with aging. The calvarium is intact.       Impression:       Age-related change without  focal abnormality.     D:  12/18/2018  E:  12/18/2018     This report was finalized on 12/18/2018 3:31 PM by Dr. Hunter Russo MD.             Results for orders placed during the hospital encounter of 12/18/18   Adult Transthoracic Echo Complete W/ Cont if Necessary Per Protocol    Narrative · Estimated EF = 60%.  · Left ventricular systolic function is normal.  · Mild mitral valve regurgitation is present  · Mild tricuspid valve regurgitation is present.  · Calculated right ventricular systolic pressure from tricuspid   regurgitation is 42 mmHg.  · Atrial fibrillation is noted throughout this study.          I have reviewed the medications:    Current Facility-Administered Medications:   •  acetaminophen (TYLENOL) tablet 650 mg, 650 mg, Oral, Q4H PRN, Sandra Lay MD  •  apixaban (ELIQUIS) tablet 5 mg, 5 mg, Oral, Q12H, Sandra Lay MD, 5 mg at 12/19/18 1004  •  aspirin chewable tablet 81 mg, 81 mg, Oral, Daily, 81 mg at 12/19/18 1004 **OR** aspirin suppository 300 mg, 300 mg, Rectal, Daily, Sandra Lay MD  •  atorvastatin (LIPITOR) tablet 80 mg, 80 mg, Oral, Nightly, Sandra Lay MD, 80 mg at 12/18/18 2038  •  citalopram (CeleXA) tablet 20 mg, 20 mg, Oral, QAM, Sandra Lay MD, 20 mg at 12/19/18 0544  •  diltiaZEM CD (CARDIZEM CD) 24 hr capsule 120 mg, 120 mg, Oral, Nightly, Sandra Lay MD, 120 mg at 12/18/18 2038  •  famotidine (PEPCID) tablet 20 mg, 20 mg, Oral, Nightly, Sandra Lay MD, 20 mg at 12/18/18 2038  •  ferrous sulfate tablet 325 mg, 325 mg, Oral, Daily With Breakfast, Sandra Lay MD, 325 mg at 12/19/18 1005  •  flecainide (TAMBOCOR) tablet 50 mg, 50 mg, Oral, Q12H, Sandra Lay MD, 50 mg at 12/19/18 1004  •  levETIRAcetam (KEPPRA) tablet 250 mg, 250 mg, Oral, Q12H, Sandra Lay MD, 250 mg at 12/19/18 1427  •  magnesium oxide (MAGOX) tablet 400 mg, 400 mg, Oral, Daily, Sandra Lay MD, 400 mg at 12/19/18 1004  •  megestrol (MEGACE) 40 MG/ML suspension 200 mg, 200 mg, Oral,  "QAM, Sandra Lay MD, 200 mg at 12/19/18 0543  •  mirtazapine (REMERON) tablet 7.5 mg, 7.5 mg, Oral, Nightly, Sandra Lay MD, 7.5 mg at 12/18/18 2038  •  sodium chloride 0.9 % flush 10 mL, 10 mL, Intravenous, PRN, Wing Chatman MD  •  sodium chloride 0.9 % flush 3 mL, 3 mL, Intravenous, Q12H, Sandra Lay MD, 3 mL at 12/18/18 2041  •  sodium chloride 0.9 % flush 3 mL, 3 mL, Intravenous, Q12H, Sandra Lay MD, 3 mL at 12/19/18 1003  •  sodium chloride 0.9 % flush 3-10 mL, 3-10 mL, Intravenous, PRN, Sandra Lay MD  •  sodium chloride 0.9 % flush 3-10 mL, 3-10 mL, Intravenous, PRN, Sandra Lay MD  •  Sodium chloride 0.9 % infusion, 125 mL/hr, Intravenous, Continuous, Daria Hartley APRN, Stopped at 12/19/18 0637      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   - Neuro consulted. Recommend EEG and follow-up with neuro-ophthalmologist as outpatient.  -Add ASA, statin, continue Eliquis  -Home health with PT/OT on discharge      Epilepsy  -Continue Keppra     Atrial fibrillation  -Continue diltiazem, flecainide, Eliquis  -If she remains in A-fib, may need to discuss with cardiology for recommendations regarding flecainide dosing (follows with Dr. Olguin)     Depression  -Continue citalopram     DVT prophylaxis:  Eliquis      Discharge: today or tomorrow pending EEG    CODE STATUS:   Code Status and " Medical Interventions:   Ordered at: 12/18/18 1233     Level Of Support Discussed With:    Patient     Code Status:    CPR     Medical Interventions (Level of Support Prior to Arrest):    Full         Electronically signed by Lisbeth Sidhu MD, 12/19/18, 3:03 PM.

## 2018-12-19 NOTE — THERAPY EVALUATION
Acute Care - Occupational Therapy Initial Evaluation  [facility]     Patient Name: Mirian Sy  : 1940  MRN: [id number]  Today's Date: 2018  Onset of Illness/Injury or Date of Surgery: 18  Date of Referral to OT: 18  Referring Physician: MD Alireza    Admit Date: 2018       ICD-10-CM ICD-9-CM   1. Benign paroxysmal positional vertigo, unspecified laterality H81.10 386.11   2. Impaired mobility and ADLs Z74.09 799.89     Patient Active Problem List   Diagnosis   • Abnormal gait   • Atrial fibrillation (CMS/HCC)   • Cardiomyopathy (CMS/HCC)   • Carpal tunnel syndrome   • Complex partial epilepsy (CMS/HCC)   • Depression   • Chronic gastritis   • Hypertension   • Hyponatremia   • Nutritional anemia   • Dyssomnia   • Diplopia   • Xeroderma   • Preventative health care   • Frailty   • Tricuspid regurgitation   • Cerebrovascular accident (CMS/HCC)   • Syncope   • DVT (deep venous thrombosis) (CMS/HCC)   • Anorexia   • Anxiety   • Patent foramen ovale   • Mitral regurgitation   • Atrial fibrillation with RVR (CMS/HCC)   • Low weight   • Senile osteoporosis   • Iron deficiency   • PAF (paroxysmal atrial fibrillation) (CMS/HCC)   • Closed fracture of left ilium (CMS/HCC)   • GERD (gastroesophageal reflux disease)   • Fall at home   • Vertigo   • Internuclear ophthalmoplegia, right eye     Past Medical History:   Diagnosis Date   • Atrial fibrillation (CMS/HCC)     Chronic anticoagulation and rate control   • Basal cell carcinoma     Left leg and nose   • Cardiomyopathy (CMS/HCC)    • Cerebrovascular accident (CMS/HCC) 2005    CT right parietal CVA. Carotid duplex -50% to 79% left ICS 15% right ICS stenosis. MRI of the neck showed no significant carotid bifurcations of these. Negative CT of the head, 09/10/2012. Carotid duplex, 2014:  Shelf-like plaque in the CECE without significant elevation and velocities.  Patent vertebral arteries.   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   • Decubitus ulcer of buttock 2014    Transient during fracture rehabilitation   • Depression 2002    Good response to citalopram and mirtazapine   • DVT (deep venous thrombosis) (CMS/[facility]) 2014    Superficial - right lesser saphenus vein - after hip fracture    • Fracture of bone of forefoot 1972    Right foot   • Fracture of ilium, left (CMS/[facility]) 05/2018    Accidental fall - Uncomplicated recovery   • GERD (gastroesophageal reflux disease) 2014    Chronic superficial gastritis   • HTN (hypertension) 2005   • Iron deficiency anemia due to chronic blood loss 2018    Predisposed by chronic anticoagulation and GERD   • Low body weight due to inadequate caloric intake Adulthood   • Mitral valve prolapse 1997    Mild MR   • Osteoarthritis    • Osteoporosis    • Patent foramen ovale 2005   • Pneumonia 1947   • SCC (squamous cell carcinoma), arm, right 2017   • Scoliosis    • Syncope 2014     undermined cause - [facility]   • Varicose veins 2005    Symptomatic     Past Surgical History:   Procedure Laterality Date   • ACHILLES TENDON SURGERY Left 1997    Repair of rupture left tendon with screws   • BREAST CYST EXCISION Left    • BREAST SURGERY Left 1982    Excision benign cyst   • CARDIAC CATHETERIZATION  2006    Severe acute coronary spasm   • CATARACT EXTRACTION WITH INTRAOCULAR LENS IMPLANT Bilateral 2015   • EYE CAPSULOTOMY WITH LASER Left 2016    Marked visual improvement   • FEMUR FRACTURE SURGERY Right 2015    Repair of shaft fracture   • HIP FRACTURE SURGERY Left 2014    left hip fracture with pin   • LUMBAR DISC SURGERY  1983   • OVARIAN CYST REMOVAL Left 1996   • SKIN CANCER EXCISION Right 2017    SCC - arm          OT ASSESSMENT FLOWSHEET (last 72 hours)      Occupational Therapy Evaluation     Row Name 12/19/18 0937                   OT Evaluation Time/Intention    Subjective Information  complains of;dizziness reports  dizziness, but improved from yesterday  -        Document Type  evaluation  -        Mode of Treatment  occupational therapy  -        Patient Effort  good  -AC        Symptoms Noted During/After Treatment  dizziness  -AC           General Information    Patient Profile Reviewed?  yes  -        Onset of Illness/Injury or Date of Surgery  12/18/18  -        Referring Physician  MD Alireza  -        Patient Observations  alert;cooperative;agree to therapy  -        Patient/Family Observations  Sister present  -        General Observations of Patient  Pt received in bed  -        Prior Level of Function  independent:;all household mobility;ADL's h/o falls  -        Equipment Currently Used at Home  bath bench;cane, quad;rollator;grab bar  -        Pertinent History of Current Functional Problem  Pt admit with weakness, dizziness and double vision  -        Existing Precautions/Restrictions  fall  -AC        Limitations/Impairments  -- dizziness with ambulation, double vision at times  -        Risks Reviewed  patient and family:;LOB;dizziness;change in vital signs  -        Benefits Reviewed  patient and family:;improve function;increase independence;increase strength;increase balance;increase knowledge  -        Barriers to Rehab  -- h/o falls  -           Relationship/Environment    Primary Source of Support/Comfort  sibling(s)  -AC        Lives With  alone  -           Resource/Environmental Concerns    Current Living Arrangements  home/apartment/condo  -           Cognitive Assessment/Interventions    Additional Documentation  Cognitive Assessment/Intervention (Group)  -           Cognitive Assessment/Intervention- PT/OT    Orientation Status (Cognition)  oriented x 4  -AC        Follows Commands (Cognition)  WNL  -        Personal Safety Interventions  fall prevention program maintained;gait belt;nonskid shoes/slippers when out of bed  -AC           Safety Issues, Functional  Mobility    Impairments Affecting Function (Mobility)  balance;strength;visual/perceptual  -AC           Bed Mobility Assessment/Treatment    Bed Mobility Assessment/Treatment  supine-sit  -AC        Supine-Sit [place] (Bed Mobility)  supervision  -        Assistive Device (Bed Mobility)  head of bed elevated  -           Functional Mobility    Functional Mobility- Ind. Level  contact guard assist  -        Functional Mobility- Device  rolling walker  -        Functional Mobility-Distance (Feet)  75  -AC        Functional Mobility- Comment  dizziness increased with ambulation  -AC           Transfer Assessment/Treatment    Transfer Assessment/Treatment  sit-stand transfer;stand-sit transfer  -           Sit-Stand Transfer    Sit-Stand [place] (Transfers)  contact guard  -        Assistive Device (Sit-Stand Transfers)  walker, front-wheeled  -           Stand-Sit Transfer    Stand-Sit [place] (Transfers)  contact guard  -        Assistive Device (Stand-Sit Transfers)  walker, front-wheeled  -           ADL Assessment/Intervention    68790 - OT Self Care/Mgmt Minutes  3  -AC        BADL Assessment/Intervention  lower body dressing  -           Lower Body Dressing Assessment/Training    Lower Body Dressing [place] Level  doff;socks;supervision;don;shoes/slippers;minimum assist (75% patient effort)  -        Lower Body Dressing Position  unsupported sitting  -        Comment (Lower Body Dressing)  pt with posterior lean with donning shoes  -           BADL Safety/Performance    Impairments, BADL Safety/Performance  balance;strength;visual/perceptual  -AC           General ROM    GENERAL ROM COMMENTS  BUE WFL  -AC           MMT (Manual Muscle Testing)    General MMT Comments  BUE grossly 4/5  -AC           Motor Assessment/Interventions    Additional Documentation  Fine Motor Testing & Training (Group) mild tremors BUE  -AC           Sensory Assessment/Intervention    Sensory  General Assessment  no sensation deficits identified  -AC        Additional Documentation  Vision Assessment/Intervention (Group)  -AC           Vision Assessment/Intervention    Visual Impairment/Limitations  corrective lenses for reading pt able to tell time on clock, and id fingers   -AC           Positioning and Restraints    Pre-Treatment Position  in bed  -AC        Post Treatment Position  chair  -AC        In Chair  reclined;call light within reach;encouraged to call for assist;exit alarm on;with family/caregiver  -AC           Pain Assessment    Additional Documentation  Pain Scale: Numbers Pre/Post-Treatment (Group)  -AC           Pain Scale: Numbers Pre/Post-Treatment    Pain Scale: Numbers, Pretreatment  0/10 - no pain  -AC        Pain Scale: Numbers, Post-Treatment  0/10 - no pain  -AC           Clinical Impression (OT)    Date of Referral to OT  12/18/18  -AC        OT Diagnosis  ADL decline  -AC        Patient/Family Goals Statement (OT Eval)  increase ADl independence  -AC        Criteria for Skilled Therapeutic Interventions Met (OT Eval)  yes;treatment indicated  -AC        Rehab Potential (OT Eval)  good, to achieve stated therapy goals  -AC        Therapy Frequency (OT Eval)  daily  -AC        Care Plan Review (OT)  evaluation/treatment results reviewed  -AC        Care Plan Review, Other Participant (OT Eval)  sibling  -AC        Anticipated Discharge Disposition (OT)  home with home health;home  -AC           Vital Signs    Pre Systolic BP Rehab  134  -AC        Pre Treatment Diastolic BP  103  -AC        Post Systolic BP Rehab  153  -AC        Post Treatment Diastolic BP  112  -AC        Pre Patient Position  Supine  -AC        Intra Patient Position  Standing  -AC        Post Patient Position  Sitting  -AC           Planned OT Interventions    Planned Therapy Interventions (OT Eval)  BADL retraining;functional balance retraining;occupation/activity based interventions;strengthening  exercise;transfer/mobility retraining  -AC           OT Goals    Transfer Goal Selection (OT)  transfer, OT goal 1  -AC        Dressing Goal Selection (OT)  dressing, OT goal 1  -AC        Toileting Goal Selection (OT)  toileting, OT goal 1  -AC           Transfer Goal 1 (OT)    Activity/Assistive Device (Transfer Goal 1, OT)  bed-to-chair/chair-to-bed;toilet;walker, rolling  -AC        [place] Level/Cues Needed (Transfer Goal 1, OT)  supervision required  -AC        Time Frame (Transfer Goal 1, OT)  by discharge  -AC        Progress/Outcome (Transfer Goal 1, OT)  goal ongoing  -AC           Dressing Goal 1 (OT)    Activity/Assistive Device (Dressing Goal 1, OT)  lower body dressing  -AC        [place]/Cues Needed (Dressing Goal 1, OT)  set-up required  -AC        Time Frame (Dressing Goal 1, OT)  by discharge  -AC        Progress/Outcome (Dressing Goal 1, OT)  goal ongoing  -AC           Toileting Goal 1 (OT)    Activity/Device (Toileting Goal 1, OT)  adjust/manage clothing;perform perineal hygiene  -AC        [place] Level/Cues Needed (Toileting Goal 1, OT)  supervision required  -AC        Time Frame (Toileting Goal 1, OT)  by discharge  -AC        Progress/Outcome (Toileting Goal 1, OT)  goal ongoing  -AC           Living Environment    Home Accessibility  tub/shower is not walk in ramp to enter  -AC          User Key  (r) = Recorded By, (t) = Taken By, (c) = Cosigned By    Initials Name Effective Dates    Kristal Wadsworth OT 06/23/15 -          Occupational Therapy Education     Title: PT OT SLP Therapies (In Progress)     Topic: Occupational Therapy (In Progress)     Point: ADL training (Done)     Description: Instruct learner(s) on proper safety adaptation and remediation techniques during self care or transfers.   Instruct in proper use of assistive devices.    Learning Progress Summary           Patient Acceptance, SWAPNIL VU by EDITH at 12/19/2018 11:09 AM    Comment:  benefits of activity, role  of OT   Family Acceptance, E, VU by  at 12/19/2018 11:09 AM    Comment:  benefits of activity, role of OT                               User Key     Initials Effective Dates Name Provider Type Discipline     06/23/15 -  Kristal Hudson, OT Occupational Therapist OT                  OT Recommendation and Plan  Outcome Summary/Treatment Plan (OT)  Anticipated Discharge Disposition (OT): home with home health, home  Planned Therapy Interventions (OT Eval): BADL retraining, functional balance retraining, occupation/activity based interventions, strengthening exercise, transfer/mobility retraining  Therapy Frequency (OT Eval): daily  Plan of Care Review  Plan of Care Reviewed With: sibling, patient  Plan of Care Reviewed With: sibling, patient  Outcome Summary: OT eval complete.  Pt supervsion with supine to sit, supervsion to doff socks, min A to don shoes,  CGA STS with RW,  CGA to ambulate with RW.  Pt with dizziness with ambulation.  Pt able to tell time on clock and able to id correct # fingers, but states she still has double vision at times.  Pt with post lean with dynamic balance activities EOB.  OT will follow to advance pt toward PLOF with ADLs. Reccommend home with HHOT.     Outcome Measures     Row Name 12/19/18 0937             How much help from another is currently needed...    Putting on and taking off regular lower body clothing?  3  -AC      Bathing (including washing, rinsing, and drying)  3  -AC      Toileting (which includes using toilet bed pan or urinal)  3  -AC      Putting on and taking off regular upper body clothing  3  -AC      Taking care of personal grooming (such as brushing teeth)  3  -AC      Eating meals  4  -AC      Score  19  -AC         Functional Assessment    Outcome Measure Options  AM-PAC 6 Clicks Daily Activity (OT)  -        User Key  (r) = Recorded By, (t) = Taken By, (c) = Cosigned By    Initials Name Provider Type    AC Kristal Hudson, OT Occupational Therapist           Time Calculation:   Time Calculation- OT     Row Name 12/19/18 0937             Time Calculation- OT    OT Start Time  0937  -AC      Total Timed Code Minutes- OT  3 minute(s)  -AC      OT Received On  12/19/18  -AC      OT Goal Re-Cert Due Date  12/29/18  -AC         Timed Charges    38556 - OT Self Care/Mgmt Minutes  3  -AC        User Key  (r) = Recorded By, (t) = Taken By, (c) = Cosigned By    Initials Name Provider Type    AC Kristal Hudson, OT Occupational Therapist        Therapy Suggested Charges     Code   Minutes Charges    90268 (CPT®) Hc Ot Neuromusc Re Education Ea 15 Min      28178 (CPT®) Hc Ot Ther Proc Ea 15 Min      47430 (CPT®) Hc Ot Therapeutic Act Ea 15 Min      79878 (CPT®) Hc Ot Manual Therapy Ea 15 Min      43347 (CPT®) Hc Ot Iontophoresis Ea 15 Min      02010 (CPT®) Hc Ot Elec Stim Ea-Per 15 Min      57433 (CPT®) Hc Ot Ultrasound Ea 15 Min      68681 (CPT®) Hc Ot Self Care/Mgmt/Train Ea 15 Min 3     Total  3         Therapy Charges for Today     Code Description Service Date Service Provider Modifiers Qty    75835615645 HC OT SELFCARE CURRENT 12/19/2018 Kristal Hudson, OT LELA, CK 1    00975807042 HC OT SELFCARE PROJECTED 12/19/2018 Kristal Hudson OT GO CJ 1    [id number] HC OT EVAL MOD COMPLEXITY 4 12/19/2018 Kristal Hudson OT GO 1          OT G-codes  Functional Assessment Tool Used: impact 6 clicks  Score: 19  Functional Limitation: Self care  Self Care Current Status (): At least 40 percent but less than 60 percent impaired, limited or restricted  Self Care Goal Status (): At least 20 percent but less than 40 percent impaired, limited or restricted    Kristal Hudson OT  12/19/2018

## 2018-12-19 NOTE — THERAPY EVALUATION
Acute Care - Physical Therapy Initial Evaluation  [facility]     Patient Name: Mirian Sy  : 1940  MRN: [id number]  Today's Date: 2018   Onset of Illness/Injury or Date of Surgery: 18  Date of Referral to PT: 18  Referring Physician: MD Alireza      Admit Date: 2018    Visit Dx:     ICD-10-CM ICD-9-CM   1. Benign paroxysmal positional vertigo, unspecified laterality H81.10 386.11   2. Impaired mobility and ADLs Z74.09 799.89   3. Impaired functional mobility, balance, gait, and endurance Z74.09 V49.89     Patient Active Problem List   Diagnosis   • Abnormal gait   • Atrial fibrillation (CMS/HCC)   • Cardiomyopathy (CMS/HCC)   • Carpal tunnel syndrome   • Complex partial epilepsy (CMS/HCC)   • Depression   • Chronic gastritis   • Hypertension   • Hyponatremia   • Nutritional anemia   • Dyssomnia   • Diplopia   • Xeroderma   • Preventative health care   • Frailty   • Tricuspid regurgitation   • Cerebrovascular accident (CMS/HCC)   • Syncope   • DVT (deep venous thrombosis) (CMS/HCC)   • Anorexia   • Anxiety   • Patent foramen ovale   • Mitral regurgitation   • Atrial fibrillation with RVR (CMS/HCC)   • Low weight   • Senile osteoporosis   • Iron deficiency   • PAF (paroxysmal atrial fibrillation) (CMS/HCC)   • Closed fracture of left ilium (CMS/HCC)   • GERD (gastroesophageal reflux disease)   • Fall at home   • Vertigo   • Internuclear ophthalmoplegia, right eye     Past Medical History:   Diagnosis Date   • Atrial fibrillation (CMS/HCC)     Chronic anticoagulation and rate control   • Basal cell carcinoma     Left leg and nose   • Cardiomyopathy (CMS/HCC)    • Cerebrovascular accident (CMS/HCC) 2005    CT right parietal CVA. Carotid duplex -50% to 79% left ICS 15% right ICS stenosis. MRI of the neck showed no significant carotid bifurcations of these. Negative CT of the head, 09/10/2012. Carotid duplex, 2014:  Shelf-like plaque in the CECE without  significant elevation and velocities.  Patent vertebral arteries.   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    With dilated cardiomyopathy   • Decubitus ulcer of buttock 2014    Transient during fracture rehabilitation   • Depression 2002    Good response to citalopram and mirtazapine   • DVT (deep venous thrombosis) (CMS/[facility]) 2014    Superficial - right lesser saphenus vein - after hip fracture    • Fracture of bone of forefoot 1972    Right foot   • Fracture of ilium, left (CMS/[facility]) 05/2018    Accidental fall - Uncomplicated recovery   • GERD (gastroesophageal reflux disease) 2014    Chronic superficial gastritis   • HTN (hypertension) 2005   • Iron deficiency anemia due to chronic blood loss 2018    Predisposed by chronic anticoagulation and GERD   • Low body weight due to inadequate caloric intake Adulthood   • Mitral valve prolapse 1997    Mild MR   • Osteoarthritis    • Osteoporosis    • Patent foramen ovale 2005   • Pneumonia 1947   • SCC (squamous cell carcinoma), arm, right 2017   • Scoliosis    • Syncope 2014     undermined cause - [facility]   • Varicose veins 2005    Symptomatic     Past Surgical History:   Procedure Laterality Date   • ACHILLES TENDON SURGERY Left 1997    Repair of rupture left tendon with screws   • BREAST CYST EXCISION Left    • BREAST SURGERY Left 1982    Excision benign cyst   • CARDIAC CATHETERIZATION  2006    Severe acute coronary spasm   • CATARACT EXTRACTION WITH INTRAOCULAR LENS IMPLANT Bilateral 2015   • EYE CAPSULOTOMY WITH LASER Left 2016    Marked visual improvement   • FEMUR FRACTURE SURGERY Right 2015    Repair of shaft fracture   • HIP FRACTURE SURGERY Left 2014    left hip fracture with pin   • LUMBAR DISC SURGERY  1983   • OVARIAN CYST REMOVAL Left 1996   • SKIN CANCER EXCISION Right 2017    SCC - arm        PT ASSESSMENT (last 12 hours)      Physical Therapy Evaluation     Row Name 12/19/18 0931          PT Evaluation  Time/Intention    Subjective Information  complains of;dizziness  -MB     Document Type  evaluation  -MB     Mode of Treatment  physical therapy  -MB     Patient Effort  good  -MB     Symptoms Noted During/After Treatment  dizziness  -MB     Row Name 12/19/18 0920          General Information    Patient Profile Reviewed?  yes  -MB     Onset of Illness/Injury or Date of Surgery  12/18/18  -MB     Referring Physician  VALENTIN Lay MD  -MB     Patient Observations  alert;cooperative;agree to therapy  -MB     Patient/Family Observations  Pt's sister present at bedside.  -MB     General Observations of Patient  Pt. supine, IV heplocked, on RA, RN consent for PT.  -MB     Prior Level of Function  independent:;all household mobility;gait;transfer;bed mobility;ADL's Pt. reports h/o  mulitiple falls at home.  -MB     Equipment Currently Used at Home  bath bench;cane, quad;grab bar;ramp;walker, rolling;rollator  -MB     Pertinent History of Current Functional Problem  Pt. is a 79 yo female adm w/ dizziness, weakness, and double vision.  MRI (-) acute findings; revealed extensive small vessel changes in periventricular WM.      -MB     Existing Precautions/Restrictions  fall  -MB     Limitations/Impairments  visual h/o double vision; dizziness  -MB     Risks Reviewed  patient and family:;LOB;nausea/vomiting;dizziness;increased discomfort;change in vital signs  -MB     Benefits Reviewed  patient and family:;improve function;increase independence;increase strength;increase balance;decrease risk of DVT;improve skin integrity;increase knowledge  -MB     Barriers to Rehab  previous functional deficit h/o multiple falls at home  -MB     Row Name 12/19/18 0920          Relationship/Environment    Primary Source of Support/Comfort  sibling(s) 2 sisters  -MB     Lives With  alone  -MB     Row Name 12/19/18 0920          Resource/Environmental Concerns    Current Living Arrangements  home/apartment/condo  -MB     Row Name 12/19/18 0920           Cognitive Assessment/Intervention- PT/OT    Orientation Status (Cognition)  oriented x 4  -MB     Follows Commands (Cognition)  WNL  -MB     Personal Safety Interventions  fall prevention program maintained;gait belt;muscle strengthening facilitated;nonskid shoes/slippers when out of bed  -MB     Row Name 12/19/18 0920          Safety Issues, Functional Mobility    Impairments Affecting Function (Mobility)  balance;coordination;strength;visual/perceptual  -MB     Row Name 12/19/18 0920          Bed Mobility Assessment/Treatment    Bed Mobility Assessment/Treatment  supine-sit  -MB     Supine-Sit [place] (Bed Mobility)  supervision  -MB     Assistive Device (Bed Mobility)  head of bed elevated;bed rails  -MB     Comment (Bed Mobility)  Pt. advanced to EOB w/ supervision only.  -MB     Row Name 12/19/18 0920          Transfer Assessment/Treatment    Transfer Assessment/Treatment  sit-stand transfer;stand-sit transfer  -MB     Comment (Transfers)  VCs for safe hand placement and upright posture in standing.  -MB     Sit-Stand [place] (Transfers)  contact guard;verbal cues  -MB     Stand-Sit [place] (Transfers)  contact guard;verbal cues  -MB     Row Name 12/19/18 0920          Sit-Stand Transfer    Assistive Device (Sit-Stand Transfers)  walker, front-wheeled  -MB     Row Name 12/19/18 0920          Stand-Sit Transfer    Assistive Device (Stand-Sit Transfers)  walker, front-wheeled  -MB     Row Name 12/19/18 0920          Gait/Stairs Assessment/Training    [place] Level (Gait)  contact guard;verbal cues  -MB     Assistive Device (Gait)  walker, front-wheeled  -MB     Distance in Feet (Gait)  150  -MB     Pattern (Gait)  step-through  -MB     Deviations/Abnormal Patterns (Gait)  el decreased;stride length decreased  -MB     Bilateral Gait Deviations  forward flexed posture;heel strike decreased  -MB     Comment (Gait/Stairs)  Pt. demo step through gait pattern w/ slow el; VCs for  upright posture and increased B heelstrike.  Gait distance limited by dizziness.  -[facility] Name 12/19/18 0920          General ROM    GENERAL ROM COMMENTS  BLE WFL.  -[facility] Name 12/19/18 0920          MMT (Manual Muscle Testing)    General MMT Comments  BLE grossly 4/5.  -[facility] Name 12/19/18 0920          Motor Assessment/Intervention    Additional Documentation  Balance (Group)  -[facility] Name 12/19/18 0920          Balance    Balance  static standing balance;dynamic standing balance  -MB     Row Name 12/19/18 0920          Static Standing Balance    Level [place] (Supported Standing, Static Balance)  supervision  -MB     Time Able to Maintain Position (Supported Standing, Static Balance)  1 to 2 minutes  -MB     Assistive Device Utilized (Supported Standing, Static Balance)  rolling walker  -MB     Row Name 12/19/18 0920          Dynamic Standing Balance    Level of [place], Reaches Outside Midline (Standing, Dynamic Balance)  contact guard assist  -MB     Time Able to Maintain Position, Reaches Outside Midline (Standing, Dynamic Balance)  1 to 2 minutes  -MB     Lower Extremity Device, Reaches Outside Midline (Standing, Dynamic Balance)  -- w/ RW  -MB     Row Name 12/19/18 0920          Sensory Assessment/Intervention    Sensory General Assessment  no sensation deficits identified  -MB     Row Name 12/19/18 0920          Vision Assessment/Intervention    Visual Impairment/Limitations  corrective lenses for reading;diplopia Pt. reports mild diplopia, intermittently.  -MB     Row Name 12/19/18 0920          Pain Scale: Numbers Pre/Post-Treatment    Pain Scale: Numbers, Pretreatment  0/10 - no pain  -MB     Pain Scale: Numbers, Post-Treatment  0/10 - no pain  -MB     Row Name 12/19/18 0920          Plan of Care Review    Plan of Care Reviewed With  patient;sibling  -MB     Row Name 12/19/18 0920          Physical Therapy Clinical Impression    Date of Referral to PT  12/18/18  -MB     PT  Diagnosis (PT Clinical Impression)  Functional decline  -MB     Functional Level at Time of Evaluation (PT Clinical Impression)  Pt. requires CGA for safety.  -MB     Patient/Family Goals Statement (PT Clinical Impression)  Return to home; independent mobility  -MB     Criteria for Skilled Interventions Met (PT Clinical Impression)  yes;treatment indicated  -MB     Rehab Potential (PT Clinical Summary)  good, to achieve stated therapy goals  -MB     Care Plan Review (PT)  evaluation/treatment results reviewed;care plan/treatment goals reviewed;risks/benefits reviewed;current/potential barriers reviewed;patient/other agree to care plan  -MB     Care Plan Review, Other Participant (PT Clinical Impression)  sibling  -MB     Row Name 12/19/18 0920          Vital Signs    Pre Systolic BP Rehab  158  -MB     Pre Treatment Diastolic BP  92  -MB     Intra Systolic BP Rehab  134  -MB     Intra Treatment Diastolic BP  103  -MB     Post Systolic BP Rehab  153  -MB     Post Treatment Diastolic BP  112 RN aware.  -MB     Pre Patient Position  Supine  -MB     Intra Patient Position  Standing  -MB     Post Patient Position  Sitting  -MB     Row Name 12/19/18 0920          Physical Therapy Goals    Bed Mobility Goal Selection (PT)  bed mobility, PT goal 1  -MB     Transfer Goal Selection (PT)  transfer, PT goal 1  -MB     Gait Training Goal Selection (PT)  gait training, PT goal 1  -MB     Row Name 12/19/18 0920          Bed Mobility Goal 1 (PT)    Activity/Assistive Device (Bed Mobility Goal 1, PT)  bed mobility activities, all  -MB     [place] Level/Cues Needed (Bed Mobility Goal 1, PT)  independent  -MB     Time Frame (Bed Mobility Goal 1, PT)  1 week  -MB     Progress/Outcomes (Bed Mobility Goal 1, PT)  goal ongoing  -MB     Row Name 12/19/18 0920          Transfer Goal 1 (PT)    Activity/Assistive Device (Transfer Goal 1, PT)  sit-to-stand/stand-to-sit;bed-to-chair/chair-to-bed;walker, rolling  -MB     [place]  Level/Cues Needed (Transfer Goal 1, PT)  conditional independence  -MB     Time Frame (Transfer Goal 1, PT)  1 week  -MB     Progress/Outcome (Transfer Goal 1, PT)  goal ongoing  -MB     Row Name 12/19/18 0920          Gait Training Goal 1 (PT)    Activity/Assistive Device (Gait Training Goal 1, PT)  gait (walking locomotion);walker, rolling  -MB     [place] Level (Gait Training Goal 1, PT)  conditional independence  -MB     Distance (Gait Goal 1, PT)  250  -MB     Time Frame (Gait Training Goal 1, PT)  1 week  -MB     Progress/Outcome (Gait Training Goal 1, PT)  goal ongoing  -MB     Row Name 12/19/18 0920          Patient Education Goal (PT)    Activity (Patient Education Goal, PT)  HEP for strengthening  -MB     [place]/Cues/Accuracy (Memory Goal 2, PT)  independent;verbalizes understanding  -MB     Time Frame (Patient Education Goal, PT)  1 week  -MB     Progress/Outcome (Patient Education Goal, PT)  goal ongoing  -MB     Row Name 12/19/18 0920          Positioning and Restraints    Pre-Treatment Position  in bed  -MB     Post Treatment Position  chair  -MB     In Chair  notified nsg;reclined;call light within reach;encouraged to call for assist;exit alarm on;with family/caregiver;legs elevated  -MB     Row Name 12/19/18 0920          Living Environment    Home Accessibility  tub/shower is not walk in ramp to enter  -MB       User Key  (r) = Recorded By, (t) = Taken By, (c) = Cosigned By    Initials Name Provider Type    Mahnaz Wheeler, PT Physical Therapist        Physical Therapy Education     Title: PT OT SLP Therapies (In Progress)     Topic: Physical Therapy (Done)     Point: Mobility training (Done)     Learning Progress Summary           Patient Acceptance, E,D, VU,NR by MB at 12/19/2018  2:09 PM    Comment:  home safety, fall precautions, gait mechanics   Family Acceptance, E,D, VU,NR by MB at 12/19/2018  2:09 PM    Comment:  home safety, fall precautions, gait mechanics                    Point: Home exercise program (Done)     Learning Progress Summary           Patient Acceptance, E,D, VU,NR by MB at 12/19/2018  2:09 PM    Comment:  home safety, fall precautions, gait mechanics   Family Acceptance, E,D, VU,NR by MB at 12/19/2018  2:09 PM    Comment:  home safety, fall precautions, gait mechanics                   Point: Body mechanics (Done)     Learning Progress Summary           Patient Acceptance, E,D, VU,NR by MB at 12/19/2018  2:09 PM    Comment:  home safety, fall precautions, gait mechanics   Family Acceptance, E,D, VU,NR by MB at 12/19/2018  2:09 PM    Comment:  home safety, fall precautions, gait mechanics                   Point: Precautions (Done)     Learning Progress Summary           Patient Acceptance, E,D, VU,NR by MB at 12/19/2018  2:09 PM    Comment:  home safety, fall precautions, gait mechanics   Family Acceptance, E,D, VU,NR by MB at 12/19/2018  2:09 PM    Comment:  home safety, fall precautions, gait mechanics                               User Key     Initials Effective Dates Name Provider Type Discipline    MB 03/14/16 -  Mahnaz Dumont, PT Physical Therapist PT              PT Recommendation and Plan  Anticipated Discharge Disposition (PT): home with home health, home with assist  Planned Therapy Interventions (PT Eval): balance training, bed mobility training, gait training, home exercise program, patient/family education, strengthening, transfer training  Therapy Frequency (PT Clinical Impression): daily  Outcome Summary/Treatment Plan (PT)  Anticipated Equipment Needs at Discharge (PT): (none)  Anticipated Discharge Disposition (PT): home with home health, home with assist  Plan of Care Reviewed With: patient, sibling  Progress: improving  Outcome Summary: PT eval completed.  Pt. pleasant, w/ good effort.  Pt. presents w/ intermittent dizziness, generalized weakness, mild balance and coordination deficits, and mildly unsteady gait.  Pt. performed sit to stand  transfers w/ RW, CGA and ambulated 150 ft w/ RW and CGA.  Pt. will benefit from skilled IPPT to promote return to PLOF.  Recommend HHPT at D/C.   Outcome Measures     Row Name 12/19/18 0937 12/19/18 0920          How much help from another person do you currently need...    Turning from your back to your side while in flat bed without using bedrails?  --  4  -MB     Moving from lying on back to sitting on the side of a flat bed without bedrails?  --  4  -MB     Moving to and from a bed to a chair (including a wheelchair)?  --  3  -MB     Standing up from a chair using your arms (e.g., wheelchair, bedside chair)?  --  3  -MB     Climbing 3-5 steps with a railing?  --  3  -MB     To walk in hospital room?  --  3  -MB     AM-PAC 6 Clicks Score  --  20  -MB        How much help from another is currently needed...    Putting on and taking off regular lower body clothing?  3  -AC  --     Bathing (including washing, rinsing, and drying)  3  -AC  --     Toileting (which includes using toilet bed pan or urinal)  3  -AC  --     Putting on and taking off regular upper body clothing  3  -AC  --     Taking care of personal grooming (such as brushing teeth)  3  -AC  --     Eating meals  4  -AC  --     Score  19  -AC  --        Functional Assessment    Outcome Measure Options  AM-PAC 6 Clicks Daily Activity (OT)  -AC  AM-PAC 6 Clicks Basic Mobility (PT)  -MB       User Key  (r) = Recorded By, (t) = Taken By, (c) = Cosigned By    Initials Name Provider Type    Kristal Wadsworth, OT Occupational Therapist    Mahnaz Wheeler, PT Physical Therapist         Time Calculation:   PT Charges     Row Name 12/19/18 1413             Time Calculation    Start Time  0920  -MB      PT Received On  12/19/18  -MB      PT Goal Re-Cert Due Date  12/29/18  -MB        User Key  (r) = Recorded By, (t) = Taken By, (c) = Cosigned By    Initials Name Provider Type    Mahnaz Wheeler, PT Physical Therapist        Therapy Suggested Charges      Code   Minutes Charges    None           Therapy Charges for Today     Code Description Service Date Service Provider Modifiers Qty    77797363140 HC PT MOBILITY CURRENT 12/19/2018 Mahnaz Dumont, PT GP, CJ 1    62271158867 HC PT MOBILITY PROJECTED 12/19/2018 Mahnaz Dumont, PT GP, CI 1    81643226969 HC PT EVAL MOD COMPLEXITY 4 12/19/2018 Mahnaz Dumont, PT GP 1          PT G-Codes  Outcome Measure Options: AM-PAC 6 Clicks Daily Activity (OT)  AM-PAC 6 Clicks Score: 20  Score: 19  Functional Limitation: Mobility: Walking and moving around  Mobility: Walking and Moving Around Current Status (): At least 20 percent but less than 40 percent impaired, limited or restricted  Mobility: Walking and Moving Around Goal Status (): At least 1 percent but less than 20 percent impaired, limited or restricted      Mahnaz Dumont, PT  12/19/2018

## 2018-12-19 NOTE — SIGNIFICANT NOTE
Pt. admitted on the CVA Pathway which has been ruled out per chart review.  Spoke with nurse who report that pt. is not experiencing any speech/language/cog-ling deficits and passed the swallow screen upon admit.  RN to discontinue SLP order as not indicated at this time.

## 2018-12-19 NOTE — DISCHARGE INSTR - LAB
Spoke to Luisa @ dr Pandya office [phone]  she stated to fax information to [phone]. She would review information and  contact patient . Dr pandya is presently out of the country  and will be back  The  First of Jan. Dr Sidhu notified

## 2018-12-19 NOTE — PROGRESS NOTES
Case Management Discharge Note    Final Note: I spoke with the pt and her son in the room. She will be staying at her sons home in [place] at [address] I called the referral to Maciej at [facility] per pt choice. Maciej is aware of the sons adress. No other DC needs per pt.    Destination      No service has been selected for the patient.      Durable Medical Equipment      No service has been selected for the patient.      Dialysis/Infusion      No service has been selected for the patient.      Home Medical Care - Selection Complete      Service Provider Request Status Selected Services Address Phone Number Fax Number    [facility] Selected [facility] [address] [address] [phone]      Community Resources      No service has been selected for the patient.             Final Discharge Disposition Code: 06 - home with home health care

## 2018-12-20 ENCOUNTER — TRANSITIONAL CARE MANAGEMENT TELEPHONE ENCOUNTER (OUTPATIENT)
Dept: FAMILY MEDICINE CLINIC | Facility: CLINIC | Age: 78
End: 2018-12-20

## 2018-12-20 LAB — LEVETIRACETAM SERPL-MCNC: 17.8 UG/ML (ref 10–40)

## 2018-12-20 NOTE — THERAPY DISCHARGE NOTE
Acute Care - Occupational Therapy Discharge Summary  [facility]     Patient Name: Mirian Sy  : 1940  MRN: [id number]    Today's Date: 2018  Onset of Illness/Injury or Date of Surgery: 18    Date of Referral to OT: 18  Referring Physician: MD Alireza      Admit Date: 2018        OT Recommendation and Plan    Visit Dx:    ICD-10-CM ICD-9-CM   1. Benign paroxysmal positional vertigo, unspecified laterality H81.10 386.11   2. Impaired mobility and ADLs Z74.09 799.89   3. Impaired functional mobility, balance, gait, and endurance Z74.09 V49.89               Rehab Goal Summary     Row Name 18 1443             Transfer Goal 1 (OT)    Progress/Outcome (Transfer Goal 1, OT)  goal not met;discharged from facility  -SUSANNAH         Dressing Goal 1 (OT)    Progress/Outcome (Dressing Goal 1, OT)  goal not met;discharged from facility  -SUSANNAH         Toileting Goal 1 (OT)    Progress/Outcome (Toileting Goal 1, OT)  goal not met;discharged from facility  -SUSANNAH        User Key  (r) = Recorded By, (t) = Taken By, (c) = Cosigned By    Initials Name Provider Type Discipline    Jessica Del Toro, OT Occupational Therapist OT          Outcome Measures     Row Name 18 0937 18 0920          How much help from another person do you currently need...    Turning from your back to your side while in flat bed without using bedrails?  --  4  -MB     Moving from lying on back to sitting on the side of a flat bed without bedrails?  --  4  -MB     Moving to and from a bed to a chair (including a wheelchair)?  --  3  -MB     Standing up from a chair using your arms (e.g., wheelchair, bedside chair)?  --  3  -MB     Climbing 3-5 steps with a railing?  --  3  -MB     To walk in hospital room?  --  3  -MB     AM-PAC 6 Clicks Score  --  20  -MB        How much help from another is currently needed...    Putting on and taking off regular lower body clothing?  3  -AC  --     Bathing (including washing,  rinsing, and drying)  3  -AC  --     Toileting (which includes using toilet bed pan or urinal)  3  -AC  --     Putting on and taking off regular upper body clothing  3  -AC  --     Taking care of personal grooming (such as brushing teeth)  3  -AC  --     Eating meals  4  -AC  --     Score  19  -AC  --        Functional Assessment    Outcome Measure Options  AM-PAC 6 Clicks Daily Activity (OT)  -AC  AM-PAC 6 Clicks Basic Mobility (PT)  -MB       User Key  (r) = Recorded By, (t) = Taken By, (c) = Cosigned By    Initials Name Provider Type    AC Kristal Hudson, OT Occupational Therapist    MB Mahnaz Dumont, PT Physical Therapist          Therapy Suggested Charges     Code   Minutes Charges    95676 (CPT®) Hc Ot Neuromusc Re Education Ea 15 Min      86709 (CPT®) Hc Ot Ther Proc Ea 15 Min      04068 (CPT®) Hc Ot Therapeutic Act Ea 15 Min      62875 (CPT®) Hc Ot Manual Therapy Ea 15 Min      91581 (CPT®) Hc Ot Iontophoresis Ea 15 Min      19156 (CPT®) Hc Ot Elec Stim Ea-Per 15 Min      80921 (CPT®) Hc Ot Ultrasound Ea 15 Min      14211 (CPT®) Hc Ot Self Care/Mgmt/Train Ea 15 Min 3     Total  3               OT Discharge Summary  Reason for Discharge: Discharge from facility  Outcomes Achieved: Discharge from facility occurred on same date as evluation  Discharge Destination: Home with assist, Home with home health      Jessica Coronel OT  12/20/2018

## 2018-12-21 ENCOUNTER — OFFICE VISIT (OUTPATIENT)
Dept: FAMILY MEDICINE CLINIC | Facility: CLINIC | Age: 78
End: 2018-12-21

## 2018-12-21 VITALS
SYSTOLIC BLOOD PRESSURE: 162 MMHG | HEART RATE: 80 BPM | WEIGHT: 107 LBS | BODY MASS INDEX: 17.81 KG/M2 | DIASTOLIC BLOOD PRESSURE: 94 MMHG

## 2018-12-21 DIAGNOSIS — H53.2 DIPLOPIA: ICD-10-CM

## 2018-12-21 DIAGNOSIS — R42 VERTIGO: Primary | ICD-10-CM

## 2018-12-21 PROCEDURE — 99213 OFFICE O/P EST LOW 20 MIN: CPT | Performed by: NURSE PRACTITIONER

## 2018-12-21 NOTE — PATIENT INSTRUCTIONS
Vertigo  Vertigo is the feeling that you or your surroundings are moving when they are not. Vertigo can be dangerous if it occurs while you are doing something that could endanger you or others, such as driving.  What are the causes?  This condition is caused by a disturbance in the signals that are sent by your body’s sensory systems to your brain. Different causes of a disturbance can lead to vertigo, including:  · Infections, especially in the inner ear.  · A bad reaction to a drug, or misuse of alcohol and medicines.  · Withdrawal from drugs or alcohol.  · Quickly changing positions, as when lying down or rolling over in bed.  · Migraine headaches.  · Decreased blood flow to the brain.  · Decreased blood pressure.  · Increased pressure in the brain from a head or neck injury, stroke, infection, tumor, or bleeding.  · Central nervous system disorders.    What are the signs or symptoms?  Symptoms of this condition usually occur when you move your head or your eyes in different directions. Symptoms may start suddenly, and they usually last for less than a minute. Symptoms may include:  · Loss of balance and falling.  · Feeling like you are spinning or moving.  · Feeling like your surroundings are spinning or moving.  · Nausea and vomiting.  · Blurred vision or double vision.  · Difficulty hearing.  · Slurred speech.  · Dizziness.  · Involuntary eye movement (nystagmus).    Symptoms can be mild and cause only slight annoyance, or they can be severe and interfere with daily life. Episodes of vertigo may return (recur) over time, and they are often triggered by certain movements. Symptoms may improve over time.  How is this diagnosed?  This condition may be diagnosed based on medical history and the quality of your nystagmus. Your health care provider may test your eye movements by asking you to quickly change positions to trigger the nystagmus. This may be called the Ivan-Hallpike test, head thrust test, or roll test.  You may be referred to a health care provider who specializes in ear, nose, and throat (ENT) problems (otolaryngologist) or a provider who specializes in disorders of the central nervous system (neurologist).  You may have additional testing, including:  · A physical exam.  · Blood tests.  · MRI.  · A CT scan.  · An electrocardiogram (ECG). This records electrical activity in your heart.  · An electroencephalogram (EEG). This records electrical activity in your brain.  · Hearing tests.    How is this treated?  Treatment for this condition depends on the cause and the severity of the symptoms. Treatment options include:  · Medicines to treat nausea or vertigo. These are usually used for severe cases. Some medicines that are used to treat other conditions may also reduce or eliminate vertigo symptoms. These include:  ? Medicines that control allergies (antihistamines).  ? Medicines that control seizures (anticonvulsants).  ? Medicines that relieve depression (antidepressants).  ? Medicines that relieve anxiety (sedatives).  · Head movements to adjust your inner ear back to normal. If your vertigo is caused by an ear problem, your health care provider may recommend certain movements to correct the problem.  · Surgery. This is rare.    Follow these instructions at home:  Safety  · Move slowly.Avoid sudden body or head movements.  · Avoid driving.  · Avoid operating heavy machinery.  · Avoid doing any tasks that would cause danger to you or others if you would have a vertigo episode during the task.  · If you have trouble walking or keeping your balance, try using a cane for stability. If you feel dizzy or unstable, sit down right away.  · Return to your normal activities as told by your health care provider. Ask your health care provider what activities are safe for you.  General instructions  · Take over-the-counter and prescription medicines only as told by your health care provider.  · Avoid certain positions or  movements as told by your health care provider.  · Drink enough fluid to keep your urine clear or pale yellow.  · Keep all follow-up visits as told by your health care provider. This is important.  Contact a health care provider if:  · Your medicines do not relieve your vertigo or they make it worse.  · You have a fever.  · Your condition gets worse or you develop new symptoms.  · Your family or friends notice any behavioral changes.  · Your nausea or vomiting gets worse.  · You have numbness or a “pins and needles” sensation in part of your body.  Get help right away if:  · You have difficulty moving or speaking.  · You are always dizzy.  · You faint.  · You develop severe headaches.  · You have weakness in your hands, arms, or legs.  · You have changes in your hearing or vision.  · You develop a stiff neck.  · You develop sensitivity to light.  This information is not intended to replace advice given to you by your health care provider. Make sure you discuss any questions you have with your health care provider.  Document Released: 09/27/2006 Document Revised: 05/31/2017 Document Reviewed: 04/11/2016  [employer] Patient Education © 2018 [employer]

## 2018-12-21 NOTE — PROGRESS NOTES
Transitional Care Follow Up Visit  Subjective     Mirian Sy is a 78 y.o. female who presents for a transitional care management visit.    Within 48 business hours after discharge our office contacted her via telephone to coordinate her care and needs.      I reviewed and discussed the details of that call along with the discharge summary, hospital problems, inpatient lab results, inpatient diagnostic studies, and consultation reports with Mirian.     Current outpatient and discharge medications have been reconciled for the patient.    Date of TCM Phone Call 5/23/2018 12/20/2018   [facility]   Date of Admission - 12/18/2018   Date of Discharge 5/22/2018 12/19/2018   Discharge Disposition Home or Self Care Home-Health Care [facility]     Risk for Readmission (LACE) Score: 4 (12/19/2018  6:00 AM)      History of Present Illness   Course During Hospital Stay:      Date of Admission: 12/18/2018  Date of Discharge:  12/19/2018    Hospital Course:     Mirian Sy is a 78 y.o. female with A-fib, history of CVA, remote history of vertigo presenting to the ED today due to dizziness and weakness. She was tachycardic on presentation but otherwise VS were WNl. Exam on admission was positive for internuclear ophthalmoplegia or right eye but otherwise neurological exam was non focal. CT head was negative for acute process. MRI obtained and was negative for acute stroke. Neurology was consulted and recommended EEG and close follow-up with neuro-ophthalmologist as outpatient. EEG was negative for seizure activity.      On discharge, patient is not longer having diplopia but does continue to have intermittent dizziness. Per neurology recommendations, she will follow-up with Dr. Dr. Carmona at  for neuro-ophthalmology. She will continue on her home medications. Follow-up in 1 week with PCP. Patient instructed to contact PCP is she continues to have dizziness. Home  "health with PT and OT has been established.      Discharge Follow Up Recommendations for labs/diagnostics:  -- Follow-up with PCP in 1 week.   -- Follow-up with Dr. Carmona first available.       12/21/2018- \"Pt awoke this AM for first time without dizziness and double vision\". Pt is walking with walker without difficulty. Eating well. Wt up 2 lbs since admitted. Pt has not had a BM since Sunday. Urination is good. Sleeping real well, 9 hrs per night. Has not needed meclizine since home. Pt does have home PT/OT.     The following portions of the patient's history were reviewed and updated as appropriate: allergies, current medications, past family history, past medical history, past social history, past surgical history and problem list.    Review of Systems   Constitutional: Positive for fatigue. Negative for chills and fever.   Eyes: Negative for visual disturbance.   Respiratory: Negative for cough, shortness of breath and wheezing.    Cardiovascular: Negative for chest pain.   Gastrointestinal: Negative.    Genitourinary: Negative.    Neurological: Negative for dizziness, light-headedness and headaches.   Psychiatric/Behavioral: Negative for sleep disturbance.       Objective   Physical Exam   Constitutional: She is oriented to person, place, and time. She appears well-developed and well-nourished.   HENT:   Head: Normocephalic and atraumatic.   Cardiovascular: Normal rate, regular rhythm and normal heart sounds.   Pulmonary/Chest: Effort normal and breath sounds normal.   Musculoskeletal: She exhibits no edema.   Neurological: She is alert and oriented to person, place, and time. No sensory deficit. She exhibits normal muscle tone. Coordination normal.   Skin: Skin is warm and dry.   Psychiatric: She has a normal mood and affect. Her behavior is normal.   Vitals reviewed.      Assessment/Plan   Problems Addressed this Visit        Other    Diplopia    Vertigo - Primary      Pt to get appt with Dr. Carmona at " . I gave info for them to call. No recurrent SX. Pt to let us know if SX return.   Patient was encouraged to keep me informed of any acute changes, lack of improvement, or any new concerning symptoms.    Keep Appt with Dr Engel as scheduled.

## 2018-12-24 DIAGNOSIS — I48.0 PAF (PAROXYSMAL ATRIAL FIBRILLATION) (HCC): ICD-10-CM

## 2018-12-26 RX ORDER — FLECAINIDE ACETATE 50 MG/1
TABLET ORAL
Qty: 180 TABLET | Refills: 1 | Status: SHIPPED | OUTPATIENT
Start: 2018-12-26 | End: 2019-05-07

## 2018-12-26 RX ORDER — MIRTAZAPINE 7.5 MG/1
TABLET, FILM COATED ORAL
Qty: 90 TABLET | Refills: 1 | Status: SHIPPED | OUTPATIENT
Start: 2018-12-26 | End: 2019-05-07

## 2018-12-28 NOTE — OUTREACH NOTE
The patient was discharged from and attended a hospital follow up scheduled with PCP which is within 2 business days of hospital discharge.  No additional contact attempts required.

## 2019-01-04 ENCOUNTER — TELEPHONE (OUTPATIENT)
Dept: URGENT CARE | Facility: CLINIC | Age: 79
End: 2019-01-04

## 2019-01-04 ENCOUNTER — TELEPHONE (OUTPATIENT)
Dept: FAMILY MEDICINE CLINIC | Facility: CLINIC | Age: 79
End: 2019-01-04

## 2019-01-04 NOTE — TELEPHONE ENCOUNTER
Patient needs order for home health nursing for another 2 weeks because of dizzy spells. Please call Kayla Ramon at [phone] and she can do a verbal order.

## 2019-01-04 NOTE — TELEPHONE ENCOUNTER
Connor from  PT called to get verbal orders for the patient.Twice a week for 4 weeks therapeutic exercises, gait training, and a home program. Please call back at [phone]

## 2019-01-07 ENCOUNTER — OFFICE VISIT (OUTPATIENT)
Dept: FAMILY MEDICINE CLINIC | Facility: CLINIC | Age: 79
End: 2019-01-07

## 2019-01-07 ENCOUNTER — APPOINTMENT (OUTPATIENT)
Dept: LAB | Facility: HOSPITAL | Age: 79
End: 2019-01-07

## 2019-01-07 VITALS
OXYGEN SATURATION: 96 % | HEIGHT: 65 IN | DIASTOLIC BLOOD PRESSURE: 82 MMHG | SYSTOLIC BLOOD PRESSURE: 130 MMHG | HEART RATE: 101 BPM | BODY MASS INDEX: 17.81 KG/M2

## 2019-01-07 DIAGNOSIS — R53.1 WEAKNESS: ICD-10-CM

## 2019-01-07 DIAGNOSIS — R42 VERTIGO: Primary | ICD-10-CM

## 2019-01-07 LAB
ALBUMIN SERPL-MCNC: 4.19 G/DL (ref 3.2–4.8)
ALBUMIN/GLOB SERPL: 1.5 G/DL (ref 1.5–2.5)
ALP SERPL-CCNC: 84 U/L (ref 25–100)
ALT SERPL W P-5'-P-CCNC: 17 U/L (ref 7–40)
ANION GAP SERPL CALCULATED.3IONS-SCNC: 8 MMOL/L (ref 3–11)
AST SERPL-CCNC: 31 U/L (ref 0–33)
BASOPHILS # BLD AUTO: 0.04 10*3/MM3 (ref 0–0.2)
BASOPHILS NFR BLD AUTO: 0.6 % (ref 0–1)
BILIRUB SERPL-MCNC: 0.4 MG/DL (ref 0.3–1.2)
BUN BLD-MCNC: 23 MG/DL (ref 9–23)
BUN/CREAT SERPL: 19.8 (ref 7–25)
CALCIUM SPEC-SCNC: 9.3 MG/DL (ref 8.7–10.4)
CHLORIDE SERPL-SCNC: 101 MMOL/L (ref 99–109)
CO2 SERPL-SCNC: 26 MMOL/L (ref 20–31)
CREAT BLD-MCNC: 1.16 MG/DL (ref 0.6–1.3)
CRP SERPL-MCNC: 0.41 MG/DL (ref 0–1)
DEPRECATED RDW RBC AUTO: 47.2 FL (ref 37–54)
EOSINOPHIL # BLD AUTO: 0.09 10*3/MM3 (ref 0–0.3)
EOSINOPHIL NFR BLD AUTO: 1.3 % (ref 0–3)
ERYTHROCYTE [DISTWIDTH] IN BLOOD BY AUTOMATED COUNT: 14.3 % (ref 11.3–14.5)
GFR SERPL CREATININE-BSD FRML MDRD: 45 ML/MIN/1.73
GLOBULIN UR ELPH-MCNC: 2.7 GM/DL
GLUCOSE BLD-MCNC: 108 MG/DL (ref 70–100)
HCT VFR BLD AUTO: 35.5 % (ref 34.5–44)
HGB BLD-MCNC: 11.4 G/DL (ref 11.5–15.5)
IMM GRANULOCYTES # BLD AUTO: 0.02 10*3/MM3 (ref 0–0.03)
IMM GRANULOCYTES NFR BLD AUTO: 0.3 % (ref 0–0.6)
LYMPHOCYTES # BLD AUTO: 1.89 10*3/MM3 (ref 0.6–4.8)
LYMPHOCYTES NFR BLD AUTO: 26.3 % (ref 24–44)
MCH RBC QN AUTO: 29.5 PG (ref 27–31)
MCHC RBC AUTO-ENTMCNC: 32.1 G/DL (ref 32–36)
MCV RBC AUTO: 92 FL (ref 80–99)
MONOCYTES # BLD AUTO: 0.53 10*3/MM3 (ref 0–1)
MONOCYTES NFR BLD AUTO: 7.4 % (ref 0–12)
NEUTROPHILS # BLD AUTO: 4.65 10*3/MM3 (ref 1.5–8.3)
NEUTROPHILS NFR BLD AUTO: 64.4 % (ref 41–71)
PLATELET # BLD AUTO: 286 10*3/MM3 (ref 150–450)
PMV BLD AUTO: 9.9 FL (ref 6–12)
POTASSIUM BLD-SCNC: 4.1 MMOL/L (ref 3.5–5.5)
PROT SERPL-MCNC: 6.9 G/DL (ref 5.7–8.2)
RBC # BLD AUTO: 3.86 10*6/MM3 (ref 3.89–5.14)
SODIUM BLD-SCNC: 135 MMOL/L (ref 132–146)
WBC NRBC COR # BLD: 7.2 10*3/MM3 (ref 3.5–10.8)

## 2019-01-07 PROCEDURE — 99214 OFFICE O/P EST MOD 30 MIN: CPT | Performed by: FAMILY MEDICINE

## 2019-01-07 PROCEDURE — 85025 COMPLETE CBC W/AUTO DIFF WBC: CPT | Performed by: FAMILY MEDICINE

## 2019-01-07 PROCEDURE — 86140 C-REACTIVE PROTEIN: CPT | Performed by: FAMILY MEDICINE

## 2019-01-07 PROCEDURE — 36415 COLL VENOUS BLD VENIPUNCTURE: CPT | Performed by: FAMILY MEDICINE

## 2019-01-07 PROCEDURE — 80053 COMPREHEN METABOLIC PANEL: CPT | Performed by: FAMILY MEDICINE

## 2019-01-07 PROCEDURE — 80177 DRUG SCRN QUAN LEVETIRACETAM: CPT | Performed by: FAMILY MEDICINE

## 2019-01-07 NOTE — PATIENT INSTRUCTIONS
1.  Blood work today. Avoid excess meclizine or other medications if you can avoid. We'll check blood levels, inflammatory markers, and Keppra level.

## 2019-01-07 NOTE — PROGRESS NOTES
After Visit Summary   3/10/2017    Gadiel James    MRN: [id number]           Patient Information     Date Of Birth          2002        Visit Information        Provider Department      3/10/2017 1:00 PM Oj Nathan MD [facility]        Today's Diagnoses     Active autistic disorder    -  1    Attention deficit hyperactivity disorder (ADHD), combined type        Major depressive disorder, single episode, moderate (H)        Developmental reading disorder        Anxiety           Follow-ups after your visit        Your next 10 appointments already scheduled     [place] 15, 2017  2:30 PM CDT   Return Visit with Oj Nathan MD   [facility] ([facility])    [address]  [facility] [address] [phone]              Who to contact     If you have questions or need follow up information about today's clinic visit or your schedule please contact [facility] directly at [phone].  Normal or non-critical lab and imaging results will be communicated to you by [employer], letter or phone within 4 business days after the clinic has received the results. If you do not hear from us within 7 days, please contact the clinic through [email or web address] or phone. If you have a critical or abnormal lab result, we will notify you by phone as soon as possible.  Submit refill requests through [employer] or call your pharmacy and they will forward the refill request to us. Please allow 3 business days for your refill to be completed.          Additional Information About Your Visit        MyChart Information     [employer] lets you send messages to your doctor, view your test results, renew your prescriptions, schedule appointments and more. To sign up, go to [email or web address], contact your [place] clinic or call [phone] during business hours.            Care EveryWhere ID     This  Subjective   Mirian Sy is a 78 y.o. female.     Chief Complaint   Patient presents with   • Follow-up       History of Present Illness     Mirian Sy presents today for follow up on her anemia, depression, and cardiac risk. She has well-controlled hypertension and hyperlipidemia. Her atrial fibrillation has fairly giid rhythm control.    The following portions of the patient's history were reviewed and updated as appropriate: allergies, current medications, past family history, past medical history, past social history, past surgical history and problem list.    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   • Depression 05/16/2016   • Chronic gastritis 05/16/2016   • Hypertension 05/16/2016   • Hyponatremia 05/16/2016   • Nutritional anemia 05/16/2016   • Dyssomnia 05/16/2016   • Diplopia 05/16/2016   • Xeroderma 05/16/2016   • Preventative health care 07/18/2016   • Frailty 09/15/2016   • Tricuspid regurgitation 09/15/2016   • Cerebrovascular accident (CMS/[facility]) 09/22/2016   • Syncope 09/22/2016   • DVT (deep venous thrombosis) (CMS/[facility]) 09/22/2016   • Anorexia 09/22/2016   • Anxiety 09/22/2016   • Patent foramen ovale    • Mitral regurgitation 09/30/2016   • Atrial fibrillation with RVR (CMS/[facility]) 10/09/2016   • Low weight 12/08/2016   • Senile osteoporosis 06/07/2017   • Iron deficiency 04/20/2018   • PAF (paroxysmal atrial fibrillation) (CMS/[facility]) 05/08/2018   • Closed fracture of left ilium (CMS/[facility]) 05/08/2018   • GERD (gastroesophageal reflux disease) 05/08/2018   • Fall at home 05/08/2018   • Vertigo 12/18/2018   • Internuclear ophthalmoplegia, 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 This could be used by other organizations to access your [place] medical records  [id number]        Your Vitals Were     Pulse Height BMI (Body Mass Index)             96 1.697 m (5' 6.81\") 28.86 kg/m2          Blood Pressure from Last 3 Encounters:   03/10/17 126/81   03/06/17 130/60   01/17/17 123/75    Weight from Last 3 Encounters:   03/10/17 83.1 kg (183 lb 3.2 oz) (99 %)*   03/06/17 82.5 kg (181 lb 14.4 oz) (99 %)*   01/17/17 79.8 kg (176 lb) (99 %)*     * Growth percentiles are based on [facility] 2-20 Years data.              Today, you had the following     No orders found for display         Today's Medication Changes          These changes are accurate as of: 3/10/17  2:29 PM.  If you have any questions, ask your nurse or doctor.               These medicines have changed or have updated prescriptions.        Dose/Directions    guanFACINE 2 MG Tb24 24 hr tablet   Commonly known as:  INTUNIV   This may have changed:  Another medication with the same name was removed. Continue taking this medication, and follow the directions you see here.   Used for:  Attention deficit hyperactivity disorder (ADHD), combined type        Dose:  2 mg   Take 1 tablet (2 mg) by mouth At Bedtime   Quantity:  30 tablet   Refills:  3         Stop taking these medicines if you haven't already. Please contact your care team if you have questions.     FLUoxetine 10 MG capsule   Commonly known as:  PROzac           FLUoxetine 20 MG capsule   Commonly known as:  PROzac                Where to get your medicines      These medications were sent to [facility] [place], [place] - [facility] [address]  [address] [facility] [address]     Phone:  [phone]     guanFACINE 2 MG Tb24 24 hr tablet         Some of these will need a paper prescription and others can be bought over the counter.  Ask your nurse if you have questions.     Bring a paper prescription for each of these medications " 05/16/2016   • Hyperlipidemia 05/16/2016   • Fatigue 09/15/2016   • Shaky 09/15/2016   • Shortness of breath 09/15/2016   • Urinary tract infection without hematuria 09/19/2016   • Tobacco abuse 09/22/2016   • VHD (valvular heart disease) 09/26/2016   • Atrial fibrillation with rapid ventricular response (CMS/[facility]) 10/11/2016   • Osteoporosis 06/07/2017     Past Medical History:   Diagnosis Date   • Atrial fibrillation (CMS/[facility]) 2005   • Basal cell carcinoma 2007   • Cardiomyopathy (CMS/[facility]) 2006   • Cerebrovascular accident (CMS/[facility]) 01/2005   • Complex partial epilepsy (CMS/[facility]) 2014   • Coronary artery vasospasm (CMS/[facility]) 2006   • Decubitus ulcer of buttock 2014   • Depression 2002   • DVT (deep venous thrombosis) (CMS/[facility]) 2014   • Fracture of bone of forefoot 1972   • Fracture of ilium, left (CMS/[facility]) 05/2018   • GERD (gastroesophageal reflux disease) 2014   • HTN (hypertension) 2005   • Iron deficiency anemia due to chronic blood loss 2018   • Low body weight due to inadequate caloric intake Adulthood   • Mitral valve prolapse 1997   • Osteoarthritis    • Osteoporosis    • Patent foramen ovale 2005   • Pneumonia 1947   • SCC (squamous cell carcinoma), arm, right 2017   • Scoliosis    • Syncope 2014   • Varicose veins 2005     Past Surgical History:   Procedure Laterality Date   • ACHILLES TENDON SURGERY Left 1997    Repair of rupture left tendon with screws   • BREAST CYST EXCISION Left    • BREAST SURGERY Left 1982    Excision benign cyst   • CARDIAC CATHETERIZATION  2006    Severe acute coronary spasm   • CATARACT EXTRACTION WITH INTRAOCULAR LENS IMPLANT Bilateral 2015   • EYE CAPSULOTOMY WITH LASER Left 2016    Marked visual improvement   • FEMUR FRACTURE SURGERY Right 2015    Repair of shaft fracture   • HIP FRACTURE SURGERY Left 2014    left hip fracture with pin   • LUMBAR DISC SURGERY  1983   • OVARIAN CYST REMOVAL Left 1996   • SKIN CANCER EXCISION Right 2017    SCC - arm     Family History   Problem      lisdexamfetamine 40 MG capsule    lisdexamfetamine 40 MG capsule    lisdexamfetamine 40 MG capsule                Primary Care Provider Office Phone # Fax #    Rajesh Radha Brower [phone]       [facility] [address]  [facility] [address]        Thank you!     Thank you for choosing [facility]  for your care. Our goal is always to provide you with excellent care. Hearing back from our patients is one way we can continue to improve our services. Please take a few minutes to complete the written survey that you may receive in the mail after your visit with us. Thank you!             Your Updated Medication List - Protect others around you: Learn how to safely use, store and throw away your medicines at www.disposemymeds.org.          This list is accurate as of: 3/10/17  2:29 PM.  Always use your most recent med list.                   Brand Name Dispense Instructions for use    CHILDRENS MULTI VITAMINS/IRON PO          guanFACINE 2 MG Tb24 24 hr tablet    INTUNIV    30 tablet    Take 1 tablet (2 mg) by mouth At Bedtime       * lisdexamfetamine 40 MG capsule    VYVANSE    30 capsule    Take 1 capsule (40 mg) by mouth every morning       * lisdexamfetamine 40 MG capsule    VYVANSE    30 capsule    Take 1 capsule (40 mg) by mouth every morning       * lisdexamfetamine 40 MG capsule    VYVANSE    30 capsule    Take 1 capsule (40 mg) by mouth every morning       order for DME     1 Device    Equipment being ordered: short CAM boot       * Notice:  This list has 3 medication(s) that are the same as other medications prescribed for you. Read the directions carefully, and ask your doctor or other care provider to review them with you.       Relation Age of Onset   • Breast cancer Mother          age 59   • Diabetes Mother    • Hypertension Mother    • Heart disease Father          age 80   • Other Sister         Arthrodesis cervical   • Basal cell carcinoma Sister    • Bone cancer Sister    • Breast cancer Sister 57   • Diabetes Sister          age 56   • Hypertension Sister    • Melanoma Sister    • Arrhythmia Sister         Sinus   • Stroke Sister    • Allergies Son         Hay fever   • Diabetes Maternal Uncle    • Breast cancer Sister 59   • Breast cancer Sister 66   • Ovarian cancer Sister         DX AGE UNKNOWN     Social History     Socioeconomic History   • Marital status:      Spouse name: Not on file   • Number of children: Not on file   • Years of education: Not on file   • Highest education level: Not on file   Social Needs   • Financial resource strain: Not on file   • Food insecurity - worry: Not on file   • Food insecurity - inability: Not on file   • Transportation needs - medical: Not on file   • Transportation needs - non-medical: Not on file   Occupational History   • Not on file   Tobacco Use   • Smoking status: Former Smoker     Packs/day: 1.00     Years: 20.00     Pack years: 20.00     Types: Cigarettes   • Smokeless tobacco: Never Used   • Tobacco comment: Remote history   Substance and Sexual Activity   • Alcohol use: No   • Drug use: No   • Sexual activity: Not on file   Other Topics Concern   • Not on file   Social History Narrative    Domestic life   lives in private home alone - good support from local children        Orthodoxy    Alevism        Sleep hygiene  in bed 9 PM to 6 AM for 9 hours of sleep        Caffeine use   1 1/2 cups coffee daily        Exercise habits  walks most days of the week. - Stretching each morning.          Diet   well-balanced diet with protein supplementations        Occupation    retired         Hearing  no impairment        Vision    no glasses needed after  cataract surgery.          Driving   daytime only - good weather - local traffic         Review of Systems   Constitutional: Negative.    HENT: Negative.    Respiratory: Negative.    Cardiovascular: Negative.        Objective   Blood pressure 110/70, pulse 94, temperature 97.4 °F (36.3 °C), temperature source Oral, resp. rate 18, weight 47.6 kg (105 lb), SpO2 98 %.  Nursing note reviewed  Physical Exam  Const: NAD, A&Ox4, Pleasant, Cooperative  Eyes: EOMI, no conjunctivitis  ENT: No nasal discharge present, neck supple  Cardiac: Regular rate and rhythm, no cyanosis  Resp: Respiratory rate within normal limits, no increased work of breathing, no audible wheezing or retractions noted  GI: No distention or ascites  MSK: Motor and sensation grossly intact in bilateral upper extremities  Neurologic: CN II-XII grossly intact  Psych: Appropriate mood and behavior.  Skin: Pink, warm, dry  Procedures  Assessment/Plan   Mirian was seen today for follow-up.    Diagnoses and all orders for this visit:    Nutritional anemia  -     CBC & Differential; Future  -     CBC & Differential  -     CBC Auto Differential    Other depression  -     Levetiracetam Level (Keppra); Future  -     Levetiracetam Level (Keppra)    Mixed hyperlipidemia  -     Comprehensive Metabolic Panel; Future  -     Lipid Panel; Future  -     Comprehensive Metabolic Panel  -     Lipid Panel    Essential hypertension  -     Comprehensive Metabolic Panel; Future  -     Comprehensive Metabolic Panel      Chronic health conditions:  #1 Anemia  Lab Results   Component Value Date    HCT 37.2 12/19/2018   Stable, currently not symptomatic    #2 HLD  Lab Results   Component Value Date    CHOL 165 12/19/2018    CHLPL 148 05/16/2016    TRIG 44 12/19/2018    HDL 51 12/19/2018     12/19/2018   At goal    #3 HTN  Stable, at goal    Patient Instructions   1.  Continue same medications and supplements - as listed.    2.  Continue well-balanced diet - low in salt and low  in sugar.    3.  Maintain a routine exercise program - every week.    4.  A letter will be sent with your test results.    5.  Obtain Hepatitis A vaccine from your pharmacy.      Return in about 3 months (around 3/17/2019) for follow up (fasting).    Ambulatory progress note signed and attested to by Oren Engel D.O.

## 2019-01-07 NOTE — PROGRESS NOTES
Subjective   Mirian Sy is a 78 y.o. female.     Chief Complaint   Patient presents with   • Dizziness     seen at ED about 3 weeks ago, dx of vertigo. Been doing better having some nausea, dizziness and shakiness today. Appt with neurology in March.       History of Present Illness     Mirian Sy presents today for follow-up on vertigo.  She was seen at the ER on 12/18/18 for dizziness and weakness.  She was also having some diplopia at the time, which resolved.  Per neurology recommendations, she was instructed to follow-up at  for neuro-ophthalmology.  She saw [facility] Al[facility]ran here at the office in follow-up on 12/21/18, and was doing well at that time.  She has an appointment with neuro-ophthalmology in March at  Dr. Carmona.    The following portions of the patient's history were reviewed and updated as appropriate: allergies, current medications, past family history, past medical history, past social history, past surgical history and problem list.    Active Ambulatory Problems     Diagnosis Date Noted   • Abnormal gait 05/16/2016   • Atrial fibrillation (CMS/HCC) 05/16/2016   • Cardiomyopathy (CMS/HCC) 05/16/2016   • Carpal tunnel syndrome 05/16/2016   • Complex partial epilepsy (CMS/HCC) 05/16/2016   • Depression 05/16/2016   • Chronic gastritis 05/16/2016   • Hypertension 05/16/2016   • Hyponatremia 05/16/2016   • Nutritional anemia 05/16/2016   • Dyssomnia 05/16/2016   • Diplopia 05/16/2016   • Xeroderma 05/16/2016   • Preventative health care 07/18/2016   • Frailty 09/15/2016   • Tricuspid regurgitation 09/15/2016   • Cerebrovascular accident (CMS/HCC) 09/22/2016   • Syncope 09/22/2016   • DVT (deep venous thrombosis) (CMS/HCC) 09/22/2016   • Anorexia 09/22/2016   • Anxiety 09/22/2016   • Patent foramen ovale    • Mitral regurgitation 09/30/2016   • Atrial fibrillation with RVR (CMS/HCC) 10/09/2016   • Low weight 12/08/2016   • Senile osteoporosis 06/07/2017   • Iron deficiency 04/20/2018   •  PAF (paroxysmal atrial fibrillation) (CMS/[facility]) 05/08/2018   • Closed fracture of left ilium (CMS/[facility]) 05/08/2018   • GERD (gastroesophageal reflux disease) 05/08/2018   • Fall at home 05/08/2018   • Vertigo 12/18/2018   • Internuclear ophthalmoplegia, 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     Resolved Ambulatory Problems     Diagnosis Date Noted   • Abnormal weight loss 05/16/2016   • Fracture of femur (CMS/HCC) 05/16/2016   • Hyperlipidemia 05/16/2016   • Fatigue 09/15/2016   • Shaky 09/15/2016   • Shortness of breath 09/15/2016   • Urinary tract infection without hematuria 09/19/2016   • Tobacco abuse 09/22/2016   • VHD (valvular heart disease) 09/26/2016   • Atrial fibrillation with rapid ventricular response (CMS/HCC) 10/11/2016   • Osteoporosis 06/07/2017     Past Medical History:   Diagnosis Date   • Atrial fibrillation (CMS/[facility]) 2005   • Basal cell carcinoma 2007   • Cardiomyopathy (CMS/HCC) 2006   • Cerebrovascular accident (CMS/HCC) 01/2005   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   • Decubitus ulcer of buttock 2014   • Depression 2002   • DVT (deep venous thrombosis) (CMS/[facility]) 2014   • Fracture of bone of forefoot 1972   • Fracture of ilium, left (CMS/[facility]) 05/2018   • GERD (gastroesophageal reflux disease) 2014   • HTN (hypertension) 2005   • Iron deficiency anemia due to chronic blood loss 2018   • Low body weight due to inadequate caloric intake Adulthood   • Mitral valve prolapse 1997   • Osteoarthritis    • Osteoporosis    • Patent foramen ovale 2005   • Pneumonia 1947   • SCC (squamous cell carcinoma), arm, right 2017   • Scoliosis    • Syncope 2014   • Varicose veins 2005     Past Surgical History:   Procedure Laterality Date   • ACHILLES TENDON SURGERY Left 1997    Repair of rupture left tendon with screws   • BREAST CYST EXCISION Left    • BREAST SURGERY Left 1982    Excision benign cyst   • CARDIAC CATHETERIZATION  2006    Severe acute coronary spasm   • CATARACT  EXTRACTION WITH INTRAOCULAR LENS IMPLANT Bilateral 2015   • EYE CAPSULOTOMY WITH LASER Left 2016    Marked visual improvement   • FEMUR FRACTURE SURGERY Right 2015    Repair of shaft fracture   • HIP FRACTURE SURGERY Left 2014    left hip fracture with pin   • LUMBAR DISC SURGERY     • OVARIAN CYST REMOVAL Left    • SKIN CANCER EXCISION Right 2017    SCC - arm     Family History   Problem Relation Age of Onset   • Breast cancer Mother          age 59   • Diabetes Mother    • Hypertension Mother    • Heart disease Father          age 80   • Other Sister         Arthrodesis cervical   • Basal cell carcinoma Sister    • Bone cancer Sister    • Breast cancer Sister 57   • Diabetes Sister          age 56   • Hypertension Sister    • Melanoma Sister    • Arrhythmia Sister         Sinus   • Stroke Sister    • Allergies Son         Hay fever   • Diabetes Maternal Uncle    • Breast cancer Sister 59   • Breast cancer Sister 66   • Ovarian cancer Sister         DX AGE UNKNOWN     Social History     Socioeconomic History   • Marital status:      Spouse name: Not on file   • Number of children: Not on file   • Years of education: Not on file   • Highest education level: Not on file   Social Needs   • Financial resource strain: Not on file   • Food insecurity - worry: Not on file   • Food insecurity - inability: Not on file   • Transportation needs - medical: Not on file   • Transportation needs - non-medical: Not on file   Occupational History   • Not on file   Tobacco Use   • Smoking status: Former Smoker     Packs/day: 1.00     Years: 20.00     Pack years: 20.00     Types: Cigarettes   • Smokeless tobacco: Never Used   • Tobacco comment: Remote history   Substance and Sexual Activity   • Alcohol use: No   • Drug use: No   • Sexual activity: Not on file   Other Topics Concern   • Not on file   Social History Narrative    Domestic life   lives in private home alone - good support from local children     "    Islam    Buddhism        Sleep hygiene  in bed 9 PM to 6 AM for 9 hours of sleep        Caffeine use   1 1/2 cups coffee daily        Exercise habits  walks most days of the week. - Stretching each morning.          Diet   well-balanced diet with protein supplementations        Occupation    retired         Hearing  no impairment        Vision    no glasses needed after cataract surgery.          Driving   daytime only - good weather - local traffic         Review of Systems   Eyes:        Diplopia, resolved   Neurological: Positive for dizziness, weakness and light-headedness. Negative for tremors and syncope.       Objective   Blood pressure 130/82, pulse 101, height 165.1 cm (65\"), SpO2 96 %.  Nursing note reviewed  Physical Exam  Const: NAD, A&Ox4, Pleasant, Cooperative  Eyes: EOMI, no conjunctivitis  ENT: No nasal discharge present, neck supple  Cardiac: Regular rate and rhythm, no cyanosis  Resp: Respiratory rate within normal limits, no increased work of breathing, no audible wheezing or retractions noted  GI: No distention or ascites  MSK: Motor and sensation grossly intact in bilateral upper extremities  Neurologic: CN II-XII grossly intact  Psych: Appropriate mood and behavior.  Skin: Pink, warm, dry  Procedures  Assessment/Plan   Mirian was seen today for dizziness.    Diagnoses and all orders for this visit:    Vertigo  -     CBC & Differential; Future  -     C-reactive protein; Future  -     Comprehensive metabolic panel; Future  -     Levetiracetam Level (Keppra); Future  -     CBC & Differential  -     C-reactive protein  -     Comprehensive metabolic panel  -     Levetiracetam Level (Keppra)  -     CBC Auto Differential    Weakness  -     CBC & Differential; Future  -     C-reactive protein; Future  -     Comprehensive metabolic panel; Future  -     Levetiracetam Level (Keppra); Future  -     CBC & Differential  -     C-reactive protein  -     Comprehensive metabolic panel  -     " Levetiracetam Level (Keppra)  -     CBC Auto Differential        Patient Instructions   1.  Blood work today. Avoid excess meclizine or other medications if you can avoid. We'll check blood levels, inflammatory markers, and Keppra level.      Return if symptoms worsen or fail to improve.    Ambulatory progress note signed and attested to by Oren Engel D.O.

## 2019-01-09 LAB — LEVETIRACETAM SERPL-MCNC: 13 UG/ML (ref 10–40)

## 2019-01-10 ENCOUNTER — TELEPHONE (OUTPATIENT)
Dept: FAMILY MEDICINE CLINIC | Facility: CLINIC | Age: 79
End: 2019-01-10

## 2019-01-10 NOTE — TELEPHONE ENCOUNTER
Her Keppra level was within normal limits. She was slightly anemic but not enough to cause her weakness. Inflammatory markers were negative.

## 2019-01-11 ENCOUNTER — TELEPHONE (OUTPATIENT)
Dept: FAMILY MEDICINE CLINIC | Facility: CLINIC | Age: 79
End: 2019-01-11

## 2019-01-15 ENCOUNTER — TELEPHONE (OUTPATIENT)
Dept: FAMILY MEDICINE CLINIC | Facility: CLINIC | Age: 79
End: 2019-01-15

## 2019-01-31 ENCOUNTER — OUTSIDE FACILITY SERVICE (OUTPATIENT)
Dept: HOSPITALIST | Facility: HOSPITAL | Age: 79
End: 2019-01-31

## 2019-01-31 PROCEDURE — G0180 MD CERTIFICATION HHA PATIENT: HCPCS | Performed by: INTERNAL MEDICINE

## 2019-02-05 RX ORDER — DILTIAZEM HYDROCHLORIDE 120 MG/1
CAPSULE, EXTENDED RELEASE ORAL
Qty: 90 CAPSULE | Refills: 3 | Status: SHIPPED | OUTPATIENT
Start: 2019-02-05 | End: 2019-05-07

## 2019-02-28 ENCOUNTER — TELEPHONE (OUTPATIENT)
Dept: FAMILY MEDICINE CLINIC | Facility: CLINIC | Age: 79
End: 2019-02-28

## 2019-02-28 NOTE — TELEPHONE ENCOUNTER
Please call son - we don't have discs.  He needs to go to location where CT and other were done and  discs.

## 2019-02-28 NOTE — TELEPHONE ENCOUNTER
PT SON CALLED REQUESTING ALL MRI AND CT SCAN ON A C.D. FOR AN APPOINTMENT THEY HAVE AT  ON 3/12     SON WOULD LIKE TO BE CALLED WHEN READY

## 2019-02-28 NOTE — TELEPHONE ENCOUNTER
Patient's son notified that he will have to obtain disks from where the CT scan was done. He verbalized understanding.

## 2019-03-18 ENCOUNTER — TRANSCRIBE ORDERS (OUTPATIENT)
Dept: ADMINISTRATIVE | Facility: HOSPITAL | Age: 79
End: 2019-03-18

## 2019-03-18 DIAGNOSIS — Z12.31 VISIT FOR SCREENING MAMMOGRAM: Primary | ICD-10-CM

## 2019-03-19 ENCOUNTER — OFFICE VISIT (OUTPATIENT)
Dept: FAMILY MEDICINE CLINIC | Facility: CLINIC | Age: 79
End: 2019-03-19

## 2019-03-19 VITALS
OXYGEN SATURATION: 96 % | HEIGHT: 65 IN | WEIGHT: 107 LBS | DIASTOLIC BLOOD PRESSURE: 72 MMHG | BODY MASS INDEX: 17.83 KG/M2 | HEART RATE: 57 BPM | SYSTOLIC BLOOD PRESSURE: 130 MMHG

## 2019-03-19 DIAGNOSIS — R42 VERTIGO: Primary | ICD-10-CM

## 2019-03-19 DIAGNOSIS — H35.9 RETINA DISORDER, RIGHT: ICD-10-CM

## 2019-03-19 PROCEDURE — 99213 OFFICE O/P EST LOW 20 MIN: CPT | Performed by: FAMILY MEDICINE

## 2019-04-22 ENCOUNTER — TELEPHONE (OUTPATIENT)
Dept: FAMILY MEDICINE CLINIC | Facility: CLINIC | Age: 79
End: 2019-04-22

## 2019-04-22 DIAGNOSIS — I48.20 CHRONIC ATRIAL FIBRILLATION (HCC): Primary | ICD-10-CM

## 2019-04-22 NOTE — TELEPHONE ENCOUNTER
Patient needs a refill on her Eliquis 5 mg through [facility]. She also needs a new prescription to use the [facility] service here in [place]. Her previous one . Please call her Daughter Kayla valencia [phone] with any questions

## 2019-04-26 ENCOUNTER — APPOINTMENT (OUTPATIENT)
Dept: MAMMOGRAPHY | Facility: HOSPITAL | Age: 79
End: 2019-04-26

## 2019-04-26 NOTE — TELEPHONE ENCOUNTER
The patients daughter is not listed on TIA. Will not be able to speak with her regarding the patient and her health concerns including the wheels application.   Attempted to call the patients son Dean Lilly who is listed on TIA.   Patient may have to call [phone] which will start the application process for wheels. Everything I could find online needed a log-in and application process. Patient can call the number above to get this started.

## 2019-05-07 ENCOUNTER — HOSPITAL ENCOUNTER (INPATIENT)
Facility: HOSPITAL | Age: 79
LOS: 5 days | Discharge: NURSING, REHAB OR LONG-TERM CARE | End: 2019-05-12
Attending: EMERGENCY MEDICINE | Admitting: INTERNAL MEDICINE

## 2019-05-07 ENCOUNTER — APPOINTMENT (OUTPATIENT)
Dept: CT IMAGING | Facility: HOSPITAL | Age: 79
End: 2019-05-07

## 2019-05-07 ENCOUNTER — APPOINTMENT (OUTPATIENT)
Dept: GENERAL RADIOLOGY | Facility: HOSPITAL | Age: 79
End: 2019-05-07

## 2019-05-07 DIAGNOSIS — Z74.09 IMPAIRED FUNCTIONAL MOBILITY, BALANCE, GAIT, AND ENDURANCE: ICD-10-CM

## 2019-05-07 DIAGNOSIS — R42 VERTIGO: Primary | ICD-10-CM

## 2019-05-07 DIAGNOSIS — R41.841 COGNITIVE COMMUNICATION DEFICIT: ICD-10-CM

## 2019-05-07 DIAGNOSIS — Z78.9 IMPAIRED MOBILITY AND ADLS: ICD-10-CM

## 2019-05-07 DIAGNOSIS — Z74.09 IMPAIRED MOBILITY AND ADLS: ICD-10-CM

## 2019-05-07 DIAGNOSIS — I48.91 ATRIAL FIBRILLATION, UNSPECIFIED TYPE (HCC): ICD-10-CM

## 2019-05-07 DIAGNOSIS — I48.91 ATRIAL FIBRILLATION WITH RVR (HCC): ICD-10-CM

## 2019-05-07 DIAGNOSIS — R13.12 OROPHARYNGEAL DYSPHAGIA: ICD-10-CM

## 2019-05-07 LAB
ALBUMIN SERPL-MCNC: 4.4 G/DL (ref 3.5–5.2)
ALBUMIN/GLOB SERPL: 1.3 G/DL
ALP SERPL-CCNC: 114 U/L (ref 39–117)
ALT SERPL W P-5'-P-CCNC: 10 U/L (ref 1–33)
ANION GAP SERPL CALCULATED.3IONS-SCNC: 11 MMOL/L
AST SERPL-CCNC: 27 U/L (ref 1–32)
BASOPHILS # BLD AUTO: 0.03 10*3/MM3 (ref 0–0.2)
BASOPHILS NFR BLD AUTO: 0.5 % (ref 0–1.5)
BILIRUB SERPL-MCNC: 0.7 MG/DL (ref 0.2–1.2)
BILIRUB UR QL STRIP: NEGATIVE
BUN BLD-MCNC: 17 MG/DL (ref 8–23)
BUN/CREAT SERPL: 16.8 (ref 7–25)
CALCIUM SPEC-SCNC: 10.1 MG/DL (ref 8.6–10.5)
CHLORIDE SERPL-SCNC: 98 MMOL/L (ref 98–107)
CLARITY UR: CLEAR
CO2 SERPL-SCNC: 28 MMOL/L (ref 22–29)
COLOR UR: YELLOW
CREAT BLD-MCNC: 1.01 MG/DL (ref 0.57–1)
DEPRECATED RDW RBC AUTO: 44.5 FL (ref 37–54)
EOSINOPHIL # BLD AUTO: 0.07 10*3/MM3 (ref 0–0.4)
EOSINOPHIL NFR BLD AUTO: 1.1 % (ref 0.3–6.2)
ERYTHROCYTE [DISTWIDTH] IN BLOOD BY AUTOMATED COUNT: 13.5 % (ref 12.3–15.4)
GFR SERPL CREATININE-BSD FRML MDRD: 53 ML/MIN/1.73
GLOBULIN UR ELPH-MCNC: 3.5 GM/DL
GLUCOSE BLD-MCNC: 90 MG/DL (ref 65–99)
GLUCOSE BLDC GLUCOMTR-MCNC: 92 MG/DL (ref 70–130)
GLUCOSE UR STRIP-MCNC: NEGATIVE MG/DL
HCT VFR BLD AUTO: 38.7 % (ref 34–46.6)
HGB BLD-MCNC: 13.1 G/DL (ref 12–15.9)
HGB UR QL STRIP.AUTO: NEGATIVE
HOLD SPECIMEN: NORMAL
HOLD SPECIMEN: NORMAL
IMM GRANULOCYTES # BLD AUTO: 0.02 10*3/MM3 (ref 0–0.05)
IMM GRANULOCYTES NFR BLD AUTO: 0.3 % (ref 0–0.5)
KETONES UR QL STRIP: NEGATIVE
LEUKOCYTE ESTERASE UR QL STRIP.AUTO: NEGATIVE
LYMPHOCYTES # BLD AUTO: 1.58 10*3/MM3 (ref 0.7–3.1)
LYMPHOCYTES NFR BLD AUTO: 25.2 % (ref 19.6–45.3)
MAGNESIUM SERPL-MCNC: 2 MG/DL (ref 1.6–2.4)
MCH RBC QN AUTO: 30.5 PG (ref 26.6–33)
MCHC RBC AUTO-ENTMCNC: 33.9 G/DL (ref 31.5–35.7)
MCV RBC AUTO: 90.2 FL (ref 79–97)
MONOCYTES # BLD AUTO: 0.69 10*3/MM3 (ref 0.1–0.9)
MONOCYTES NFR BLD AUTO: 11 % (ref 5–12)
NEUTROPHILS # BLD AUTO: 3.89 10*3/MM3 (ref 1.7–7)
NEUTROPHILS NFR BLD AUTO: 61.9 % (ref 42.7–76)
NITRITE UR QL STRIP: NEGATIVE
PH UR STRIP.AUTO: 8.5 [PH] (ref 5–8)
PLATELET # BLD AUTO: 279 10*3/MM3 (ref 140–450)
PMV BLD AUTO: 9.6 FL (ref 6–12)
POTASSIUM BLD-SCNC: 4.9 MMOL/L (ref 3.5–5.2)
PROT SERPL-MCNC: 7.9 G/DL (ref 6–8.5)
PROT UR QL STRIP: NEGATIVE
RBC # BLD AUTO: 4.29 10*6/MM3 (ref 3.77–5.28)
SODIUM BLD-SCNC: 137 MMOL/L (ref 136–145)
SP GR UR STRIP: 1.01 (ref 1–1.03)
TROPONIN T SERPL-MCNC: <0.01 NG/ML (ref 0–0.03)
TSH SERPL DL<=0.05 MIU/L-ACNC: 1.65 MIU/ML (ref 0.27–4.2)
UROBILINOGEN UR QL STRIP: ABNORMAL
WBC NRBC COR # BLD: 6.28 10*3/MM3 (ref 3.4–10.8)
WHOLE BLOOD HOLD SPECIMEN: NORMAL
WHOLE BLOOD HOLD SPECIMEN: NORMAL

## 2019-05-07 PROCEDURE — 93005 ELECTROCARDIOGRAM TRACING: CPT | Performed by: EMERGENCY MEDICINE

## 2019-05-07 PROCEDURE — 71045 X-RAY EXAM CHEST 1 VIEW: CPT

## 2019-05-07 PROCEDURE — 83735 ASSAY OF MAGNESIUM: CPT | Performed by: EMERGENCY MEDICINE

## 2019-05-07 PROCEDURE — 99223 1ST HOSP IP/OBS HIGH 75: CPT | Performed by: INTERNAL MEDICINE

## 2019-05-07 PROCEDURE — 84484 ASSAY OF TROPONIN QUANT: CPT | Performed by: EMERGENCY MEDICINE

## 2019-05-07 PROCEDURE — 85025 COMPLETE CBC W/AUTO DIFF WBC: CPT | Performed by: EMERGENCY MEDICINE

## 2019-05-07 PROCEDURE — 70450 CT HEAD/BRAIN W/O DYE: CPT

## 2019-05-07 PROCEDURE — 99285 EMERGENCY DEPT VISIT HI MDM: CPT

## 2019-05-07 PROCEDURE — 81003 URINALYSIS AUTO W/O SCOPE: CPT | Performed by: EMERGENCY MEDICINE

## 2019-05-07 PROCEDURE — 99222 1ST HOSP IP/OBS MODERATE 55: CPT | Performed by: INTERNAL MEDICINE

## 2019-05-07 PROCEDURE — 84443 ASSAY THYROID STIM HORMONE: CPT | Performed by: INTERNAL MEDICINE

## 2019-05-07 PROCEDURE — 82962 GLUCOSE BLOOD TEST: CPT

## 2019-05-07 PROCEDURE — 80053 COMPREHEN METABOLIC PANEL: CPT | Performed by: EMERGENCY MEDICINE

## 2019-05-07 RX ORDER — LEVETIRACETAM 250 MG/1
250 TABLET ORAL 2 TIMES DAILY
COMMUNITY
End: 2019-06-28 | Stop reason: SDUPTHER

## 2019-05-07 RX ORDER — LEVETIRACETAM 250 MG/1
250 TABLET ORAL EVERY 12 HOURS SCHEDULED
Status: DISCONTINUED | OUTPATIENT
Start: 2019-05-07 | End: 2019-05-12 | Stop reason: HOSPADM

## 2019-05-07 RX ORDER — RANITIDINE 150 MG/1
150 CAPSULE ORAL NIGHTLY
COMMUNITY
End: 2019-06-28 | Stop reason: SDUPTHER

## 2019-05-07 RX ORDER — ACETAMINOPHEN 325 MG/1
650 TABLET ORAL EVERY 4 HOURS PRN
Status: DISCONTINUED | OUTPATIENT
Start: 2019-05-07 | End: 2019-05-12 | Stop reason: HOSPADM

## 2019-05-07 RX ORDER — DILTIAZEM HYDROCHLORIDE 60 MG/1
60 TABLET, FILM COATED ORAL EVERY 8 HOURS SCHEDULED
Status: COMPLETED | OUTPATIENT
Start: 2019-05-07 | End: 2019-05-07

## 2019-05-07 RX ORDER — MIRTAZAPINE 15 MG/1
7.5 TABLET, FILM COATED ORAL NIGHTLY
Status: DISCONTINUED | OUTPATIENT
Start: 2019-05-07 | End: 2019-05-12 | Stop reason: HOSPADM

## 2019-05-07 RX ORDER — SODIUM CHLORIDE 0.9 % (FLUSH) 0.9 %
10 SYRINGE (ML) INJECTION AS NEEDED
Status: DISCONTINUED | OUTPATIENT
Start: 2019-05-07 | End: 2019-05-12 | Stop reason: HOSPADM

## 2019-05-07 RX ORDER — SODIUM CHLORIDE 0.9 % (FLUSH) 0.9 %
3 SYRINGE (ML) INJECTION EVERY 12 HOURS SCHEDULED
Status: DISCONTINUED | OUTPATIENT
Start: 2019-05-07 | End: 2019-05-12 | Stop reason: HOSPADM

## 2019-05-07 RX ORDER — MEGESTROL ACETATE 40 MG/ML
200 SUSPENSION ORAL DAILY PRN
COMMUNITY
End: 2019-05-12 | Stop reason: HOSPADM

## 2019-05-07 RX ORDER — CITALOPRAM 20 MG/1
20 TABLET ORAL EVERY MORNING
Status: DISCONTINUED | OUTPATIENT
Start: 2019-05-07 | End: 2019-05-07

## 2019-05-07 RX ORDER — DILTIAZEM HYDROCHLORIDE 120 MG/1
120 CAPSULE, EXTENDED RELEASE ORAL NIGHTLY
COMMUNITY
End: 2019-05-12 | Stop reason: HOSPADM

## 2019-05-07 RX ORDER — MIRTAZAPINE 7.5 MG/1
7.5 TABLET, FILM COATED ORAL NIGHTLY
COMMUNITY
End: 2019-09-12

## 2019-05-07 RX ORDER — DILTIAZEM HYDROCHLORIDE 180 MG/1
180 CAPSULE, COATED, EXTENDED RELEASE ORAL
Status: DISCONTINUED | OUTPATIENT
Start: 2019-05-08 | End: 2019-05-08

## 2019-05-07 RX ORDER — SODIUM CHLORIDE 0.9 % (FLUSH) 0.9 %
3-10 SYRINGE (ML) INJECTION AS NEEDED
Status: DISCONTINUED | OUTPATIENT
Start: 2019-05-07 | End: 2019-05-12 | Stop reason: HOSPADM

## 2019-05-07 RX ORDER — FLECAINIDE ACETATE 50 MG/1
50 TABLET ORAL EVERY 12 HOURS
COMMUNITY
End: 2019-05-12 | Stop reason: HOSPADM

## 2019-05-07 RX ADMIN — SODIUM CHLORIDE, PRESERVATIVE FREE 3 ML: 5 INJECTION INTRAVENOUS at 21:18

## 2019-05-07 RX ADMIN — LEVETIRACETAM 250 MG: 250 TABLET, FILM COATED ORAL at 21:15

## 2019-05-07 RX ADMIN — LEVETIRACETAM 250 MG: 250 TABLET, FILM COATED ORAL at 13:18

## 2019-05-07 RX ADMIN — DILTIAZEM HYDROCHLORIDE 60 MG: 60 TABLET, FILM COATED ORAL at 21:16

## 2019-05-07 RX ADMIN — ACETAMINOPHEN 650 MG: 325 TABLET ORAL at 22:10

## 2019-05-07 RX ADMIN — DILTIAZEM HYDROCHLORIDE 60 MG: 60 TABLET, FILM COATED ORAL at 13:18

## 2019-05-07 RX ADMIN — SODIUM CHLORIDE 1000 ML: 9 INJECTION, SOLUTION INTRAVENOUS at 09:27

## 2019-05-07 RX ADMIN — DILTIAZEM HYDROCHLORIDE 5 MG/HR: 5 INJECTION INTRAVENOUS at 09:27

## 2019-05-07 RX ADMIN — APIXABAN 5 MG: 5 TABLET, FILM COATED ORAL at 13:18

## 2019-05-07 RX ADMIN — APIXABAN 5 MG: 5 TABLET, FILM COATED ORAL at 21:15

## 2019-05-07 RX ADMIN — MIRTAZAPINE 7.5 MG: 15 TABLET, FILM COATED ORAL at 21:16

## 2019-05-08 ENCOUNTER — APPOINTMENT (OUTPATIENT)
Dept: CT IMAGING | Facility: HOSPITAL | Age: 79
End: 2019-05-08

## 2019-05-08 LAB
ANION GAP SERPL CALCULATED.3IONS-SCNC: 13 MMOL/L
BUN BLD-MCNC: 16 MG/DL (ref 8–23)
BUN/CREAT SERPL: 15.2 (ref 7–25)
CALCIUM SPEC-SCNC: 9 MG/DL (ref 8.6–10.5)
CHLORIDE SERPL-SCNC: 99 MMOL/L (ref 98–107)
CO2 SERPL-SCNC: 23 MMOL/L (ref 22–29)
CREAT BLD-MCNC: 1.05 MG/DL (ref 0.57–1)
GFR SERPL CREATININE-BSD FRML MDRD: 51 ML/MIN/1.73
GLUCOSE BLD-MCNC: 91 MG/DL (ref 65–99)
GLUCOSE BLDC GLUCOMTR-MCNC: 94 MG/DL (ref 70–130)
GLUCOSE BLDC GLUCOMTR-MCNC: 97 MG/DL (ref 70–130)
POTASSIUM BLD-SCNC: 4.2 MMOL/L (ref 3.5–5.2)
SODIUM BLD-SCNC: 135 MMOL/L (ref 136–145)

## 2019-05-08 PROCEDURE — 99233 SBSQ HOSP IP/OBS HIGH 50: CPT | Performed by: INTERNAL MEDICINE

## 2019-05-08 PROCEDURE — 99223 1ST HOSP IP/OBS HIGH 75: CPT | Performed by: PSYCHIATRY & NEUROLOGY

## 2019-05-08 PROCEDURE — 82962 GLUCOSE BLOOD TEST: CPT

## 2019-05-08 PROCEDURE — 92610 EVALUATE SWALLOWING FUNCTION: CPT

## 2019-05-08 PROCEDURE — 70450 CT HEAD/BRAIN W/O DYE: CPT

## 2019-05-08 PROCEDURE — 99232 SBSQ HOSP IP/OBS MODERATE 35: CPT | Performed by: NURSE PRACTITIONER

## 2019-05-08 PROCEDURE — 80048 BASIC METABOLIC PNL TOTAL CA: CPT | Performed by: INTERNAL MEDICINE

## 2019-05-08 RX ORDER — DILTIAZEM HYDROCHLORIDE 240 MG/1
240 CAPSULE, COATED, EXTENDED RELEASE ORAL
Status: DISCONTINUED | OUTPATIENT
Start: 2019-05-08 | End: 2019-05-12 | Stop reason: HOSPADM

## 2019-05-08 RX ADMIN — APIXABAN 5 MG: 5 TABLET, FILM COATED ORAL at 09:13

## 2019-05-08 RX ADMIN — LEVETIRACETAM 250 MG: 250 TABLET, FILM COATED ORAL at 19:44

## 2019-05-08 RX ADMIN — LEVETIRACETAM 250 MG: 250 TABLET, FILM COATED ORAL at 09:13

## 2019-05-08 RX ADMIN — SODIUM CHLORIDE, PRESERVATIVE FREE 3 ML: 5 INJECTION INTRAVENOUS at 09:13

## 2019-05-08 RX ADMIN — SODIUM CHLORIDE, PRESERVATIVE FREE 3 ML: 5 INJECTION INTRAVENOUS at 19:45

## 2019-05-08 RX ADMIN — MIRTAZAPINE 7.5 MG: 15 TABLET, FILM COATED ORAL at 19:44

## 2019-05-08 RX ADMIN — DILTIAZEM HYDROCHLORIDE 240 MG: 240 CAPSULE, COATED, EXTENDED RELEASE ORAL at 09:13

## 2019-05-09 ENCOUNTER — APPOINTMENT (OUTPATIENT)
Dept: GENERAL RADIOLOGY | Facility: HOSPITAL | Age: 79
End: 2019-05-09

## 2019-05-09 ENCOUNTER — APPOINTMENT (OUTPATIENT)
Dept: CARDIOLOGY | Facility: HOSPITAL | Age: 79
End: 2019-05-09

## 2019-05-09 ENCOUNTER — APPOINTMENT (OUTPATIENT)
Dept: MRI IMAGING | Facility: HOSPITAL | Age: 79
End: 2019-05-09

## 2019-05-09 PROCEDURE — 70551 MRI BRAIN STEM W/O DYE: CPT

## 2019-05-09 PROCEDURE — 93880 EXTRACRANIAL BILAT STUDY: CPT

## 2019-05-09 PROCEDURE — 93306 TTE W/DOPPLER COMPLETE: CPT | Performed by: INTERNAL MEDICINE

## 2019-05-09 PROCEDURE — 92611 MOTION FLUOROSCOPY/SWALLOW: CPT

## 2019-05-09 PROCEDURE — 93306 TTE W/DOPPLER COMPLETE: CPT

## 2019-05-09 PROCEDURE — 93880 EXTRACRANIAL BILAT STUDY: CPT | Performed by: INTERNAL MEDICINE

## 2019-05-09 PROCEDURE — 97163 PT EVAL HIGH COMPLEX 45 MIN: CPT

## 2019-05-09 PROCEDURE — 92523 SPEECH SOUND LANG COMPREHEN: CPT

## 2019-05-09 PROCEDURE — 99232 SBSQ HOSP IP/OBS MODERATE 35: CPT | Performed by: PSYCHIATRY & NEUROLOGY

## 2019-05-09 PROCEDURE — 99233 SBSQ HOSP IP/OBS HIGH 50: CPT | Performed by: INTERNAL MEDICINE

## 2019-05-09 PROCEDURE — 74230 X-RAY XM SWLNG FUNCJ C+: CPT

## 2019-05-09 RX ADMIN — BARIUM SULFATE 20 ML: 400 PASTE ORAL at 14:09

## 2019-05-09 RX ADMIN — BARIUM SULFATE 50 ML: 400 SUSPENSION ORAL at 14:09

## 2019-05-09 RX ADMIN — SODIUM CHLORIDE, PRESERVATIVE FREE 3 ML: 5 INJECTION INTRAVENOUS at 21:50

## 2019-05-09 RX ADMIN — SODIUM CHLORIDE, PRESERVATIVE FREE 3 ML: 5 INJECTION INTRAVENOUS at 08:53

## 2019-05-09 RX ADMIN — BARIUM SULFATE 100 ML: 0.81 POWDER, FOR SUSPENSION ORAL at 14:09

## 2019-05-09 RX ADMIN — LEVETIRACETAM 250 MG: 250 TABLET, FILM COATED ORAL at 21:50

## 2019-05-09 RX ADMIN — DILTIAZEM HYDROCHLORIDE 240 MG: 240 CAPSULE, COATED, EXTENDED RELEASE ORAL at 08:53

## 2019-05-09 RX ADMIN — APIXABAN 5 MG: 5 TABLET, FILM COATED ORAL at 21:50

## 2019-05-09 RX ADMIN — MIRTAZAPINE 7.5 MG: 15 TABLET, FILM COATED ORAL at 21:50

## 2019-05-09 RX ADMIN — LEVETIRACETAM 250 MG: 250 TABLET, FILM COATED ORAL at 08:53

## 2019-05-10 LAB
ANION GAP SERPL CALCULATED.3IONS-SCNC: 11 MMOL/L
BUN BLD-MCNC: 17 MG/DL (ref 8–23)
BUN/CREAT SERPL: 15.2 (ref 7–25)
CALCIUM SPEC-SCNC: 9 MG/DL (ref 8.6–10.5)
CHLORIDE SERPL-SCNC: 95 MMOL/L (ref 98–107)
CO2 SERPL-SCNC: 23 MMOL/L (ref 22–29)
CREAT BLD-MCNC: 1.12 MG/DL (ref 0.57–1)
DEPRECATED RDW RBC AUTO: 43.3 FL (ref 37–54)
ERYTHROCYTE [DISTWIDTH] IN BLOOD BY AUTOMATED COUNT: 13.3 % (ref 12.3–15.4)
GFR SERPL CREATININE-BSD FRML MDRD: 47 ML/MIN/1.73
GLUCOSE BLD-MCNC: 141 MG/DL (ref 65–99)
HCT VFR BLD AUTO: 36 % (ref 34–46.6)
HGB BLD-MCNC: 12 G/DL (ref 12–15.9)
MCH RBC QN AUTO: 29.6 PG (ref 26.6–33)
MCHC RBC AUTO-ENTMCNC: 33.3 G/DL (ref 31.5–35.7)
MCV RBC AUTO: 88.9 FL (ref 79–97)
PLATELET # BLD AUTO: 265 10*3/MM3 (ref 140–450)
PMV BLD AUTO: 10.1 FL (ref 6–12)
POTASSIUM BLD-SCNC: 4 MMOL/L (ref 3.5–5.2)
RBC # BLD AUTO: 4.05 10*6/MM3 (ref 3.77–5.28)
SODIUM BLD-SCNC: 129 MMOL/L (ref 136–145)
WBC NRBC COR # BLD: 10.53 10*3/MM3 (ref 3.4–10.8)

## 2019-05-10 PROCEDURE — 99232 SBSQ HOSP IP/OBS MODERATE 35: CPT | Performed by: PSYCHIATRY & NEUROLOGY

## 2019-05-10 PROCEDURE — 97530 THERAPEUTIC ACTIVITIES: CPT

## 2019-05-10 PROCEDURE — 99232 SBSQ HOSP IP/OBS MODERATE 35: CPT | Performed by: NURSE PRACTITIONER

## 2019-05-10 PROCEDURE — 85027 COMPLETE CBC AUTOMATED: CPT | Performed by: INTERNAL MEDICINE

## 2019-05-10 PROCEDURE — 80048 BASIC METABOLIC PNL TOTAL CA: CPT | Performed by: INTERNAL MEDICINE

## 2019-05-10 PROCEDURE — 97166 OT EVAL MOD COMPLEX 45 MIN: CPT

## 2019-05-10 RX ORDER — DOCUSATE SODIUM 100 MG/1
100 CAPSULE, LIQUID FILLED ORAL 2 TIMES DAILY PRN
Status: DISCONTINUED | OUTPATIENT
Start: 2019-05-10 | End: 2019-05-12 | Stop reason: HOSPADM

## 2019-05-10 RX ADMIN — SODIUM CHLORIDE, PRESERVATIVE FREE 3 ML: 5 INJECTION INTRAVENOUS at 08:29

## 2019-05-10 RX ADMIN — LEVETIRACETAM 250 MG: 250 TABLET, FILM COATED ORAL at 20:35

## 2019-05-10 RX ADMIN — MIRTAZAPINE 7.5 MG: 15 TABLET, FILM COATED ORAL at 20:35

## 2019-05-10 RX ADMIN — LEVETIRACETAM 250 MG: 250 TABLET, FILM COATED ORAL at 08:29

## 2019-05-10 RX ADMIN — SODIUM CHLORIDE, PRESERVATIVE FREE 3 ML: 5 INJECTION INTRAVENOUS at 20:36

## 2019-05-10 RX ADMIN — SODIUM CHLORIDE 250 ML: 9 INJECTION, SOLUTION INTRAVENOUS at 15:53

## 2019-05-10 RX ADMIN — POLYETHYLENE GLYCOL 3350 17 G: 17 POWDER, FOR SOLUTION ORAL at 12:39

## 2019-05-10 RX ADMIN — DILTIAZEM HYDROCHLORIDE 240 MG: 240 CAPSULE, COATED, EXTENDED RELEASE ORAL at 05:08

## 2019-05-10 RX ADMIN — APIXABAN 5 MG: 5 TABLET, FILM COATED ORAL at 20:35

## 2019-05-10 RX ADMIN — APIXABAN 5 MG: 5 TABLET, FILM COATED ORAL at 08:29

## 2019-05-11 ENCOUNTER — APPOINTMENT (OUTPATIENT)
Dept: GENERAL RADIOLOGY | Facility: HOSPITAL | Age: 79
End: 2019-05-11

## 2019-05-11 LAB
ANION GAP SERPL CALCULATED.3IONS-SCNC: 13 MMOL/L
BASOPHILS # BLD AUTO: 0.02 10*3/MM3 (ref 0–0.2)
BASOPHILS NFR BLD AUTO: 0.2 % (ref 0–1.5)
BUN BLD-MCNC: 18 MG/DL (ref 8–23)
BUN/CREAT SERPL: 21.7 (ref 7–25)
CALCIUM SPEC-SCNC: 9.2 MG/DL (ref 8.6–10.5)
CHLORIDE SERPL-SCNC: 93 MMOL/L (ref 98–107)
CO2 SERPL-SCNC: 23 MMOL/L (ref 22–29)
CREAT BLD-MCNC: 0.83 MG/DL (ref 0.57–1)
DEPRECATED RDW RBC AUTO: 43.9 FL (ref 37–54)
EOSINOPHIL # BLD AUTO: 0.01 10*3/MM3 (ref 0–0.4)
EOSINOPHIL NFR BLD AUTO: 0.1 % (ref 0.3–6.2)
ERYTHROCYTE [DISTWIDTH] IN BLOOD BY AUTOMATED COUNT: 13.3 % (ref 12.3–15.4)
ERYTHROCYTE [SEDIMENTATION RATE] IN BLOOD: 66 MM/HR (ref 0–30)
GFR SERPL CREATININE-BSD FRML MDRD: 66 ML/MIN/1.73
GLUCOSE BLD-MCNC: 125 MG/DL (ref 65–99)
HCT VFR BLD AUTO: 34 % (ref 34–46.6)
HGB BLD-MCNC: 11.4 G/DL (ref 12–15.9)
IMM GRANULOCYTES # BLD AUTO: 0.02 10*3/MM3 (ref 0–0.05)
IMM GRANULOCYTES NFR BLD AUTO: 0.2 % (ref 0–0.5)
LYMPHOCYTES # BLD AUTO: 1.39 10*3/MM3 (ref 0.7–3.1)
LYMPHOCYTES NFR BLD AUTO: 12.4 % (ref 19.6–45.3)
MCH RBC QN AUTO: 29.8 PG (ref 26.6–33)
MCHC RBC AUTO-ENTMCNC: 33.5 G/DL (ref 31.5–35.7)
MCV RBC AUTO: 89 FL (ref 79–97)
MONOCYTES # BLD AUTO: 2.02 10*3/MM3 (ref 0.1–0.9)
MONOCYTES NFR BLD AUTO: 18.1 % (ref 5–12)
NEUTROPHILS # BLD AUTO: 7.74 10*3/MM3 (ref 1.7–7)
NEUTROPHILS NFR BLD AUTO: 69.2 % (ref 42.7–76)
PLATELET # BLD AUTO: 240 10*3/MM3 (ref 140–450)
PMV BLD AUTO: 9.9 FL (ref 6–12)
POTASSIUM BLD-SCNC: 4 MMOL/L (ref 3.5–5.2)
RBC # BLD AUTO: 3.82 10*6/MM3 (ref 3.77–5.28)
SODIUM BLD-SCNC: 129 MMOL/L (ref 136–145)
URATE SERPL-MCNC: 3.7 MG/DL (ref 2.4–5.7)
WBC NRBC COR # BLD: 11.18 10*3/MM3 (ref 3.4–10.8)

## 2019-05-11 PROCEDURE — 85652 RBC SED RATE AUTOMATED: CPT | Performed by: INTERNAL MEDICINE

## 2019-05-11 PROCEDURE — 73070 X-RAY EXAM OF ELBOW: CPT

## 2019-05-11 PROCEDURE — 85025 COMPLETE CBC W/AUTO DIFF WBC: CPT | Performed by: NURSE PRACTITIONER

## 2019-05-11 PROCEDURE — 97530 THERAPEUTIC ACTIVITIES: CPT

## 2019-05-11 PROCEDURE — 84550 ASSAY OF BLOOD/URIC ACID: CPT | Performed by: INTERNAL MEDICINE

## 2019-05-11 PROCEDURE — 80048 BASIC METABOLIC PNL TOTAL CA: CPT | Performed by: NURSE PRACTITIONER

## 2019-05-11 PROCEDURE — 97116 GAIT TRAINING THERAPY: CPT

## 2019-05-11 PROCEDURE — 99233 SBSQ HOSP IP/OBS HIGH 50: CPT | Performed by: NURSE PRACTITIONER

## 2019-05-11 RX ADMIN — ACETAMINOPHEN 650 MG: 325 TABLET ORAL at 20:25

## 2019-05-11 RX ADMIN — SODIUM CHLORIDE, PRESERVATIVE FREE 3 ML: 5 INJECTION INTRAVENOUS at 08:30

## 2019-05-11 RX ADMIN — LEVETIRACETAM 250 MG: 250 TABLET, FILM COATED ORAL at 20:26

## 2019-05-11 RX ADMIN — SODIUM CHLORIDE, PRESERVATIVE FREE 3 ML: 5 INJECTION INTRAVENOUS at 20:26

## 2019-05-11 RX ADMIN — APIXABAN 5 MG: 5 TABLET, FILM COATED ORAL at 09:37

## 2019-05-11 RX ADMIN — POLYETHYLENE GLYCOL 3350 17 G: 17 POWDER, FOR SOLUTION ORAL at 09:36

## 2019-05-11 RX ADMIN — MIRTAZAPINE 7.5 MG: 15 TABLET, FILM COATED ORAL at 20:25

## 2019-05-11 RX ADMIN — APIXABAN 5 MG: 5 TABLET, FILM COATED ORAL at 20:25

## 2019-05-11 RX ADMIN — DILTIAZEM HYDROCHLORIDE 240 MG: 240 CAPSULE, COATED, EXTENDED RELEASE ORAL at 09:36

## 2019-05-11 RX ADMIN — DOCUSATE SODIUM 100 MG: 100 CAPSULE, LIQUID FILLED ORAL at 09:37

## 2019-05-11 RX ADMIN — LEVETIRACETAM 250 MG: 250 TABLET, FILM COATED ORAL at 09:36

## 2019-05-11 RX ADMIN — ACETAMINOPHEN 650 MG: 325 TABLET ORAL at 05:44

## 2019-05-12 VITALS
WEIGHT: 100.09 LBS | OXYGEN SATURATION: 96 % | TEMPERATURE: 97.8 F | HEART RATE: 94 BPM | DIASTOLIC BLOOD PRESSURE: 85 MMHG | RESPIRATION RATE: 18 BRPM | SYSTOLIC BLOOD PRESSURE: 139 MMHG | HEIGHT: 64 IN | BODY MASS INDEX: 17.09 KG/M2

## 2019-05-12 LAB
ANION GAP SERPL CALCULATED.3IONS-SCNC: 12 MMOL/L
BASOPHILS # BLD AUTO: 0.03 10*3/MM3 (ref 0–0.2)
BASOPHILS NFR BLD AUTO: 0.3 % (ref 0–1.5)
BUN BLD-MCNC: 21 MG/DL (ref 8–23)
BUN/CREAT SERPL: 23.1 (ref 7–25)
CALCIUM SPEC-SCNC: 9.3 MG/DL (ref 8.6–10.5)
CHLORIDE SERPL-SCNC: 94 MMOL/L (ref 98–107)
CO2 SERPL-SCNC: 24 MMOL/L (ref 22–29)
CREAT BLD-MCNC: 0.91 MG/DL (ref 0.57–1)
DEPRECATED RDW RBC AUTO: 42.9 FL (ref 37–54)
EOSINOPHIL # BLD AUTO: 0.03 10*3/MM3 (ref 0–0.4)
EOSINOPHIL NFR BLD AUTO: 0.3 % (ref 0.3–6.2)
ERYTHROCYTE [DISTWIDTH] IN BLOOD BY AUTOMATED COUNT: 13.3 % (ref 12.3–15.4)
GFR SERPL CREATININE-BSD FRML MDRD: 60 ML/MIN/1.73
GLUCOSE BLD-MCNC: 127 MG/DL (ref 65–99)
HCT VFR BLD AUTO: 32.5 % (ref 34–46.6)
HGB BLD-MCNC: 11.3 G/DL (ref 12–15.9)
IMM GRANULOCYTES # BLD AUTO: 0.04 10*3/MM3 (ref 0–0.05)
IMM GRANULOCYTES NFR BLD AUTO: 0.4 % (ref 0–0.5)
LYMPHOCYTES # BLD AUTO: 1.4 10*3/MM3 (ref 0.7–3.1)
LYMPHOCYTES NFR BLD AUTO: 12.8 % (ref 19.6–45.3)
MCH RBC QN AUTO: 30.4 PG (ref 26.6–33)
MCHC RBC AUTO-ENTMCNC: 34.8 G/DL (ref 31.5–35.7)
MCV RBC AUTO: 87.4 FL (ref 79–97)
MONOCYTES # BLD AUTO: 1.64 10*3/MM3 (ref 0.1–0.9)
MONOCYTES NFR BLD AUTO: 15 % (ref 5–12)
NEUTROPHILS # BLD AUTO: 7.78 10*3/MM3 (ref 1.7–7)
NEUTROPHILS NFR BLD AUTO: 71.2 % (ref 42.7–76)
PLATELET # BLD AUTO: 247 10*3/MM3 (ref 140–450)
PMV BLD AUTO: 9.9 FL (ref 6–12)
POTASSIUM BLD-SCNC: 4.3 MMOL/L (ref 3.5–5.2)
RBC # BLD AUTO: 3.72 10*6/MM3 (ref 3.77–5.28)
SODIUM BLD-SCNC: 130 MMOL/L (ref 136–145)
WBC NRBC COR # BLD: 10.92 10*3/MM3 (ref 3.4–10.8)

## 2019-05-12 PROCEDURE — 85007 BL SMEAR W/DIFF WBC COUNT: CPT | Performed by: NURSE PRACTITIONER

## 2019-05-12 PROCEDURE — 80048 BASIC METABOLIC PNL TOTAL CA: CPT | Performed by: NURSE PRACTITIONER

## 2019-05-12 PROCEDURE — 99239 HOSP IP/OBS DSCHRG MGMT >30: CPT | Performed by: INTERNAL MEDICINE

## 2019-05-12 PROCEDURE — 85025 COMPLETE CBC W/AUTO DIFF WBC: CPT | Performed by: NURSE PRACTITIONER

## 2019-05-12 RX ORDER — ATORVASTATIN CALCIUM 40 MG/1
80 TABLET, FILM COATED ORAL NIGHTLY
Status: DISCONTINUED | OUTPATIENT
Start: 2019-05-12 | End: 2019-05-12 | Stop reason: HOSPADM

## 2019-05-12 RX ORDER — DILTIAZEM HYDROCHLORIDE 240 MG/1
240 CAPSULE, COATED, EXTENDED RELEASE ORAL
Start: 2019-05-12 | End: 2019-08-01 | Stop reason: SDUPTHER

## 2019-05-12 RX ORDER — ATORVASTATIN CALCIUM 80 MG/1
80 TABLET, FILM COATED ORAL NIGHTLY
Qty: 30 TABLET
Start: 2019-05-12 | End: 2019-08-01 | Stop reason: SDUPTHER

## 2019-05-12 RX ADMIN — DOCUSATE SODIUM 100 MG: 100 CAPSULE, LIQUID FILLED ORAL at 08:30

## 2019-05-12 RX ADMIN — DILTIAZEM HYDROCHLORIDE 240 MG: 240 CAPSULE, COATED, EXTENDED RELEASE ORAL at 08:30

## 2019-05-12 RX ADMIN — LEVETIRACETAM 250 MG: 250 TABLET, FILM COATED ORAL at 08:30

## 2019-05-12 RX ADMIN — POLYETHYLENE GLYCOL 3350 17 G: 17 POWDER, FOR SOLUTION ORAL at 08:30

## 2019-05-12 RX ADMIN — ACETAMINOPHEN 650 MG: 325 TABLET ORAL at 01:14

## 2019-05-12 RX ADMIN — APIXABAN 5 MG: 5 TABLET, FILM COATED ORAL at 08:30

## 2019-05-13 LAB
BH CV ECHO MEAS - AO ROOT AREA (BSA CORRECTED): 2.1
BH CV ECHO MEAS - AO ROOT AREA: 7.2 CM^2
BH CV ECHO MEAS - AO ROOT DIAM: 3 CM
BH CV ECHO MEAS - BSA(HAYCOCK): 1.4 M^2
BH CV ECHO MEAS - BSA: 1.5 M^2
BH CV ECHO MEAS - BZI_BMI: 17.2 KILOGRAMS/M^2
BH CV ECHO MEAS - BZI_METRIC_HEIGHT: 162.6 CM
BH CV ECHO MEAS - BZI_METRIC_WEIGHT: 45.4 KG
BH CV ECHO MEAS - EDV(CUBED): 57.2 ML
BH CV ECHO MEAS - EDV(TEICH): 64 ML
BH CV ECHO MEAS - EF(CUBED): 75.4 %
BH CV ECHO MEAS - EF(TEICH): 68 %
BH CV ECHO MEAS - ESV(CUBED): 14.1 ML
BH CV ECHO MEAS - ESV(TEICH): 20.5 ML
BH CV ECHO MEAS - FS: 37.3 %
BH CV ECHO MEAS - IVS/LVPW: 1
BH CV ECHO MEAS - IVSD: 1.2 CM
BH CV ECHO MEAS - LA DIMENSION: 3.7 CM
BH CV ECHO MEAS - LA/AO: 1.2
BH CV ECHO MEAS - LAD MAJOR: 6.7 CM
BH CV ECHO MEAS - LAT PEAK E' VEL: 5.2 CM/SEC
BH CV ECHO MEAS - LATERAL E/E' RATIO: 15
BH CV ECHO MEAS - LV MASS(C)D: 164.8 GRAMS
BH CV ECHO MEAS - LV MASS(C)DI: 113.1 GRAMS/M^2
BH CV ECHO MEAS - LVIDD: 3.9 CM
BH CV ECHO MEAS - LVIDS: 2.4 CM
BH CV ECHO MEAS - LVPWD: 1.2 CM
BH CV ECHO MEAS - MED PEAK E' VEL: 8.2 CM/SEC
BH CV ECHO MEAS - MEDIAL E/E' RATIO: 9.5
BH CV ECHO MEAS - MV DEC SLOPE: 465.8 CM/SEC^2
BH CV ECHO MEAS - MV DEC TIME: 0.17 SEC
BH CV ECHO MEAS - MV E MAX VEL: 79.5 CM/SEC
BH CV ECHO MEAS - MV P1/2T MAX VEL: 106.7 CM/SEC
BH CV ECHO MEAS - MV P1/2T: 67.1 MSEC
BH CV ECHO MEAS - MVA P1/2T LCG: 2.1 CM^2
BH CV ECHO MEAS - MVA(P1/2T): 3.3 CM^2
BH CV ECHO MEAS - PA ACC SLOPE: 981.3 CM/SEC^2
BH CV ECHO MEAS - PA ACC TIME: 0.08 SEC
BH CV ECHO MEAS - PA PR(ACCEL): 41 MMHG
BH CV ECHO MEAS - RAP SYSTOLE: 3 MMHG
BH CV ECHO MEAS - RV MAX PG: 0.99 MMHG
BH CV ECHO MEAS - RV V1 MAX: 49.9 CM/SEC
BH CV ECHO MEAS - RVSP: 34 MMHG
BH CV ECHO MEAS - SI(CUBED): 29.6 ML/M^2
BH CV ECHO MEAS - SI(TEICH): 29.9 ML/M^2
BH CV ECHO MEAS - SV(CUBED): 43.1 ML
BH CV ECHO MEAS - SV(TEICH): 43.5 ML
BH CV ECHO MEAS - TAPSE (>1.6): 2.3 CM2
BH CV ECHO MEAS - TR MAX PG: 31 MMHG
BH CV ECHO MEAS - TR MAX VEL: 276.6 CM/SEC
BH CV ECHO MEASUREMENTS AVERAGE E/E' RATIO: 11.87
BH CV XLRA - RV BASE: 4.8 CM
BH CV XLRA - RV LENGTH: 6.1 CM
BH CV XLRA - RV MID: 3.8 CM
BH CV XLRA - TDI S': 16.1 CM/SEC
BH CV XLRA MEAS LEFT CCA RATIO VEL: 60.9 CM/SEC
BH CV XLRA MEAS LEFT DIST CCA EDV: 15.3 CM/SEC
BH CV XLRA MEAS LEFT DIST CCA PSV: 58.1 CM/SEC
BH CV XLRA MEAS LEFT DIST ICA EDV: 24.4 CM/SEC
BH CV XLRA MEAS LEFT DIST ICA PSV: 69.8 CM/SEC
BH CV XLRA MEAS LEFT ICA RATIO VEL: 77.3 CM/SEC
BH CV XLRA MEAS LEFT ICA/CCA RATIO: 1.3
BH CV XLRA MEAS LEFT MID CCA EDV: 14.9 CM/SEC
BH CV XLRA MEAS LEFT MID CCA PSV: 61.3 CM/SEC
BH CV XLRA MEAS LEFT MID ICA EDV: 21.4 CM/SEC
BH CV XLRA MEAS LEFT MID ICA PSV: 77.7 CM/SEC
BH CV XLRA MEAS LEFT PROX CCA EDV: 8.6 CM/SEC
BH CV XLRA MEAS LEFT PROX CCA PSV: 62.5 CM/SEC
BH CV XLRA MEAS LEFT PROX ECA EDV: 6.3 CM/SEC
BH CV XLRA MEAS LEFT PROX ECA PSV: 76.7 CM/SEC
BH CV XLRA MEAS LEFT PROX ICA EDV: 14.1 CM/SEC
BH CV XLRA MEAS LEFT PROX ICA PSV: 49.7 CM/SEC
BH CV XLRA MEAS LEFT PROX SCLA PSV: 74 CM/SEC
BH CV XLRA MEAS LEFT VERTEBRAL A EDV: 12.8 CM/SEC
BH CV XLRA MEAS LEFT VERTEBRAL A PSV: 67.8 CM/SEC
BH CV XLRA MEAS RIGHT CCA RATIO VEL: 54.1 CM/SEC
BH CV XLRA MEAS RIGHT DIST CCA EDV: 12.9 CM/SEC
BH CV XLRA MEAS RIGHT DIST CCA PSV: 49 CM/SEC
BH CV XLRA MEAS RIGHT DIST ICA EDV: 19.2 CM/SEC
BH CV XLRA MEAS RIGHT DIST ICA PSV: 61.9 CM/SEC
BH CV XLRA MEAS RIGHT ICA RATIO VEL: 71.2 CM/SEC
BH CV XLRA MEAS RIGHT ICA/CCA RATIO: 1.3
BH CV XLRA MEAS RIGHT MID CCA EDV: 7.9 CM/SEC
BH CV XLRA MEAS RIGHT MID CCA PSV: 54.4 CM/SEC
BH CV XLRA MEAS RIGHT MID ICA EDV: 21.3 CM/SEC
BH CV XLRA MEAS RIGHT MID ICA PSV: 71.6 CM/SEC
BH CV XLRA MEAS RIGHT PROX CCA EDV: 13.1 CM/SEC
BH CV XLRA MEAS RIGHT PROX CCA PSV: 64.6 CM/SEC
BH CV XLRA MEAS RIGHT PROX ECA EDV: 7.9 CM/SEC
BH CV XLRA MEAS RIGHT PROX ECA PSV: 78.1 CM/SEC
BH CV XLRA MEAS RIGHT PROX ICA EDV: 15.1 CM/SEC
BH CV XLRA MEAS RIGHT PROX ICA PSV: 54.4 CM/SEC
BH CV XLRA MEAS RIGHT PROX SCLA PSV: 95.1 CM/SEC
BH CV XLRA MEAS RIGHT VERTEBRAL A EDV: 9.3 CM/SEC
BH CV XLRA MEAS RIGHT VERTEBRAL A PSV: 38.5 CM/SEC
LEFT ARM BP: NORMAL MMHG
LEFT ATRIUM VOLUME INDEX: 65.2 ML/M^2
LEFT ATRIUM VOLUME: 95 ML
LV EF 2D ECHO EST: 60 %
RIGHT ARM BP: NORMAL MMHG

## 2019-06-04 ENCOUNTER — APPOINTMENT (OUTPATIENT)
Dept: MAMMOGRAPHY | Facility: HOSPITAL | Age: 79
End: 2019-06-04

## 2019-06-26 ENCOUNTER — TELEPHONE (OUTPATIENT)
Dept: FAMILY MEDICINE CLINIC | Facility: CLINIC | Age: 79
End: 2019-06-26

## 2019-06-28 ENCOUNTER — OFFICE VISIT (OUTPATIENT)
Dept: FAMILY MEDICINE CLINIC | Facility: CLINIC | Age: 79
End: 2019-06-28

## 2019-06-28 VITALS
WEIGHT: 104 LBS | SYSTOLIC BLOOD PRESSURE: 120 MMHG | HEIGHT: 64 IN | DIASTOLIC BLOOD PRESSURE: 70 MMHG | BODY MASS INDEX: 17.75 KG/M2 | HEART RATE: 75 BPM

## 2019-06-28 DIAGNOSIS — I48.20 CHRONIC ATRIAL FIBRILLATION (HCC): Primary | ICD-10-CM

## 2019-06-28 DIAGNOSIS — F32.89 OTHER DEPRESSION: ICD-10-CM

## 2019-06-28 DIAGNOSIS — Z60.4 ISOLATION (SOCIAL): ICD-10-CM

## 2019-06-28 PROCEDURE — 99214 OFFICE O/P EST MOD 30 MIN: CPT | Performed by: FAMILY MEDICINE

## 2019-06-28 RX ORDER — CITALOPRAM 20 MG/1
20 TABLET ORAL DAILY
Refills: 0 | COMMUNITY
Start: 2019-06-21 | End: 2019-06-28 | Stop reason: SDUPTHER

## 2019-06-28 RX ORDER — LEVETIRACETAM 250 MG/1
TABLET ORAL
Qty: 180 TABLET | Refills: 1 | Status: SHIPPED | OUTPATIENT
Start: 2019-06-28 | End: 2019-09-23

## 2019-06-28 RX ORDER — FERROUS SULFATE 325(65) MG
TABLET ORAL
Qty: 90 TABLET | Refills: 3 | Status: SHIPPED | OUTPATIENT
Start: 2019-06-28

## 2019-06-28 RX ORDER — RANITIDINE 150 MG/1
CAPSULE ORAL
Qty: 90 CAPSULE | Refills: 1 | Status: SHIPPED | OUTPATIENT
Start: 2019-06-28

## 2019-06-28 RX ORDER — CITALOPRAM 20 MG/1
TABLET ORAL
Qty: 90 TABLET | Refills: 1 | Status: SHIPPED | OUTPATIENT
Start: 2019-06-28 | End: 2019-12-30

## 2019-06-28 SDOH — SOCIAL STABILITY - SOCIAL INSECURITY: SOCIAL EXCLUSION AND REJECTION: Z60.4

## 2019-06-28 NOTE — PROGRESS NOTES
Subjective   Mirian Sy is a 79 y.o. female.     Chief Complaint   Patient presents with   • Follow-up     Hospital follow up       History of Present Illness     Mirian Sy presents today for hospital follow-up.    She was hospitalized from 5/7/2019 to 5/12/2019 for A. fib with RVR.  Dr. Olguin is her usual cardiologist and saw her in the hospital.  Her flecainide was stopped and her Cardizem was increased for rate control.  An MRI at the time showed acute strokes in the right posterior lateral thalamus and adjacent to the right putamen and right basal ganglia.  The importance of Eliquis compliance was stressed, and she was started on a statin.  She was discharged to inpatient rehab at [facility] for 1 month, then to [facility] for a few weeks.  She reports she did fairly well at [facility], she wore oxygen for couple days in the hospital but has not had any since discharge.  She has been relatively asymptomatic.  States that she has been having some increased depression due to social isolation.  She did pretty well at [facility], but typically goes to sleep around 5:30 PM due to boredom, which results in sundowning around midnight.    Dr. Reno July 16th for eye injection.  Dr. Olguin, in September    The following portions of the patient's history were reviewed and updated as appropriate: allergies, current medications, past family history, past medical history, past social history, past surgical history and problem list.    Active Ambulatory Problems     Diagnosis Date Noted   • Abnormal gait 05/16/2016   • Atrial fibrillation (CMS/HCC) 05/16/2016   • Cardiomyopathy (CMS/HCC) 05/16/2016   • Carpal tunnel syndrome 05/16/2016   • Complex partial epilepsy (CMS/HCC) 05/16/2016   • Depression 05/16/2016   • Chronic gastritis 05/16/2016   • Hypertension 05/16/2016   • Hyponatremia 05/16/2016   • Nutritional anemia 05/16/2016   • Dyssomnia 05/16/2016   • Diplopia 05/16/2016   • Xeroderma  05/16/2016   • Preventative health care 07/18/2016   • Frailty 09/15/2016   • Tricuspid regurgitation 09/15/2016   • Cerebrovascular accident (CMS/[facility]) 09/22/2016   • Syncope 09/22/2016   • DVT (deep venous thrombosis) (CMS/[facility]) 09/22/2016   • Anorexia 09/22/2016   • Anxiety 09/22/2016   • Patent foramen ovale    • Mitral regurgitation 09/30/2016   • Low weight 12/08/2016   • Senile osteoporosis 06/07/2017   • Iron deficiency 04/20/2018   • PAF (paroxysmal atrial fibrillation) (CMS/[facility]) 05/08/2018   • Closed fracture of left ilium (CMS/HCC) 05/08/2018   • GERD (gastroesophageal reflux disease) 05/08/2018   • Fall at home 05/08/2018   • Vertigo 12/18/2018   • Internuclear ophthalmoplegia, 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   • Atrial fibrillation with RVR (CMS/HCC) 05/07/2019     Resolved Ambulatory Problems     Diagnosis Date Noted   • Abnormal weight loss 05/16/2016   • Fracture of femur (CMS/HCC) 05/16/2016   • Hyperlipidemia 05/16/2016   • Fatigue 09/15/2016   • Shaky 09/15/2016   • Shortness of breath 09/15/2016   • Urinary tract infection without hematuria 09/19/2016   • Tobacco abuse 09/22/2016   • VHD (valvular heart disease) 09/26/2016   • Atrial fibrillation with RVR (CMS/HCC) 10/09/2016   • Atrial fibrillation with rapid ventricular response (CMS/HCC) 10/11/2016   • Osteoporosis 06/07/2017     Past Medical History:   Diagnosis Date   • Atrial fibrillation (CMS/[facility]) 2005   • Basal cell carcinoma 2007   • Cardiomyopathy (CMS/[facility]) 2006   • Cerebrovascular accident (CMS/[facility]) 01/2005   • Complex partial epilepsy (CMS/[facility]) 2014   • Coronary artery vasospasm (CMS/[facility]) 2006   • Decubitus ulcer of buttock 2014   • Depression 2002   • DVT (deep venous thrombosis) (CMS/[facility]) 2014   • Fracture of bone of forefoot 1972   • Fracture of ilium, left (CMS/[facility]) 05/2018   • GERD (gastroesophageal reflux disease) 2014   • HTN (hypertension) 2005   • Iron deficiency anemia due to chronic blood loss 2018   • Low body weight due to inadequate  caloric intake Adulthood   • Mitral valve prolapse    • Osteoarthritis    • Osteoporosis    • Patent foramen ovale    • Pneumonia    • SCC (squamous cell carcinoma), arm, right 2017   • Scoliosis    • Syncope    • Varicose veins      Past Surgical History:   Procedure Laterality Date   • ACHILLES TENDON SURGERY Left     Repair of rupture left tendon with screws   • BREAST CYST EXCISION Left    • BREAST SURGERY Left     Excision benign cyst   • CARDIAC CATHETERIZATION  2006    Severe acute coronary spasm   • CATARACT EXTRACTION WITH INTRAOCULAR LENS IMPLANT Bilateral 2015   • EYE CAPSULOTOMY WITH LASER Left 2016    Marked visual improvement   • FEMUR FRACTURE SURGERY Right 2015    Repair of shaft fracture   • HIP FRACTURE SURGERY Left 2014    left hip fracture with pin   • LUMBAR DISC SURGERY  1983   • OVARIAN CYST REMOVAL Left    • SKIN CANCER EXCISION Right 2017    SCC - arm     Family History   Problem Relation Age of Onset   • Breast cancer Mother          age 59   • Diabetes Mother    • Hypertension Mother    • Heart disease Father          age 80   • Other Sister         Arthrodesis cervical   • Basal cell carcinoma Sister    • Bone cancer Sister    • Breast cancer Sister 57   • Diabetes Sister          age 56   • Hypertension Sister    • Melanoma Sister    • Arrhythmia Sister         Sinus   • Stroke Sister    • Allergies Son         Hay fever   • Diabetes Maternal Uncle    • Breast cancer Sister 59   • Breast cancer Sister 66   • Ovarian cancer Sister         DX AGE UNKNOWN     Social History     Socioeconomic History   • Marital status:      Spouse name: Not on file   • Number of children: Not on file   • Years of education: Not on file   • Highest education level: Not on file   Tobacco Use   • Smoking status: Former Smoker     Packs/day: 1.00     Years: 20.00     Pack years: 20.00     Types: Cigarettes   • Smokeless tobacco: Never Used   • Tobacco comment:  "Remote history   Substance and Sexual Activity   • Alcohol use: No   • Drug use: No   Social History Narrative    Domestic life   lives in private home alone - good support from local children        Mandaen    [employer]        Sleep hygiene  in bed 9 PM to 6 AM for 9 hours of sleep        Caffeine use   1 1/2 cups coffee daily        Exercise habits  walks most days of the week. - Stretching each morning.          Diet   well-balanced diet with protein supplementations        Occupation    retired         Hearing  no impairment        Vision    no glasses needed after cataract surgery.          Driving   daytime only - good weather - local traffic         Review of Systems   Constitutional: Negative.    Respiratory: Negative.    Cardiovascular: Negative.    Psychiatric/Behavioral: Positive for sleep disturbance.       Objective   Blood pressure 120/70, pulse 75, height 162.6 cm (64.02\"), weight 47.2 kg (104 lb).  Nursing note reviewed  Physical Exam  Const: NAD, A&Ox4, Pleasant, frail  Eyes: EOMI, no conjunctivitis  ENT: No nasal discharge present, neck supple  Cardiac: Regular rate and rhythm, no cyanosis  Resp: Respiratory rate within normal limits, no increased work of breathing, no audible wheezing or retractions noted  Procedures  Assessment/Plan   Mirian was seen today for follow-up.    Diagnoses and all orders for this visit:    Chronic atrial fibrillation (CMS/HCC)    Isolation (social)  -     Ambulatory Referral to Social Work    Other depression  -     Ambulatory Referral to Social Work        #1  Atrial fibrillation  Stable on a dosage of Cardizem asymptomatic currently.  Oxygenation at 98% today  Follow-up with Dr. Olguin in September    #2  Depression due to social isolation  We discussed moving to assisted living long-term, however living at home is her top priority  Referral to social work today, appreciate help    #3 Debility  Cont PT/OT at home  Will follow    There are no " Patient Instructions on file for this visit.    Return in about 3 months (around 9/28/2019).    Ambulatory progress note signed and attested to by Oren Engel D.O.

## 2019-07-12 ENCOUNTER — TELEPHONE (OUTPATIENT)
Dept: SOCIAL WORK | Facility: CLINIC | Age: 79
End: 2019-07-12

## 2019-07-12 NOTE — TELEPHONE ENCOUNTER
ABBY contacted pt.Pt stated she has SNF, OT/PT. 2 days a week. [facility]. ABBY provided pt with that phone number.

## 2019-07-18 NOTE — TELEPHONE ENCOUNTER
SW contacted pt once more to see if she needed any other resources. Pt stated she has a shower chair, transportation and family support.  Pt stated she needed nothing further at this time.    SW will let provider know.

## 2019-07-24 ENCOUNTER — TELEPHONE (OUTPATIENT)
Dept: FAMILY MEDICINE CLINIC | Facility: CLINIC | Age: 79
End: 2019-07-24

## 2019-07-24 DIAGNOSIS — R53.1 WEAKNESS: Primary | ICD-10-CM

## 2019-07-24 NOTE — TELEPHONE ENCOUNTER
Mr Matthews with Home Health PT finished his last session today. He is requesting an order to be sent to [facility] at [facility]. The fax number is .

## 2019-07-29 ENCOUNTER — TELEPHONE (OUTPATIENT)
Dept: FAMILY MEDICINE CLINIC | Facility: CLINIC | Age: 79
End: 2019-07-29

## 2019-07-29 NOTE — TELEPHONE ENCOUNTER
[facility], LOGIN FOR PROVIDERS IS [phone] PASSWORD [id number]  NEEDS TO ATTEST THAT PATIENT NEEDS TO TAKE THE BUS. PATIENT HAS BEEN REQUESTING THIS EARLY IN THE YEAR. NOW NEEDS PHYSICAL THERAPY AND NEEDS THE RIDE.

## 2019-08-01 ENCOUNTER — TELEPHONE (OUTPATIENT)
Dept: FAMILY MEDICINE CLINIC | Facility: CLINIC | Age: 79
End: 2019-08-01

## 2019-08-01 DIAGNOSIS — I48.91 ATRIAL FIBRILLATION WITH RVR (HCC): ICD-10-CM

## 2019-08-01 RX ORDER — ATORVASTATIN CALCIUM 80 MG/1
80 TABLET, FILM COATED ORAL NIGHTLY
Qty: 30 TABLET | Refills: 5 | Status: SHIPPED | OUTPATIENT
Start: 2019-08-01 | End: 2019-10-16 | Stop reason: SDUPTHER

## 2019-08-01 RX ORDER — DILTIAZEM HYDROCHLORIDE 240 MG/1
240 CAPSULE, COATED, EXTENDED RELEASE ORAL
Qty: 30 CAPSULE | Refills: 5 | Status: SHIPPED | OUTPATIENT
Start: 2019-08-01 | End: 2019-08-01 | Stop reason: SDUPTHER

## 2019-08-01 RX ORDER — DILTIAZEM HYDROCHLORIDE 360 MG/1
360 CAPSULE, EXTENDED RELEASE ORAL
Qty: 30 CAPSULE | Refills: 5 | Status: SHIPPED | OUTPATIENT
Start: 2019-08-01 | End: 2020-03-05 | Stop reason: DRUGHIGH

## 2019-08-01 NOTE — TELEPHONE ENCOUNTER
DILTIAZEM 360 MG, PROVIDER CHANGED DOSAGE  ATORVASTATIN 80 MG, 30 DAY SUPPLY    [facility] ON [address] IN [place]

## 2019-08-28 DIAGNOSIS — I48.20 CHRONIC ATRIAL FIBRILLATION (HCC): ICD-10-CM

## 2019-08-28 DIAGNOSIS — I48.0 PAF (PAROXYSMAL ATRIAL FIBRILLATION) (HCC): ICD-10-CM

## 2019-08-28 RX ORDER — FLECAINIDE ACETATE 50 MG/1
TABLET ORAL
Qty: 180 TABLET | Refills: 4 | Status: SHIPPED | OUTPATIENT
Start: 2019-08-28 | End: 2019-09-10

## 2019-08-28 RX ORDER — MIRTAZAPINE 7.5 MG/1
TABLET, FILM COATED ORAL
Qty: 90 TABLET | Refills: 4 | Status: SHIPPED | OUTPATIENT
Start: 2019-08-28 | End: 2020-01-31 | Stop reason: SDUPTHER

## 2019-08-28 RX ORDER — APIXABAN 5 MG/1
TABLET, FILM COATED ORAL
Qty: 60 TABLET | Refills: 12 | Status: SHIPPED | OUTPATIENT
Start: 2019-08-28 | End: 2020-04-04 | Stop reason: HOSPADM

## 2019-09-10 DIAGNOSIS — I48.91 ATRIAL FIBRILLATION WITH RVR (HCC): ICD-10-CM

## 2019-09-10 RX ORDER — DILTIAZEM HYDROCHLORIDE 360 MG/1
360 CAPSULE, EXTENDED RELEASE ORAL
Qty: 30 CAPSULE | Refills: 5 | OUTPATIENT
Start: 2019-09-10

## 2019-09-10 NOTE — PROGRESS NOTES
Problems   This clinical information was not verified.   Medications   This clinical information was not verified, but there are updates pending review:   No Longer Applicable Medications Start Date Reported By  Comments   flecainide 50 MG tablet 8/28/2019 Mirian Sy Cardiologist stopped it

## 2019-09-12 ENCOUNTER — OFFICE VISIT (OUTPATIENT)
Dept: CARDIOLOGY | Facility: CLINIC | Age: 79
End: 2019-09-12

## 2019-09-12 VITALS
SYSTOLIC BLOOD PRESSURE: 136 MMHG | HEART RATE: 105 BPM | HEIGHT: 64 IN | WEIGHT: 101 LBS | BODY MASS INDEX: 17.24 KG/M2 | DIASTOLIC BLOOD PRESSURE: 82 MMHG

## 2019-09-12 DIAGNOSIS — I51.81 TAKOTSUBO SYNDROME: ICD-10-CM

## 2019-09-12 DIAGNOSIS — E78.5 DYSLIPIDEMIA: ICD-10-CM

## 2019-09-12 DIAGNOSIS — I10 ESSENTIAL HYPERTENSION: ICD-10-CM

## 2019-09-12 DIAGNOSIS — I48.20 CHRONIC ATRIAL FIBRILLATION (HCC): ICD-10-CM

## 2019-09-12 DIAGNOSIS — Z86.73 HISTORY OF STROKE: Primary | ICD-10-CM

## 2019-09-12 PROCEDURE — 99214 OFFICE O/P EST MOD 30 MIN: CPT | Performed by: INTERNAL MEDICINE

## 2019-09-12 RX ORDER — METOPROLOL SUCCINATE 25 MG/1
25 TABLET, EXTENDED RELEASE ORAL DAILY
Qty: 30 TABLET | Refills: 2 | Status: SHIPPED | OUTPATIENT
Start: 2019-09-12 | End: 2019-10-07 | Stop reason: SDUPTHER

## 2019-09-12 NOTE — PROGRESS NOTES
[facility] Cardiology  Mirian Sy  1940  79 y.o.  [phone]      Date: 09/12/2019    PCP: Oren Engel DO    Chief Complaint   Patient presents with   • Stroke   • Atrial Fibrillation       Problem List:  1. Chronic atrial fibrillation.  a. CHADS VASc 7; on eliquis  b. External Cardioversion 10/2016: NSR  c. Holter Monitor with Dr Springer, 10/27/2016, revealing brief PAF with accelerated junctional rhythms.  d. Echo 12/18/18:  EF 60%, mild MR/ TR with RVSP of 42 mmHg.  In a-fib during study  e. Echocardiogram, 05/09/2019: EF 60%. Trace-to-mild MR. Mild TR with RVSP 34 mmHg. No cardiac source for embolus.   2. CVA, January 2005 and May 2019:  a. CT scan of the head showed a right parietal CVA.  b. Carotid duplex revealed a 50% to 79% left ICS stenosis, 15% right ICS stenosis.  c. MRI of the neck: no significant carotid bifurcations.  d. Negative CT of the head, 09/10/2012.  e. Carotid duplex, 02/24/2014: Shelf-like plaque in the CECE without significant elevation and velocities. Patent vertebral arteries.   f. Carotid duplex 12/18/18:  0-49% bilaterally with antegrade flow noted bilaterally.  g. ER presentation with weakness, 05/07/2019. Pt was in Afib with RVR. MRI showed acute strokes in the right posterior lateral thalamus and adjacent to the right putamen and right basal ganglia. 0-49% bilateral ICA stenosis per duplex on 05/09/2019.  3. Cardiomyopathy:  a. Takotsubo syndrome, 05/07/2006.  b. [facility], 05/08/2006, Dr. Arguello: EF 30% with near normal CORS.  c. Echocardiogram, 05/07/2006: EF 40% with a peak gradient of 75-80 mm across the LVFT and at least moderate MR.  d. Echocardiogram, 07/13/2006: EF > 60% with only trace MR.  e. Echocardiogram, 02/24/2014: LVEF of 55% to 60% with PFO, mild-to-moderate MR, moderate TR.   f. Echocardiogram, 09/20/2016: EF 60%. Moderate-to-severe MR, Moderate TR.  4. Patent foramen ovale   a. MONIE, January 2005, documented again today  by echocardiogram, 07/13/2006.  5. Syncope, 2/23/2014, admission @ [facility], unknown etiology.   6. Superficial DVT in the right lesser saphenus vein following ORIF of the left hip.   7. Chronic anorexia.  8. Hyperlipidemia, followed by Dr. Springer.  9. Depression/Anxiety.  10. Osteoporosis/ Osteoarthritis/ Scoliosis  11. Surgical history:  a. Left Achilles tendon repair.  b. Left ovarian cyst excision.  c. Lumbar discectomy.  d. Previous breast surgery.  e. Left hip fracture, status post intratrochanteric fixation    Allergies   Allergen Reactions   • Actonel [Risedronate Sodium] GI Intolerance   • Hctz [Hydrochlorothiazide]      hyponatremia   • Lisinopril      hyperkalemia       Current Medications:      Current Outpatient Medications:   •  acetaminophen (TYLENOL) 325 MG tablet, Take 2 tablets by mouth Every 4 (Four) Hours As Needed for Mild Pain ., Disp: , Rfl:   •  atorvastatin (LIPITOR) 80 MG tablet, Take 1 tablet by mouth Every Night., Disp: 30 tablet, Rfl: 5  •  calcium-vitamin D (OSCAL-500) 500-200 MG-UNIT per tablet, Take 1 tablet by mouth every morning., Disp: , Rfl:   •  Cholecalciferol (VITAMIN D PO), Take 1 tablet by mouth Daily., Disp: , Rfl:   •  citalopram (CeleXA) 20 MG tablet, TAKE 1 TABLET EVERY MORNING, Disp: 90 tablet, Rfl: 1  •  diltiaZEM CD (CARDIZEM CD) 360 MG 24 hr capsule, Take 1 capsule by mouth Daily., Disp: 30 capsule, Rfl: 5  •  ELIQUIS 5 MG tablet tablet, TAKE 1 TABLET EVERY 12 HOURS, Disp: 60 tablet, Rfl: 12  •  ferrous sulfate 325 (65 FE) MG tablet, TAKE 1 TABLET DAILY WITH BREAKFAST, Disp: 90 tablet, Rfl: 3  •  levETIRAcetam (KEPPRA) 250 MG tablet, TAKE 1 TABLET EVERY 12 HOURS, Disp: 180 tablet, Rfl: 1  •  magnesium oxide (MAGOX) 400 (241.3 Mg) MG tablet tablet, Take 400 mg by mouth Daily., Disp: , Rfl:   •  mirtazapine (REMERON) 7.5 MG tablet, TAKE 1 TABLET AT BEDTIME, Disp: 90 tablet, Rfl: 4  •  Multiple Vitamins-Minerals (CENTRUM SILVER 50+WOMEN PO), Take 1 tablet by mouth Daily.,  "Disp: , Rfl:   •  ranitidine (ZANTAC) 150 MG capsule, TAKE 1 CAPSULE EVERY NIGHT, Disp: 90 capsule, Rfl: 1  •  metoprolol succinate XL (TOPROL-XL) 25 MG 24 hr tablet, Take 1 tablet by mouth Daily., Disp: 30 tablet, Rfl: 2    HPI    Mirian Sy is a 79 y.o. female who presents today for hospital follow up. She was admitted on 05/07/2019 with Afib with RVR. MRI was performed and showed acute strokes in the right posterior lateral thalamus and adjacent to the right putamen and right basal ganglia, which were likely the cause for her weakness, which prompted her ER visit. She was discharged after five days, on 05/12/19. Since last visit, she has been feeling well overall. She has been going through physical therapy, and reports residual left-sided weakness. She denies any other stroke-like symptoms. She reports her heart rate has been elevated since her stroke. Patient denies chest pain, shortness of breath, edema, dizziness, and syncope.     The following portions of the patient's history were reviewed and updated as appropriate: allergies, current medications and problem list.    Pertinent positives as listed in the HPI.  All other systems reviewed are negative.    Vitals:    09/12/19 1014 09/12/19 1032   BP: 136/82    BP Location: Right arm    Patient Position: Sitting    Pulse: 60 105   Weight: 45.8 kg (101 lb)    Height: 162.6 cm (64\")        Physical Exam:    General: Alert and oriented.  Neck: Jugular venous pressure is within normal limits. Carotids have normal upstrokes without bruits.   Cardiovascular: Heart has a nondisplaced focal PMI. Irregularly irregular rate and rhythm without murmur, gallop or rub.  Lungs: Clear without rales or wheezes. Equal expansion is noted.   Extremities: Show no edema.  Skin: Warm and dry.  Neurologic: Nonfocal.    Diagnostic Data:  Lab Results   Component Value Date    GLUCOSE 127 (H) 05/12/2019    BUN 21 05/12/2019    CREATININE 0.91 05/12/2019    EGFRIFNONA 60 (L) " 05/12/2019    BCR 23.1 05/12/2019     (L) 05/12/2019    K 4.3 05/12/2019    CL 94 (L) 05/12/2019    CO2 24.0 05/12/2019    CALCIUM 9.3 05/12/2019    ALBUMIN 4.40 05/07/2019    AST 27 05/07/2019    ALT 10 05/07/2019     Lab Results   Component Value Date    CHOL 165 12/19/2018    TRIG 44 12/19/2018    HDL 51 12/19/2018     12/19/2018      Lab Results   Component Value Date    WBC 10.92 (H) 05/12/2019    HGB 11.3 (L) 05/12/2019    HCT 32.5 (L) 05/12/2019    MCV 87.4 05/12/2019     05/12/2019     Lab Results   Component Value Date    TSH 1.650 05/07/2019       Procedures    Assessment:      ICD-10-CM ICD-9-CM   1. History of stroke Z86.73 V12.54   2. Chronic atrial fibrillation (CMS/HCC) I48.2 427.31   3. Essential hypertension I10 401.9   4. Hx of Takotsubo syndrome I51.81 429.83   5. Dyslipidemia E78.5 272.4     Lab results found above were reviewed with the patient.    Plan:    1. Patient instructed to perform PT exercises at home on days she does not have physical therapy.  2. Start Toprol XL 25 mg daily for rate control.  3. Continue Eliquis 5 mg for stroke prophylaxis with chronic Afib.  4. Continue diltiazem 360 mg for hypertension and rhythm control.  5. Continue atorvastatin 80 mg for dyslipidemia.  6. Continue all other current medications.  7. F/up in 1 month or sooner if needed.  May need increase in Toprol at that time if rate remains uncontrolled.      Scribed for Bri Olguin MD by Anaya Redding. 9/12/2019  10:32 AM     Bri Olguin MD, FACC

## 2019-09-15 ENCOUNTER — APPOINTMENT (OUTPATIENT)
Dept: GENERAL RADIOLOGY | Facility: HOSPITAL | Age: 79
End: 2019-09-15

## 2019-09-15 ENCOUNTER — HOSPITAL ENCOUNTER (OUTPATIENT)
Facility: HOSPITAL | Age: 79
Setting detail: OBSERVATION
Discharge: HOME | End: 2019-09-17
Attending: EMERGENCY MEDICINE | Admitting: INTERNAL MEDICINE

## 2019-09-15 ENCOUNTER — APPOINTMENT (OUTPATIENT)
Dept: CT IMAGING | Facility: HOSPITAL | Age: 79
End: 2019-09-15

## 2019-09-15 DIAGNOSIS — Z78.9 IMPAIRED MOBILITY AND ADLS: ICD-10-CM

## 2019-09-15 DIAGNOSIS — N17.9 AKI (ACUTE KIDNEY INJURY) (HCC): ICD-10-CM

## 2019-09-15 DIAGNOSIS — R53.1 WEAKNESS: ICD-10-CM

## 2019-09-15 DIAGNOSIS — Z74.09 IMPAIRED MOBILITY AND ADLS: ICD-10-CM

## 2019-09-15 DIAGNOSIS — N30.00 ACUTE CYSTITIS WITHOUT HEMATURIA: ICD-10-CM

## 2019-09-15 DIAGNOSIS — R00.1 BRADYCARDIA: Primary | ICD-10-CM

## 2019-09-15 PROBLEM — R41.0 CONFUSION: Status: ACTIVE | Noted: 2019-09-15

## 2019-09-15 PROBLEM — I48.20 CHRONIC ATRIAL FIBRILLATION (HCC): Status: ACTIVE | Noted: 2019-09-15

## 2019-09-15 PROBLEM — E87.5 HYPERKALEMIA: Status: ACTIVE | Noted: 2019-09-15

## 2019-09-15 LAB
ALBUMIN SERPL-MCNC: 3.9 G/DL (ref 3.5–5.2)
ALBUMIN/GLOB SERPL: 1.1 G/DL
ALP SERPL-CCNC: 90 U/L (ref 39–117)
ALT SERPL W P-5'-P-CCNC: 13 U/L (ref 1–33)
ANION GAP SERPL CALCULATED.3IONS-SCNC: 10 MMOL/L (ref 5–15)
AST SERPL-CCNC: 28 U/L (ref 1–32)
BACTERIA UR QL AUTO: ABNORMAL /HPF
BASOPHILS # BLD AUTO: 0.05 10*3/MM3 (ref 0–0.2)
BASOPHILS NFR BLD AUTO: 0.7 % (ref 0–1.5)
BILIRUB SERPL-MCNC: 0.3 MG/DL (ref 0.2–1.2)
BILIRUB UR QL STRIP: ABNORMAL
BUN BLD-MCNC: 23 MG/DL (ref 8–23)
BUN/CREAT SERPL: 14.3 (ref 7–25)
CALCIUM SPEC-SCNC: 10 MG/DL (ref 8.6–10.5)
CHLORIDE SERPL-SCNC: 103 MMOL/L (ref 98–107)
CLARITY UR: ABNORMAL
CO2 SERPL-SCNC: 26 MMOL/L (ref 22–29)
COLOR UR: ABNORMAL
CREAT BLD-MCNC: 1.61 MG/DL (ref 0.57–1)
DEPRECATED RDW RBC AUTO: 50.4 FL (ref 37–54)
EOSINOPHIL # BLD AUTO: 0.17 10*3/MM3 (ref 0–0.4)
EOSINOPHIL NFR BLD AUTO: 2.4 % (ref 0.3–6.2)
ERYTHROCYTE [DISTWIDTH] IN BLOOD BY AUTOMATED COUNT: 14.3 % (ref 12.3–15.4)
GFR SERPL CREATININE-BSD FRML MDRD: 31 ML/MIN/1.73
GLOBULIN UR ELPH-MCNC: 3.7 GM/DL
GLUCOSE BLD-MCNC: 109 MG/DL (ref 65–99)
GLUCOSE UR STRIP-MCNC: NEGATIVE MG/DL
HCT VFR BLD AUTO: 39.2 % (ref 34–46.6)
HGB BLD-MCNC: 12 G/DL (ref 12–15.9)
HGB UR QL STRIP.AUTO: NEGATIVE
HOLD SPECIMEN: NORMAL
HOLD SPECIMEN: NORMAL
HYALINE CASTS UR QL AUTO: ABNORMAL /LPF
IMM GRANULOCYTES # BLD AUTO: 0.02 10*3/MM3 (ref 0–0.05)
IMM GRANULOCYTES NFR BLD AUTO: 0.3 % (ref 0–0.5)
KETONES UR QL STRIP: ABNORMAL
LEUKOCYTE ESTERASE UR QL STRIP.AUTO: ABNORMAL
LYMPHOCYTES # BLD AUTO: 2.03 10*3/MM3 (ref 0.7–3.1)
LYMPHOCYTES NFR BLD AUTO: 29.2 % (ref 19.6–45.3)
MAGNESIUM SERPL-MCNC: 2.4 MG/DL (ref 1.6–2.4)
MCH RBC QN AUTO: 29.3 PG (ref 26.6–33)
MCHC RBC AUTO-ENTMCNC: 30.6 G/DL (ref 31.5–35.7)
MCV RBC AUTO: 95.8 FL (ref 79–97)
MONOCYTES # BLD AUTO: 0.74 10*3/MM3 (ref 0.1–0.9)
MONOCYTES NFR BLD AUTO: 10.6 % (ref 5–12)
NEUTROPHILS # BLD AUTO: 3.94 10*3/MM3 (ref 1.7–7)
NEUTROPHILS NFR BLD AUTO: 56.8 % (ref 42.7–76)
NITRITE UR QL STRIP: NEGATIVE
NRBC BLD AUTO-RTO: 0 /100 WBC (ref 0–0.2)
NT-PROBNP SERPL-MCNC: 3384 PG/ML (ref 5–1800)
PH UR STRIP.AUTO: <=5 [PH] (ref 5–8)
PLATELET # BLD AUTO: 271 10*3/MM3 (ref 140–450)
PMV BLD AUTO: 10 FL (ref 6–12)
POTASSIUM BLD-SCNC: 5.4 MMOL/L (ref 3.5–5.2)
POTASSIUM BLD-SCNC: 5.7 MMOL/L (ref 3.5–5.2)
PROT SERPL-MCNC: 7.6 G/DL (ref 6–8.5)
PROT UR QL STRIP: ABNORMAL
RBC # BLD AUTO: 4.09 10*6/MM3 (ref 3.77–5.28)
RBC # UR: ABNORMAL /HPF
REF LAB TEST METHOD: ABNORMAL
SODIUM BLD-SCNC: 139 MMOL/L (ref 136–145)
SP GR UR STRIP: 1.03 (ref 1–1.03)
SQUAMOUS #/AREA URNS HPF: ABNORMAL /HPF
TROPONIN T SERPL-MCNC: <0.01 NG/ML (ref 0–0.03)
TROPONIN T SERPL-MCNC: <0.01 NG/ML (ref 0–0.03)
TSH SERPL DL<=0.05 MIU/L-ACNC: 1.63 UIU/ML (ref 0.27–4.2)
UROBILINOGEN UR QL STRIP: ABNORMAL
WBC NRBC COR # BLD: 6.95 10*3/MM3 (ref 3.4–10.8)
WBC UR QL AUTO: ABNORMAL /HPF
WHOLE BLOOD HOLD SPECIMEN: NORMAL
WHOLE BLOOD HOLD SPECIMEN: NORMAL

## 2019-09-15 PROCEDURE — 93005 ELECTROCARDIOGRAM TRACING: CPT | Performed by: EMERGENCY MEDICINE

## 2019-09-15 PROCEDURE — G0378 HOSPITAL OBSERVATION PER HR: HCPCS

## 2019-09-15 PROCEDURE — 70450 CT HEAD/BRAIN W/O DYE: CPT

## 2019-09-15 PROCEDURE — 83880 ASSAY OF NATRIURETIC PEPTIDE: CPT | Performed by: NURSE PRACTITIONER

## 2019-09-15 PROCEDURE — 25010000002 CEFTRIAXONE PER 250 MG: Performed by: NURSE PRACTITIONER

## 2019-09-15 PROCEDURE — 84132 ASSAY OF SERUM POTASSIUM: CPT | Performed by: NURSE PRACTITIONER

## 2019-09-15 PROCEDURE — 99285 EMERGENCY DEPT VISIT HI MDM: CPT

## 2019-09-15 PROCEDURE — 96365 THER/PROPH/DIAG IV INF INIT: CPT

## 2019-09-15 PROCEDURE — 84484 ASSAY OF TROPONIN QUANT: CPT | Performed by: EMERGENCY MEDICINE

## 2019-09-15 PROCEDURE — 87086 URINE CULTURE/COLONY COUNT: CPT | Performed by: NURSE PRACTITIONER

## 2019-09-15 PROCEDURE — 85025 COMPLETE CBC W/AUTO DIFF WBC: CPT | Performed by: EMERGENCY MEDICINE

## 2019-09-15 PROCEDURE — 80053 COMPREHEN METABOLIC PANEL: CPT | Performed by: EMERGENCY MEDICINE

## 2019-09-15 PROCEDURE — 71045 X-RAY EXAM CHEST 1 VIEW: CPT

## 2019-09-15 PROCEDURE — 83735 ASSAY OF MAGNESIUM: CPT | Performed by: EMERGENCY MEDICINE

## 2019-09-15 PROCEDURE — 81001 URINALYSIS AUTO W/SCOPE: CPT | Performed by: EMERGENCY MEDICINE

## 2019-09-15 PROCEDURE — 84443 ASSAY THYROID STIM HORMONE: CPT | Performed by: NURSE PRACTITIONER

## 2019-09-15 PROCEDURE — 99220 PR INITIAL OBSERVATION CARE/DAY 70 MINUTES: CPT | Performed by: FAMILY MEDICINE

## 2019-09-15 RX ORDER — LEVETIRACETAM 250 MG/1
250 TABLET ORAL EVERY 12 HOURS
Status: DISCONTINUED | OUTPATIENT
Start: 2019-09-15 | End: 2019-09-17 | Stop reason: HOSPADM

## 2019-09-15 RX ORDER — DILTIAZEM HYDROCHLORIDE 180 MG/1
360 CAPSULE, COATED, EXTENDED RELEASE ORAL
Status: DISCONTINUED | OUTPATIENT
Start: 2019-09-17 | End: 2019-09-17 | Stop reason: HOSPADM

## 2019-09-15 RX ORDER — FERROUS SULFATE 325(65) MG
325 TABLET ORAL
Status: DISCONTINUED | OUTPATIENT
Start: 2019-09-16 | End: 2019-09-17 | Stop reason: HOSPADM

## 2019-09-15 RX ORDER — SODIUM CHLORIDE 9 MG/ML
75 INJECTION, SOLUTION INTRAVENOUS CONTINUOUS
Status: ACTIVE | OUTPATIENT
Start: 2019-09-15 | End: 2019-09-16

## 2019-09-15 RX ORDER — SODIUM CHLORIDE 0.9 % (FLUSH) 0.9 %
10 SYRINGE (ML) INJECTION EVERY 12 HOURS SCHEDULED
Status: DISCONTINUED | OUTPATIENT
Start: 2019-09-15 | End: 2019-09-17 | Stop reason: HOSPADM

## 2019-09-15 RX ORDER — SENNA AND DOCUSATE SODIUM 50; 8.6 MG/1; MG/1
2 TABLET, FILM COATED ORAL 2 TIMES DAILY PRN
Status: DISCONTINUED | OUTPATIENT
Start: 2019-09-15 | End: 2019-09-17 | Stop reason: HOSPADM

## 2019-09-15 RX ORDER — FAMOTIDINE 20 MG/1
40 TABLET, FILM COATED ORAL DAILY
Status: DISCONTINUED | OUTPATIENT
Start: 2019-09-15 | End: 2019-09-16

## 2019-09-15 RX ORDER — SODIUM CHLORIDE 0.9 % (FLUSH) 0.9 %
10 SYRINGE (ML) INJECTION AS NEEDED
Status: DISCONTINUED | OUTPATIENT
Start: 2019-09-15 | End: 2019-09-17 | Stop reason: HOSPADM

## 2019-09-15 RX ORDER — ATORVASTATIN CALCIUM 40 MG/1
80 TABLET, FILM COATED ORAL NIGHTLY
Status: DISCONTINUED | OUTPATIENT
Start: 2019-09-15 | End: 2019-09-17 | Stop reason: HOSPADM

## 2019-09-15 RX ORDER — CITALOPRAM 20 MG/1
20 TABLET ORAL EVERY MORNING
Status: DISCONTINUED | OUTPATIENT
Start: 2019-09-16 | End: 2019-09-17 | Stop reason: HOSPADM

## 2019-09-15 RX ORDER — MIRTAZAPINE 15 MG/1
7.5 TABLET, FILM COATED ORAL NIGHTLY
Status: DISCONTINUED | OUTPATIENT
Start: 2019-09-15 | End: 2019-09-17 | Stop reason: HOSPADM

## 2019-09-15 RX ORDER — DILTIAZEM HYDROCHLORIDE 180 MG/1
360 CAPSULE, COATED, EXTENDED RELEASE ORAL
Status: DISCONTINUED | OUTPATIENT
Start: 2019-09-16 | End: 2019-09-15

## 2019-09-15 RX ADMIN — LEVETIRACETAM 250 MG: 250 TABLET, FILM COATED ORAL at 21:08

## 2019-09-15 RX ADMIN — PATIROMER 2 PACKET: 8.4 POWDER, FOR SUSPENSION ORAL at 13:39

## 2019-09-15 RX ADMIN — MIRTAZAPINE 7.5 MG: 15 TABLET, FILM COATED ORAL at 21:08

## 2019-09-15 RX ADMIN — ATORVASTATIN CALCIUM 80 MG: 40 TABLET, FILM COATED ORAL at 21:08

## 2019-09-15 RX ADMIN — CEFTRIAXONE 1 G: 1 INJECTION, POWDER, FOR SOLUTION INTRAMUSCULAR; INTRAVENOUS at 13:43

## 2019-09-15 RX ADMIN — SODIUM CHLORIDE 75 ML/HR: 9 INJECTION, SOLUTION INTRAVENOUS at 17:35

## 2019-09-15 RX ADMIN — SODIUM CHLORIDE, PRESERVATIVE FREE 10 ML: 5 INJECTION INTRAVENOUS at 17:35

## 2019-09-15 RX ADMIN — APIXABAN 5 MG: 5 TABLET, FILM COATED ORAL at 21:08

## 2019-09-15 RX ADMIN — FAMOTIDINE 40 MG: 20 TABLET ORAL at 17:35

## 2019-09-15 RX ADMIN — SODIUM CHLORIDE 500 ML: 9 INJECTION, SOLUTION INTRAVENOUS at 12:18

## 2019-09-15 NOTE — ED PROVIDER NOTES
Subjective   Ms. Mirian Sy is a 79 y.o. female who presents to the ED with c/o altered mental status. Her sisters accompanied her here. This morning when they went to get her to go to [facility] she thought it was Monday and asked why they did not come yesterday to bring her to [facility]. She has also been lightheaded. She denies any pain. She has a history of Eliquis and was seen by Dr. Olguin on 9/12/19 and was started on Toprol XL 25 mg at that time. The patient states she only took her prescribed dose but her sister feels she may have taken an extra dose given her confusion. She was admitted her on 5/7/19 for CVA. There are no other acute symptoms at this time.        History provided by:  Patient  Altered Mental Status   Presenting symptoms: confusion    Severity:  Moderate  Most recent episode:  Today  Episode history:  Continuous  Duration:  2 days  Timing:  Intermittent  Progression:  Resolved  Chronicity:  New  Context: not nursing home resident    Associated symptoms: light-headedness        Review of Systems   Cardiovascular: Negative for chest pain.   Neurological: Positive for light-headedness. Negative for dizziness and syncope.   Psychiatric/Behavioral: Positive for confusion.   All other systems reviewed and are negative.      Past Medical History:   Diagnosis Date   • Atrial fibrillation (CMS/HCC) 2005    Chronic anticoagulation and rate control   • Basal cell carcinoma 2007    Left leg and nose   • Cardiomyopathy (CMS/HCC) 2006   • Cerebrovascular accident (CMS/HCC) 01/2005    CT right parietal CVA. Carotid duplex -50% to 79% left ICS 15% right ICS stenosis. MRI of the neck showed no significant carotid bifurcations of these. Negative CT of the head, 09/10/2012. Carotid duplex, 02/24/2014:  Shelf-like plaque in the CECE without significant elevation and velocities.  Patent vertebral arteries.   • Complex partial epilepsy (CMS/HCC) 2014   • Coronary artery vasospasm (CMS/HCC) 2006    With  dilated cardiomyopathy   • Decubitus ulcer of buttock 2014    Transient during fracture rehabilitation   • Depression 2002    Good response to citalopram and mirtazapine   • DVT (deep venous thrombosis) (CMS/[facility])     Superficial - right lesser saphenus vein - after hip fracture    • Fracture of bone of forefoot 1972    Right foot   • Fracture of ilium, left (CMS/[facility]) 2018    Accidental fall - Uncomplicated recovery   • GERD (gastroesophageal reflux disease)     Chronic superficial gastritis   • HTN (hypertension)    • Iron deficiency anemia due to chronic blood loss     Predisposed by chronic anticoagulation and GERD   • Low body weight due to inadequate caloric intake Adulthood   • Mitral valve prolapse     Mild MR   • Osteoarthritis    • Osteoporosis    • Patent foramen ovale    • Pneumonia    • SCC (squamous cell carcinoma), arm, right 2017   • Scoliosis    • Syncope      undermined cause - [facility]   • Varicose veins     Symptomatic       Allergies   Allergen Reactions   • Actonel [Risedronate Sodium] GI Intolerance   • Hctz [Hydrochlorothiazide]      hyponatremia   • Lisinopril      hyperkalemia       Past Surgical History:   Procedure Laterality Date   • ACHILLES TENDON SURGERY Left     Repair of rupture left tendon with screws   • BREAST CYST EXCISION Left    • BREAST SURGERY Left     Excision benign cyst   • CARDIAC CATHETERIZATION  2006    Severe acute coronary spasm   • CATARACT EXTRACTION WITH INTRAOCULAR LENS IMPLANT Bilateral    • EYE CAPSULOTOMY WITH LASER Left 2016    Marked visual improvement   • FEMUR FRACTURE SURGERY Right     Repair of shaft fracture   • HIP FRACTURE SURGERY Left     left hip fracture with pin   • LUMBAR DISC SURGERY  1983   • OVARIAN CYST REMOVAL Left    • SKIN CANCER EXCISION Right 2017    SCC - arm       Family History   Problem Relation Age of Onset   • Breast cancer Mother          age 59   • Diabetes Mother    •  Hypertension Mother    • Heart disease Father          age 80   • Other Sister         Arthrodesis cervical   • Basal cell carcinoma Sister    • Bone cancer Sister    • Breast cancer Sister 57   • Diabetes Sister          age 56   • Hypertension Sister    • Melanoma Sister    • Arrhythmia Sister         Sinus   • Stroke Sister    • Allergies Son         Hay fever   • Diabetes Maternal Uncle    • Breast cancer Sister 59   • Breast cancer Sister 66   • Ovarian cancer Sister         DX AGE UNKNOWN       Social History     Socioeconomic History   • Marital status:      Spouse name: Not on file   • Number of children: Not on file   • Years of education: Not on file   • Highest education level: Not on file   Tobacco Use   • Smoking status: Former Smoker     Packs/day: 1.00     Years: 20.00     Pack years: 20.00     Types: Cigarettes   • Smokeless tobacco: Never Used   • Tobacco comment: Remote history   Substance and Sexual Activity   • Alcohol use: No   • Drug use: No   Social History Narrative    Domestic life   lives in private home alone - good support from local children        [employer]    [employer]        Sleep hygiene  in bed 9 PM to 6 AM for 9 hours of sleep        Caffeine use   1 1/2 cups coffee daily        Exercise habits  walks most days of the week. - Stretching each morning.          Diet   well-balanced diet with protein supplementations        Occupation    retired         Hearing  no impairment        Vision    no glasses needed after cataract surgery.          Driving   daytime only - good weather - local traffic         Objective   Physical Exam   Constitutional: She is oriented to person, place, and time. She appears well-developed and well-nourished. No distress.   HENT:   Head: Normocephalic and atraumatic.   Nose: Nose normal.   Eyes: Conjunctivae are normal. No scleral icterus.   Neck: Normal range of motion. Neck supple.   Cardiovascular: Regular rhythm and normal  heart sounds. Bradycardia present.   No murmur heard.  Pulmonary/Chest: Effort normal and breath sounds normal. No respiratory distress.   Abdominal: Soft. There is no tenderness.   Musculoskeletal: Normal range of motion. She exhibits no edema.   Neurological: She is alert and oriented to person, place, and time.   Skin: Skin is warm and dry.   Psychiatric: She has a normal mood and affect. Her behavior is normal.   Nursing note and vitals reviewed.      Procedures         ED Course  ED Course as of Sep 15 1441   Sun Sep 15, 2019   1251 POC with Dr. Banks.  []   1422 Hospitalist paged  [JM]   1427 Unable to get orthostatics as patient cannot stand.  []   1440 I spoke with Dr. Cotton who will admit  []      ED Course User Index  [RULA] Thang Hartley APRN     Recent Results (from the past 24 hour(s))   Comprehensive Metabolic Panel    Collection Time: 09/15/19 10:51 AM   Result Value Ref Range    Glucose 109 (H) 65 - 99 mg/dL    BUN 23 8 - 23 mg/dL    Creatinine 1.61 (H) 0.57 - 1.00 mg/dL    Sodium 139 136 - 145 mmol/L    Potassium 5.4 (H) 3.5 - 5.2 mmol/L    Chloride 103 98 - 107 mmol/L    CO2 26.0 22.0 - 29.0 mmol/L    Calcium 10.0 8.6 - 10.5 mg/dL    Total Protein 7.6 6.0 - 8.5 g/dL    Albumin 3.90 3.50 - 5.20 g/dL    ALT (SGPT) 13 1 - 33 U/L    AST (SGOT) 28 1 - 32 U/L    Alkaline Phosphatase 90 39 - 117 U/L    Total Bilirubin 0.3 0.2 - 1.2 mg/dL    eGFR Non African Amer 31 (L) >60 mL/min/1.73    Globulin 3.7 gm/dL    A/G Ratio 1.1 g/dL    BUN/Creatinine Ratio 14.3 7.0 - 25.0    Anion Gap 10.0 5.0 - 15.0 mmol/L   Troponin    Collection Time: 09/15/19 10:51 AM   Result Value Ref Range    Troponin T <0.010 0.000 - 0.030 ng/mL   Magnesium    Collection Time: 09/15/19 10:51 AM   Result Value Ref Range    Magnesium 2.4 1.6 - 2.4 mg/dL   Green Top (Gel)    Collection Time: 09/15/19 10:51 AM   Result Value Ref Range    Extra Tube Hold for add-ons.    Lavender Top    Collection Time: 09/15/19 10:51 AM   Result Value  Ref Range    Extra Tube hold for add-on    CBC Auto Differential    Collection Time: 09/15/19 10:51 AM   Result Value Ref Range    WBC 6.95 3.40 - 10.80 10*3/mm3    RBC 4.09 3.77 - 5.28 10*6/mm3    Hemoglobin 12.0 12.0 - 15.9 g/dL    Hematocrit 39.2 34.0 - 46.6 %    MCV 95.8 79.0 - 97.0 fL    MCH 29.3 26.6 - 33.0 pg    MCHC 30.6 (L) 31.5 - 35.7 g/dL    RDW 14.3 12.3 - 15.4 %    RDW-SD 50.4 37.0 - 54.0 fl    MPV 10.0 6.0 - 12.0 fL    Platelets 271 140 - 450 10*3/mm3    Neutrophil % 56.8 42.7 - 76.0 %    Lymphocyte % 29.2 19.6 - 45.3 %    Monocyte % 10.6 5.0 - 12.0 %    Eosinophil % 2.4 0.3 - 6.2 %    Basophil % 0.7 0.0 - 1.5 %    Immature Grans % 0.3 0.0 - 0.5 %    Neutrophils, Absolute 3.94 1.70 - 7.00 10*3/mm3    Lymphocytes, Absolute 2.03 0.70 - 3.10 10*3/mm3    Monocytes, Absolute 0.74 0.10 - 0.90 10*3/mm3    Eosinophils, Absolute 0.17 0.00 - 0.40 10*3/mm3    Basophils, Absolute 0.05 0.00 - 0.20 10*3/mm3    Immature Grans, Absolute 0.02 0.00 - 0.05 10*3/mm3    nRBC 0.0 0.0 - 0.2 /100 WBC   BNP    Collection Time: 09/15/19 10:51 AM   Result Value Ref Range    proBNP 3,384.0 (H) 5.0-1,800.0 pg/mL   Light Blue Top    Collection Time: 09/15/19 11:30 AM   Result Value Ref Range    Extra Tube hold for add-on    Gold Top - SST    Collection Time: 09/15/19 11:30 AM   Result Value Ref Range    Extra Tube Hold for add-ons.    TSH    Collection Time: 09/15/19 11:30 AM   Result Value Ref Range    TSH 1.630 0.270 - 4.200 uIU/mL   Potassium    Collection Time: 09/15/19 12:01 PM   Result Value Ref Range    Potassium 5.7 (H) 3.5 - 5.2 mmol/L   Urinalysis With Microscopic If Indicated (No Culture) - Urine, Clean Catch    Collection Time: 09/15/19 12:17 PM   Result Value Ref Range    Color, UA Dark Yellow (A) Yellow, Straw    Appearance, UA Cloudy (A) Clear    pH, UA <=5.0 5.0 - 8.0    Specific Gravity, UA 1.028 1.001 - 1.030    Glucose, UA Negative Negative    Ketones, UA 15 mg/dL (1+) (A) Negative    Bilirubin, UA Small (1+) (A)  "Negative    Blood, UA Negative Negative    Protein,  mg/dL (2+) (A) Negative    Leuk Esterase, UA Small (1+) (A) Negative    Nitrite, UA Negative Negative    Urobilinogen, UA 1.0 E.U./dL 0.2 - 1.0 E.U./dL   Urinalysis, Microscopic Only - Urine, Clean Catch    Collection Time: 09/15/19 12:17 PM   Result Value Ref Range    RBC, UA 3-6 (A) None Seen, 0-2 /HPF    WBC, UA 31-50 (A) None Seen, 0-2 /HPF    Bacteria, UA None Seen None Seen, Trace /HPF    Squamous Epithelial Cells, UA 3-6 (A) None Seen, 0-2 /HPF    Hyaline Casts, UA 21-30 0 - 6 /LPF    Methodology Manual Light Microscopy    Troponin    Collection Time: 09/15/19 12:59 PM   Result Value Ref Range    Troponin T <0.010 0.000 - 0.030 ng/mL     Note: In addition to lab results from this visit, the labs listed above may include labs taken at another facility or during a different encounter within the last 24 hours. Please correlate lab times with ED admission and discharge times for further clarification of the services performed during this visit.    CT Head Without Contrast   Preliminary Result   No acute intracranial abnormality specifically no acute   hemorrhage.              XR Chest 1 View   Preliminary Result   Persistent cardiomegaly with trace interstitial prominence   of lung markings concerning for trace interstitial edema pattern however   no focal consolidation or effusion.                Vitals:    09/15/19 1036 09/15/19 1118   BP: 102/57 107/88   BP Location: Left arm    Patient Position: Sitting    Pulse: 91    Resp: 16    Temp: 97.5 °F (36.4 °C)    TempSrc: Oral    SpO2: 96%    Weight: 45.8 kg (101 lb)    Height: 165.1 cm (65\")      Medications   sodium chloride 0.9 % flush 10 mL (not administered)   sodium chloride 0.9 % flush 10 mL (not administered)   sodium chloride 0.9 % bolus 500 mL (0 mL Intravenous Stopped 9/15/19 1248)   Patiromer Sorbitex Calcium (VELTASSA) 2 packet (2 packets Oral Given 9/15/19 1339)   cefTRIAXone (ROCEPHIN) 1 " g/100 mL 0.9% NS (MBP) (1 g Intravenous New Bag 9/15/19 1343)     ECG/EMG Results (last 24 hours)     Procedure Component Value Units Date/Time    ECG 12 Lead [[id number]] Collected:  09/15/19 1047     Updated:  09/15/19 1045        ECG 12 Lead   Final Result   Test Reason : Weak/Dizzy/AMS protocol   Blood Pressure : **/** mmHG   Vent. Rate : 046 BPM     Atrial Rate : 061 BPM      P-R Int : 000 ms          QRS Dur : 082 ms       QT Int : 504 ms       P-R-T Axes : 000 097 075 degrees      QTc Int : 441 ms      Atrial fibrillation   Rightward axis   Abnormal ECG   Confirmed by YULI WILSON MD ([id number]) on 9/15/2019 1:13:12 PM      Referred By:  CAROLYNN           Confirmed By:YULI WILSON MD      ECG 12 Lead   Final Result   Test Reason : 2ND SET   Blood Pressure : **/** mmHG   Vent. Rate : 043 BPM     Atrial Rate : 271 BPM      P-R Int : 000 ms          QRS Dur : 084 ms       QT Int : 552 ms       P-R-T Axes : 000 099 098 degrees      QTc Int : 466 ms      Atrial flutter with variable AV block   Rightward axis   Nonspecific T wave abnormality   Abnormal ECG   Confirmed by YULI WILSON MD ([id number]) on 9/15/2019 1:12:41 PM      Referred By:  tina           Confirmed By:YULI WILSON MD                           [facility]    Final diagnoses:   Bradycardia   SABRINA (acute kidney injury) (CMS/HCC)   Weakness   Acute cystitis without hematuria       Documentation assistance provided by derian Viera.  Information recorded by the scribe was done at my direction and has been verified and validated by me.     Giorgio Viera  09/15/19 1206       Thang Hartley APRN  09/15/19 [id number]

## 2019-09-15 NOTE — H&P
[facility] Medicine Services  HISTORY AND PHYSICAL    Patient Name: Mirian Sy  : 1940  MRN: [id number]  Primary Care Physician: Oren Engel DO  Date of admission: 9/15/2019      Subjective   Subjective     Chief Complaint:  Confusion, weakness     HPI:  Mirian Sy is a 79 y.o. female with PMH of chronic afib on Eliquis, cardiomyopathy, HLD, seizures and two previous strokes presented to ED with c/o generalized weakness, and confusion. Sister went to  patient this morning for Spiritism and patient was disoriented to the day, thought that yesterday was  and was not dressed appropriately for Spiritism this morning. Went to Spiritism and patient continued to remain confused to details, and c/o dizziness. Patient brought in for evaluation, with concern from sister for another stroke, like her confusion presented in May 19 with her last stroke. CT head negative. Patient states she was in her normal states of health until she was started on Toprol XL three days ago, and has continued to feel extremely tired, fatigued and confused to details. Patients medications have been changed for rate control three times this summer according to physician notes, upping her Cardizem and stopping her Flecainide. Family concerned that patient may not be taking medications at home appropriately, concerned that when she gets her days and nights confused, maybe she has taken two times her normal medications in one day.  UA with + WBC, treated with Rocephin but patient asymptomatic. WBC normal, creatinine 1.6, Potassium 5.7. EKG with evelyn underlying afib. Will admit to hospital medicine for further evaluation.     Review of Systems   Constitutional: Positive for activity change and fatigue. Negative for appetite change, chills, diaphoresis and fever.   HENT: Negative for sore throat, trouble swallowing and voice change.    Eyes: Negative for photophobia, redness and visual  disturbance.   Respiratory: Negative for cough, shortness of breath and wheezing.    Cardiovascular: Negative for chest pain, palpitations and leg swelling.   Gastrointestinal: Negative for abdominal distention, abdominal pain, constipation, diarrhea, nausea and vomiting.   Endocrine: Negative for polydipsia, polyphagia and polyuria.   Genitourinary: Negative for decreased urine volume, difficulty urinating, dysuria and flank pain.   Musculoskeletal: Positive for gait problem. Negative for arthralgias and back pain.   Skin: Negative for color change, pallor, rash and wound.   Neurological: Positive for dizziness and weakness. Negative for syncope, facial asymmetry, speech difficulty and light-headedness.   Hematological: Negative for adenopathy. Does not bruise/bleed easily.   Psychiatric/Behavioral: Positive for confusion. Negative for agitation, behavioral problems and sleep disturbance. The patient is not nervous/anxious.       All other systems reviewed and are negative.     Personal History     Past Medical History:   Diagnosis Date   • Atrial fibrillation (CMS/[facility]) 2005    Chronic anticoagulation and rate control   • Basal cell carcinoma 2007    Left leg and nose   • Cardiomyopathy (CMS/[facility]) 2006   • Cerebrovascular accident (CMS/[facility]) 01/2005    CT right parietal CVA. Carotid duplex -50% to 79% left ICS 15% right ICS stenosis. MRI of the neck showed no significant carotid bifurcations of these. Negative CT of the head, 09/10/2012. Carotid duplex, 02/24/2014:  Shelf-like plaque in the CECE without significant elevation and velocities.  Patent vertebral arteries.   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    With dilated cardiomyopathy   • Decubitus ulcer of buttock 2014    Transient during fracture rehabilitation   • Depression 2002    Good response to citalopram and mirtazapine   • DVT (deep venous thrombosis) (CMS/[facility]) 2014    Superficial - right lesser saphenus  vein - after hip fracture    • Fracture of bone of forefoot 1972    Right foot   • Fracture of ilium, left (CMS/HCC) 05/2018    Accidental fall - Uncomplicated recovery   • GERD (gastroesophageal reflux disease) 2014    Chronic superficial gastritis   • HTN (hypertension) 2005   • Iron deficiency anemia due to chronic blood loss 2018    Predisposed by chronic anticoagulation and GERD   • Low body weight due to inadequate caloric intake Adulthood   • Mitral valve prolapse 1997    Mild MR   • Osteoarthritis    • Osteoporosis    • Patent foramen ovale 2005   • Pneumonia 1947   • SCC (squamous cell carcinoma), arm, right 2017   • Scoliosis    • Syncope 2014     undermined cause - [facility]   • Varicose veins 2005    Symptomatic       Past Surgical History:   Procedure Laterality Date   • ACHILLES TENDON SURGERY Left 1997    Repair of rupture left tendon with screws   • BREAST CYST EXCISION Left    • BREAST SURGERY Left 1982    Excision benign cyst   • CARDIAC CATHETERIZATION  2006    Severe acute coronary spasm   • CATARACT EXTRACTION WITH INTRAOCULAR LENS IMPLANT Bilateral 2015   • EYE CAPSULOTOMY WITH LASER Left 2016    Marked visual improvement   • FEMUR FRACTURE SURGERY Right 2015    Repair of shaft fracture   • HIP FRACTURE SURGERY Left 2014    left hip fracture with pin   • LUMBAR DISC SURGERY  1983   • OVARIAN CYST REMOVAL Left 1996   • SKIN CANCER EXCISION Right 2017    SCC - arm       Family History: family history includes Allergies in her son; Arrhythmia in her sister; Basal cell carcinoma in her sister; Bone cancer in her sister; Breast cancer in her mother; Breast cancer (age of onset: 57) in her sister; Breast cancer (age of onset: 59) in her sister; Breast cancer (age of onset: 66) in her sister; Diabetes in her maternal uncle, mother, and sister; Heart disease in her father; Hypertension in her mother and sister; Melanoma in her sister; Other in her sister; Ovarian cancer in her sister; Stroke in her sister.  Otherwise pertinent FHx was reviewed and unremarkable.     Social History:  reports that she has quit smoking. Her smoking use included cigarettes. She has a 20.00 pack-year smoking history. She has never used smokeless tobacco. She reports that she does not drink alcohol or use drugs.  Social History     Social History Narrative    Domestic life   lives in private home alone - good support from local children        Anabaptist    [employer]        Sleep hygiene  in bed 9 PM to 6 AM for 9 hours of sleep        Caffeine use   1 1/2 cups coffee daily        Exercise habits  walks most days of the week. - Stretching each morning.          Diet   well-balanced diet with protein supplementations        Occupation    retired         Hearing  no impairment        Vision    no glasses needed after cataract surgery.          Driving   daytime only - good weather - local traffic       Medications:    Available home medication information reviewed.  Medications Prior to Admission   Medication Sig Dispense Refill Last Dose   • atorvastatin (LIPITOR) 80 MG tablet Take 1 tablet by mouth Every Night. 30 tablet 5 9/14/2019 at Unknown time   • calcium-vitamin D (OSCAL-500) 500-200 MG-UNIT per tablet Take 1 tablet by mouth every morning.   9/15/2019 at Unknown time   • Cholecalciferol (VITAMIN D PO) Take 1 tablet by mouth Daily.   9/15/2019 at Unknown time   • citalopram (CeleXA) 20 MG tablet TAKE 1 TABLET EVERY MORNING 90 tablet 1 9/15/2019 at Unknown time   • ELIQUIS 5 MG tablet tablet TAKE 1 TABLET EVERY 12 HOURS 60 tablet 12 9/15/2019 at Unknown time   • ferrous sulfate 325 (65 FE) MG tablet TAKE 1 TABLET DAILY WITH BREAKFAST 90 tablet 3 9/15/2019 at Unknown time   • levETIRAcetam (KEPPRA) 250 MG tablet TAKE 1 TABLET EVERY 12 HOURS 180 tablet 1 9/15/2019 at Unknown time   • magnesium oxide (MAGOX) 400 (241.3 Mg) MG tablet tablet Take 400 mg by mouth Daily.   9/15/2019 at Unknown time   • metoprolol succinate XL  (TOPROL-XL) 25 MG 24 hr tablet Take 1 tablet by mouth Daily. 30 tablet 2 9/15/2019 at Unknown time   • mirtazapine (REMERON) 7.5 MG tablet TAKE 1 TABLET AT BEDTIME 90 tablet 4 9/14/2019 at Unknown time   • Multiple Vitamins-Minerals (CENTRUM SILVER 50+WOMEN PO) Take 1 tablet by mouth Daily.   9/15/2019 at Unknown time   • ranitidine (ZANTAC) 150 MG capsule TAKE 1 CAPSULE EVERY NIGHT 90 capsule 1 9/14/2019 at Unknown time   • acetaminophen (TYLENOL) 325 MG tablet Take 2 tablets by mouth Every 4 (Four) Hours As Needed for Mild Pain .   Unknown at Unknown time   • diltiaZEM CD (CARDIZEM CD) 360 MG 24 hr capsule Take 1 capsule by mouth Daily. 30 capsule 5 Taking       Allergies   Allergen Reactions   • Actonel [Risedronate Sodium] GI Intolerance   • Hctz [Hydrochlorothiazide]      hyponatremia   • Lisinopril      hyperkalemia       Objective   Objective     Vital Signs:   Temp:  [97.5 °F (36.4 °C)-98.1 °F (36.7 °C)] 98.1 °F (36.7 °C)  Heart Rate:  [46-91] 47  Resp:  [16] 16  BP: (102-114)/(57-88) 104/69        Physical Exam   Constitutional: Awake, alert, resting in bed, family in room   Eyes: PERRLA, sclerae anicteric, no conjunctival injection  HENT: NCAT, mucous membranes moist  Neck: Supple, no thyromegaly, no lymphadenopathy, trachea midline  Respiratory: Clear to auscultation bilaterally, nonlabored respirations   Cardiovascular: Nickolas/ irreg, no murmurs, rubs, or gallops, palpable pedal pulses bilaterally  Gastrointestinal: Positive bowel sounds, soft, nontender, nondistended  Musculoskeletal: No bilateral ankle edema, no clubbing or cyanosis to extremities  Psychiatric: Appropriate affect, cooperative  Neurologic: Oriented x 3, strength symmetric in all extremities, Cranial Nerves grossly intact to confrontation, speech clear  Skin: No rashes     Results Reviewed:  I have personally reviewed current lab and radiology data.    Results from last 7 days   Lab Units 09/15/19  1051   WBC 10*3/mm3 6.95   HEMOGLOBIN  g/dL 12.0   HEMATOCRIT % 39.2   PLATELETS 10*3/mm3 271     Results from last 7 days   Lab Units 09/15/19  1259 09/15/19  1201 09/15/19  1051   SODIUM mmol/L  --   --  139   POTASSIUM mmol/L  --  5.7* 5.4*   CHLORIDE mmol/L  --   --  103   CO2 mmol/L  --   --  26.0   BUN mg/dL  --   --  23   CREATININE mg/dL  --   --  1.61*   GLUCOSE mg/dL  --   --  109*   CALCIUM mg/dL  --   --  10.0   ALT (SGPT) U/L  --   --  13   AST (SGOT) U/L  --   --  28   TROPONIN T ng/mL <0.010  --  <0.010   PROBNP pg/mL  --   --  3,384.0*     Estimated Creatinine Clearance: 22.5 mL/min (A) (by C-G formula based on SCr of 1.61 mg/dL (H)).  Brief Urine Lab Results  (Last result in the past 365 days)      Color   Clarity   Blood   Leuk Est   Nitrite   Protein   CREAT   Urine HCG        09/15/19 1217 Dark Yellow Cloudy Negative Small (1+) Negative 100 mg/dL (2+)             Imaging Results (last 24 hours)     Procedure Component Value Units Date/Time    CT Head Without Contrast [[id number]] Collected:  09/15/19 1239     Updated:  09/15/19 1547    Narrative:       EXAMINATION: CT HEAD WO CONTRAST - 09/15/2019     INDICATION: Weak, dizzy and confused.     TECHNIQUE: CT head without intravenous contrast.     The radiation dose reduction device was turned on for each scan per the  ALARA (As Low as Reasonably Achievable) protocol.     COMPARISON: 05/08/2019     FINDINGS: Midline structures are symmetric without evidence of mass,  mass effect or midline shift. Ventricles and sulci within normal limits  however advanced chronic small vessel ischemic disease with attenuation  decrease in the periventricular and deep white matter. No intra-axial  hemorrhage or extra-axial fluid collection. Globes and orbits  unremarkable. Visualized paranasal sinuses and mastoid air cells are  grossly clear and well-pneumatized. Calvarium intact.       Impression:       No acute intracranial abnormality, specifically no acute  hemorrhage.     DICTATED:    09/15/2019  EDITED/ls :   09/15/2019        XR Chest 1 View [[id number]] Collected:  09/15/19 1217     Updated:  09/15/19 1540    Narrative:          EXAMINATION: XR CHEST 1 VW - 09/15/2019     INDICATION: Weakness, dizziness, altered mental status.      COMPARISON: 05/07/2019     FINDINGS: Cardiomegaly with increased prominence from prior comparison  may be partially due to technique and minimally decreased lung volumes  from comparison on 05/07/2019. Mild interstitial prominence concerning  for trace interstitial edema pattern without focal consolidation or  significant effusion. Background chronic changes including biapical  pleural-parenchymal scarring right greater than left. Degenerative  changes of the spine with scoliotic curvature.       Impression:       Persistent cardiomegaly with trace interstitial prominence  of lung markings concerning for trace interstitial edema pattern however  no focal consolidation or effusion.     DICTATED:   09/15/2019  EDITED/ls :   09/15/2019            Results for orders placed during the hospital encounter of 05/07/19   Adult Transthoracic Echo Complete W/ Cont if Necessary Per Protocol    Narrative · Estimated EF = 60%.  · Left ventricular systolic function is normal.  · Trace-to-mild mitral valve regurgitation is present.  · Mild tricuspid valve regurgitation is present.  · Calculated right ventricular systolic pressure from tricuspid   regurgitation is 34.0 mmHg.  · No cardiac source for embolus is seen          Assessment/Plan   Assessment / Plan     Active Hospital Problems    Diagnosis POA   • Bradycardia [R00.1] Yes   • Chronic atrial fibrillation (CMS/HCC) [I48.2] Unknown   • SABRINA (acute kidney injury) (CMS/HCC) [N17.9] Unknown   • Hyperkalemia [E87.5] Unknown   • Confusion [R41.0] Unknown   • HLD (hyperlipidemia) [E78.5] Unknown     Symptomatic bradycardia --  EKG-- aflutter with AV block  Nickolas in the 40's  D/C ToprolXL for now  Hold Cardizem tomorrow and restart  "the following day (9/17/19)  Consult cardiology in AM    Pyuria:  UA unremarkable, no infectious process seen, will hold on antibiotics and monitor  Follow culture    SABRINA:  Gentle IVF overnight due to SABRINA, repeat labs in AM     Hyperkalemia:  Valtessa  repeat Potassium lab tonight     PT/ OT in am       DVT prophylaxis:  Eliquis     CODE STATUS:    Code Status and Medical Interventions:   Ordered at: 09/15/19 1613     Level Of Support Discussed With:    Patient     Code Status:    CPR     Medical Interventions (Level of Support Prior to Arrest):    Full       OBSERVATION status, however if further evaluation or treatment plans warrant, status may change.  Based upon current information, I predict patient's care encounter to be less than or equal to 2 midnights.      Electronically signed by GREG Mulligan, 09/15/19, 4:14 PM.        Brief Attending Admission Attestation     I have seen and examined the patient, performing an independent face-to-face diagnostic evaluation with plan of care reviewed and developed with the advanced practice clinician (APC).      Brief Summary Statement:   Mirian Sy is a 79 y.o. female with PMH significant for chronic afib on Eliquis, cardiomyopathy, HLD, seizures and two previous strokes.  She presents today with confusion, lightheadedness and weakness.  THe patient's sister went to pick her up for Restorationism today and the patient thought it was Monday and therefore was not ready for Restorationism.  Additionally, she was feeling light headed \"like my head was inflated and was floating over my body.\"  Of note, the patient was started on Toprol 25mg by cardiology due to her mild persistent tachycardia.  She states she took her first dose on Friday and has taken one dose daily since.      Here in the ER, her HR is in the 40's.  's. UA shows pyuria without bacteria and she denies UTI symptoms.  K - 5.7.  Creatinine 1.61 (baseline 0.91).  Troponin negative and proBNP 3384.  CT head " is nonacute.  CXR is nonacute.      She has continued her Cardizem 360 mg as directed as well.  SHe does not think she has taken extra, but as she thought today was Monday and not Sunday, it is a possibility that she took an extra dose of both medications.  Her son will go check her mediplanner tonight and let us know tomorrow.    Remainder of detailed HPI is as noted above and has been reviewed and/or edited by me for completeness.      Attending Physical Exam:  Constitutional: Awake, alert  Eyes: PERRLA, sclerae anicteric, no conjunctival injection  HENT: NCAT, mucous membranes moist  Neck: Supple, no thyromegaly, no lymphadenopathy, trachea midline  Respiratory: Clear to auscultation bilaterally, nonlabored respirations   Cardiovascular: bracycardic, palpable pedal pulses bilaterally  Gastrointestinal: Positive bowel sounds, soft, nontender, nondistended  Musculoskeletal: No bilateral ankle edema, no clubbing or cyanosis to extremities  Psychiatric: Appropriate affect, cooperative  Neurologic: Oriented x 3, strength symmetric in all extremities, Cranial Nerves grossly intact to confrontation, speech clear  Skin: No rashes        Brief Assessment/Plan :  See above for further detailed assessment and plan developed with APC which I have reviewed and/or edited for completeness.      Electronically signed by Aure Cotton MD, 09/15/19, 7:21 PM.

## 2019-09-15 NOTE — PLAN OF CARE
Problem: Patient Care Overview  Goal: Plan of Care Review  Outcome: Ongoing (interventions implemented as appropriate)   09/15/19 3972   Coping/Psychosocial   Plan of Care Reviewed With patient;family   Plan of Care Review   Progress no change

## 2019-09-16 LAB
ANION GAP SERPL CALCULATED.3IONS-SCNC: 8 MMOL/L (ref 5–15)
BACTERIA SPEC AEROBE CULT: NO GROWTH
BASOPHILS # BLD AUTO: 0.04 10*3/MM3 (ref 0–0.2)
BASOPHILS NFR BLD AUTO: 0.7 % (ref 0–1.5)
BUN BLD-MCNC: 24 MG/DL (ref 8–23)
BUN/CREAT SERPL: 16.7 (ref 7–25)
CALCIUM SPEC-SCNC: 9.5 MG/DL (ref 8.6–10.5)
CHLORIDE SERPL-SCNC: 106 MMOL/L (ref 98–107)
CO2 SERPL-SCNC: 27 MMOL/L (ref 22–29)
CREAT BLD-MCNC: 1.44 MG/DL (ref 0.57–1)
DEPRECATED RDW RBC AUTO: 49.3 FL (ref 37–54)
EOSINOPHIL # BLD AUTO: 0.19 10*3/MM3 (ref 0–0.4)
EOSINOPHIL NFR BLD AUTO: 3.2 % (ref 0.3–6.2)
ERYTHROCYTE [DISTWIDTH] IN BLOOD BY AUTOMATED COUNT: 14.3 % (ref 12.3–15.4)
GFR SERPL CREATININE-BSD FRML MDRD: 35 ML/MIN/1.73
GLUCOSE BLD-MCNC: 113 MG/DL (ref 65–99)
HCT VFR BLD AUTO: 37.2 % (ref 34–46.6)
HGB BLD-MCNC: 11.5 G/DL (ref 12–15.9)
IMM GRANULOCYTES # BLD AUTO: 0.02 10*3/MM3 (ref 0–0.05)
IMM GRANULOCYTES NFR BLD AUTO: 0.3 % (ref 0–0.5)
LYMPHOCYTES # BLD AUTO: 1.46 10*3/MM3 (ref 0.7–3.1)
LYMPHOCYTES NFR BLD AUTO: 24.5 % (ref 19.6–45.3)
MCH RBC QN AUTO: 29 PG (ref 26.6–33)
MCHC RBC AUTO-ENTMCNC: 30.9 G/DL (ref 31.5–35.7)
MCV RBC AUTO: 93.9 FL (ref 79–97)
MONOCYTES # BLD AUTO: 0.69 10*3/MM3 (ref 0.1–0.9)
MONOCYTES NFR BLD AUTO: 11.6 % (ref 5–12)
NEUTROPHILS # BLD AUTO: 3.57 10*3/MM3 (ref 1.7–7)
NEUTROPHILS NFR BLD AUTO: 59.7 % (ref 42.7–76)
NRBC BLD AUTO-RTO: 0 /100 WBC (ref 0–0.2)
PLATELET # BLD AUTO: 236 10*3/MM3 (ref 140–450)
PMV BLD AUTO: 10.5 FL (ref 6–12)
POTASSIUM BLD-SCNC: 4.8 MMOL/L (ref 3.5–5.2)
RBC # BLD AUTO: 3.96 10*6/MM3 (ref 3.77–5.28)
SODIUM BLD-SCNC: 141 MMOL/L (ref 136–145)
WBC NRBC COR # BLD: 5.97 10*3/MM3 (ref 3.4–10.8)

## 2019-09-16 PROCEDURE — 99214 OFFICE O/P EST MOD 30 MIN: CPT | Performed by: INTERNAL MEDICINE

## 2019-09-16 PROCEDURE — 97116 GAIT TRAINING THERAPY: CPT

## 2019-09-16 PROCEDURE — G0378 HOSPITAL OBSERVATION PER HR: HCPCS

## 2019-09-16 PROCEDURE — 97163 PT EVAL HIGH COMPLEX 45 MIN: CPT

## 2019-09-16 PROCEDURE — 97535 SELF CARE MNGMENT TRAINING: CPT

## 2019-09-16 PROCEDURE — 80048 BASIC METABOLIC PNL TOTAL CA: CPT | Performed by: NURSE PRACTITIONER

## 2019-09-16 PROCEDURE — 99226 PR SBSQ OBSERVATION CARE/DAY 35 MINUTES: CPT | Performed by: INTERNAL MEDICINE

## 2019-09-16 PROCEDURE — 85025 COMPLETE CBC W/AUTO DIFF WBC: CPT | Performed by: NURSE PRACTITIONER

## 2019-09-16 PROCEDURE — 97165 OT EVAL LOW COMPLEX 30 MIN: CPT

## 2019-09-16 RX ORDER — METOPROLOL SUCCINATE 25 MG/1
25 TABLET, EXTENDED RELEASE ORAL
Status: DISCONTINUED | OUTPATIENT
Start: 2019-09-16 | End: 2019-09-17 | Stop reason: HOSPADM

## 2019-09-16 RX ORDER — FAMOTIDINE 20 MG/1
20 TABLET, FILM COATED ORAL DAILY
Status: DISCONTINUED | OUTPATIENT
Start: 2019-09-17 | End: 2019-09-17 | Stop reason: HOSPADM

## 2019-09-16 RX ADMIN — METOPROLOL SUCCINATE 25 MG: 25 TABLET, FILM COATED, EXTENDED RELEASE ORAL at 11:27

## 2019-09-16 RX ADMIN — ATORVASTATIN CALCIUM 80 MG: 40 TABLET, FILM COATED ORAL at 21:45

## 2019-09-16 RX ADMIN — FERROUS SULFATE TAB 325 MG (65 MG ELEMENTAL FE) 325 MG: 325 (65 FE) TAB at 08:36

## 2019-09-16 RX ADMIN — APIXABAN 5 MG: 5 TABLET, FILM COATED ORAL at 21:45

## 2019-09-16 RX ADMIN — MIRTAZAPINE 7.5 MG: 15 TABLET, FILM COATED ORAL at 21:45

## 2019-09-16 RX ADMIN — LEVETIRACETAM 250 MG: 250 TABLET, FILM COATED ORAL at 21:45

## 2019-09-16 RX ADMIN — SODIUM CHLORIDE, PRESERVATIVE FREE 10 ML: 5 INJECTION INTRAVENOUS at 08:37

## 2019-09-16 RX ADMIN — APIXABAN 5 MG: 5 TABLET, FILM COATED ORAL at 08:36

## 2019-09-16 RX ADMIN — FAMOTIDINE 40 MG: 20 TABLET ORAL at 08:36

## 2019-09-16 RX ADMIN — LEVETIRACETAM 250 MG: 250 TABLET, FILM COATED ORAL at 08:36

## 2019-09-16 RX ADMIN — CITALOPRAM HYDROBROMIDE 20 MG: 20 TABLET ORAL at 08:36

## 2019-09-16 NOTE — PLAN OF CARE
Problem: Patient Care Overview  Goal: Plan of Care Review  Outcome: Ongoing (interventions implemented as appropriate)   09/16/19 0921   Coping/Psychosocial   Plan of Care Reviewed With patient   Plan of Care Review   Progress improving   OTHER   Outcome Summary OT eval completed Pt presents with deficits in coordination for opening containers and grooming tasks, balance deficits and sequencing deficits for ADLs; Pt min A progressed to CGA STS at rollator intermittent lean posteriorly at times with transition into stands, min A LBD socks, min A doff/donning gown at EOB, CGA progressed to SBA at sink side for grooming tasks; recom IPOT d/c home with assist and HHOT pending progress

## 2019-09-16 NOTE — PROGRESS NOTES
[facility] Medicine Services  PROGRESS NOTE    Patient Name: Mirian Sy  : 1940  MRN: [id number]    Date of Admission: 9/15/2019  Primary Care Physician: Oren Engel DO    Subjective   Subjective     CC:  Confusion    HPI:  Pt doing well this am, denies any issues overnight.  She was ambulated to toilet when I saw her. Denies any palpitations.     Review of Systems  Gen- No fevers, chills  CV- No chest pain, palpitations  Resp- No cough, dyspnea  GI- No N/V/D, abd pain      All other systems reviewed and negative.     Objective   Objective     Vital Signs:   Temp:  [97.5 °F (36.4 °C)-98.1 °F (36.7 °C)] 97.5 °F (36.4 °C)  Heart Rate:  [46-91] 69  Resp:  [16-18] 18  BP: (102-114)/(57-88) 107/82        Physical Exam:  Constitutional: elderly appearing female, No acute distress, awake, alert  HENT: NCAT, mucous membranes moist  Respiratory: Clear to auscultation bilaterally, respiratory effort normal   Cardiovascular: RRR, no murmurs, rubs, or gallops, palpable pedal pulses bilaterally  Gastrointestinal: Positive bowel sounds, soft, nontender, nondistended  Musculoskeletal: No bilateral ankle edema  Psychiatric: Appropriate affect, cooperative  Neurologic: Oriented x 3, strength symmetric in all extremities, Cranial Nerves grossly intact to confrontation, speech clear  Skin: No rashes    Results Reviewed:    Results from last 7 days   Lab Units 19  0528 09/15/19  1051   WBC 10*3/mm3 5.97 6.95   HEMOGLOBIN g/dL 11.5* 12.0   HEMATOCRIT % 37.2 39.2   PLATELETS 10*3/mm3 236 271     Results from last 7 days   Lab Units 19  0528 09/15/19  1259 09/15/19  1201 09/15/19  1051   SODIUM mmol/L 141  --   --  139   POTASSIUM mmol/L 4.8  --  5.7* 5.4*   CHLORIDE mmol/L 106  --   --  103   CO2 mmol/L 27.0  --   --  26.0   BUN mg/dL 24*  --   --  23   CREATININE mg/dL 1.44*  --   --  1.61*   GLUCOSE mg/dL 113*  --   --  109*   CALCIUM mg/dL 9.5  --   --  10.0   ALT (SGPT)  U/L  --   --   --  13   AST (SGOT) U/L  --   --   --  28   TROPONIN T ng/mL  --  <0.010  --  <0.010   PROBNP pg/mL  --   --   --  3,384.0*     Estimated Creatinine Clearance: 25.1 mL/min (A) (by C-G formula based on SCr of 1.44 mg/dL (H)).    Microbiology Results Abnormal     None          Imaging Results (last 24 hours)     Procedure Component Value Units Date/Time    CT Head Without Contrast [[id number]] Collected:  09/15/19 1239     Updated:  09/15/19 1547    Narrative:       EXAMINATION: CT HEAD WO CONTRAST - 09/15/2019     INDICATION: Weak, dizzy and confused.     TECHNIQUE: CT head without intravenous contrast.     The radiation dose reduction device was turned on for each scan per the  ALARA (As Low as Reasonably Achievable) protocol.     COMPARISON: 05/08/2019     FINDINGS: Midline structures are symmetric without evidence of mass,  mass effect or midline shift. Ventricles and sulci within normal limits  however advanced chronic small vessel ischemic disease with attenuation  decrease in the periventricular and deep white matter. No intra-axial  hemorrhage or extra-axial fluid collection. Globes and orbits  unremarkable. Visualized paranasal sinuses and mastoid air cells are  grossly clear and well-pneumatized. Calvarium intact.       Impression:       No acute intracranial abnormality, specifically no acute  hemorrhage.     DICTATED:   09/15/2019  EDITED/ls :   09/15/2019        XR Chest 1 View [[id number]] Collected:  09/15/19 1217     Updated:  09/15/19 1540    Narrative:          EXAMINATION: XR CHEST 1 VW - 09/15/2019     INDICATION: Weakness, dizziness, altered mental status.      COMPARISON: 05/07/2019     FINDINGS: Cardiomegaly with increased prominence from prior comparison  may be partially due to technique and minimally decreased lung volumes  from comparison on 05/07/2019. Mild interstitial prominence concerning  for trace interstitial edema pattern without focal consolidation or  significant  effusion. Background chronic changes including biapical  pleural-parenchymal scarring right greater than left. Degenerative  changes of the spine with scoliotic curvature.       Impression:       Persistent cardiomegaly with trace interstitial prominence  of lung markings concerning for trace interstitial edema pattern however  no focal consolidation or effusion.     DICTATED:   09/15/2019  EDITED/ls :   09/15/2019              Results for orders placed during the hospital encounter of 05/07/19   Adult Transthoracic Echo Complete W/ Cont if Necessary Per Protocol    Narrative · Estimated EF = 60%.  · Left ventricular systolic function is normal.  · Trace-to-mild mitral valve regurgitation is present.  · Mild tricuspid valve regurgitation is present.  · Calculated right ventricular systolic pressure from tricuspid   regurgitation is 34.0 mmHg.  · No cardiac source for embolus is seen          I have reviewed the medications:  Scheduled Meds:  apixaban 5 mg Oral Q12H   atorvastatin 80 mg Oral Nightly   citalopram 20 mg Oral QAM   [START ON 9/17/2019] diltiaZEM  mg Oral Q24H   famotidine 40 mg Oral Daily   ferrous sulfate 325 mg Oral Daily With Breakfast   levETIRAcetam 250 mg Oral Q12H   mirtazapine 7.5 mg Oral Nightly   sodium chloride 10 mL Intravenous Q12H     Continuous Infusions:  sodium chloride 75 mL/hr Last Rate: 75 mL/hr (09/16/19 0601)     PRN Meds:.sennosides-docusate sodium  •  sodium chloride  •  [COMPLETED] Insert peripheral IV **AND** sodium chloride  •  sodium chloride      Assessment/Plan   Assessment / Plan     Active Hospital Problems    Diagnosis  POA   • Bradycardia [R00.1]  Yes   • Chronic atrial fibrillation (CMS/HCC) [I48.2]  Unknown   • SABRINA (acute kidney injury) (CMS/HCC) [N17.9]  Unknown   • Hyperkalemia [E87.5]  Unknown   • Confusion [R41.0]  Unknown   • HLD (hyperlipidemia) [E78.5]  Unknown      Resolved Hospital Problems   No resolved problems to display.        Brief Hospital Course  to date:  Mirian Sy is a 79 y.o. female  with PMH significant for chronic afib on Eliquis, cardiomyopathy, HLD, seizures and two previous strokes presented with symptomatic bradycardia.    Plan:    Symptomatic bradycardia   Chronic Afib  --EKG-- aflutter with AV block, rate in the 40s  --Hold Cardizem today and restart tomorrow (9/17/19)  --continue Eliquis  --Consulted cardiology today, have resumed her Toprol. Monitor for one more day on tele     Pyuria  --UA otherwise unremarkable, no infectious process seen, will hold on antibiotics and monitor  --Follow culture     SABRINA  Gentle IVFs, Cr improved. Baseline Cr is wnl.      Hyperkalemia  --resolved with Veltassa     DVT Prophylaxis:  Anticoagulated with Eliquis    Disposition: I expect the patient to be discharged home tomorrow    CODE STATUS:   Code Status and Medical Interventions:   Ordered at: 09/15/19 1613     Level Of Support Discussed With:    Patient     Code Status:    CPR     Medical Interventions (Level of Support Prior to Arrest):    Full         Electronically signed by Miriam Carr MD, 09/16/19, 7:29 AM.

## 2019-09-16 NOTE — PROGRESS NOTES
Discharge Planning Assessment  [facility]     Patient Name: Mirian Sy  MRN: [id number]  Today's Date: 9/16/2019    Admit Date: 9/15/2019    Discharge Needs Assessment     Row Name 09/16/19 1423       Living Environment    Lives With  alone    Current Living Arrangements  home/apartment/condo    Primary Care Provided by  self    Provides Primary Care For  no one    Family Caregiver if Needed  child(tammy), adult    Quality of Family Relationships  supportive;involved;helpful    Able to Return to Prior Arrangements  yes       Resource/Environmental Concerns    Transportation Concerns  car, none       Transition Planning    Patient/Family Anticipates Transition to  home    Patient/Family Anticipated Services at Transition      Transportation Anticipated  family or friend will provide       Discharge Needs Assessment    Readmission Within the Last 30 Days  no previous admission in last 30 days    Concerns to be Addressed  no discharge needs identified;denies needs/concerns at this time;discharge planning    Equipment Currently Used at Home  rollator;shower chair;grab bar    Anticipated Changes Related to Illness  none    Equipment Needed After Discharge  none    Discharge Facility/Level of Care Needs  outpatient therapy        Discharge Plan     Row Name 09/16/19 1424       Plan    Plan  HOME    Patient/Family in Agreement with Plan  yes    Plan Comments  Met with pt at bedside.  She resides alone in [facility].  She is independent with ADLs.  Has some assistance with IADLs.  Uses a rollator at baseline.  She is currently participating in OP therapy at [facility].  She uses [employer] for prn transportation.  Confirmed she has Medicare and [employer] insurance with Rx coverage.  Goal is to return home and resume OP therapy upon DC.  No immediate needs identified/voiced.  CM will cont to follow.    Final Discharge Disposition Code  01 - home or self-care        Destination      No service coordination in this  encounter.      Durable Medical Equipment      No service coordination in this encounter.      Dialysis/Infusion      No service coordination in this encounter.      Home Medical Care      No service coordination in this encounter.      Therapy      No service coordination in this encounter.      Community Resources      No service coordination in this encounter.        Expected Discharge Date and Time     Expected Discharge Date Expected Discharge Time    Sep 18, 2019         Demographic Summary     Row Name 09/16/19 1422       General Information    Admission Type  observation    Referral Source  admission list    Reason for Consult  discharge planning    Preferred Language  English       Contact Information    Permission Granted to Share Info With      Contact Information Obtained for          Functional Status     Row Name 09/16/19 1423       Functional Status    Usual Activity Tolerance  moderate       Functional Status, IADL    Medications  independent    Meal Preparation  independent    Housekeeping  assistive person    Laundry  assistive person    Shopping  assistive person        Psychosocial    No documentation.       Abuse/Neglect    No documentation.       Legal    No documentation.       Substance Abuse    No documentation.       Patient Forms    No documentation.           Lavonne Campbell RN

## 2019-09-16 NOTE — PLAN OF CARE
Problem: Fall Risk (Adult)  Goal: Absence of Fall  Outcome: Ongoing (interventions implemented as appropriate)   09/16/19 1528   Fall Risk (Adult)   Absence of Fall making progress toward outcome       Problem: Patient Care Overview  Goal: Plan of Care Review  Outcome: Ongoing (interventions implemented as appropriate)   09/16/19 1528   Coping/Psychosocial   Plan of Care Reviewed With patient   Plan of Care Review   Progress improving   OTHER   Outcome Summary VSS, BB added and dose decreased, home tomorrow

## 2019-09-16 NOTE — THERAPY EVALUATION
Patient Name: Mirian Sy  : 1940    MRN: [id number]                              Today's Date: 2019       Admit Date: 9/15/2019    Visit Dx:     ICD-10-CM ICD-9-CM   1. Bradycardia R00.1 427.89   2. SABRINA (acute kidney injury) (CMS/HCC) N17.9 584.9   3. Weakness R53.1 780.79   4. Acute cystitis without hematuria N30.00 595.0   5. Impaired mobility and ADLs Z74.09 799.89     Patient Active Problem List   Diagnosis   • Abnormal gait   • Takotsubo syndrome   • Carpal tunnel syndrome   • Complex partial epilepsy (CMS/HCC)   • Depression   • Chronic gastritis   • HLD (hyperlipidemia)   • Hypertension   • Hyponatremia   • Nutritional anemia   • Dyssomnia   • Diplopia   • Xeroderma   • Preventative health care   • Frailty   • Tricuspid regurgitation   • Cerebrovascular accident (CMS/HCC)   • Syncope   • DVT (deep venous thrombosis) (CMS/HCC)   • Anorexia   • Anxiety   • Patent foramen ovale   • Mitral regurgitation   • Low weight   • Senile osteoporosis   • Iron deficiency   • Chronic atrial fibrillation (CMS/HCC)   • Closed fracture of left ilium (CMS/HCC)   • GERD (gastroesophageal reflux disease)   • Fall at home   • Vertigo   • Internuclear ophthalmoplegia, right eye   • Atrial fibrillation with RVR (CMS/HCC)   • Bradycardia   • Chronic atrial fibrillation (CMS/HCC)   • SABRINA (acute kidney injury) (CMS/HCC)   • Hyperkalemia   • Confusion     Past Medical History:   Diagnosis Date   • Atrial fibrillation (CMS/HCC)     Chronic anticoagulation and rate control   • Basal cell carcinoma     Left leg and nose   • Cardiomyopathy (CMS/HCC)    • Cerebrovascular accident (CMS/HCC) 2005    CT right parietal CVA. Carotid duplex -50% to 79% left ICS 15% right ICS stenosis. MRI of the neck showed no significant carotid bifurcations of these. Negative CT of the head, 09/10/2012. Carotid duplex, 2014:  Shelf-like plaque in the CECE without significant elevation and velocities.  Patent vertebral  arteries.   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    With dilated cardiomyopathy   • Decubitus ulcer of buttock 2014    Transient during fracture rehabilitation   • Depression 2002    Good response to citalopram and mirtazapine   • DVT (deep venous thrombosis) (CMS/[facility]) 2014    Superficial - right lesser saphenus vein - after hip fracture    • Fracture of bone of forefoot 1972    Right foot   • Fracture of ilium, left (CMS/[facility]) 05/2018    Accidental fall - Uncomplicated recovery   • GERD (gastroesophageal reflux disease) 2014    Chronic superficial gastritis   • HTN (hypertension) 2005   • Iron deficiency anemia due to chronic blood loss 2018    Predisposed by chronic anticoagulation and GERD   • Low body weight due to inadequate caloric intake Adulthood   • Mitral valve prolapse 1997    Mild MR   • Osteoarthritis    • Osteoporosis    • Patent foramen ovale 2005   • Pneumonia 1947   • SCC (squamous cell carcinoma), arm, right 2017   • Scoliosis    • Syncope 2014     undermined cause - [facility]   • Varicose veins 2005    Symptomatic     Past Surgical History:   Procedure Laterality Date   • ACHILLES TENDON SURGERY Left 1997    Repair of rupture left tendon with screws   • BREAST CYST EXCISION Left    • BREAST SURGERY Left 1982    Excision benign cyst   • CARDIAC CATHETERIZATION  2006    Severe acute coronary spasm   • CATARACT EXTRACTION WITH INTRAOCULAR LENS IMPLANT Bilateral 2015   • EYE CAPSULOTOMY WITH LASER Left 2016    Marked visual improvement   • FEMUR FRACTURE SURGERY Right 2015    Repair of shaft fracture   • HIP FRACTURE SURGERY Left 2014    left hip fracture with pin   • LUMBAR DISC SURGERY  1983   • OVARIAN CYST REMOVAL Left 1996   • SKIN CANCER EXCISION Right 2017    SCC - arm     General Information     Row Name 09/16/19 1134          PT Evaluation Time/Intention    Document Type  evaluation  -AA     Mode of Treatment  physical therapy;individual therapy  -AA      Row Name 09/16/19 1134          General Information    Patient Profile Reviewed?  yes  -AA     Prior Level of Function  independent:;all household mobility;max assist:;community mobility  -AA     Existing Precautions/Restrictions  fall  -AA     Barriers to Rehab  previous functional deficit  -AA     Row Name 09/16/19 1134          Relationship/Environment    Lives With  sibling(s)  -AA     Row Name 09/16/19 1134          Resource/Environmental Concerns    Current Living Arrangements  home/apartment/condo  -AA     Row Name 09/16/19 1134          Home Main Entrance    Number of Stairs, Main Entrance  none  -AA     Row Name 09/16/19 1134          Stairs Within Home, Primary    Stairs, Within Home, Primary  pt has ramp to enter home  -AA     Row Name 09/16/19 1134          Cognitive Assessment/Intervention- PT/OT    Orientation Status (Cognition)  oriented x 4  -AA     Row Name 09/16/19 1134          Safety Issues, Functional Mobility    Safety Issues Affecting Function (Mobility)  insight into deficits/self awareness;problem solving;sequencing abilities  -AA     Impairments Affecting Function (Mobility)  balance;postural/trunk control;coordination  -AA       User Key  (r) = Recorded By, (t) = Taken By, (c) = Cosigned By    Initials Name Provider Type    AA Antonia Perez, PT Physical Therapist        Mobility     Row Name 09/16/19 1141          Bed Mobility Assessment/Treatment    Comment (Bed Mobility)  pt UIC  -AA     Row Name 09/16/19 1141          Transfer Assessment/Treatment    Comment (Transfers)  VC to unlock breaks upon standing  -AA     Row Name 09/16/19 1141          Sit-Stand Transfer    Sit-Stand [place] (Transfers)  contact guard;verbal cues  -AA     Assistive Device (Sit-Stand Transfers)  walker, 4-wheeled  -AA     Row Name 09/16/19 1141          Gait/Stairs Assessment/Training    Gait/Stairs Assessment/Training  gait/ambulation independence;distance ambulated;gait pattern;gait deviations  -AA      [place] Level (Gait)  contact guard;verbal cues  -AA     Assistive Device (Gait)  walker, 4-wheeled  -AA     Distance in Feet (Gait)  150  -AA     Pattern (Gait)  step-through  -AA     Deviations/Abnormal Patterns (Gait)  bilateral deviations;steppage;stride length decreased  -AA     Bilateral Gait Deviations  forward flexed posture  -AA     Comment (Gait/Stairs)  Pt ambulated with CGA and rollator 150 feet with no dizzines, pain, or SOB.  Pt require VC to stay inside and close to walker during turns.  No LOB noted  -AA       User Key  (r) = Recorded By, (t) = Taken By, (c) = Cosigned By    Initials Name Provider Type    AA Antonia Perez, PT Physical Therapist        Obj/Interventions     Row Name 09/16/19 1142          General ROM    GENERAL ROM COMMENTS  BLE WNL  -AA     Row Name 09/16/19 1142          MMT (Manual Muscle Testing)    General MMT Comments  4+/5 BLE  -AA     Row Name 09/16/19 1142          Static Sitting Balance    Level of [place] (Unsupported Sitting, Static Balance)  supervision  -AA     Sitting Position (Unsupported Sitting, Static Balance)  sitting in chair  -AA     Time Able to Maintain Position (Unsupported Sitting, Static Balance)  more than 5 minutes  -AA     Row Name 09/16/19 1142          Static Standing Balance    Level of [place] (Supported Standing, Static Balance)  contact guard assist  -AA     Time Able to Maintain Position (Supported Standing, Static Balance)  more than 5 minutes  -AA     Assistive Device Utilized (Supported Standing, Static Balance)  walker, rolling  -AA     Row Name 09/16/19 1142          Dynamic Standing Balance    Level of [place], Reaches Outside Midline (Standing, Dynamic Balance)  contact guard assist  -AA     Time Able to Maintain Position, Reaches Outside Midline (Standing, Dynamic Balance)  more than 5 minutes  -AA     Assistive Device Utilized (Supported Standing, Dynamic Balance)  walker, rolling  -AA       User Key  (r) = Recorded  By, (t) = Taken By, (c) = Cosigned By    Initials Name Provider Type    Antonia Rangel L, PT Physical Therapist        Goals/Plan     Row Name 09/16/19 1145          Transfer Goal 1 (PT)    Activity/Assistive Device (Transfer Goal 1, PT)  transfers, all  -AA     [place] Level/Cues Needed (Transfer Goal 1, PT)  conditional independence  -AA     Time Frame (Transfer Goal 1, PT)  10 days  -AA     Row Name 09/16/19 1145          Gait Training Goal 1 (PT)    Activity/Assistive Device (Gait Training Goal 1, PT)  gait (walking locomotion);walker, rolling  -AA     [place] Level (Gait Training Goal 1, PT)  conditional independence  -AA     Distance (Gait Goal 1, PT)  250 feet  -AA     Time Frame (Gait Training Goal 1, PT)  10 days  -AA     Row Name 09/16/19 1145          Patient Education Goal (PT)    Activity (Patient Education Goal, PT)  HEP independence  -AA     [place]/Cues/Accuracy (Memory Goal 2, PT)  demonstrates adequately;independent  -AA     Time Frame (Patient Education Goal, PT)  10 days  -AA       User Key  (r) = Recorded By, (t) = Taken By, (c) = Cosigned By    Initials Name Provider Type    Antonia Rangel, PT Physical Therapist        Clinical Impression     Row Name 09/16/19 1143          Pain Assessment    Additional Documentation  Pain Scale: Numbers Pre/Post-Treatment (Group)  -AA     Row Name 09/16/19 1143          Pain Scale: Numbers Pre/Post-Treatment    Pain Scale: Numbers, Pretreatment  0/10 - no pain  -AA     Pain Scale: Numbers, Post-Treatment  0/10 - no pain  -AA     Row Name 09/16/19 1143          Plan of Care Review    Plan of Care Reviewed With  patient;son  -AA     Row Name 09/16/19 1143          Physical Therapy Clinical Impression    Patient/Family Goals Statement (PT Clinical Impression)  go home safely  -AA     Criteria for Skilled Interventions Met (PT Clinical Impression)  yes;treatment indicated  -AA     Rehab Potential (PT Clinical Summary)  good, to achieve stated  therapy goals  -AA     Predicted Duration of Therapy (PT)  10 days  -AA     Row Name 09/16/19 1143          Vital Signs    Pre Systolic BP Rehab  107  -AA     Pre Treatment Diastolic BP  66  -AA     Post Systolic BP Rehab  123  -AA     Post Treatment Diastolic BP  77  -AA     Pretreatment Heart Rate (beats/min)  80  -AA     Intratreatment Heart Rate (beats/min)  92  -AA     Posttreatment Heart Rate (beats/min)  75  -AA     Pre Patient Position  Sitting  -AA     Intra Patient Position  Standing  -AA     Post Patient Position  Sitting  -AA     Row Name 09/16/19 1143          Positioning and Restraints    Pre-Treatment Position  sitting in chair/recliner  -AA     Post Treatment Position  chair  -AA     In Chair  notified nsg;exit alarm on;with nsg;waffle cushion;reclined;with family/caregiver;call light within reach;encouraged to call for assist  -AA       User Key  (r) = Recorded By, (t) = Taken By, (c) = Cosigned By    Initials Name Provider Type    Antonia Rangel PT Physical Therapist        Outcome Measures     Row Name 09/16/19 1147          How much help from another person do you currently need...    Turning from your back to your side while in flat bed without using bedrails?  4  -AA     Moving from lying on back to sitting on the side of a flat bed without bedrails?  3  -AA     Moving to and from a bed to a chair (including a wheelchair)?  3  -AA     Standing up from a chair using your arms (e.g., wheelchair, bedside chair)?  3  -AA     Climbing 3-5 steps with a railing?  2  -AA     To walk in hospital room?  3  -AA     AM-PAC 6 Clicks Score (PT)  18  -AA     Row Name 09/16/19 1147          Functional Assessment    Outcome Measure Options  AM-PAC 6 Clicks Basic Mobility (PT)  -AA       User Key  (r) = Recorded By, (t) = Taken By, (c) = Cosigned By    Initials Name Provider Type    Antonia Rangel PT Physical Therapist        Physical Therapy Education     Title: PT OT SLP Therapies (In Progress)      Topic: Physical Therapy (In Progress)     Point: Mobility training (In Progress)     Learning Progress Summary           Patient Acceptance, E, NR by AA at 9/16/2019 11:46 AM   Family Acceptance, E, NR by AA at 9/16/2019 11:46 AM                   Point: Home exercise program (In Progress)     Learning Progress Summary           Patient Acceptance, E, NR by AA at 9/16/2019 11:46 AM   Family Acceptance, E, NR by AA at 9/16/2019 11:46 AM                   Point: Body mechanics (In Progress)     Learning Progress Summary           Patient Acceptance, E, NR by AA at 9/16/2019 11:46 AM   Family Acceptance, E, NR by AA at 9/16/2019 11:46 AM                   Point: Precautions (In Progress)     Learning Progress Summary           Patient Acceptance, E, NR by AA at 9/16/2019 11:46 AM   Family Acceptance, E, NR by AA at 9/16/2019 11:46 AM                               User Key     Initials Effective Dates Name Provider Type Discipline     04/02/18 -  Antonia Perez, PT Physical Therapist PT              PT Recommendation and Plan  Planned Therapy Interventions (PT Eval): balance training, bed mobility training, gait training, home exercise program, patient/family education, strengthening, transfer training  Outcome Summary/Treatment Plan (PT)  Anticipated Discharge Disposition (PT): home with assist, home with home health  Plan of Care Reviewed With: patient, son  Progress: improving  Outcome Summary: PT eval complete.  Pt perform STS with CGA and rollator.  Pt ambulated 150 feet with rollator and CGA with cues for safety and sequencing.  Recommend DC home with assist and home health     Time Calculation:   PT Charges     Row Name 09/16/19 1148             Time Calculation    Start Time  1058  -AA      PT Received On  09/16/19  -      PT Goal Re-Cert Due Date  09/26/19  -         Time Calculation- PT    Total Timed Code Minutes- PT  12 minute(s)  -         Timed Charges    39416 - Gait Training Minutes   12  -AA         User Key  (r) = Recorded By, (t) = Taken By, (c) = Cosigned By    Initials Name Provider Type    AA Antonia Perez, PT Physical Therapist        Therapy Charges for Today     Code Description Service Date Service Provider Modifiers Qty    25284862184 HC GAIT TRAINING EA 15 MIN 9/16/2019 Antonia Perez, PT GP 1    80161266319 HC PT EVAL HIGH COMPLEXITY 3 9/16/2019 Antonia Perez, PT GP 1          PT G-Codes  Outcome Measure Options: AM-PAC 6 Clicks Basic Mobility (PT)  AM-PAC 6 Clicks Score (PT): 18  AM-PAC 6 Clicks Score (OT): 19 April LUCINDA Perez PT  9/16/2019

## 2019-09-16 NOTE — CONSULTS
[facility] Consult Note      Referring Provider: No ref. provider found  Primary Provider:  Oren Engel DO  Reason for Consultation: Bradycardia    Problem list:   1. Chronic atrial fibrillation.  a. CHADS VASc 7; on eliquis  b. External Cardioversion 10/2016: NSR  c. Holter Monitor with Dr Springer, 10/27/2016, revealing brief PAF with accelerated junctional rhythms.  d. Echo 12/18/18:  EF 60%, mild MR/ TR with RVSP of 42 mmHg.  In a-fib during study  e. Echocardiogram, 05/09/2019: EF 60%. Trace-to-mild MR. Mild TR with RVSP 34 mmHg. No cardiac source for embolus.   2. CVA, January 2005 and May 2019:  a. CT scan of the head showed a right parietal CVA.  b. Carotid duplex revealed a 50% to 79% left ICS stenosis, 15% right ICS stenosis.  c. MRI of the neck: no significant carotid bifurcations.  d. Negative CT of the head, 09/10/2012.  e. Carotid duplex, 02/24/2014: Shelf-like plaque in the CECE without significant elevation and velocities. Patent vertebral arteries.   f. Carotid duplex 12/18/18:  0-49% bilaterally with antegrade flow noted bilaterally.  g. ER presentation with weakness, 05/07/2019. Pt was in Afib with RVR. MRI showed acute strokes in the right posterior lateral thalamus and adjacent to the right putamen and right basal ganglia. 0-49% bilateral ICA stenosis per duplex on 05/09/2019.  3. Cardiomyopathy:  a. Takotsubo syndrome, 05/07/2006.  b. C, 05/08/2006, Dr. Arguello: EF 30% with near normal CORS.  c. Echocardiogram, 05/07/2006: EF 40% with a peak gradient of 75-80 mm across the LVFT and at least moderate MR.  d. Echocardiogram, 07/13/2006: EF > 60% with only trace MR.  e. Echocardiogram, 02/24/2014: LVEF of 55% to 60% with PFO, mild-to-moderate MR, moderate TR.   f. Echocardiogram, 09/20/2016: EF 60%. Moderate-to-severe MR, Moderate TR.  4. Patent foramen ovale   a. MONIE, January 2005, documented again today by echocardiogram, 07/13/2006.  5. Syncope, 2/23/2014, admission @ [facility],  unknown etiology.   6. Superficial DVT in the right lesser saphenus vein following ORIF of the left hip.   7. Chronic anorexia.  8. Hyperlipidemia, followed by Dr. Springer.  9. Depression/Anxiety.  10. Osteoporosis/ Osteoarthritis/ Scoliosis  11. Surgical history:  a. Left Achilles tendon repair.  b. Left ovarian cyst excision.  c. Lumbar discectomy.  d. Previous breast surgery.  e. Left hip fracture, status post intratrochanteric fixation    Allergies:  Actonel [risedronate sodium]; Hctz [hydrochlorothiazide]; and Lisinopril    Home/Current Medications:      Current Facility-Administered Medications:   •  apixaban (ELIQUIS) tablet 5 mg, 5 mg, Oral, Q12H, Telma Tobias APRN, 5 mg at 09/16/19 0836  •  atorvastatin (LIPITOR) tablet 80 mg, 80 mg, Oral, Nightly, Telma Tobias, APRN, 80 mg at 09/15/19 2108  •  citalopram (CeleXA) tablet 20 mg, 20 mg, Oral, QAM, Telma Tobias, APRN, 20 mg at 09/16/19 0836  •  [START ON 9/17/2019] diltiaZEM CD (CARDIZEM CD) 24 hr capsule 360 mg, 360 mg, Oral, Q24H, Aure Cotton MD  •  famotidine (PEPCID) tablet 40 mg, 40 mg, Oral, Daily, Telma Tobias, APRN, 40 mg at 09/16/19 0836  •  ferrous sulfate tablet 325 mg, 325 mg, Oral, Daily With Breakfast, Telma Tobias APRN, 325 mg at 09/16/19 0836  •  levETIRAcetam (KEPPRA) tablet 250 mg, 250 mg, Oral, Q12H, Telma Tobias, APRN, 250 mg at 09/16/19 0836  •  mirtazapine (REMERON) tablet 7.5 mg, 7.5 mg, Oral, Nightly, Telma Tobias, APRN, 7.5 mg at 09/15/19 2108  •  sennosides-docusate sodium (SENOKOT-S) 8.6-50 MG tablet 2 tablet, 2 tablet, Oral, BID PRN, Telma Tobias APRN  •  sodium chloride 0.9 % flush 10 mL, 10 mL, Intravenous, PRN, Garo Alvarez MD  •  [COMPLETED] Insert peripheral IV, , , Once **AND** sodium chloride 0.9 % flush 10 mL, 10 mL, Intravenous, PRN, Thang Hartley APRN  •  sodium chloride 0.9 % flush 10 mL, 10 mL, Intravenous, Q12H, Telma Tobias APRN, 10 mL at 09/16/19 0837  •  sodium  chloride 0.9 % flush 10 mL, 10 mL, Intravenous, PRN, Telma Tobias, APRN, 10 mL at 09/15/19 1735  •  sodium chloride 0.9 % infusion, 75 mL/hr, Intravenous, Continuous, Telma Tobias, APRN, Last Rate: 75 mL/hr at 09/16/19 0822, 75 mL/hr at 09/16/19 0822    History of present illness:  Ms. Sy is a 80 y/o female with pmhx of chronic atrial fibrillation, h/o Takotsubo cardiomyopathy, HLD, seizures and two prior CVA's who presented to ED with c/o generalized weakness and confusion. Symptoms began yesterday morning before Shinto. She remained disoriented most of the morning and also complained of dizziness. Of note, she was started on Toprolol XL about three days prior and since that time she has been extremely tired, fatigued and confused. Family is reportedly concerned that she may be taking her medications wrong. Son thinks she may have taken a double dose of dilt 360 Saturday night as well.    Workup on arrival notable for: EKG with Atrial flutter with slow ventricular response, Labs revealed SABRINA and hyperkalemia.     She was admitted overnight by medicine and cardiology is consulted this AM for further evaluation/management of atrial flutter and bradycardia.     Currently she states that she feels well. She denies chest pain, shortness of breath, LH, dizziness, confusion. No edema, PND, orthopnea.       Social History:  Social History     Socioeconomic History   • Marital status:      Spouse name: Not on file   • Number of children: Not on file   • Years of education: Not on file   • Highest education level: Not on file   Tobacco Use   • Smoking status: Former Smoker     Packs/day: 1.00     Years: 20.00     Pack years: 20.00     Types: Cigarettes   • Smokeless tobacco: Never Used   • Tobacco comment: Remote history   Substance and Sexual Activity   • Alcohol use: No   • Drug use: No   Social History Narrative    Domestic life   lives in private home alone - good support from local children         "Taoist    Jainism        Sleep hygiene  in bed 9 PM to 6 AM for 9 hours of sleep        Caffeine use   1 1/2 cups coffee daily        Exercise habits  walks most days of the week. - Stretching each morning.          Diet   well-balanced diet with protein supplementations        Occupation    retired         Hearing  no impairment        Vision    no glasses needed after cataract surgery.          Driving   daytime only - good weather - local traffic       Family History:  Family History   Problem Relation Age of Onset   • Breast cancer Mother          age 59   • Diabetes Mother    • Hypertension Mother    • Heart disease Father          age 80   • Other Sister         Arthrodesis cervical   • Basal cell carcinoma Sister    • Bone cancer Sister    • Breast cancer Sister 57   • Diabetes Sister          age 56   • Hypertension Sister    • Melanoma Sister    • Arrhythmia Sister         Sinus   • Stroke Sister    • Allergies Son         Hay fever   • Diabetes Maternal Uncle    • Breast cancer Sister 59   • Breast cancer Sister 66   • Ovarian cancer Sister         DX AGE UNKNOWN     Review of Systems  Pertinent positives are listed in the HPI.  All other systems reviewed are negative.         Objective     Vital Sign Min/Max for last 24 hours  Temp  Min: 97.5 °F (36.4 °C)  Max: 98.1 °F (36.7 °C)   BP  Min: 102/57  Max: 114/72   Pulse  Min: 46  Max: 91   Resp  Min: 16  Max: 18   SpO2  Min: 92 %  Max: 96 %   No Data Recorded   Weight  Min: 45.8 kg (101 lb)  Max: 49.4 kg (109 lb)     Flowsheet Rows      First Filed Value   Admission Height  165.1 cm (65\") Documented at 09/15/2019 1036   Admission Weight  45.8 kg (101 lb) Documented at 09/15/2019 1036          Physical Exam:  GENERAL: This is a well-developed, well-nourished, female who is in no acute distress. Alert and oriented x3. Normal mood and affect.   SKIN: Pink and warm without rash or abnormality noted.   HEENT: Head is normocephalic " and atraumatic. Pupils are equal and reactive to light bilaterally. Mucous membranes are pink and moist.   NECK: Supple without lymphadenopathy or thyromegaly. There is no jugular venous distention at 30°.  LUNGS: Clear to auscultation bilaterally without wheezing, rhonchi, or rales noted.   CARDIOVASCULAR: The heart has an irregularly irregular rhythm, regular rate with a normal S1 and S2. There is no murmur, gallop, rub, or click appreciated. The PMI is nondisplaced. Carotid upstrokes are 2+ and symmetrical without bruits.   ABDOMEN: Soft and nondistended with positive bowel sounds x4. The patient denies tenderness of palpitation.   MUSCULOSKELETAL: There are no obvious bony abnormalities. Normal range of tenderness to palpation.   NEUROLOGICAL: Nonfocal.   PERIPHERAL VASCULAR: Femoral pulses are 2+ and symmetrical without bruits. Posterior tibial and dorsalis pedis pulses are 2+ and symmetrical. There is no peripheral edema.      EKG:  Aflutter with variable AV conduction and slow ventricular response  Tele: Aflutter, 70's  Echo: May 2019: EF 60%.    Labs:      Results from last 7 days   Lab Units 09/16/19  0528 09/15/19  1201 09/15/19  1051   SODIUM mmol/L 141  --  139   POTASSIUM mmol/L 4.8 5.7* 5.4*   CHLORIDE mmol/L 106  --  103   CO2 mmol/L 27.0  --  26.0   BUN mg/dL 24*  --  23   CREATININE mg/dL 1.44*  --  1.61*   CALCIUM mg/dL 9.5  --  10.0   BILIRUBIN mg/dL  --   --  0.3   ALK PHOS U/L  --   --  90   ALT (SGPT) U/L  --   --  13   AST (SGOT) U/L  --   --  28   GLUCOSE mg/dL 113*  --  109*     Lab Results   Component Value Date    WBC 5.97 09/16/2019    HGB 11.5 (L) 09/16/2019    HCT 37.2 09/16/2019    MCV 93.9 09/16/2019     09/16/2019     Lab Results   Component Value Date    HGBA1C 5.60 12/19/2018     Lab Results   Component Value Date    CHOL 165 12/19/2018    CHLPL 148 05/16/2016    TRIG 44 12/19/2018    HDL 51 12/19/2018     12/19/2018     Lab Results   Component Value Date    TROPONINI  0.02 06/25/2015    TROPONINI <0.01 06/25/2015    TROPONINI 0.01 02/24/2014    TROPONINT <0.010 09/15/2019    TROPONINT <0.010 09/15/2019    TROPONINT <0.010 05/07/2019        Ejection Fraction:  60% (5/2019)    Results Review:  I reviewed the patients new clinical results.    Assessment:  1) Chronic atrial fibrillation/flutter  Tachybrady syndrome  - CHADSVASC = 6, on Eliquis.   - Toprol discontinued. HRs improved this AM.   - Cardizem to resume this AM.    2) H/o Takotsubo, resolved.   - Most recent EF 5/2019: 60%    3) Hypertension, stable.     4) History of stroke    5) Dyslipidemia  - continue Atorvastatin 80 mg daily.   6) Confusion  - CT head: no acute process.   7) SABRINA  - creatinine 1.61-->1.44 (baseline around 0.9)  - s/p IVFs.   8) Hyperkalemia, resolved.   9) Pyuria  - per medicine.       Plan:    - Resume Toprol at reduced dose of 25 mg daily   - Continue Cardizem 360 mg daily.   - Continue Eliquis.   - Will monitor on tele for another day.   - Consider Holter monitor in the near future. May need PPM in near future.       I discussed the patients findings and my recommendations with patient.      Scribed for Bri Olguin MD by BROOK Cabrera, APRN. 9/16/2019  9:06 AM    rBi Olguin MD, FACC

## 2019-09-16 NOTE — PLAN OF CARE
Problem: Fall Risk (Adult)  Goal: Identify Related Risk Factors and Signs and Symptoms  Outcome: Ongoing (interventions implemented as appropriate)   09/16/19 0100   Fall Risk (Adult)   Related Risk Factors (Fall Risk) age-related changes;environment unfamiliar   Signs and Symptoms (Fall Risk) presence of risk factors       Problem: Patient Care Overview  Goal: Plan of Care Review  Outcome: Ongoing (interventions implemented as appropriate)   09/16/19 0100   Coping/Psychosocial   Plan of Care Reviewed With patient   Plan of Care Review   Progress  09/16/19 0100   Coping/Psychosocial   Plan of Care Reviewed With patient   Plan of Care Review   Progress no change   OTHER   Outcome Summary patient resting comfortably in bed, no complaints otherwise. VSS. Will continue to monitor.    no change       Problem: Patient Care Overview  Goal: Plan of Care Review  Outcome: Ongoing (interventions implemented as appropriate)      Problem: Skin Injury Risk (Adult)  Goal: Identify Related Risk Factors and Signs and Symptoms  Outcome: Ongoing (interventions implemented as appropriate)   09/16/19 0100   Skin Injury Risk (Adult)   Related Risk Factors (Skin Injury Risk) cognitive impairment;fluid intake inadequate;mobility impaired

## 2019-09-16 NOTE — THERAPY EVALUATION
Acute Care - Occupational Therapy Initial Evaluation  [facility]     Patient Name: Mirian Sy  : 1940  MRN: [id number]  Today's Date: 2019  Onset of Illness/Injury or Date of Surgery: 09/15/19  Date of Referral to OT: 09/15/19       Admit Date: 9/15/2019       ICD-10-CM ICD-9-CM   1. Bradycardia R00.1 427.89   2. SABRINA (acute kidney injury) (CMS/HCC) N17.9 584.9   3. Weakness R53.1 780.79   4. Acute cystitis without hematuria N30.00 595.0   5. Impaired mobility and ADLs Z74.09 799.89     Patient Active Problem List   Diagnosis   • Abnormal gait   • Takotsubo syndrome   • Carpal tunnel syndrome   • Complex partial epilepsy (CMS/HCC)   • Depression   • Chronic gastritis   • HLD (hyperlipidemia)   • Hypertension   • Hyponatremia   • Nutritional anemia   • Dyssomnia   • Diplopia   • Xeroderma   • Preventative health care   • Frailty   • Tricuspid regurgitation   • Cerebrovascular accident (CMS/HCC)   • Syncope   • DVT (deep venous thrombosis) (CMS/HCC)   • Anorexia   • Anxiety   • Patent foramen ovale   • Mitral regurgitation   • Low weight   • Senile osteoporosis   • Iron deficiency   • Chronic atrial fibrillation (CMS/HCC)   • Closed fracture of left ilium (CMS/HCC)   • GERD (gastroesophageal reflux disease)   • Fall at home   • Vertigo   • Internuclear ophthalmoplegia, right eye   • Atrial fibrillation with RVR (CMS/HCC)   • Bradycardia   • Chronic atrial fibrillation (CMS/HCC)   • SABRINA (acute kidney injury) (CMS/HCC)   • Hyperkalemia   • Confusion     Past Medical History:   Diagnosis Date   • Atrial fibrillation (CMS/HCC)     Chronic anticoagulation and rate control   • Basal cell carcinoma     Left leg and nose   • Cardiomyopathy (CMS/HCC)    • Cerebrovascular accident (CMS/HCC) 2005    CT right parietal CVA. Carotid duplex -50% to 79% left ICS 15% right ICS stenosis. MRI of the neck showed no significant carotid bifurcations of these. Negative CT of the head, 09/10/2012. Carotid  duplex, 02/24/2014:  Shelf-like plaque in the CECE without significant elevation and velocities.  Patent vertebral arteries.   • Complex partial epilepsy (CMS/HCC) 2014   • Coronary artery vasospasm (CMS/HCC) 2006    With dilated cardiomyopathy   • Decubitus ulcer of buttock 2014    Transient during fracture rehabilitation   • Depression 2002    Good response to citalopram and mirtazapine   • DVT (deep venous thrombosis) (CMS/[facility]) 2014    Superficial - right lesser saphenus vein - after hip fracture    • Fracture of bone of forefoot 1972    Right foot   • Fracture of ilium, left (CMS/[facility]) 05/2018    Accidental fall - Uncomplicated recovery   • GERD (gastroesophageal reflux disease) 2014    Chronic superficial gastritis   • HTN (hypertension) 2005   • Iron deficiency anemia due to chronic blood loss 2018    Predisposed by chronic anticoagulation and GERD   • Low body weight due to inadequate caloric intake Adulthood   • Mitral valve prolapse 1997    Mild MR   • Osteoarthritis    • Osteoporosis    • Patent foramen ovale 2005   • Pneumonia 1947   • SCC (squamous cell carcinoma), arm, right 2017   • Scoliosis    • Syncope 2014     undermined cause - [facility]   • Varicose veins 2005    Symptomatic     Past Surgical History:   Procedure Laterality Date   • ACHILLES TENDON SURGERY Left 1997    Repair of rupture left tendon with screws   • BREAST CYST EXCISION Left    • BREAST SURGERY Left 1982    Excision benign cyst   • CARDIAC CATHETERIZATION  2006    Severe acute coronary spasm   • CATARACT EXTRACTION WITH INTRAOCULAR LENS IMPLANT Bilateral 2015   • EYE CAPSULOTOMY WITH LASER Left 2016    Marked visual improvement   • FEMUR FRACTURE SURGERY Right 2015    Repair of shaft fracture   • HIP FRACTURE SURGERY Left 2014    left hip fracture with pin   • LUMBAR DISC SURGERY  1983   • OVARIAN CYST REMOVAL Left 1996   • SKIN CANCER EXCISION Right 2017    SCC - arm          OT ASSESSMENT FLOWSHEET (last 12 hours)      Occupational  Therapy Evaluation     Row Name 09/16/19 0800                   OT Evaluation Time/Intention    Subjective Information  no complaints  -KF        Document Type  evaluation  -KF        Mode of Treatment  individual therapy;occupational therapy  -KF        Patient Effort  good  -KF        Symptoms Noted During/After Treatment  none  -KF           General Information    Patient Profile Reviewed?  yes  -KF        Onset of Illness/Injury or Date of Surgery  09/15/19  -KF        Patient Observations  alert;cooperative;agree to therapy  -KF        Patient/Family Observations  no famiily present  -KF        General Observations of Patient  supine, had spilled food on gown agreeable for ADLs and  evaluation  -KF        Prior Level of Function  min assist:;home management;max assist:;community mobility;independent:;all household mobility;transfer;bed mobility;ADL's  -KF        Equipment Currently Used at Home  grab bar;rollator;shoe horn;sock aid;shower chair adjustable bed; lifeline  -KF        Pertinent History of Current Functional Problem  Pt hx of 2 CVA, afib presents with bradycardia and AMS reporting some confusion compard to baseline  -KF        Existing Precautions/Restrictions  fall  -KF        Limitations/Impairments  safety/cognitive  -KF        Risks Reviewed  patient:;LOB;nausea/vomiting;dizziness;increased discomfort;change in vital signs  -KF        Benefits Reviewed  patient:;improve function;increase independence;increase strength;increase balance;increase knowledge  -KF        Barriers to Rehab  previous functional deficit  -KF           Relationship/Environment    Primary Source of Support/Comfort  child(tammy);sibling(s)  -KF        Lives With  sibling(s);child(tammy), adult  -KF        Family Caregiver if Needed  child(tammy), adult;sibling(s)  -KF        Concerns About Impact on Relationships  states son assists with medication management with AM and PM pill tray, sisters provide shopping and community  transportation  -KF           Resource/Environmental Concerns    Current Living Arrangements  home/apartment/condo  -KF           Cognitive Assessment/Interventions    Additional Documentation  Cognitive Assessment/Intervention (Group)  -KF           Cognitive Assessment/Intervention- PT/OT    Affect/Mental Status (Cognitive)  WFL  -KF        Orientation Status (Cognition)  oriented x 4  -KF        Follows Commands (Cognition)  WFL;follows one step commands;over 90% accuracy;verbal cues/prompting required  -KF        Cognitive Function (Cognitive)  executive function deficit;safety deficit  -KF        Executive Function Deficit (Cognition)  mild deficit;judgment;organization/sequencing  -KF        Safety Deficit (Cognitive)  mild deficit;problem solving;judgment;safety precautions awareness  -KF        Cognitive Interventions (Cognitive)  occupation/activity based interventions;process/task specific training  -KF           Safety Issues, Functional Mobility    Safety Issues Affecting Function (Mobility)  insight into deficits/self awareness;judgment;problem solving;sequencing abilities  -KF        Impairments Affecting Function (Mobility)  balance;postural/trunk control;coordination  -KF           Bed Mobility Assessment/Treatment    Bed Mobility Assessment/Treatment  rolling left;scooting/bridging;supine-sit  -KF        Rolling Left [place] (Bed Mobility)  conditional independence  -KF        Scooting/Bridging [place] (Bed Mobility)  conditional independence  -KF        Supine-Sit [place] (Bed Mobility)  supervision;verbal cues  -KF        Bed Mobility, Safety Issues  impaired trunk control for bed mobility  -KF        Assistive Device (Bed Mobility)  head of bed elevated;bed rails  -KF        Comment (Bed Mobility)  has adjustable bed at home  -KF           Functional Mobility    Functional Mobility- Ind. Level  contact guard assist  -KF        Functional Mobility- Device  rolling walker  -KF         Functional Mobility-Distance (Feet)  18  -KF        Functional Mobility- Safety Issues  loses balance backward  -KF        Functional Mobility- Comment  upon initial standing has some posterior lean at times   -KF           Transfer Assessment/Treatment    Transfer Assessment/Treatment  sit-stand transfer;stand-sit transfer;bed-chair transfer  -KF        Comment (Transfers)  good HP and cued for seq for use of rollator   -KF           Bed-Chair Transfer    Bed-Chair [place] (Transfers)  contact guard  -KF        Assistive Device (Bed-Chair Transfers)  walker, 4-wheeled  -KF           Sit-Stand Transfer    Sit-Stand [place] (Transfers)  contact guard;verbal cues  -KF        Assistive Device (Sit-Stand Transfers)  walker, 4-wheeled  -KF           Stand-Sit Transfer    Stand-Sit [place] (Transfers)  stand by assist;verbal cues  -KF        Assistive Device (Stand-Sit Transfers)  walker, 4-wheeled  -KF           ADL Assessment/Intervention    29263 - OT Self Care/Mgmt Minutes  14  -KF        BADL Assessment/Intervention  upper body dressing;lower body dressing;grooming  -KF           Upper Body Dressing Assessment/Training    Upper Body Dressing [place] Level  doff;don;front opening garment;minimum assist (75% patient effort)  -KF        Upper Body Dressing Position  edge of bed sitting  -KF        Comment (Upper Body Dressing)  min A for lines and ties  -KF           Lower Body Dressing Assessment/Training    Lower Body Dressing [place] Level  doff;socks;independent;don;minimum assist (75% patient effort);verbal cues  -KF        Lower Body Dressing Position  supported sitting;supported standing  -KF           Grooming Assessment/Training    [place] Level (Grooming)  hair care, combing/brushing;oral care regimen;wash face, hands;contact guard assist;verbal cues;supervision  -KF        Grooming Position  sink side;supported standing  -KF        Comment (Grooming)  overall SBA throughout sink  tasks   -KF           BADL Safety/Performance    Impairments, BADL Safety/Performance  cognition;coordination  -KF        Cognitive Impairments, BADL Safety/Performance  attention;problem solving/reasoning;sequencing abilities;judgment  -KF        Skilled BADL Treatment/Intervention  BADL process/adaptation training;cognitive/safety deficit modifications  -KF           General ROM    GENERAL ROM COMMENTS  BUE WNL  -KF           MMT (Manual Muscle Testing)    General MMT Comments  BUE grossly 4+/5, no noticeable asymetry   -KF           Motor Assessment/Interventions    Additional Documentation  Balance (Group);Balance Interventions (Group);Fine Motor Testing & Training (Group);Gross Motor Coordination (Group)  -KF           Gross Motor Coordination    Gross Motor Impairments  finger to nose  -KF        Gross Motor Skill, Impairments Detail  BUE WFL   -KF           Balance    Balance  static sitting balance;static standing balance;dynamic sitting balance;dynamic standing balance  -KF           Static Sitting Balance    Level of [place] (Unsupported Sitting, Static Balance)  supervision  -KF        Sitting Position (Unsupported Sitting, Static Balance)  sitting on edge of bed  -KF        Time Able to Maintain Position (Unsupported Sitting, Static Balance)  more than 5 minutes  -KF           Dynamic Sitting Balance    Level of [place], Reaches Outside Midline (Sitting, Dynamic Balance)  standby assist  -KF        Sitting Position, Reaches Outside Midline (Sitting, Dynamic Balance)  sitting on edge of bed  -KF        Comment, Reaches Outside Midline (Sitting, Dynamic Balance)  unsteady at times and some trunk control deficits demonstrated   -KF           Static Standing Balance    Level of [place] (Supported Standing, Static Balance)  contact guard assist;standby assist  -KF        Time Able to Maintain Position (Supported Standing, Static Balance)  2 to 3 minutes  -KF        Assistive Device Utilized  (Supported Standing, Static Balance)  -- rollator  -KF           Dynamic Standing Balance    Level of [place], Reaches Outside Midline (Standing, Dynamic Balance)  contact guard assist  -KF        Time Able to Maintain Position, Reaches Outside Midline (Standing, Dynamic Balance)  4 to 5 minutes  -KF        Assistive Device Utilized (Supported Standing, Dynamic Balance)  -- rollator  -KF           Fine Motor Testing & Training    Training Activity, Fine Motor Coordination  manipulation of grooming tools;opening/closing containers  -KF        Comment, Fine Motor Coordination  mild deficits B hands more in L than R   -KF           Sensory Assessment/Intervention    Sensory General Assessment  no sensation deficits identified  -KF        Additional Documentation  Vision Assessment/Intervention (Group)  -KF           Vision Assessment/Intervention    Visual Impairment/Limitations  WFL  -KF           Positioning and Restraints    Pre-Treatment Position  in bed  -KF        Post Treatment Position  chair  -KF        In Chair  sitting;notified nsg;reclined;call light within reach;encouraged to call for assist;exit alarm on;legs elevated  -KF           Pain Assessment    Additional Documentation  Pain Scale: Numbers Pre/Post-Treatment (Group)  -KF           Pain Scale: Numbers Pre/Post-Treatment    Pain Scale: Numbers, Pretreatment  0/10 - no pain  -KF        Pain Scale: Numbers, Post-Treatment  0/10 - no pain  -KF        Pain Intervention(s)  Repositioned;Ambulation/increased activity  -KF           Plan of Care Review    Plan of Care Reviewed With  patient  -KF           Clinical Impression (OT)    Date of Referral to OT  09/15/19  -KF        OT Diagnosis  ADL decline  -KF        Patient/Family Goals Statement (OT Eval)  PLOF  -KF        Criteria for Skilled Therapeutic Interventions Met (OT Eval)  yes;treatment indicated  -KF        Rehab Potential (OT Eval)  good, to achieve stated therapy goals  -KF        Therapy  Frequency (OT Eval)  daily  -KF        Care Plan Review (OT)  evaluation/treatment results reviewed;care plan/treatment goals reviewed;risks/benefits reviewed;current/potential barriers reviewed;patient/other agree to care plan  -KF        Anticipated Discharge Disposition (OT)  home with assist;home with home health  -KF           Vital Signs    Pre Systolic BP Rehab  107  -KF        Pre Treatment Diastolic BP  82 RN cleared vitals stable   -KF        Intra Systolic BP Rehab  129  -KF        Intra Treatment Diastolic BP  81  -KF        Posttreatment Heart Rate (beats/min)  78  -KF        O2 Delivery Pre Treatment  room air  -KF        Pre Patient Position  Supine  -KF        Intra Patient Position  Standing  -KF        Post Patient Position  Sitting  -KF           Planned OT Interventions    Planned Therapy Interventions (OT Eval)  adaptive equipment training;BADL retraining;cognitive/visual perception retraining;functional balance retraining;IADL retraining;neuromuscular control/coordination retraining;occupation/activity based interventions;patient/caregiver education/training;ROM/therapeutic exercise;strengthening exercise;transfer/mobility retraining  -KF           OT Goals    Transfer Goal Selection (OT)  transfer, OT goal 1  -KF        Bathing Goal Selection (OT)  bathing, OT goal 1  -KF        Dressing Goal Selection (OT)  --  -KF        Problem Specific Goal Selection (OT)  problem specific goal 1, OT  -KF        Additional Documentation  Problem Specific Goal Selection (OT) (Row)  -KF           Transfer Goal 1 (OT)    Activity/Assistive Device (Transfer Goal 1, OT)  bed-to-chair/chair-to-bed;toilet;rollator  -KF        [place] Level/Cues Needed (Transfer Goal 1, OT)  conditional independence  -KF        Time Frame (Transfer Goal 1, OT)  long term goal (LTG);by discharge  -KF        Progress/Outcome (Transfer Goal 1, OT)  goal ongoing  -KF           Bathing Goal 1 (OT)    Activity/Assistive Device  (Bathing Goal 1, OT)  lower body bathing;bathing skills, all AE prn  -KF        [place] Level/Cues Needed (Bathing Goal 1, OT)  conditional independence  -KF        Time Frame (Bathing Goal 1, OT)  long term goal (LTG);by discharge  -KF        Progress/Outcomes (Bathing Goal 1, OT)  goal ongoing  -KF           Problem Specific Goal 1 (OT)    Problem Specific Goal 1 (OT)  Demonstrate good safety awareness during ADL completion and demonstrate good understanding of medication management strategies throughout TA and education   -KF        Time Frame (Problem Specific Goal 1, OT)  long term goal (LTG);by discharge  -KF        Progress/Outcome (Problem Specific Goal 1, OT)  goal ongoing  -KF           Living Environment    Home Accessibility  tub/shower is not walk in  -KF          User Key  (r) = Recorded By, (t) = Taken By, (c) = Cosigned By    Initials Name Effective Dates    Henny Green, OT 04/03/18 -          Occupational Therapy Education     Title: PT OT SLP Therapies (Done)     Topic: Occupational Therapy (Done)     Point: ADL training (Done)     Description: Instruct learner(s) on proper safety adaptation and remediation techniques during self care or transfers.   Instruct in proper use of assistive devices.    Learning Progress Summary           Patient Acceptance, E,TB, VU,DU by  at 9/16/2019  8:00 AM    Comment:  Purpose of OT, medication management organizational tools, seq of transfers                   Point: Home exercise program (Done)     Description: Instruct learner(s) on appropriate technique for monitoring, assisting and/or progressing therapeutic exercises/activities.    Learning Progress Summary           Patient Acceptance, E,TB, VU,DU by  at 9/16/2019  8:00 AM    Comment:  Purpose of OT, medication management organizational tools, seq of transfers                   Point: Precautions (Done)     Description: Instruct learner(s) on prescribed precautions during self-care and  functional transfers.    Learning Progress Summary           Patient Acceptance, E,TB, VU,DU by  at 9/16/2019  8:00 AM    Comment:  Purpose of OT, medication management organizational tools, seq of transfers                   Point: Body mechanics (Done)     Description: Instruct learner(s) on proper positioning and spine alignment during self-care, functional mobility activities and/or exercises.    Learning Progress Summary           Patient Acceptance, E,TB, VU,DU by KF at 9/16/2019  8:00 AM    Comment:  Purpose of OT, medication management organizational tools, seq of transfers                               User Key     Initials Effective Dates Name Provider Type Discipline     04/03/18 -  Henny Harrington, OT Occupational Therapist OT                  OT Recommendation and Plan  Outcome Summary/Treatment Plan (OT)  Anticipated Discharge Disposition (OT): home with assist, home with home health  Planned Therapy Interventions (OT Eval): adaptive equipment training, BADL retraining, cognitive/visual perception retraining, functional balance retraining, IADL retraining, neuromuscular control/coordination retraining, occupation/activity based interventions, patient/caregiver education/training, ROM/therapeutic exercise, strengthening exercise, transfer/mobility retraining  Therapy Frequency (OT Eval): daily  Plan of Care Review  Plan of Care Reviewed With: patient  Plan of Care Reviewed With: patient  Outcome Summary: OT eval completed Pt presents with deficits in coordination for opening containers and grooming tasks, balance deficits and sequencing deficits for ADLs; Pt min A progressed to CGA STS at rollator intermittent lean posteriorly at times with transition into stands, min A LBD socks, min A doff/donning gown at EOB, CGA progressed to SBA at sink side for grooming tasks; recom IPOT d/c home with assist and HHOT pending progress     Outcome Measures     Row Name 09/16/19 0800             How much help  from another is currently needed...    Putting on and taking off regular lower body clothing?  3  -KF      Bathing (including washing, rinsing, and drying)  3  -KF      Toileting (which includes using toilet bed pan or urinal)  3  -KF      Putting on and taking off regular upper body clothing  4  -KF      Taking care of personal grooming (such as brushing teeth)  3  -KF      Eating meals  3  -KF      AM-PAC 6 Clicks Score (OT)  19  -KF         Functional Assessment    Outcome Measure Options  AM-PAC 6 Clicks Daily Activity (OT)  -KF        User Key  (r) = Recorded By, (t) = Taken By, (c) = Cosigned By    Initials Name Provider Type    Henny Green OT Occupational Therapist          Time Calculation:   Time Calculation- OT     Row Name 09/16/19 0800             Time Calculation- OT    OT Start Time  0800  -KF      Total Timed Code Minutes- OT  14 minute(s)  -KF      OT Received On  09/16/19  -KF      OT Goal Re-Cert Due Date  09/26/19  -         Timed Charges    10808 - OT Self Care/Mgmt Minutes  14  -KF        User Key  (r) = Recorded By, (t) = Taken By, (c) = Cosigned By    Initials Name Provider Type    Henny Green OT Occupational Therapist        Therapy Charges for Today     Code Description Service Date Service Provider Modifiers Qty    [id number]  OT SELF CARE/MGMT/TRAIN EA 15 MIN 9/16/2019 Henny Harrington OT GO 1    [id number]  OT EVAL LOW COMPLEXITY 4 9/16/2019 Henny Harrington OT GO 1               Henny Harrington OT  9/16/2019

## 2019-09-16 NOTE — PLAN OF CARE
Problem: Patient Care Overview  Goal: Plan of Care Review  Outcome: Ongoing (interventions implemented as appropriate)   09/16/19 1146   Coping/Psychosocial   Plan of Care Reviewed With patient;son   Plan of Care Review   Progress improving   OTHER   Outcome Summary PT eval complete. Pt perform STS with CGA and rollator. Pt ambulated 150 feet with rollator and CGA with cues for safety and sequencing. Recommend DC home with assist and home health

## 2019-09-17 VITALS
TEMPERATURE: 97.9 F | HEIGHT: 60 IN | RESPIRATION RATE: 18 BRPM | SYSTOLIC BLOOD PRESSURE: 136 MMHG | DIASTOLIC BLOOD PRESSURE: 87 MMHG | OXYGEN SATURATION: 91 % | HEART RATE: 97 BPM | WEIGHT: 109 LBS | BODY MASS INDEX: 21.4 KG/M2

## 2019-09-17 LAB
ANION GAP SERPL CALCULATED.3IONS-SCNC: 9 MMOL/L (ref 5–15)
BUN BLD-MCNC: 19 MG/DL (ref 8–23)
BUN/CREAT SERPL: 18.1 (ref 7–25)
CALCIUM SPEC-SCNC: 9.3 MG/DL (ref 8.6–10.5)
CHLORIDE SERPL-SCNC: 103 MMOL/L (ref 98–107)
CO2 SERPL-SCNC: 25 MMOL/L (ref 22–29)
CREAT BLD-MCNC: 1.05 MG/DL (ref 0.57–1)
DEPRECATED RDW RBC AUTO: 47.6 FL (ref 37–54)
ERYTHROCYTE [DISTWIDTH] IN BLOOD BY AUTOMATED COUNT: 14 % (ref 12.3–15.4)
GFR SERPL CREATININE-BSD FRML MDRD: 51 ML/MIN/1.73
GLUCOSE BLD-MCNC: 96 MG/DL (ref 65–99)
HCT VFR BLD AUTO: 38 % (ref 34–46.6)
HGB BLD-MCNC: 11.9 G/DL (ref 12–15.9)
MCH RBC QN AUTO: 29.2 PG (ref 26.6–33)
MCHC RBC AUTO-ENTMCNC: 31.3 G/DL (ref 31.5–35.7)
MCV RBC AUTO: 93.1 FL (ref 79–97)
PLATELET # BLD AUTO: 220 10*3/MM3 (ref 140–450)
PMV BLD AUTO: 10.3 FL (ref 6–12)
POTASSIUM BLD-SCNC: 4.3 MMOL/L (ref 3.5–5.2)
RBC # BLD AUTO: 4.08 10*6/MM3 (ref 3.77–5.28)
SODIUM BLD-SCNC: 137 MMOL/L (ref 136–145)
WBC NRBC COR # BLD: 6.98 10*3/MM3 (ref 3.4–10.8)

## 2019-09-17 PROCEDURE — 99213 OFFICE O/P EST LOW 20 MIN: CPT | Performed by: INTERNAL MEDICINE

## 2019-09-17 PROCEDURE — 85027 COMPLETE CBC AUTOMATED: CPT | Performed by: INTERNAL MEDICINE

## 2019-09-17 PROCEDURE — G0378 HOSPITAL OBSERVATION PER HR: HCPCS

## 2019-09-17 PROCEDURE — 80048 BASIC METABOLIC PNL TOTAL CA: CPT | Performed by: INTERNAL MEDICINE

## 2019-09-17 PROCEDURE — 99217 PR OBSERVATION CARE DISCHARGE MANAGEMENT: CPT | Performed by: NURSE PRACTITIONER

## 2019-09-17 PROCEDURE — 97530 THERAPEUTIC ACTIVITIES: CPT

## 2019-09-17 RX ADMIN — METOPROLOL SUCCINATE 25 MG: 25 TABLET, FILM COATED, EXTENDED RELEASE ORAL at 09:30

## 2019-09-17 RX ADMIN — FAMOTIDINE 20 MG: 20 TABLET ORAL at 09:31

## 2019-09-17 RX ADMIN — DILTIAZEM HYDROCHLORIDE 360 MG: 180 CAPSULE, COATED, EXTENDED RELEASE ORAL at 09:30

## 2019-09-17 RX ADMIN — LEVETIRACETAM 250 MG: 250 TABLET, FILM COATED ORAL at 09:31

## 2019-09-17 RX ADMIN — APIXABAN 5 MG: 5 TABLET, FILM COATED ORAL at 09:30

## 2019-09-17 RX ADMIN — FERROUS SULFATE TAB 325 MG (65 MG ELEMENTAL FE) 325 MG: 325 (65 FE) TAB at 09:31

## 2019-09-17 RX ADMIN — CITALOPRAM HYDROBROMIDE 20 MG: 20 TABLET ORAL at 09:30

## 2019-09-17 RX ADMIN — SODIUM CHLORIDE, PRESERVATIVE FREE 10 ML: 5 INJECTION INTRAVENOUS at 09:34

## 2019-09-17 NOTE — THERAPY EVALUATION
Patient Name: Mirian Sy  : 1940    MRN: [id number]                              Today's Date: 2019       Admit Date: 9/15/2019    Visit Dx:     ICD-10-CM ICD-9-CM   1. Bradycardia R00.1 427.89   2. SABRINA (acute kidney injury) (CMS/HCC) N17.9 584.9   3. Weakness R53.1 780.79   4. Acute cystitis without hematuria N30.00 595.0   5. Impaired mobility and ADLs Z74.09 799.89     Patient Active Problem List   Diagnosis   • Abnormal gait   • Takotsubo syndrome   • Carpal tunnel syndrome   • Complex partial epilepsy (CMS/HCC)   • Depression   • Chronic gastritis   • HLD (hyperlipidemia)   • Hypertension   • Hyponatremia   • Nutritional anemia   • Dyssomnia   • Diplopia   • Xeroderma   • Preventative health care   • Frailty   • Tricuspid regurgitation   • Cerebrovascular accident (CMS/HCC)   • Syncope   • DVT (deep venous thrombosis) (CMS/HCC)   • Anorexia   • Anxiety   • Patent foramen ovale   • Mitral regurgitation   • Low weight   • Senile osteoporosis   • Iron deficiency   • Chronic atrial fibrillation (CMS/HCC)   • Closed fracture of left ilium (CMS/HCC)   • GERD (gastroesophageal reflux disease)   • Fall at home   • Vertigo   • Internuclear ophthalmoplegia, right eye   • Atrial fibrillation with RVR (CMS/HCC)   • Bradycardia   • Chronic atrial fibrillation (CMS/HCC)   • SABRINA (acute kidney injury) (CMS/HCC)   • Hyperkalemia   • Confusion     Past Medical History:   Diagnosis Date   • Atrial fibrillation (CMS/HCC)     Chronic anticoagulation and rate control   • Basal cell carcinoma     Left leg and nose   • Cardiomyopathy (CMS/HCC)    • Cerebrovascular accident (CMS/HCC) 2005    CT right parietal CVA. Carotid duplex -50% to 79% left ICS 15% right ICS stenosis. MRI of the neck showed no significant carotid bifurcations of these. Negative CT of the head, 09/10/2012. Carotid duplex, 2014:  Shelf-like plaque in the CECE without significant elevation and velocities.  Patent vertebral  arteries.   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    With dilated cardiomyopathy   • Decubitus ulcer of buttock 2014    Transient during fracture rehabilitation   • Depression 2002    Good response to citalopram and mirtazapine   • DVT (deep venous thrombosis) (CMS/[facility]) 2014    Superficial - right lesser saphenus vein - after hip fracture    • Fracture of bone of forefoot 1972    Right foot   • Fracture of ilium, left (CMS/[facility]) 05/2018    Accidental fall - Uncomplicated recovery   • GERD (gastroesophageal reflux disease) 2014    Chronic superficial gastritis   • HTN (hypertension) 2005   • Iron deficiency anemia due to chronic blood loss 2018    Predisposed by chronic anticoagulation and GERD   • Low body weight due to inadequate caloric intake Adulthood   • Mitral valve prolapse 1997    Mild MR   • Osteoarthritis    • Osteoporosis    • Patent foramen ovale 2005   • Pneumonia 1947   • SCC (squamous cell carcinoma), arm, right 2017   • Scoliosis    • Syncope 2014     undermined cause - [facility]   • Varicose veins 2005    Symptomatic     Past Surgical History:   Procedure Laterality Date   • ACHILLES TENDON SURGERY Left 1997    Repair of rupture left tendon with screws   • BREAST CYST EXCISION Left    • BREAST SURGERY Left 1982    Excision benign cyst   • CARDIAC CATHETERIZATION  2006    Severe acute coronary spasm   • CATARACT EXTRACTION WITH INTRAOCULAR LENS IMPLANT Bilateral 2015   • EYE CAPSULOTOMY WITH LASER Left 2016    Marked visual improvement   • FEMUR FRACTURE SURGERY Right 2015    Repair of shaft fracture   • HIP FRACTURE SURGERY Left 2014    left hip fracture with pin   • LUMBAR DISC SURGERY  1983   • OVARIAN CYST REMOVAL Left 1996   • SKIN CANCER EXCISION Right 2017    SCC - arm     General Information     Row Name 09/17/19 1433          PT Evaluation Time/Intention    Document Type  therapy note (daily note)  -EJ     Mode of Treatment  physical therapy  -EJ      Row Name 09/17/19 1433          General Information    Patient Profile Reviewed?  yes  -EJ     Row Name 09/17/19 1433          Cognitive Assessment/Intervention- PT/OT    Orientation Status (Cognition)  oriented x 4  -EJ     Row Name 09/17/19 1433          Safety Issues, Functional Mobility    Safety Issues Affecting Function (Mobility)  awareness of need for assistance  -EJ     Impairments Affecting Function (Mobility)  balance;strength;postural/trunk control  -EJ       User Key  (r) = Recorded By, (t) = Taken By, (c) = Cosigned By    Initials Name Provider Type     Akanksha Moran PT Physical Therapist        Mobility     Row Name 09/17/19 1434          Transfer Assessment/Treatment    Comment (Transfers)  MIN A for low toilet t/f.  -EJ     [facility] Name 09/17/19 1434          Sit-Stand Transfer    Sit-Stand [place] (Transfers)  verbal cues;minimum assist (75% patient effort)  -EJ     Assistive Device (Sit-Stand Transfers)  walker, 4-wheeled grab bars  -EJ     [facility] Name 09/17/19 1434          Gait/Stairs Assessment/Training    [place] Level (Gait)  verbal cues;contact guard  -EJ     Assistive Device (Gait)  walker, 4-wheeled  -EJ     Distance in Feet (Gait)  160  -EJ     Pattern (Gait)  swing-through  -EJ     Left Sided Gait Deviations  forward flexed posture  -EJ     Comment (Gait/Stairs)  CGA, no dizziness, pain, SOB. Cues for upright posture  -EJ       User Key  (r) = Recorded By, (t) = Taken By, (c) = Cosigned By    Initials Name Provider Type    Akanksha Richter PT Physical Therapist        Obj/Interventions     Row Name 09/17/19 1437          Static Sitting Balance    Level of [place] (Unsupported Sitting, Static Balance)  independent  -EJ     [facility] Name 09/17/19 1437          Dynamic Sitting Balance    Level of [place], Reaches Outside Midline (Sitting, Dynamic Balance)  independent  -EJ       User Key  (r) = Recorded By, (t) = Taken By, (c) = Cosigned By    Initials Name Provider  Type    Akanksha Richter PT Physical Therapist        Goals/Plan    No documentation.       Clinical Impression     Row Name 09/17/19 1437          Pain Assessment    Additional Documentation  Pain Scale: Numbers Pre/Post-Treatment (Group)  -     Row Name 09/17/19 1437          Pain Scale: Numbers Pre/Post-Treatment    Pain Scale: Numbers, Pretreatment  0/10 - no pain  -EJ     Pain Scale: Numbers, Post-Treatment  0/10 - no pain  -EJ     Pain Intervention(s)  Repositioned;Ambulation/increased activity  -     Row Name 09/17/19 1437          Vital Signs    Pre Patient Position  Sitting  -EJ     Intra Patient Position  Standing  -EJ     Post Patient Position  Supine  -     Row Name 09/17/19 1437          Positioning and Restraints    Pre-Treatment Position  standing in room  -EJ     Post Treatment Position  bed  -EJ     In Bed  call light within reach;sitting EOB;encouraged to call for assist;with family/caregiver  -       User Key  (r) = Recorded By, (t) = Taken By, (c) = Cosigned By    Initials Name Provider Type    Akanksha Richter PT Physical Therapist        Outcome Measures    No documentation.       Physical Therapy Education     Title: PT OT SLP Therapies (In Progress)     Topic: Physical Therapy (In Progress)     Point: Mobility training (In Progress)     Learning Progress Summary           Patient Acceptance, E, VU by ALEM at 9/17/2019  2:40 PM    Acceptance, E, NR by NANDA at 9/16/2019 11:46 AM   Family Acceptance, E, NR by NANDA at 9/16/2019 11:46 AM                   Point: Home exercise program (In Progress)     Learning Progress Summary           Patient Acceptance, E, VU by ALEM at 9/17/2019  2:40 PM    Acceptance, E, NR by NANDA at 9/16/2019 11:46 AM   Family Acceptance, E, NR by NANDA at 9/16/2019 11:46 AM                   Point: Body mechanics (In Progress)     Learning Progress Summary           Patient Acceptance, E, VU by ALEM at 9/17/2019  2:40 PM    Acceptance, E, NR by NANDA at 9/16/2019 11:46  AM   Family Acceptance, E, NR by AA at 9/16/2019 11:46 AM                   Point: Precautions (In Progress)     Learning Progress Summary           Patient Acceptance, E, VU by ALEM at 9/17/2019  2:40 PM    Acceptance, E, NR by AA at 9/16/2019 11:46 AM   Family Acceptance, E, NR by AA at 9/16/2019 11:46 AM                               User Key     Initials Effective Dates Name Provider Type [facility] 11/20/18 -  Akanksha Moran, PT Physical Therapist PT    NANDA 04/02/18 -  Antonia Perez PT Physical Therapist PT              PT Recommendation and Plan     Plan of Care Reviewed With: patient  Progress: improving  Outcome Summary: Pt ambulates in [place] x 160 ft with CGA, MIN A needed to stand from toilet, CGA to sit. Pt expected to d/c home today. HHPT recommended.     Time Calculation:   PT Charges     Row Name 09/17/19 1441             Time Calculation    Start Time  1418  -EJ      PT Received On  09/17/19  -EJ         Time Calculation- PT    Total Timed Code Minutes- PT  11 minute(s)  -EJ         Timed Charges    69600 - PT Therapeutic Activity Minutes  11  -EJ        User Key  (r) = Recorded By, (t) = Taken By, (c) = Cosigned By    Initials Name Provider Type     Akanksha Moran, PT Physical Therapist        Therapy Charges for Today     Code Description Service Date Service Provider Modifiers Qty    27552890118 HC PT THERAPEUTIC ACT EA 15 MIN 9/17/2019 Akanksha Moran PT GP 1          PT G-Codes  Outcome Measure Options: AM-PAC 6 Clicks Basic Mobility (PT)  AM-PAC 6 Clicks Score (PT): 19  AM-PAC 6 Clicks Score (OT): 19    Akanksha Hdz PT  9/17/2019

## 2019-09-17 NOTE — PLAN OF CARE
Problem: Fall Risk (Adult)  Goal: Identify Related Risk Factors and Signs and Symptoms  Outcome: Ongoing (interventions implemented as appropriate)      Problem: Patient Care Overview  Goal: Plan of Care Review  Outcome: Ongoing (interventions implemented as appropriate)   09/16/19 1528 09/17/19 0403 09/17/19 0519   Coping/Psychosocial   Plan of Care Reviewed With --  patient --    Plan of Care Review   Progress improving --  --    OTHER   Outcome Summary --  --  Patient rested well. Vss,a-fib,fall risk. Plan is to discharge home today.        Problem: Skin Injury Risk (Adult)  Goal: Identify Related Risk Factors and Signs and Symptoms  Outcome: Ongoing (interventions implemented as appropriate)

## 2019-09-17 NOTE — PLAN OF CARE
Problem: Patient Care Overview  Goal: Plan of Care Review  Outcome: Outcome(s) achieved Date Met: 09/17/19 09/17/19 1440   Coping/Psychosocial   Plan of Care Reviewed With patient   Plan of Care Review   Progress improving   OTHER   Outcome Summary Pt ambulates in [place] x 160 ft with CGA, MIN A needed to stand from toilet, CGA to sit. Pt expected to d/c home today. HHPT recommended.

## 2019-09-17 NOTE — PROGRESS NOTES
"[facility] Daily Note       LOS: 0 days   Patient Care Team:  Oren Engel DO as PCP - General (Family Medicine)  Oren Engel DO as PCP - Claims Attributed    Chief Complaint:  Afib/flutter, bradycardia     Subjective     Subjective: No further confusion.  No dizziness.  No dyspnea or chest pain.  Denies palpitations.      Review of Systems:   As above.    Medications:    apixaban 5 mg Oral Q12H   atorvastatin 80 mg Oral Nightly   citalopram 20 mg Oral QAM   diltiaZEM  mg Oral Q24H   famotidine 20 mg Oral Daily   ferrous sulfate 325 mg Oral Daily With Breakfast   levETIRAcetam 250 mg Oral Q12H   metoprolol succinate XL 25 mg Oral Q24H   mirtazapine 7.5 mg Oral Nightly   sodium chloride 10 mL Intravenous Q12H       Objective     Vital Sign Min/Max for last 24 hours  Temp  Min: 97.9 °F (36.6 °C)  Max: 98.3 °F (36.8 °C)   BP  Min: 107/65  Max: 142/81   Pulse  Min: 70  Max: 112   Resp  Min: 16  Max: 18   SpO2  Min: 93 %  Max: 94 %   No Data Recorded   No Data Recorded      Intake/Output Summary (Last 24 hours) at 9/17/2019 0717  Last data filed at 9/17/2019 0600  Gross per 24 hour   Intake 1319 ml   Output 300 ml   Net 1019 ml        Flowsheet Rows      First Filed Value   Admission Height  165.1 cm (65\") Documented at 09/15/2019 1036   Admission Weight  45.8 kg (101 lb) Documented at 09/15/2019 1036          Physical Exam:    General: Alert and oriented  Cardiovascular: Heart has a nondisplaced focal PMI. Irregular rate and rhythm without murmur, gallop or rub.  Lungs: Clear without rales or wheezes. Equal expansion is noted.   Extremities: Show no edema.  Skin: warm and dry.  Neurologic: nonfocal       Results Review:    I reviewed the patient's new clinical results.    Labs:    Results from last 7 days   Lab Units 09/17/19  0504 09/16/19  0528 09/15/19  1201 09/15/19  1051   SODIUM mmol/L 137 141  --  139   POTASSIUM mmol/L 4.3 4.8 5.7* 5.4*   CHLORIDE mmol/L 103 106  --  103   CO2 " mmol/L 25.0 27.0  --  26.0   BUN mg/dL 19 24*  --  23   CREATININE mg/dL 1.05* 1.44*  --  1.61*   CALCIUM mg/dL 9.3 9.5  --  10.0   BILIRUBIN mg/dL  --   --   --  0.3   ALK PHOS U/L  --   --   --  90   ALT (SGPT) U/L  --   --   --  13   AST (SGOT) U/L  --   --   --  28   GLUCOSE mg/dL 96 113*  --  109*     Results from last 7 days   Lab Units 09/17/19  0504 09/16/19  0528 09/15/19  1051   WBC 10*3/mm3 6.98 5.97 6.95   HEMOGLOBIN g/dL 11.9* 11.5* 12.0   HEMATOCRIT % 38.0 37.2 39.2   PLATELETS 10*3/mm3 220 236 271     Lab Results   Component Value Date    TROPONINI 0.02 06/25/2015    TROPONINI <0.01 06/25/2015    TROPONINI 0.01 02/24/2014    TROPONINT <0.010 09/15/2019    TROPONINT <0.010 09/15/2019    TROPONINT <0.010 05/07/2019     Lab Results   Component Value Date    CHOL 165 12/19/2018    CHOL 182 12/17/2018    CHOL 164 09/13/2018     Lab Results   Component Value Date    TRIG 44 12/19/2018    TRIG 72 12/17/2018    TRIG 57 09/13/2018     Lab Results   Component Value Date    HDL 51 12/19/2018    HDL 58 12/17/2018    HDL 53 09/13/2018         Ejection Fraction:60%  Tele: Aflutter with variable AV conduction, 110-118 bpm.    Assessment      Assessment:  1) Chronic atrial fibrillation/flutter  Tachybrady syndrome  - CHADSVASC = 6, on Eliquis.   - Toprol reduced.     2) H/o Takotsubo, resolved.   - Most recent EF 5/2019: 60%     3) Hypertension, stable.      4) History of stroke     5) Dyslipidemia  - continue Atorvastatin 80 mg daily.   6) Confusion  - CT head: no acute process.   7) SABRINA  - creatinine 1.61-->1.44 (baseline around 0.9)  - s/p IVFs.   8) Hyperkalemia, resolved.   9) Pyuria  - per medicine.         Plan:     - Reduced Toprol at 25 mg daily   - It appears Cardizem 360 mg was not given yesterday. Scheduled to receive at 9:00 today. She is mildly tachycardic this AM.  - Continue Eliquis.   - Consider Holter monitor in the near future. May need PPM in near future.     The plan is as above.  I think it is  reasonable to monitor on telemetry until this afternoon.  After she receives her Cardizem this morning she has no further bradycardia she should be okay for discharge home later today.  She will follow-up with me as scheduled on 10/23/2019.       Electronically signed by GREG Sosa, 09/17/19, 7:20 AM.    I, Bri Olguin MD, have reviewed the note in full and agree with all aspects of the above including physical exam, assessment, labs and plan with changes made accordingly.    Bri Olguin MD, FACC

## 2019-09-17 NOTE — DISCHARGE SUMMARY
[facility] Medicine Services  DISCHARGE SUMMARY    Patient Name: Mirian Sy  : 1940  MRN: [id number]    Date of Admission: 9/15/2019  Date of Discharge:  2019  Primary Care Physician: Oren Engel DO    Consults     Date and Time Order Name Status Description    9/15/2019 1613 Inpatient Cardiology Consult Completed           Hospital Course     Presenting Problem:   Bradycardia [R00.1]    Active Hospital Problems    Diagnosis  POA   • Bradycardia [R00.1]  Yes   • Chronic atrial fibrillation (CMS/HCC) [I48.2]  Unknown   • SABRINA (acute kidney injury) (CMS/HCC) [N17.9]  Unknown   • Hyperkalemia [E87.5]  Unknown   • Confusion [R41.0]  Unknown   • HLD (hyperlipidemia) [E78.5]  Unknown      Resolved Hospital Problems   No resolved problems to display.          Hospital Course:  Mirian Sy is a 79 y.o. female with PMH significant for chronic afib on Eliquis, cardiomyopathy, HLD, seizures and two previous strokes presented with symptomatic bradycardia.    Patient was admitted to hospital medicine for further evaluation. Patient had SABRINA which resolved with IVF's. Hyperkalemia resolved with Veltassa. Patient had Pyuria on UA otherwise unremarkable, no infectious process seen and no antibiotics were given. Cardiology was consulted for Atrial flutter with slow ventricular response. Medications were held for one day and then patient was restarted back on metoprolol and Cardizem. Patient had no further bradycardia on current medication regimen. Per family patient may have accidentally taken two doses of Cardizem on Saturday. Cardiology has cleared patient for discharge.     Patient has remained clinically stable and will be discharged home today. Patient will need to follow up  With PCP in one week. Follow up with Cardiology as scheduled on 10/23/2019. May consider a Holter monitor in the near future. May need a PPM in the future.       Discharge Follow Up Recommendations  for labs/diagnostics:   follow up with PCP one week   Follow up with Cardiology as scheduled on 10/23/2019    Day of Discharge     HPI:   Patient is sitting up in chair in NAD. She just got done walking in the hallways. Feels good today. Appetite good. No acute events overnight per nursing. Plan for home today and patient is agreeable to plan .     Review of Systems  Gen- No fevers, chills  CV- No chest pain, palpitations  Resp- No cough, dyspnea  GI- No N/V/D, abd pain        Otherwise ROS is negative except as mentioned in the HPI.    Vital Signs:   Temp:  [97.9 °F (36.6 °C)-98.2 °F (36.8 °C)] 97.9 °F (36.6 °C)  Heart Rate:  [] 97  Resp:  [16-18] 18  BP: (107-142)/(65-87) 136/87     Physical Exam:  Constitutional: elderly appearing female, No acute distress, awake, alert  HENT: NCAT, mucous membranes moist  Respiratory: Clear to auscultation bilaterally, respiratory effort normal, room air    Cardiovascular: irregular , no murmurs, rubs, or gallops, palpable pedal pulses bilaterally  Gastrointestinal: Positive bowel sounds, soft, nontender, nondistended  Musculoskeletal: No bilateral ankle edema  Psychiatric: Appropriate affect, cooperative  Neurologic: Oriented x 3, strength symmetric in all extremities, Cranial Nerves grossly intact to confrontation, speech clear  Skin: No rashes    Pertinent  and/or Most Recent Results     Results from last 7 days   Lab Units 09/17/19  0504 09/16/19  0528 09/15/19  1201 09/15/19  1051   WBC 10*3/mm3 6.98 5.97  --  6.95   HEMOGLOBIN g/dL 11.9* 11.5*  --  12.0   HEMATOCRIT % 38.0 37.2  --  39.2   PLATELETS 10*3/mm3 220 236  --  271   SODIUM mmol/L 137 141  --  139   POTASSIUM mmol/L 4.3 4.8 5.7* 5.4*   CHLORIDE mmol/L 103 106  --  103   CO2 mmol/L 25.0 27.0  --  26.0   BUN mg/dL 19 24*  --  23   CREATININE mg/dL 1.05* 1.44*  --  1.61*   GLUCOSE mg/dL 96 113*  --  109*   CALCIUM mg/dL 9.3 9.5  --  10.0     Results from last 7 days   Lab Units 09/15/19  1051   BILIRUBIN mg/dL  0.3   ALK PHOS U/L 90   ALT (SGPT) U/L 13   AST (SGOT) U/L 28           Invalid input(s): TG, LDLCALC, LDLREALC  Results from last 7 days   Lab Units 09/15/19  1259 09/15/19  1130 09/15/19  1051   TSH uIU/mL  --  1.630  --    PROBNP pg/mL  --   --  3,384.0*   TROPONIN T ng/mL <0.010  --  <0.010       Brief Urine Lab Results  (Last result in the past 365 days)      Color   Clarity   Blood   Leuk Est   Nitrite   Protein   CREAT   Urine HCG        09/15/19 1217 Dark Yellow Cloudy Negative Small (1+) Negative 100 mg/dL (2+)               Microbiology Results Abnormal     Procedure Component Value - Date/Time    Urine Culture - Urine, Urine, Clean Catch [[id number]]  (Normal) Collected:  09/15/19 1217    Lab Status:  Final result Specimen:  Urine, Clean Catch Updated:  09/16/19 1847     Urine Culture No growth          Imaging Results (all)     Procedure Component Value Units Date/Time    CT Head Without Contrast [[id number]] Collected:  09/15/19 1239     Updated:  09/15/19 1547    Narrative:       EXAMINATION: CT HEAD WO CONTRAST - 09/15/2019     INDICATION: Weak, dizzy and confused.     TECHNIQUE: CT head without intravenous contrast.     The radiation dose reduction device was turned on for each scan per the  ALARA (As Low as Reasonably Achievable) protocol.     COMPARISON: 05/08/2019     FINDINGS: Midline structures are symmetric without evidence of mass,  mass effect or midline shift. Ventricles and sulci within normal limits  however advanced chronic small vessel ischemic disease with attenuation  decrease in the periventricular and deep white matter. No intra-axial  hemorrhage or extra-axial fluid collection. Globes and orbits  unremarkable. Visualized paranasal sinuses and mastoid air cells are  grossly clear and well-pneumatized. Calvarium intact.       Impression:       No acute intracranial abnormality, specifically no acute  hemorrhage.     DICTATED:   09/15/2019  EDITED/ls :   09/15/2019        XR Chest 1 View  [[id number]] Collected:  09/15/19 1217     Updated:  09/15/19 1540    Narrative:          EXAMINATION: XR CHEST 1 VW - 09/15/2019     INDICATION: Weakness, dizziness, altered mental status.      COMPARISON: 05/07/2019     FINDINGS: Cardiomegaly with increased prominence from prior comparison  may be partially due to technique and minimally decreased lung volumes  from comparison on 05/07/2019. Mild interstitial prominence concerning  for trace interstitial edema pattern without focal consolidation or  significant effusion. Background chronic changes including biapical  pleural-parenchymal scarring right greater than left. Degenerative  changes of the spine with scoliotic curvature.       Impression:       Persistent cardiomegaly with trace interstitial prominence  of lung markings concerning for trace interstitial edema pattern however  no focal consolidation or effusion.     DICTATED:   09/15/2019  EDITED/ls :   09/15/2019              Results for orders placed during the hospital encounter of 05/07/19   Duplex Carotid Ultrasound CAR    Narrative · Right internal carotid artery stenosis of 0-49%.  · Left internal carotid artery stenosis of 0-49%.  · Bilateral heterogeneous calcific carotid atherosclerosis without   flow-limiting stenosis          Results for orders placed during the hospital encounter of 05/07/19   Duplex Carotid Ultrasound CAR    Narrative · Right internal carotid artery stenosis of 0-49%.  · Left internal carotid artery stenosis of 0-49%.  · Bilateral heterogeneous calcific carotid atherosclerosis without   flow-limiting stenosis          Results for orders placed during the hospital encounter of 05/07/19   Adult Transthoracic Echo Complete W/ Cont if Necessary Per Protocol    Narrative · Estimated EF = 60%.  · Left ventricular systolic function is normal.  · Trace-to-mild mitral valve regurgitation is present.  · Mild tricuspid valve regurgitation is present.  · Calculated right ventricular  systolic pressure from tricuspid   regurgitation is 34.0 mmHg.  · No cardiac source for embolus is seen            Discharge Details        Discharge Medications      Continue These Medications      Instructions Start Date   acetaminophen 325 MG tablet  Commonly known as:  TYLENOL   650 mg, Oral, Every 4 Hours PRN      atorvastatin 80 MG tablet  Commonly known as:  LIPITOR   80 mg, Oral, Nightly      calcium-vitamin D 500-200 MG-UNIT per tablet  Commonly known as:  OSCAL-500   1 tablet, Oral, Every Morning      CENTRUM SILVER 50+WOMEN PO   1 tablet, Oral, Daily      citalopram 20 MG tablet  Commonly known as:  CeleXA   TAKE 1 TABLET EVERY MORNING      diltiaZEM  MG 24 hr capsule  Commonly known as:  CARDIZEM CD   360 mg, Oral, Every 24 Hours Scheduled      ELIQUIS 5 MG tablet tablet  Generic drug:  apixaban   TAKE 1 TABLET EVERY 12 HOURS      ferrous sulfate 325 (65 FE) MG tablet   TAKE 1 TABLET DAILY WITH BREAKFAST      levETIRAcetam 250 MG tablet  Commonly known as:  KEPPRA   TAKE 1 TABLET EVERY 12 HOURS      magnesium oxide 400 (241.3 Mg) MG tablet tablet  Commonly known as:  MAGOX   400 mg, Oral, Daily      metoprolol succinate XL 25 MG 24 hr tablet  Commonly known as:  TOPROL-XL   25 mg, Oral, Daily      mirtazapine 7.5 MG tablet  Commonly known as:  REMERON   TAKE 1 TABLET AT BEDTIME      ranitidine 150 MG capsule  Commonly known as:  ZANTAC   TAKE 1 CAPSULE EVERY NIGHT      VITAMIN D PO   1 tablet, Oral, Daily             Allergies   Allergen Reactions   • Actonel [Risedronate Sodium] GI Intolerance   • Hctz [Hydrochlorothiazide]      hyponatremia   • Lisinopril      hyperkalemia         Discharge Disposition:  Home or Self Care    Diet:  Hospital:  Diet Order   Procedures   • Diet Regular; Cardiac     Discharge:   Diet Instructions     Diet: Cardiac; Thin      Discharge Diet:  Cardiac    Fluid Consistency:  Thin          Discharge Activity:   Activity Instructions     Activity as Tolerated      Measure  Blood Pressure      Measure Weight              CODE STATUS:    Code Status and Medical Interventions:   Ordered at: 09/15/19 1613     Level Of Support Discussed With:    Patient     Code Status:    CPR     Medical Interventions (Level of Support Prior to Arrest):    Full         Future Appointments   Date Time Provider Department Center   9/23/2019  9:30 AM Oren Engel DO [facility] None   10/23/2019  1:00 PM Bri Olguin MD [facility] None       Additional Instructions for the Follow-ups that You Need to Schedule     Discharge Follow-up with PCP   As directed       Currently Documented PCP:    Oren Engel DO    PCP Phone Number:    [phone]     Follow Up Details:  pcp one week         Discharge Follow-up with Specified Provider: Follow up with Cardiology as scheduled on 10/23/2019.   As directed      To:  Follow up with Cardiology as scheduled on 10/23/2019.               Time Spent on Discharge:  35 minutes    Electronically signed by GREG Jesus, 09/17/19, 1:12 PM.

## 2019-09-18 ENCOUNTER — TRANSITIONAL CARE MANAGEMENT TELEPHONE ENCOUNTER (OUTPATIENT)
Dept: INTERNAL MEDICINE | Facility: CLINIC | Age: 79
End: 2019-09-18

## 2019-09-18 NOTE — OUTREACH NOTE
"TCM call completed.  See TCM flowsheet for details.  Does have upcoming hospital follow up appt with Dr. Engel 9/20/19.  Talked with son that had just arrived to check on her.  He had not had a chance to check her bp and pulse at present, but he states she is back to her normal.  Up and about as before.  Eating and drinking.  \"She is doing well.\"  Had appointment for 15 minute regular follow up that I tried to call son back to reschedule.  He actually called office and it was changed to hospital follow up.   "

## 2019-09-23 ENCOUNTER — OFFICE VISIT (OUTPATIENT)
Dept: FAMILY MEDICINE CLINIC | Facility: CLINIC | Age: 79
End: 2019-09-23

## 2019-09-23 VITALS
SYSTOLIC BLOOD PRESSURE: 110 MMHG | DIASTOLIC BLOOD PRESSURE: 60 MMHG | HEIGHT: 60 IN | HEART RATE: 67 BPM | BODY MASS INDEX: 20.42 KG/M2 | WEIGHT: 104 LBS | OXYGEN SATURATION: 95 %

## 2019-09-23 DIAGNOSIS — R00.1 BRADYCARDIA: Primary | ICD-10-CM

## 2019-09-23 DIAGNOSIS — I48.20 CHRONIC ATRIAL FIBRILLATION (HCC): ICD-10-CM

## 2019-09-23 DIAGNOSIS — G40.209 PARTIAL SYMPTOMATIC EPILEPSY WITH COMPLEX PARTIAL SEIZURES, NOT INTRACTABLE, WITHOUT STATUS EPILEPTICUS (HCC): ICD-10-CM

## 2019-09-23 PROCEDURE — 99214 OFFICE O/P EST MOD 30 MIN: CPT | Performed by: FAMILY MEDICINE

## 2019-09-23 RX ORDER — LEVETIRACETAM 250 MG/1
125 TABLET ORAL EVERY 12 HOURS
Qty: 180 TABLET | Refills: 1
Start: 2019-09-23 | End: 2020-04-04 | Stop reason: HOSPADM

## 2019-09-23 NOTE — PATIENT INSTRUCTIONS
1.  Observe the somnolence and daytime fatigue.    2.  Reduce Keppra to 1/2 tablet every 12 hours to try to reduce sundowning.    3.  Call in 6 weeks with status.

## 2019-09-27 NOTE — PROGRESS NOTES
Subjective   Mirian Sy is a 79 y.o. female.     Chief Complaint   Patient presents with   • Follow-up     Hospital follow up        History of Present Illness     Mirian Sy presents today for follow-up of her atrial fibrillation.  Her son is with her today and reports that she has been having more somnolence and fatigue during the day.  He is also noticed that her sundowning more at night.  This was a problem in the hospital and at the rehab facility, but has persisted at home as well.    The following portions of the patient's history were reviewed and updated as appropriate: allergies, current medications, past family history, past medical history, past social history, past surgical history and problem list.    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/16/2016   • Depression 05/16/2016   • Chronic gastritis 05/16/2016   • HLD (hyperlipidemia) 05/16/2016   • Hypertension 05/16/2016   • Hyponatremia 05/16/2016   • Nutritional anemia 05/16/2016   • Dyssomnia 05/16/2016   • Diplopia 05/16/2016   • Xeroderma 05/16/2016   • Preventative health care 07/18/2016   • Frailty 09/15/2016   • Tricuspid regurgitation 09/15/2016   • Cerebrovascular accident (CMS/HCC) 09/22/2016   • Syncope 09/22/2016   • DVT (deep venous thrombosis) (CMS/[facility]) 09/22/2016   • Anorexia 09/22/2016   • Anxiety 09/22/2016   • Patent foramen ovale    • Mitral regurgitation 09/30/2016   • Low weight 12/08/2016   • Senile osteoporosis 06/07/2017   • Iron deficiency 04/20/2018   • Chronic atrial fibrillation (CMS/HCC) 05/08/2018   • Closed fracture of left ilium (CMS/HCC) 05/08/2018   • GERD (gastroesophageal reflux disease) 05/08/2018   • Fall at home 05/08/2018   • Vertigo 12/18/2018   • Internuclear ophthalmoplegia, 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   • Atrial fibrillation with RVR (CMS/[facility])  05/07/2019   • Bradycardia 09/15/2019   • Chronic atrial fibrillation (CMS/[facility]) 09/15/2019   • SABRINA (acute kidney injury) (CMS/[facility]) 09/15/2019   • Hyperkalemia 09/15/2019   • Confusion 09/15/2019     Resolved Ambulatory Problems     Diagnosis Date Noted   • Abnormal weight loss 05/16/2016   • Fracture of femur (CMS/[facility]) 05/16/2016   • Fatigue 09/15/2016   • Shaky 09/15/2016   • Shortness of breath 09/15/2016   • Urinary tract infection without hematuria 09/19/2016   • Tobacco abuse 09/22/2016   • VHD (valvular heart disease) 09/26/2016   • Atrial fibrillation with RVR (CMS/HCC) 10/09/2016   • Atrial fibrillation with rapid ventricular response (CMS/HCC) 10/11/2016   • Osteoporosis 06/07/2017     Past Medical History:   Diagnosis Date   • Atrial fibrillation (CMS/[facility]) 2005   • Basal cell carcinoma 2007   • Cardiomyopathy (CMS/HCC) 2006   • Cerebrovascular accident (CMS/HCC) 01/2005   • Complex partial epilepsy ([facility]) 2014   • Coronary artery vasospasm (CMS/HCC) 2006   • Decubitus ulcer of buttock 2014   • Depression 2002   • DVT (deep venous thrombosis) (CMS/[facility]) 2014   • Fracture of bone of forefoot 1972   • Fracture of ilium, left (CMS/HCC) 05/2018   • GERD (gastroesophageal reflux disease) 2014   • HTN (hypertension) 2005   • Iron deficiency anemia due to chronic blood loss 2018   • Low body weight due to inadequate caloric intake Adulthood   • Mitral valve prolapse 1997   • Osteoarthritis    • Osteoporosis    • Patent foramen ovale 2005   • Pneumonia 1947   • SCC (squamous cell carcinoma), arm, right 2017   • Scoliosis    • Syncope 2014   • Varicose veins 2005     Past Surgical History:   Procedure Laterality Date   • ACHILLES TENDON SURGERY Left 1997    Repair of rupture left tendon with screws   • BREAST CYST EXCISION Left    • BREAST SURGERY Left 1982    Excision benign cyst   • CARDIAC CATHETERIZATION  2006    Severe acute coronary spasm   • CATARACT EXTRACTION WITH INTRAOCULAR LENS IMPLANT Bilateral 2015    • EYE CAPSULOTOMY WITH LASER Left 2016    Marked visual improvement   • FEMUR FRACTURE SURGERY Right 2015    Repair of shaft fracture   • HIP FRACTURE SURGERY Left 2014    left hip fracture with pin   • LUMBAR DISC SURGERY     • OVARIAN CYST REMOVAL Left    • SKIN CANCER EXCISION Right 2017    SCC - arm     Family History   Problem Relation Age of Onset   • Breast cancer Mother          age 59   • Diabetes Mother    • Hypertension Mother    • Heart disease Father          age 80   • Other Sister         Arthrodesis cervical   • Basal cell carcinoma Sister    • Bone cancer Sister    • Breast cancer Sister 57   • Diabetes Sister          age 56   • Hypertension Sister    • Melanoma Sister    • Arrhythmia Sister         Sinus   • Stroke Sister    • Allergies Son         Hay fever   • Diabetes Maternal Uncle    • Breast cancer Sister 59   • Breast cancer Sister 66   • Ovarian cancer Sister         DX AGE UNKNOWN     Social History     Socioeconomic History   • Marital status:      Spouse name: Not on file   • Number of children: Not on file   • Years of education: Not on file   • Highest education level: Not on file   Tobacco Use   • Smoking status: Former Smoker     Packs/day: 1.00     Years: 20.00     Pack years: 20.00     Types: Cigarettes   • Smokeless tobacco: Never Used   • Tobacco comment: Remote history   Substance and Sexual Activity   • Alcohol use: No   • Drug use: No   Social History Narrative    Domestic life   lives in private home alone - good support from local children        [employer]    P[employer]ism        Sleep hygiene  in bed 9 PM to 6 AM for 9 hours of sleep        Caffeine use   1 1/2 cups coffee daily        Exercise habits  walks most days of the week. - Stretching each morning.          Diet   well-balanced diet with protein supplementations        Occupation    retired         Hearing  no impairment        Vision    no glasses needed after cataract  "surgery.          Driving   daytime only - good weather - local traffic         Review of Systems   Constitutional: Negative.    Respiratory: Negative.    Cardiovascular: Negative.    Psychiatric/Behavioral: Positive for sleep disturbance.       Objective   Blood pressure 110/60, pulse 67, height 152.4 cm (60\"), weight 47.2 kg (104 lb), SpO2 95 %.  Nursing note reviewed  Physical Exam  Const: NAD, A&Ox4, Pleasant, frail  Eyes: EOMI, no conjunctivitis  ENT: No nasal discharge present, neck supple  Cardiac: Regular rate and rhythm, no cyanosis  Resp: Respiratory rate within normal limits, no increased work of breathing, no audible wheezing or retractions noted  Procedures  Assessment/Plan   Mirian was seen today for follow-up.    Diagnoses and all orders for this visit:    Bradycardia    Chronic atrial fibrillation (CMS/HCC)    Partial symptomatic epilepsy with complex partial seizures, not intractable, without status epilepticus (CMS/HCC)  -     levETIRAcetam (KEPPRA) 250 MG tablet; Take 0.5 tablets by mouth Every 12 (Twelve) Hours.        #1  Atrial fibrillation  Stable on a dosage of Cardizem asymptomatic currently.    She should continue to follow with cardiology    #2  Depression due to social isolation  We discussed moving to assisted living long-term, however living at home is her top priority  Referral to social work has been placed previously    #3 Debility  Cont PT/OT at home  Will follow    #4 recurrent sundowning and fatigue  She evidently had a single isolated seizure years ago and has been maintained on Keppra since then.  In order to try to minimize her medication burden, I like her to try coming down on the Keppra as indicated below.    Patient Instructions   1.  Observe the somnolence and daytime fatigue.    2.  Reduce Keppra to 1/2 tablet every 12 hours to try to reduce sundowning.    3.  Call in 6 weeks with status.      Return in about 3 months (around 12/23/2019).    Ambulatory progress note signed " and attested to by Oren Engel D.O.

## 2019-10-07 RX ORDER — METOPROLOL SUCCINATE 25 MG/1
25 TABLET, EXTENDED RELEASE ORAL DAILY
Qty: 90 TABLET | Refills: 3 | Status: ON HOLD | OUTPATIENT
Start: 2019-10-07 | End: 2020-02-04 | Stop reason: SDUPTHER

## 2019-10-12 DIAGNOSIS — I48.91 ATRIAL FIBRILLATION WITH RVR (HCC): ICD-10-CM

## 2019-10-12 RX ORDER — DILTIAZEM HYDROCHLORIDE 360 MG/1
360 CAPSULE, EXTENDED RELEASE ORAL
Qty: 30 CAPSULE | Refills: 5 | OUTPATIENT
Start: 2019-10-12

## 2019-10-12 RX ORDER — ATORVASTATIN CALCIUM 80 MG/1
80 TABLET, FILM COATED ORAL NIGHTLY
Qty: 30 TABLET | Refills: 5 | OUTPATIENT
Start: 2019-10-12

## 2019-10-16 ENCOUNTER — TELEPHONE (OUTPATIENT)
Dept: FAMILY MEDICINE CLINIC | Facility: CLINIC | Age: 79
End: 2019-10-16

## 2019-10-16 RX ORDER — ATORVASTATIN CALCIUM 80 MG/1
80 TABLET, FILM COATED ORAL NIGHTLY
Qty: 90 TABLET | Refills: 0 | Status: SHIPPED | OUTPATIENT
Start: 2019-10-16 | End: 2019-10-17 | Stop reason: SDUPTHER

## 2019-10-16 NOTE — TELEPHONE ENCOUNTER
Med Refill:    atorvastatin (LIPITOR) 80 MG tablet [[id number]]    90 days w 3 refills    Take 1 at night    atorvastatin (LIPITOR) 80 MG tablet [[id number]]    Takes 1 daily    90 day supply      [facility]

## 2019-10-17 ENCOUNTER — PATIENT MESSAGE (OUTPATIENT)
Dept: FAMILY MEDICINE CLINIC | Facility: CLINIC | Age: 79
End: 2019-10-17

## 2019-10-17 DIAGNOSIS — R41.89 COGNITIVE IMPAIRMENT: Primary | ICD-10-CM

## 2019-10-17 RX ORDER — ATORVASTATIN CALCIUM 80 MG/1
80 TABLET, FILM COATED ORAL NIGHTLY
Qty: 90 TABLET | Refills: 0 | Status: SHIPPED | OUTPATIENT
Start: 2019-10-17 | End: 2020-01-30 | Stop reason: SDUPTHER

## 2019-10-23 ENCOUNTER — OFFICE VISIT (OUTPATIENT)
Dept: CARDIOLOGY | Facility: CLINIC | Age: 79
End: 2019-10-23

## 2019-10-23 VITALS
BODY MASS INDEX: 20.42 KG/M2 | HEART RATE: 62 BPM | DIASTOLIC BLOOD PRESSURE: 58 MMHG | HEIGHT: 60 IN | WEIGHT: 104 LBS | SYSTOLIC BLOOD PRESSURE: 100 MMHG

## 2019-10-23 DIAGNOSIS — I63.9 CEREBROVASCULAR ACCIDENT (CVA), UNSPECIFIED MECHANISM (HCC): Primary | ICD-10-CM

## 2019-10-23 DIAGNOSIS — I10 ESSENTIAL HYPERTENSION: ICD-10-CM

## 2019-10-23 DIAGNOSIS — I51.81 TAKOTSUBO SYNDROME: ICD-10-CM

## 2019-10-23 DIAGNOSIS — I48.20 CHRONIC ATRIAL FIBRILLATION (HCC): ICD-10-CM

## 2019-10-23 DIAGNOSIS — E78.2 MIXED HYPERLIPIDEMIA: ICD-10-CM

## 2019-10-23 PROCEDURE — 99214 OFFICE O/P EST MOD 30 MIN: CPT | Performed by: INTERNAL MEDICINE

## 2019-10-23 NOTE — PROGRESS NOTES
[facility]  Mirian Sy  1940  79 y.o.  [phone]      Date: 10/23/2019    PCP: Oren Engel DO    Chief Complaint   Patient presents with   • History of stroke   • Cardiomyopathy (CMS/HCC)       Problem List:  1. Chronic atrial fibrillation:  a. CHADS VASc 7; on eliquis  b. External Cardioversion 10/2016: NSR  c. Holter Monitor, 10/27/2016, Dr Springer: Brief PAF with accelerated junctional rhythms.  d. Echocardiogram, 12/18/2018: EF 60%. Mild MR/TR with RVSP 42 mmHg. In a-fib during study  e. Echocardiogram, 05/09/2019: EF 60%. Trace-to-mild MR. Mild TR with RVSP 34 mmHg. No cardiac source for embolus.   2. CVA, January 2005 and May 2019:  a. CT scan of the head showed a right parietal CVA.  b. Carotid duplex revealed a 50-79% left ICS stenosis, 15% right ICS stenosis.  c. MRI of the neck: no significant carotid bifurcations.  d. Negative CT of the head, 09/10/2012.  e. Carotid duplex, 02/24/2014: Shelf-like plaque in the CECE without significant elevation and velocities. Patent vertebral arteries.   f. Carotid duplex, 12/18/2018: 0-49% bilaterally with antegrade flow.  g. ER presentation with weakness, 05/07/2019: Pt was in Afib with RVR. MRI showed acute strokes in the right posterior lateral thalamus and adjacent to the right putamen and right basal ganglia. 0-49% bilateral ICA stenosis per duplex on 05/09/2019.  3. Cardiomyopathy:  a. Takotsubo syndrome, 05/07/2006.  b. [facility], 05/08/2006, Dr. Arguello: EF 30% with near normal CORS.  c. Echocardiogram, 05/07/2006: EF 40% with a peak gradient of 75-80 mm across the LVFT and at least moderate MR.  d. Echocardiogram, 07/13/2006: EF > 60% with only trace MR.  e. Echocardiogram, 02/24/2014: EF 55-60% with PFO, mild-to-moderate MR, moderate TR.   f. Echocardiogram, 09/20/2016: EF 60%. Moderate-to-severe MR, Moderate TR.  g. Echocardiogram, 12/18/2018: EF 60%.  h. Echocardiogram, 05/09/2019: EF 60%.  4. Patent  foramen ovale:  a. MONIE, 01/2005, documented again today by echocardiogram, 07/13/2006.  5. Syncope, 2/23/2014, admission @ [facility], unknown etiology.   6. Superficial DVT in the right lesser saphenus vein following ORIF of the left hip.   7. Chronic anorexia.  8. Hypertension.  9. Hyperlipidemia, followed by Dr. Springer.  10. Depression/Anxiety.  11. Osteoporosis/ Osteoarthritis/ Scoliosis  12. Surgical history:  a. Left Achilles tendon repair.  b. Left ovarian cyst excision.  c. Lumbar discectomy.  d. Previous breast surgery.  e. Left hip fracture, status post intratrochanteric fixation    Allergies   Allergen Reactions   • Actonel [Risedronate Sodium] GI Intolerance   • Hctz [Hydrochlorothiazide]      hyponatremia   • Lisinopril      hyperkalemia       Current Medications:      Current Outpatient Medications:   •  acetaminophen (TYLENOL) 325 MG tablet, Take 2 tablets by mouth Every 4 (Four) Hours As Needed for Mild Pain ., Disp: , Rfl:   •  atorvastatin (LIPITOR) 80 MG tablet, Take 1 tablet by mouth Every Night., Disp: 90 tablet, Rfl: 0  •  calcium-vitamin D (OSCAL-500) 500-200 MG-UNIT per tablet, Take 1 tablet by mouth every morning., Disp: , Rfl:   •  Cholecalciferol (VITAMIN D PO), Take 1 tablet by mouth Daily., Disp: , Rfl:   •  citalopram (CeleXA) 20 MG tablet, TAKE 1 TABLET EVERY MORNING, Disp: 90 tablet, Rfl: 1  •  diltiaZEM CD (CARDIZEM CD) 360 MG 24 hr capsule, Take 1 capsule by mouth Daily., Disp: 30 capsule, Rfl: 5  •  ELIQUIS 5 MG tablet tablet, TAKE 1 TABLET EVERY 12 HOURS, Disp: 60 tablet, Rfl: 12  •  ferrous sulfate 325 (65 FE) MG tablet, TAKE 1 TABLET DAILY WITH BREAKFAST, Disp: 90 tablet, Rfl: 3  •  levETIRAcetam (KEPPRA) 250 MG tablet, Take 0.5 tablets by mouth Every 12 (Twelve) Hours., Disp: 180 tablet, Rfl: 1  •  magnesium oxide (MAGOX) 400 (241.3 Mg) MG tablet tablet, Take 400 mg by mouth Daily., Disp: , Rfl:   •  metoprolol succinate XL (TOPROL-XL) 25 MG 24 hr tablet, Take 1 tablet by mouth  "Daily., Disp: 90 tablet, Rfl: 3  •  mirtazapine (REMERON) 7.5 MG tablet, TAKE 1 TABLET AT BEDTIME, Disp: 90 tablet, Rfl: 4  •  Multiple Vitamins-Minerals (CENTRUM SILVER 50+WOMEN PO), Take 1 tablet by mouth Daily., Disp: , Rfl:   •  ranitidine (ZANTAC) 150 MG capsule, TAKE 1 CAPSULE EVERY NIGHT, Disp: 90 capsule, Rfl: 1    HPI    Mirian Sy is a 79 y.o. female who presents today for one month follow up of chronic atrial fibrillation, recurrent CVA, PFO, Takotsubo cardiomyopathy, hypertension, and hyperlipidemia. Since last visit, she has been feeling well overall from a cardiovascular standpoint. She reports an episode of light-headedness last week after a PT session. Her blood pressure was checked and found to be low. Her blood pressure is typically normal at home. Patient denies chest pain, palpitations, shortness of breath, edema, and syncope. Her physical activity is limited by walker use, but she does try to stay active.    The following portions of the patient's history were reviewed and updated as appropriate: allergies, current medications and problem list.    Pertinent positives as listed in the HPI.  All other systems reviewed are negative.    Vitals:    10/23/19 1317   BP: 100/58   BP Location: Right arm   Patient Position: Sitting   Pulse: 62   Weight: 47.2 kg (104 lb)   Height: 152.4 cm (60\")       Physical Exam:    General: Alert and oriented.  Neck: Jugular venous pressure is within normal limits. Carotids have normal upstrokes without bruits.   Cardiovascular: Heart has a nondisplaced focal PMI. Irregularly irregular rate and rhythm without murmur, gallop or rub.  Lungs: Clear without rales or wheezes. Equal expansion is noted.   Extremities: Show no edema.  Skin: Warm and dry.  Neurologic: Nonfocal.    Diagnostic Data:  Lab Results   Component Value Date    GLUCOSE 96 09/17/2019    BUN 19 09/17/2019    CREATININE 1.05 (H) 09/17/2019    EGFRIFNONA 51 (L) 09/17/2019    BCR 18.1 09/17/2019    NA " 137 09/17/2019    K 4.3 09/17/2019     09/17/2019    CO2 25.0 09/17/2019    CALCIUM 9.3 09/17/2019    ALBUMIN 3.90 09/15/2019    AST 28 09/15/2019    ALT 13 09/15/2019     Lab Results   Component Value Date    CHOL 165 12/19/2018    TRIG 44 12/19/2018    HDL 51 12/19/2018     12/19/2018      Lab Results   Component Value Date    WBC 6.98 09/17/2019    HGB 11.9 (L) 09/17/2019    HCT 38.0 09/17/2019    MCV 93.1 09/17/2019     09/17/2019     Lab Results   Component Value Date    TSH 1.630 09/15/2019       Procedures    Assessment:      ICD-10-CM ICD-9-CM   1. Cerebrovascular accident (CVA), unspecified mechanism (CMS/HCC) I63.9 434.91   2. Takotsubo syndrome I51.81 429.83   3. Chronic atrial fibrillation I48.20 427.31   4. Essential hypertension I10 401.9   5. Mixed hyperlipidemia E78.2 272.2     Lab results found above were reviewed with the patient.    Plan:    1. Monitor BP at home. If it goes goes <100 mmHg systolic recurrently, call the office and we will decrease diltiazem. Continue metoprolol and diltiazem at this time.  2. Continue Eliquis 5 mg BID for stroke prophylaxis with chronic atrial fibrillation.  3. Continue atorvastatin 80 mg for hyperlipidemia.  4. Continue all other current medications.  5. F/up in 6 months or sooner if needed.      Scribed for Bri Olguin MD by Anaya Redding. 10/23/2019  1:30 PM     I Bri Olguin MD personally performed the services described in this documentation as scribed by the above individual in my presence, and it is both accurate and complete.    Bri Olguin MD, [facility]

## 2019-11-04 RX ORDER — METOPROLOL SUCCINATE 25 MG/1
TABLET, EXTENDED RELEASE ORAL
Qty: 90 TABLET | Refills: 3 | Status: ON HOLD | OUTPATIENT
Start: 2019-11-04 | End: 2019-12-13 | Stop reason: SDUPTHER

## 2019-12-12 ENCOUNTER — HOSPITAL ENCOUNTER (OUTPATIENT)
Facility: HOSPITAL | Age: 79
Setting detail: OBSERVATION
Discharge: HOME HEALTH | End: 2019-12-14
Attending: EMERGENCY MEDICINE | Admitting: FAMILY MEDICINE

## 2019-12-12 ENCOUNTER — APPOINTMENT (OUTPATIENT)
Dept: GENERAL RADIOLOGY | Facility: HOSPITAL | Age: 79
End: 2019-12-12

## 2019-12-12 DIAGNOSIS — R09.02 HYPOXIA: ICD-10-CM

## 2019-12-12 DIAGNOSIS — I50.33 ACUTE ON CHRONIC DIASTOLIC CONGESTIVE HEART FAILURE (HCC): ICD-10-CM

## 2019-12-12 DIAGNOSIS — Z78.9 IMPAIRED MOBILITY AND ACTIVITIES OF DAILY LIVING: ICD-10-CM

## 2019-12-12 DIAGNOSIS — I50.9 ACUTE ON CHRONIC CONGESTIVE HEART FAILURE, UNSPECIFIED HEART FAILURE TYPE (HCC): Primary | ICD-10-CM

## 2019-12-12 DIAGNOSIS — Z74.09 IMPAIRED FUNCTIONAL MOBILITY, BALANCE, GAIT, AND ENDURANCE: ICD-10-CM

## 2019-12-12 DIAGNOSIS — Z74.09 IMPAIRED MOBILITY AND ACTIVITIES OF DAILY LIVING: ICD-10-CM

## 2019-12-12 PROBLEM — N18.30 CKD (CHRONIC KIDNEY DISEASE) STAGE 3, GFR 30-59 ML/MIN (HCC): Status: ACTIVE | Noted: 2019-12-12

## 2019-12-12 LAB
ALBUMIN SERPL-MCNC: 3.7 G/DL (ref 3.5–5.2)
ALBUMIN/GLOB SERPL: 1 G/DL
ALP SERPL-CCNC: 124 U/L (ref 39–117)
ALT SERPL W P-5'-P-CCNC: 26 U/L (ref 1–33)
ANION GAP SERPL CALCULATED.3IONS-SCNC: 11 MMOL/L (ref 5–15)
AST SERPL-CCNC: 47 U/L (ref 1–32)
BASOPHILS # BLD AUTO: 0.03 10*3/MM3 (ref 0–0.2)
BASOPHILS NFR BLD AUTO: 0.4 % (ref 0–1.5)
BILIRUB SERPL-MCNC: 0.4 MG/DL (ref 0.2–1.2)
BUN BLD-MCNC: 26 MG/DL (ref 8–23)
BUN/CREAT SERPL: 19.1 (ref 7–25)
CALCIUM SPEC-SCNC: 10.1 MG/DL (ref 8.6–10.5)
CHLORIDE SERPL-SCNC: 105 MMOL/L (ref 98–107)
CO2 SERPL-SCNC: 25 MMOL/L (ref 22–29)
CREAT BLD-MCNC: 1.36 MG/DL (ref 0.57–1)
DEPRECATED RDW RBC AUTO: 54.8 FL (ref 37–54)
EOSINOPHIL # BLD AUTO: 0.16 10*3/MM3 (ref 0–0.4)
EOSINOPHIL NFR BLD AUTO: 2 % (ref 0.3–6.2)
ERYTHROCYTE [DISTWIDTH] IN BLOOD BY AUTOMATED COUNT: 15.5 % (ref 12.3–15.4)
GFR SERPL CREATININE-BSD FRML MDRD: 38 ML/MIN/1.73
GLOBULIN UR ELPH-MCNC: 3.8 GM/DL
GLUCOSE BLD-MCNC: 99 MG/DL (ref 65–99)
HCT VFR BLD AUTO: 36.6 % (ref 34–46.6)
HGB BLD-MCNC: 11.6 G/DL (ref 12–15.9)
HOLD SPECIMEN: NORMAL
HOLD SPECIMEN: NORMAL
IMM GRANULOCYTES # BLD AUTO: 0.02 10*3/MM3 (ref 0–0.05)
IMM GRANULOCYTES NFR BLD AUTO: 0.3 % (ref 0–0.5)
LYMPHOCYTES # BLD AUTO: 2.06 10*3/MM3 (ref 0.7–3.1)
LYMPHOCYTES NFR BLD AUTO: 26 % (ref 19.6–45.3)
MAGNESIUM SERPL-MCNC: 2.5 MG/DL (ref 1.6–2.4)
MCH RBC QN AUTO: 30.7 PG (ref 26.6–33)
MCHC RBC AUTO-ENTMCNC: 31.7 G/DL (ref 31.5–35.7)
MCV RBC AUTO: 96.8 FL (ref 79–97)
MONOCYTES # BLD AUTO: 0.9 10*3/MM3 (ref 0.1–0.9)
MONOCYTES NFR BLD AUTO: 11.4 % (ref 5–12)
NEUTROPHILS # BLD AUTO: 4.74 10*3/MM3 (ref 1.7–7)
NEUTROPHILS NFR BLD AUTO: 59.9 % (ref 42.7–76)
NRBC BLD AUTO-RTO: 0 /100 WBC (ref 0–0.2)
NT-PROBNP SERPL-MCNC: 3101 PG/ML (ref 5–1800)
PLATELET # BLD AUTO: 189 10*3/MM3 (ref 140–450)
PMV BLD AUTO: 10.2 FL (ref 6–12)
POTASSIUM BLD-SCNC: 5.1 MMOL/L (ref 3.5–5.2)
PROT SERPL-MCNC: 7.5 G/DL (ref 6–8.5)
RBC # BLD AUTO: 3.78 10*6/MM3 (ref 3.77–5.28)
SODIUM BLD-SCNC: 141 MMOL/L (ref 136–145)
TROPONIN T SERPL-MCNC: <0.01 NG/ML (ref 0–0.03)
TROPONIN T SERPL-MCNC: <0.01 NG/ML (ref 0–0.03)
TSH SERPL DL<=0.05 MIU/L-ACNC: 2.37 UIU/ML (ref 0.27–4.2)
WBC NRBC COR # BLD: 7.91 10*3/MM3 (ref 3.4–10.8)
WHOLE BLOOD HOLD SPECIMEN: NORMAL
WHOLE BLOOD HOLD SPECIMEN: NORMAL

## 2019-12-12 PROCEDURE — G0378 HOSPITAL OBSERVATION PER HR: HCPCS

## 2019-12-12 PROCEDURE — 80053 COMPREHEN METABOLIC PANEL: CPT

## 2019-12-12 PROCEDURE — 85025 COMPLETE CBC W/AUTO DIFF WBC: CPT

## 2019-12-12 PROCEDURE — 99220 PR INITIAL OBSERVATION CARE/DAY 70 MINUTES: CPT | Performed by: NURSE PRACTITIONER

## 2019-12-12 PROCEDURE — 93005 ELECTROCARDIOGRAM TRACING: CPT | Performed by: EMERGENCY MEDICINE

## 2019-12-12 PROCEDURE — 94799 UNLISTED PULMONARY SVC/PX: CPT

## 2019-12-12 PROCEDURE — 99284 EMERGENCY DEPT VISIT MOD MDM: CPT

## 2019-12-12 PROCEDURE — 25010000002 FUROSEMIDE PER 20 MG: Performed by: EMERGENCY MEDICINE

## 2019-12-12 PROCEDURE — 83735 ASSAY OF MAGNESIUM: CPT

## 2019-12-12 PROCEDURE — 93005 ELECTROCARDIOGRAM TRACING: CPT

## 2019-12-12 PROCEDURE — 84484 ASSAY OF TROPONIN QUANT: CPT

## 2019-12-12 PROCEDURE — 84443 ASSAY THYROID STIM HORMONE: CPT

## 2019-12-12 PROCEDURE — 94660 CPAP INITIATION&MGMT: CPT

## 2019-12-12 PROCEDURE — 84484 ASSAY OF TROPONIN QUANT: CPT | Performed by: EMERGENCY MEDICINE

## 2019-12-12 PROCEDURE — 96374 THER/PROPH/DIAG INJ IV PUSH: CPT

## 2019-12-12 PROCEDURE — 71046 X-RAY EXAM CHEST 2 VIEWS: CPT

## 2019-12-12 PROCEDURE — 83880 ASSAY OF NATRIURETIC PEPTIDE: CPT

## 2019-12-12 RX ORDER — SODIUM CHLORIDE 0.9 % (FLUSH) 0.9 %
10 SYRINGE (ML) INJECTION AS NEEDED
Status: DISCONTINUED | OUTPATIENT
Start: 2019-12-12 | End: 2019-12-14 | Stop reason: HOSPADM

## 2019-12-12 RX ORDER — SODIUM CHLORIDE 0.9 % (FLUSH) 0.9 %
10 SYRINGE (ML) INJECTION EVERY 12 HOURS SCHEDULED
Status: DISCONTINUED | OUTPATIENT
Start: 2019-12-13 | End: 2019-12-14 | Stop reason: HOSPADM

## 2019-12-12 RX ORDER — FUROSEMIDE 10 MG/ML
40 INJECTION INTRAMUSCULAR; INTRAVENOUS ONCE
Status: COMPLETED | OUTPATIENT
Start: 2019-12-13 | End: 2019-12-13

## 2019-12-12 RX ORDER — CITALOPRAM 20 MG/1
20 TABLET ORAL EVERY MORNING
Status: DISCONTINUED | OUTPATIENT
Start: 2019-12-13 | End: 2019-12-14 | Stop reason: HOSPADM

## 2019-12-12 RX ORDER — METOPROLOL SUCCINATE 25 MG/1
25 TABLET, EXTENDED RELEASE ORAL DAILY
Status: DISCONTINUED | OUTPATIENT
Start: 2019-12-13 | End: 2019-12-14 | Stop reason: HOSPADM

## 2019-12-12 RX ORDER — FERROUS SULFATE 325(65) MG
325 TABLET ORAL
Status: DISCONTINUED | OUTPATIENT
Start: 2019-12-13 | End: 2019-12-14 | Stop reason: HOSPADM

## 2019-12-12 RX ORDER — ACETAMINOPHEN 325 MG/1
650 TABLET ORAL EVERY 4 HOURS PRN
Status: DISCONTINUED | OUTPATIENT
Start: 2019-12-12 | End: 2019-12-14 | Stop reason: HOSPADM

## 2019-12-12 RX ORDER — DILTIAZEM HYDROCHLORIDE 180 MG/1
360 CAPSULE, COATED, EXTENDED RELEASE ORAL
Status: DISCONTINUED | OUTPATIENT
Start: 2019-12-13 | End: 2019-12-14 | Stop reason: HOSPADM

## 2019-12-12 RX ORDER — FAMOTIDINE 20 MG/1
20 TABLET, FILM COATED ORAL DAILY
Status: DISCONTINUED | OUTPATIENT
Start: 2019-12-13 | End: 2019-12-14 | Stop reason: HOSPADM

## 2019-12-12 RX ORDER — MIRTAZAPINE 15 MG/1
7.5 TABLET, FILM COATED ORAL NIGHTLY
Status: DISCONTINUED | OUTPATIENT
Start: 2019-12-13 | End: 2019-12-14 | Stop reason: HOSPADM

## 2019-12-12 RX ORDER — FUROSEMIDE 10 MG/ML
40 INJECTION INTRAMUSCULAR; INTRAVENOUS ONCE
Status: COMPLETED | OUTPATIENT
Start: 2019-12-12 | End: 2019-12-12

## 2019-12-12 RX ORDER — ATORVASTATIN CALCIUM 40 MG/1
80 TABLET, FILM COATED ORAL NIGHTLY
Status: DISCONTINUED | OUTPATIENT
Start: 2019-12-13 | End: 2019-12-14 | Stop reason: HOSPADM

## 2019-12-12 RX ORDER — LEVETIRACETAM 250 MG/1
125 TABLET ORAL EVERY 12 HOURS SCHEDULED
Status: DISCONTINUED | OUTPATIENT
Start: 2019-12-13 | End: 2019-12-14 | Stop reason: HOSPADM

## 2019-12-12 RX ADMIN — FUROSEMIDE 40 MG: 10 INJECTION, SOLUTION INTRAMUSCULAR; INTRAVENOUS at 21:24

## 2019-12-13 LAB
ANION GAP SERPL CALCULATED.3IONS-SCNC: 9 MMOL/L (ref 5–15)
BACTERIA UR QL AUTO: ABNORMAL /HPF
BASOPHILS # BLD AUTO: 0.03 10*3/MM3 (ref 0–0.2)
BASOPHILS NFR BLD AUTO: 0.5 % (ref 0–1.5)
BILIRUB UR QL STRIP: NEGATIVE
BUN BLD-MCNC: 26 MG/DL (ref 8–23)
BUN/CREAT SERPL: 17.7 (ref 7–25)
CALCIUM SPEC-SCNC: 9.8 MG/DL (ref 8.6–10.5)
CHLORIDE SERPL-SCNC: 104 MMOL/L (ref 98–107)
CLARITY UR: CLEAR
CO2 SERPL-SCNC: 30 MMOL/L (ref 22–29)
COLOR UR: YELLOW
CREAT BLD-MCNC: 1.47 MG/DL (ref 0.57–1)
DEPRECATED RDW RBC AUTO: 54.5 FL (ref 37–54)
EOSINOPHIL # BLD AUTO: 0.19 10*3/MM3 (ref 0–0.4)
EOSINOPHIL NFR BLD AUTO: 3.2 % (ref 0.3–6.2)
ERYTHROCYTE [DISTWIDTH] IN BLOOD BY AUTOMATED COUNT: 15.3 % (ref 12.3–15.4)
FLUAV SUBTYP SPEC NAA+PROBE: NOT DETECTED
FLUBV RNA ISLT QL NAA+PROBE: NOT DETECTED
GFR SERPL CREATININE-BSD FRML MDRD: 34 ML/MIN/1.73
GLUCOSE BLD-MCNC: 96 MG/DL (ref 65–99)
GLUCOSE UR STRIP-MCNC: NEGATIVE MG/DL
HCT VFR BLD AUTO: 33.4 % (ref 34–46.6)
HGB BLD-MCNC: 10.6 G/DL (ref 12–15.9)
HGB UR QL STRIP.AUTO: NEGATIVE
HYALINE CASTS UR QL AUTO: ABNORMAL /LPF
IMM GRANULOCYTES # BLD AUTO: 0.02 10*3/MM3 (ref 0–0.05)
IMM GRANULOCYTES NFR BLD AUTO: 0.3 % (ref 0–0.5)
KETONES UR QL STRIP: NEGATIVE
LEUKOCYTE ESTERASE UR QL STRIP.AUTO: ABNORMAL
LYMPHOCYTES # BLD AUTO: 1.29 10*3/MM3 (ref 0.7–3.1)
LYMPHOCYTES NFR BLD AUTO: 21.4 % (ref 19.6–45.3)
MCH RBC QN AUTO: 30.6 PG (ref 26.6–33)
MCHC RBC AUTO-ENTMCNC: 31.7 G/DL (ref 31.5–35.7)
MCV RBC AUTO: 96.5 FL (ref 79–97)
MONOCYTES # BLD AUTO: 0.75 10*3/MM3 (ref 0.1–0.9)
MONOCYTES NFR BLD AUTO: 12.5 % (ref 5–12)
NEUTROPHILS # BLD AUTO: 3.74 10*3/MM3 (ref 1.7–7)
NEUTROPHILS NFR BLD AUTO: 62.1 % (ref 42.7–76)
NITRITE UR QL STRIP: NEGATIVE
NRBC BLD AUTO-RTO: 0 /100 WBC (ref 0–0.2)
PH UR STRIP.AUTO: 7.5 [PH] (ref 5–8)
PLATELET # BLD AUTO: 175 10*3/MM3 (ref 140–450)
PMV BLD AUTO: 10.7 FL (ref 6–12)
POTASSIUM BLD-SCNC: 4.4 MMOL/L (ref 3.5–5.2)
PROT UR QL STRIP: NEGATIVE
RBC # BLD AUTO: 3.46 10*6/MM3 (ref 3.77–5.28)
RBC # UR: ABNORMAL /HPF
REF LAB TEST METHOD: ABNORMAL
SODIUM BLD-SCNC: 143 MMOL/L (ref 136–145)
SP GR UR STRIP: 1.01 (ref 1–1.03)
SQUAMOUS #/AREA URNS HPF: ABNORMAL /HPF
UROBILINOGEN UR QL STRIP: ABNORMAL
WBC NRBC COR # BLD: 6.02 10*3/MM3 (ref 3.4–10.8)
WBC UR QL AUTO: ABNORMAL /HPF

## 2019-12-13 PROCEDURE — 25010000002 FUROSEMIDE PER 20 MG: Performed by: NURSE PRACTITIONER

## 2019-12-13 PROCEDURE — G0378 HOSPITAL OBSERVATION PER HR: HCPCS

## 2019-12-13 PROCEDURE — 87502 INFLUENZA DNA AMP PROBE: CPT | Performed by: NURSE PRACTITIONER

## 2019-12-13 PROCEDURE — 85025 COMPLETE CBC W/AUTO DIFF WBC: CPT | Performed by: NURSE PRACTITIONER

## 2019-12-13 PROCEDURE — 97162 PT EVAL MOD COMPLEX 30 MIN: CPT

## 2019-12-13 PROCEDURE — 94660 CPAP INITIATION&MGMT: CPT

## 2019-12-13 PROCEDURE — 99225 PR SBSQ OBSERVATION CARE/DAY 25 MINUTES: CPT | Performed by: NURSE PRACTITIONER

## 2019-12-13 PROCEDURE — 96376 TX/PRO/DX INJ SAME DRUG ADON: CPT

## 2019-12-13 PROCEDURE — 80048 BASIC METABOLIC PNL TOTAL CA: CPT | Performed by: NURSE PRACTITIONER

## 2019-12-13 PROCEDURE — 81001 URINALYSIS AUTO W/SCOPE: CPT | Performed by: NURSE PRACTITIONER

## 2019-12-13 PROCEDURE — 94799 UNLISTED PULMONARY SVC/PX: CPT

## 2019-12-13 RX ADMIN — METOPROLOL SUCCINATE 25 MG: 25 TABLET, EXTENDED RELEASE ORAL at 09:14

## 2019-12-13 RX ADMIN — Medication 400 MG: at 09:14

## 2019-12-13 RX ADMIN — CITALOPRAM HYDROBROMIDE 20 MG: 20 TABLET ORAL at 09:14

## 2019-12-13 RX ADMIN — FERROUS SULFATE TAB 325 MG (65 MG ELEMENTAL FE) 325 MG: 325 (65 FE) TAB at 09:14

## 2019-12-13 RX ADMIN — SODIUM CHLORIDE, PRESERVATIVE FREE 10 ML: 5 INJECTION INTRAVENOUS at 00:00

## 2019-12-13 RX ADMIN — DILTIAZEM HYDROCHLORIDE 360 MG: 180 CAPSULE, COATED, EXTENDED RELEASE ORAL at 09:12

## 2019-12-13 RX ADMIN — LEVETIRACETAM 125 MG: 250 TABLET, FILM COATED ORAL at 01:28

## 2019-12-13 RX ADMIN — SODIUM CHLORIDE, PRESERVATIVE FREE 10 ML: 5 INJECTION INTRAVENOUS at 21:46

## 2019-12-13 RX ADMIN — FUROSEMIDE 40 MG: 10 INJECTION, SOLUTION INTRAMUSCULAR; INTRAVENOUS at 05:46

## 2019-12-13 RX ADMIN — ATORVASTATIN CALCIUM 80 MG: 40 TABLET, FILM COATED ORAL at 01:28

## 2019-12-13 RX ADMIN — LEVETIRACETAM 125 MG: 250 TABLET, FILM COATED ORAL at 21:46

## 2019-12-13 RX ADMIN — SODIUM CHLORIDE, PRESERVATIVE FREE 10 ML: 5 INJECTION INTRAVENOUS at 09:20

## 2019-12-13 RX ADMIN — APIXABAN 5 MG: 5 TABLET, FILM COATED ORAL at 21:46

## 2019-12-13 RX ADMIN — ATORVASTATIN CALCIUM 80 MG: 40 TABLET, FILM COATED ORAL at 21:45

## 2019-12-13 RX ADMIN — LEVETIRACETAM 125 MG: 250 TABLET, FILM COATED ORAL at 09:14

## 2019-12-13 RX ADMIN — FAMOTIDINE 20 MG: 20 TABLET ORAL at 09:14

## 2019-12-13 RX ADMIN — APIXABAN 5 MG: 5 TABLET, FILM COATED ORAL at 09:14

## 2019-12-13 RX ADMIN — MIRTAZAPINE 7.5 MG: 15 TABLET, FILM COATED ORAL at 21:46

## 2019-12-13 NOTE — ED PROVIDER NOTES
"Subjective   Mirian Sy is a 79 y.o female who presents to the ED with complaints of atrial fibrillation. The patient reports she awoke this morning feeling as though she was in atrial fibrillation. She states she has \"not felt like her usual self\" today and has been experiencing shortness of breath, fatigue, and generalized weakness. The patient has known atrial fibrillation and reports she has been cardioverted in the past. She has been compliant on all of her medication and is followed by Dr. Olguin, cardiology. No dysuria, frequency, or urgency. She also has a past medical history of anemia, basal cell carcinoma, cardiomyopathy, DVT, GERD, hypertension, mitral valve prolapse, osteoarthritis, scoliosis, and stroke. There are no other acute symptoms at this time.      History provided by:  Patient  Atrial Fibrillation   Severity:  Moderate  Onset quality:  Sudden  Timing:  Constant  Progression:  Unchanged  Chronicity:  Chronic  Associated symptoms: fatigue and shortness of breath    Fatigue:     Severity:  Moderate    Timing:  Constant    Progression:  Unchanged  Shortness of breath:     Severity:  Moderate    Onset quality:  Sudden    Timing:  Constant    Progression:  Unchanged      Review of Systems   Constitutional: Positive for fatigue.   Respiratory: Positive for shortness of breath.    Genitourinary: Negative for dysuria, frequency and urgency.   Neurological: Positive for weakness (generalized).   All other systems reviewed and are negative.      Past Medical History:   Diagnosis Date   • Atrial fibrillation (CMS/HCC) 2005    Chronic anticoagulation and rate control   • Basal cell carcinoma 2007    Left leg and nose   • Cardiomyopathy (CMS/HCC) 2006   • Cerebrovascular accident (CMS/HCC) 01/2005    CT right parietal CVA. Carotid duplex -50% to 79% left ICS 15% right ICS stenosis. MRI of the neck showed no significant carotid bifurcations of these. Negative CT of the head, 09/10/2012. Carotid " duplex, 02/24/2014:  Shelf-like plaque in the CECE without significant elevation and velocities.  Patent vertebral arteries.   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    With dilated cardiomyopathy   • Decubitus ulcer of buttock 2014    Transient during fracture rehabilitation   • Depression 2002    Good response to citalopram and mirtazapine   • DVT (deep venous thrombosis) (CMS/[facility]) 2014    Superficial - right lesser saphenus vein - after hip fracture    • Fracture of bone of forefoot 1972    Right foot   • Fracture of ilium, left (CMS/[facility]) 05/2018    Accidental fall - Uncomplicated recovery   • GERD (gastroesophageal reflux disease) 2014    Chronic superficial gastritis   • HTN (hypertension) 2005   • Iron deficiency anemia due to chronic blood loss 2018    Predisposed by chronic anticoagulation and GERD   • Low body weight due to inadequate caloric intake Adulthood   • Mitral valve prolapse 1997    Mild MR   • Osteoarthritis    • Osteoporosis    • Patent foramen ovale 2005   • Pneumonia 1947   • SCC (squamous cell carcinoma), arm, right 2017   • Scoliosis    • Syncope 2014     undermined cause - [facility]   • Varicose veins 2005    Symptomatic       Allergies   Allergen Reactions   • Actonel [Risedronate Sodium] GI Intolerance   • Hctz [Hydrochlorothiazide]      hyponatremia   • Lisinopril      hyperkalemia       Past Surgical History:   Procedure Laterality Date   • ACHILLES TENDON SURGERY Left 1997    Repair of rupture left tendon with screws   • BREAST CYST EXCISION Left    • BREAST SURGERY Left 1982    Excision benign cyst   • CARDIAC CATHETERIZATION  2006    Severe acute coronary spasm   • CATARACT EXTRACTION WITH INTRAOCULAR LENS IMPLANT Bilateral 2015   • EYE CAPSULOTOMY WITH LASER Left 2016    Marked visual improvement   • FEMUR FRACTURE SURGERY Right 2015    Repair of shaft fracture   • HIP FRACTURE SURGERY Left 2014    left hip fracture with pin   • LUMBAR DISC  SURGERY     • OVARIAN CYST REMOVAL Left    • SKIN CANCER EXCISION Right 2017    SCC - arm       Family History   Problem Relation Age of Onset   • Breast cancer Mother          age 59   • Diabetes Mother    • Hypertension Mother    • Heart disease Father          age 80   • Other Sister         Arthrodesis cervical   • Basal cell carcinoma Sister    • Bone cancer Sister    • Breast cancer Sister 57   • Diabetes Sister          age 56   • Hypertension Sister    • Melanoma Sister    • Arrhythmia Sister         Sinus   • Stroke Sister    • Allergies Son         Hay fever   • Diabetes Maternal Uncle    • Breast cancer Sister 59   • Breast cancer Sister 66   • Ovarian cancer Sister         DX AGE UNKNOWN       Social History     Socioeconomic History   • Marital status:      Spouse name: Not on file   • Number of children: Not on file   • Years of education: Not on file   • Highest education level: Not on file   Tobacco Use   • Smoking status: Former Smoker     Packs/day: 1.00     Years: 20.00     Pack years: 20.00     Types: Cigarettes   • Smokeless tobacco: Never Used   • Tobacco comment: Remote history   Substance and Sexual Activity   • Alcohol use: No   • Drug use: No   Social History Narrative    Domestic life   lives in private home alone - good support from local children        [employer]    [employer]        Sleep hygiene  in bed 9 PM to 6 AM for 9 hours of sleep        Caffeine use   1 1/2 cups coffee daily        Exercise habits  walks most days of the week. - Stretching each morning.          Diet   well-balanced diet with protein supplementations        Occupation    retired         Hearing  no impairment        Vision    no glasses needed after cataract surgery.          Driving   daytime only - good weather - local traffic         Objective   Physical Exam   Constitutional: She is oriented to person, place, and time. She appears well-developed and well-nourished. No  distress.   HENT:   Head: Normocephalic and atraumatic.   Nose: Nose normal.   Eyes: Conjunctivae are normal. No scleral icterus.   Neck: Normal range of motion. Neck supple.   Cardiovascular: Normal rate and normal heart sounds. An irregularly irregular rhythm present.   No murmur heard.  Pulmonary/Chest: Effort normal. Tachypnea (mild) noted. No respiratory distress. She has rales.   Crackles on inspiration at about the mid lungs throughout the bases bilaterally.   Abdominal: Soft. There is no tenderness.   Musculoskeletal: Normal range of motion. She exhibits edema.   +1 edema in the mid-tibia bilaterally with equal calf size.   Neurological: She is alert and oriented to person, place, and time.   Skin: Skin is warm and dry.   Psychiatric: She has a normal mood and affect. Her behavior is normal.   Nursing note and vitals reviewed.      Procedures         ED Course     Recent Results (from the past 24 hour(s))   Light Blue Top    Collection Time: 12/12/19  5:32 PM   Result Value Ref Range    Extra Tube hold for add-on    Comprehensive Metabolic Panel    Collection Time: 12/12/19  5:33 PM   Result Value Ref Range    Glucose 99 65 - 99 mg/dL    BUN 26 (H) 8 - 23 mg/dL    Creatinine 1.36 (H) 0.57 - 1.00 mg/dL    Sodium 141 136 - 145 mmol/L    Potassium 5.1 3.5 - 5.2 mmol/L    Chloride 105 98 - 107 mmol/L    CO2 25.0 22.0 - 29.0 mmol/L    Calcium 10.1 8.6 - 10.5 mg/dL    Total Protein 7.5 6.0 - 8.5 g/dL    Albumin 3.70 3.50 - 5.20 g/dL    ALT (SGPT) 26 1 - 33 U/L    AST (SGOT) 47 (H) 1 - 32 U/L    Alkaline Phosphatase 124 (H) 39 - 117 U/L    Total Bilirubin 0.4 0.2 - 1.2 mg/dL    eGFR Non African Amer 38 (L) >60 mL/min/1.73    Globulin 3.8 gm/dL    A/G Ratio 1.0 g/dL    BUN/Creatinine Ratio 19.1 7.0 - 25.0    Anion Gap 11.0 5.0 - 15.0 mmol/L   Magnesium    Collection Time: 12/12/19  5:33 PM   Result Value Ref Range    Magnesium 2.5 (H) 1.6 - 2.4 mg/dL   Troponin    Collection Time: 12/12/19  5:33 PM   Result Value  Ref Range    Troponin T <0.010 0.000 - 0.030 ng/mL   TSH    Collection Time: 12/12/19  5:33 PM   Result Value Ref Range    TSH 2.370 0.270 - 4.200 uIU/mL   BNP    Collection Time: 12/12/19  5:33 PM   Result Value Ref Range    proBNP 3,101.0 (H) 5.0-1,800.0 pg/mL   Green Top (Gel)    Collection Time: 12/12/19  5:33 PM   Result Value Ref Range    Extra Tube Hold for add-ons.    Lavender Top    Collection Time: 12/12/19  5:33 PM   Result Value Ref Range    Extra Tube hold for add-on    Gold Top - SST    Collection Time: 12/12/19  5:33 PM   Result Value Ref Range    Extra Tube Hold for add-ons.    CBC Auto Differential    Collection Time: 12/12/19  5:33 PM   Result Value Ref Range    WBC 7.91 3.40 - 10.80 10*3/mm3    RBC 3.78 3.77 - 5.28 10*6/mm3    Hemoglobin 11.6 (L) 12.0 - 15.9 g/dL    Hematocrit 36.6 34.0 - 46.6 %    MCV 96.8 79.0 - 97.0 fL    MCH 30.7 26.6 - 33.0 pg    MCHC 31.7 31.5 - 35.7 g/dL    RDW 15.5 (H) 12.3 - 15.4 %    RDW-SD 54.8 (H) 37.0 - 54.0 fl    MPV 10.2 6.0 - 12.0 fL    Platelets 189 140 - 450 10*3/mm3    Neutrophil % 59.9 42.7 - 76.0 %    Lymphocyte % 26.0 19.6 - 45.3 %    Monocyte % 11.4 5.0 - 12.0 %    Eosinophil % 2.0 0.3 - 6.2 %    Basophil % 0.4 0.0 - 1.5 %    Immature Grans % 0.3 0.0 - 0.5 %    Neutrophils, Absolute 4.74 1.70 - 7.00 10*3/mm3    Lymphocytes, Absolute 2.06 0.70 - 3.10 10*3/mm3    Monocytes, Absolute 0.90 0.10 - 0.90 10*3/mm3    Eosinophils, Absolute 0.16 0.00 - 0.40 10*3/mm3    Basophils, Absolute 0.03 0.00 - 0.20 10*3/mm3    Immature Grans, Absolute 0.02 0.00 - 0.05 10*3/mm3    nRBC 0.0 0.0 - 0.2 /100 WBC   Troponin    Collection Time: 12/12/19  8:13 PM   Result Value Ref Range    Troponin T <0.010 0.000 - 0.030 ng/mL   Urinalysis With Culture If Indicated - Urine, Clean Catch    Collection Time: 12/13/19 12:15 AM   Result Value Ref Range    Color, UA Yellow Yellow, Straw    Appearance, UA Clear Clear    pH, UA 7.5 5.0 - 8.0    Specific Gravity, UA 1.009 1.001 - 1.030     Glucose, UA Negative Negative    Ketones, UA Negative Negative    Bilirubin, UA Negative Negative    Blood, UA Negative Negative    Protein, UA Negative Negative    Leuk Esterase, UA Trace (A) Negative    Nitrite, UA Negative Negative    Urobilinogen, UA 0.2 E.U./dL 0.2 - 1.0 E.U./dL   Urinalysis, Microscopic Only - Urine, Clean Catch    Collection Time: 12/13/19 12:15 AM   Result Value Ref Range    RBC, UA 3-6 (A) None Seen, 0-2 /HPF    WBC, UA 0-2 None Seen, 0-2 /HPF    Bacteria, UA None Seen None Seen, Trace /HPF    Squamous Epithelial Cells, UA None Seen None Seen, 0-2 /HPF    Hyaline Casts, UA 0-6 0 - 6 /LPF    Methodology Automated Microscopy      Note: In addition to lab results from this visit, the labs listed above may include labs taken at another facility or during a different encounter within the last 24 hours. Please correlate lab times with ED admission and discharge times for further clarification of the services performed during this visit.    XR Chest 2 View   Preliminary Result   Cardiomegaly with mild pulmonary vasculature congestion and   trace bilateral pleural effusions most consistent with congestive heart   failure. Additionally there is mild interstitial edema superimposed on   chronic lung changes.       Similar scarring/fibrosis of the right upper lung unchanged from   09/15/2019.       DICTATED:   12/12/2019   EDITED/ls :   12/12/2019                 Vitals:    12/12/19 2200 12/12/19 2300 12/12/19 2358 12/13/19 0015   BP: 116/63 99/59 107/74    BP Location:   Right arm    Patient Position:   Lying    Pulse:  66 69    Resp:   18    Temp:   98.6 °F (37 °C)    TempSrc:   Oral    SpO2: 92% 97% 98%    Weight:    52.4 kg (115 lb 9.6 oz)   Height:         Medications   sodium chloride 0.9 % flush 10 mL (has no administration in time range)   acetaminophen (TYLENOL) tablet 650 mg (has no administration in time range)   atorvastatin (LIPITOR) tablet 80 mg (80 mg Oral Given 12/13/19 0128)    citalopram (CeleXA) tablet 20 mg (has no administration in time range)   dilTIAZem CD (CARDIZEM CD) 24 hr capsule 360 mg (has no administration in time range)   apixaban (ELIQUIS) tablet 5 mg (has no administration in time range)   ferrous sulfate tablet 325 mg (has no administration in time range)   levETIRAcetam (KEPPRA) tablet 125 mg (125 mg Oral Given 12/13/19 0128)   magnesium oxide (MAGOX) tablet 400 mg (has no administration in time range)   metoprolol succinate XL (TOPROL-XL) 24 hr tablet 25 mg (has no administration in time range)   mirtazapine (REMERON) tablet 7.5 mg (has no administration in time range)   famotidine (PEPCID) tablet 20 mg (has no administration in time range)   sodium chloride 0.9 % flush 10 mL (has no administration in time range)   sodium chloride 0.9 % flush 10 mL (has no administration in time range)   furosemide (LASIX) injection 40 mg (has no administration in time range)   furosemide (LASIX) injection 40 mg (40 mg Intravenous Given 12/12/19 2124)     ECG/EMG Results (last 24 hours)     Procedure Component Value Units Date/Time    ECG 12 Lead [[id number]] Collected:  12/12/19 1727     Updated:  12/12/19 1728    ECG 12 Lead [[id number]] Collected:  12/12/19 2011     Updated:  12/12/19 2011        ECG 12 Lead         ECG 12 Lead                           MDM  Number of Diagnoses or Management Options  Acute on chronic congestive heart failure, unspecified heart failure type (CMS/HCC): new and requires workup  Hypoxia: new and requires workup  Diagnosis management comments: Patient presents with a complaint of increased difficulty breathing.    Chest x-ray shows pulmonary vascular congestion.  Laboratory evaluation shows elevated BNP.    Patient does not have a history of oxygen dependence but oxygen saturations were identified to go down to 85% on room air.    Patient is afebrile with normal white blood cell count, no infectious process  identified on chest x-ray.    I discussed the  patient with the hospitalist who will admit for diuresis.  IV Lasix initiated.       Amount and/or Complexity of Data Reviewed  Clinical lab tests: ordered and reviewed  Tests in the radiology section of CPT®: ordered and reviewed  Decide to obtain previous medical records or to obtain history from someone other than the patient: yes  Obtain history from someone other than the patient: yes  Review and summarize past medical records: yes  Discuss the patient with other providers: yes  Independent visualization of images, tracings, or specimens: yes        Final diagnoses:   Acute on chronic congestive heart failure, unspecified heart failure type (CMS/HCC)   Hypoxia       Documentation assistance provided by derian Santos.  Information recorded by the scribe was done at my direction and has been verified and validated by me.     Roger Santos  12/12/19 1875       Freda Red MD  12/13/19 9747

## 2019-12-13 NOTE — PROGRESS NOTES
Clinical Nutrition   Nutrition Assessment  Reason for Visit:   BMI    Patient Name: Mirian Sy  YOB: 1940  MRN: [id number]  Date of Encounter: 12/13/19 10:23 AM  Admission date: 12/12/2019    Comments: Pt triggered for  with low BMI. Per EMR- pt has not experienced any significant weight loss recently. No report of decreased appetite/PO intake. RD to follow-up as appropriate.     Last 15 Recorded Weights   View Complete Flowsheet   Weight Weight (kg) Weight (lbs) Weight Method VISIT REPORT   12/13/2019 50.259 kg 110 lb 12.8 oz - -   12/13/2019 52.436 kg 115 lb 9.6 oz - -   12/12/2019 47.628 kg 105 lb Stated -   10/23/2019 47.174 kg 104 lb - Report   9/23/2019 47.174 kg 104 lb - Report   9/15/2019 49.442 kg 109 lb Standing scale -   9/15/2019 45.813 kg 101 lb Stated -   9/12/2019 45.813 kg 101 lb - Report   6/28/2019 47.174 kg 104 lb - Report   5/9/2019 45.4 kg 100 lb 1.4 oz - -   5/7/2019 45.36 kg 100 lb Stated -   3/19/2019 48.535 kg 107 lb - Report   12/21/2018 48.535 kg 107 lb - Report   12/18/2018 48.081 kg 106 lb Standing scale -   12/18/2018 47.628 kg 105 lb Stated -     Monitoring/Evaluation:   Per protocol, PO intake, Weight, Symptoms    Will Continue to follow per protocol    Lizz Escobar RD  Time Spent:15 minutes

## 2019-12-13 NOTE — PLAN OF CARE
Problem: Patient Care Overview  Goal: Plan of Care Review  Outcome: Ongoing (interventions implemented as appropriate)  Flowsheets  Taken 12/13/2019 0612  Progress: improving  Taken 12/12/2019 5800  Plan of Care Reviewed With: patient     Problem: Patient Care Overview  Goal: Individualization and Mutuality  Outcome: Ongoing (interventions implemented as appropriate)     Problem: Patient Care Overview  Goal: Discharge Needs Assessment  Outcome: Ongoing (interventions implemented as appropriate)     Problem: Patient Care Overview  Goal: Interprofessional Rounds/Family Conf  Outcome: Ongoing (interventions implemented as appropriate)     Problem: Skin Injury Risk (Adult)  Goal: Identify Related Risk Factors and Signs and Symptoms  Outcome: Ongoing (interventions implemented as appropriate)  Flowsheets (Taken 12/13/2019 0612)  Related Risk Factors (Skin Injury Risk): advanced age; body weight extremes; medical devices; mobility impaired; nutritional deficiencies  Note:   Pt appears to be under weight with bony prominences. Pt is on Bipap for oxygen desaturations. Pt has not been ambulated this evening r/t generalized weakness.      Problem: Skin Injury Risk (Adult)  Goal: Skin Health and Integrity  Outcome: Ongoing (interventions implemented as appropriate)  Flowsheets (Taken 12/13/2019 0612)  Skin Health and Integrity: making progress toward outcome     Problem: Fall Risk (Adult)  Goal: Identify Related Risk Factors and Signs and Symptoms  Outcome: Ongoing (interventions implemented as appropriate)  Flowsheets (Taken 12/13/2019 0612)  Related Risk Factors (Fall Risk): age-related changes; bladder function altered; gait/mobility problems  Note:   Pt has generalized weakness, has oxygen desaturations without Bipap, and is receiving IV diuresis.      Problem: Fall Risk (Adult)  Goal: Absence of Fall  Outcome: Ongoing (interventions implemented as appropriate)  Flowsheets (Taken 12/13/2019 0612)  Absence of Fall: making  progress toward outcome     Problem: Arrhythmia/Dysrhythmia (Symptomatic) (Adult)  Goal: Signs and Symptoms of Listed Potential Problems Will be Absent, Minimized or Managed (Arrhythmia/Dysrhythmia)  Outcome: Ongoing (interventions implemented as appropriate)  Flowsheets (Taken 12/13/2019 0612)  Problems Assessed (Arrhythmia/Dysrhythmia): all  Problems Present (Dysrhythmia): electrophysiologic conduction defect  Note:   Patient continues to be in afib, with rates dropping as low as the 40's. Patient had oxygen desaturations into the low 80's on 6L NC and was placed back on bipap and maintained O2 sats in the 90's. Patient received IV diuresis. Pt is has been unable to ambulate to the bathroom r/t generalized weakness, uses bedpan.

## 2019-12-13 NOTE — PLAN OF CARE
Pt in NAD, VSS, will be discharged tomorrow due to no family transport today. Pt ambulatory to bathroom with walker and on RA, SpO2 91%.

## 2019-12-13 NOTE — H&P
"    [facility] Medicine Services  Clinical Decision Unit  HISTORY & PHYSICAL    Patient Name: Mirian Sy  : 1940  MRN: [id number]  Primary Care Physician: Oren Engel DO  Date of admission: 2019  6:36 PM      Subjective   Subjective     Chief Complaint:  Weakness and fatigue     HPI:  Mirian Sy is a 79 y.o. female w/ a hx of chronic atrial fibrillation on Eliquis, HTN, HLD, GERD and seizures (on Keppra) who presented to the ED w/ c/o generalized weakness and fatigue.  Pt reports that she has \"felt poorly\" all day. She c/o weakness and fatigue but has no specific complaints. She reports that she felt at baseline yesterday w/ no complaints. She described that she felt as if she is in afib but denies chest pain/pressure, palpitations, dyspnea.   Pt denies fever/chills, dyspnea, cough, N/V/D, abdominal pain, dysuria, edema, syncope, confusion, orthopnea.   Pt's son did have the flu several weeks ago but denies recent sick contacts.  Pt evaluated in the ED. CXR c/w acute CHF, proBNP 3,101. Pt maintaining saturations on room air but placed on BiPAP and given IV Lasix for diuresis. Pt admitted to the hospital medicine service for further evalaution.       Review of Systems   Constitutional: Positive for fatigue. Negative for chills and fever.        Fatigue and generalized weakness x 1 day.    HENT: Negative.    Eyes: Negative.    Respiratory: Negative.    Cardiovascular: Negative.    Gastrointestinal: Negative.    Endocrine: Negative.    Genitourinary: Negative.    Musculoskeletal: Negative.    Skin: Negative.    Allergic/Immunologic: Negative.    Neurological: Negative.    Hematological: Negative.    Psychiatric/Behavioral: Negative.    All other systems reviewed and are negative.       Personal History     Past Medical History:   Diagnosis Date   • Atrial fibrillation (CMS/HCC)     Chronic anticoagulation and rate control   • Basal cell carcinoma     " Left leg and nose   • Cardiomyopathy (CMS/[facility]) 2006   • Cerebrovascular accident (CMS/[facility]) 01/2005    CT right parietal CVA. Carotid duplex -50% to 79% left ICS 15% right ICS stenosis. MRI of the neck showed no significant carotid bifurcations of these. Negative CT of the head, 09/10/2012. Carotid duplex, 02/24/2014:  Shelf-like plaque in the CECE without significant elevation and velocities.  Patent vertebral arteries.   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    With dilated cardiomyopathy   • Decubitus ulcer of buttock 2014    Transient during fracture rehabilitation   • Depression 2002    Good response to citalopram and mirtazapine   • DVT (deep venous thrombosis) (CMS/[facility]) 2014    Superficial - right lesser saphenus vein - after hip fracture    • Fracture of bone of forefoot 1972    Right foot   • Fracture of ilium, left (CMS/[facility]) 05/2018    Accidental fall - Uncomplicated recovery   • GERD (gastroesophageal reflux disease) 2014    Chronic superficial gastritis   • HTN (hypertension) 2005   • Iron deficiency anemia due to chronic blood loss 2018    Predisposed by chronic anticoagulation and GERD   • Low body weight due to inadequate caloric intake Adulthood   • Mitral valve prolapse 1997    Mild MR   • Osteoarthritis    • Osteoporosis    • Patent foramen ovale 2005   • Pneumonia 1947   • SCC (squamous cell carcinoma), arm, right 2017   • Scoliosis    • Syncope 2014     undermined cause - [facility]   • Varicose veins 2005    Symptomatic       Past Surgical History:   Procedure Laterality Date   • ACHILLES TENDON SURGERY Left 1997    Repair of rupture left tendon with screws   • BREAST CYST EXCISION Left    • BREAST SURGERY Left 1982    Excision benign cyst   • CARDIAC CATHETERIZATION  2006    Severe acute coronary spasm   • CATARACT EXTRACTION WITH INTRAOCULAR LENS IMPLANT Bilateral 2015   • EYE CAPSULOTOMY WITH LASER Left 2016    Marked visual improvement   • FEMUR  FRACTURE SURGERY Right 2015    Repair of shaft fracture   • HIP FRACTURE SURGERY Left 2014    left hip fracture with pin   • LUMBAR DISC SURGERY  1983   • OVARIAN CYST REMOVAL Left 1996   • SKIN CANCER EXCISION Right 2017    SCC - arm       Family History: family history includes Allergies in her son; Arrhythmia in her sister; Basal cell carcinoma in her sister; Bone cancer in her sister; Breast cancer in her mother; Breast cancer (age of onset: 57) in her sister; Breast cancer (age of onset: 59) in her sister; Breast cancer (age of onset: 66) in her sister; Diabetes in her maternal uncle, mother, and sister; Heart disease in her father; Hypertension in her mother and sister; Melanoma in her sister; Other in her sister; Ovarian cancer in her sister; Stroke in her sister. Otherwise pertinent FHx was reviewed and unremarkable.     Social History:  reports that she has quit smoking. Her smoking use included cigarettes. She has a 20.00 pack-year smoking history. She has never used smokeless tobacco. She reports that she does not drink alcohol or use drugs.  Social History     Social History Narrative    Domestic life   lives in private home alone - good support from local children        Hoahaoism    Hoahaoism        Sleep hygiene  in bed 9 PM to 6 AM for 9 hours of sleep        Caffeine use   1 1/2 cups coffee daily        Exercise habits  walks most days of the week. - Stretching each morning.          Diet   well-balanced diet with protein supplementations        Occupation    retired         Hearing  no impairment        Vision    no glasses needed after cataract surgery.          Driving   daytime only - good weather - local traffic       Medications:  Available home medication information reviewed.    (Not in a hospital admission)    Allergies   Allergen Reactions   • Actonel [Risedronate Sodium] GI Intolerance   • Hctz [Hydrochlorothiazide]      hyponatremia   • Lisinopril      hyperkalemia        Objective   Objective     Vital Signs:   Temp:  [98.2 °F (36.8 °C)] 98.2 °F (36.8 °C)  Heart Rate:  [61-78] 72  Resp:  [14-18] 18  BP: (110-123)/(61-79) 123/79        Physical Exam   Constitutional: She is oriented to person, place, and time. She appears well-developed and well-nourished. No distress.   HENT:   Head: Normocephalic and atraumatic.   Eyes: Pupils are equal, round, and reactive to light. EOM are normal.   Neck: Normal range of motion. Neck supple.   Cardiovascular: Normal rate, normal heart sounds and intact distal pulses. An irregular rhythm present. Exam reveals no gallop and no friction rub.   No murmur heard.  Trace edema BLE.    Pulmonary/Chest: Effort normal. No stridor. No respiratory distress. She has no wheezes. She has rales. She exhibits no tenderness.   Faint rales bilateral lower bases.    Abdominal: Soft. Bowel sounds are normal. She exhibits no distension. There is no tenderness. There is no guarding.   Musculoskeletal: Normal range of motion. She exhibits no edema, tenderness or deformity.   Neurological: She is alert and oriented to person, place, and time. No cranial nerve deficit.   Skin: Skin is warm and dry. Capillary refill takes 2 to 3 seconds. No rash noted. She is not diaphoretic. No erythema. No pallor.   Psychiatric: She has a normal mood and affect. Her behavior is normal. Judgment and thought content normal.   Nursing note and vitals reviewed.       Results Reviewed:    CXR- reviewed  proBNP 3,101  CR 1.36, GFR 38      Assessment/Plan   Assessment / Plan     Active Hospital Problems    Diagnosis POA   • **Acute exacerbation of CHF (congestive heart failure) (CMS/[facility]) [I50.9] Yes   • CKD (chronic kidney disease) stage 3, GFR 30-59 ml/min (CMS/HCC) [N18.3] Yes   • Chronic atrial fibrillation [I48.20] Yes      79 y.o. female w/ a hx of chronic atrial fibrillation on Eliquis, HTN, HLD, GERD and seizures (on Keppra) who presented to the ED w/ c/o generalized weakness and  fatigue. CXR c/w acute CHF, proBNP 3,101. Pt maintaining saturations on room air but placed on BiPAP and given IV Lasix for diuresis. Pt admitted to the hospital medicine service for further evalaution.       Assessment & Plan    **Acute CHF Exacerbation   -(c/o weakness and fatigue- obtaining influenza PCR and UA; both pending)   -ECHO done 5/2019; EF 60%; will not repeat at this time   -CXR reviewed  -proBNP 3,101  -s/p Lasix 40 mg IV x 1 given in ED; repeat dose ordered for 5am; additional doses per pt assessment, etc (PRN bladder scans)  -not on routine diuretic at home  -placed on BiPAP in ED for comfort and diuresis; but not hypoxic; will keep on BiPAP overnight and d/c in am and reassess  -daily weights, strict I/Os    **CKD Stage III  -previous CR 1.05 and 1.44 in 9/2019   -@ baseline  -monitor closely in light of IV diuresis; repeat BMP in am  -baseline UA pending     **Chronic atrial fibrillation   -EKG reviewed, rate controlled   -routine Cardizem and Toprol (w/ hold parameters) continued   -routine Eliquis continued   -continuous telemetry       Admission Status:   I believe this patient meets OBSERVATION status, however if further evaluation or treatment plans warrant, status may change.  Based upon current information, I predict patient's care encounter to be less than or equal to 2 midnights.        The Discharge Blueprint (criteria for discharge):   1. Pt maintaining saturations of 92% or higher on room air.  2. Tolerating oral diet w/ adequate output.  3. Stable creatinine/remains at baseline.     Electronically signed by GREG Singh, 12/12/19, 10:12 PM.

## 2019-12-13 NOTE — PROGRESS NOTES
[facility] Medicine Services  Clinical Decision Unit  PROGRESS NOTE    Patient Name: Mirian Sy  : 1940  MRN: [id number]    Admission Date and Time: 2019  6:36 PM  Primary Care Physician: Oren Engel DO    Subjective   Subjective     CC:  Dyspnea/shortness of air    HPI:  Patient seen this morning sitting on side of bed, states she feels well.  On room air without pain, dyspnea.  Eating well and voiding well.  States she still feels slightly weak    Review of Systems  Gen- No fevers, chills  CV- No chest pain, palpitations  Resp- No cough, dyspnea  GI- No N/V/D, abd pain        Objective   Objective     Vital Signs:   Temp:  [96.8 °F (36 °C)-98.6 °F (37 °C)] 96.8 °F (36 °C)  Heart Rate:  [59-78] 59  Resp:  [14-18] 18  BP: ()/(59-79) 108/64        Physical Exam:  Constitutional: No acute distress, awake, alert, sitting on side of bed  HENT: NCAT, mucous membranes moist  Respiratory: Clear to auscultation bilaterally, respiratory effort normal room air  Cardiovascular: RRR, no murmurs, rubs, or gallops, palpable pedal pulses bilaterally  Gastrointestinal: Positive bowel sounds, soft, nontender, nondistended  Musculoskeletal: No bilateral ankle edema  Psychiatric: Appropriate affect, cooperative  Neurologic: Oriented x 3, strength symmetric in all extremities, Cranial Nerves grossly intact to confrontation, speech clear  Skin: No rashes      Results Reviewed:  Troponin negative  Creatinine 1.47    Assessment/Plan   Assessment / Plan     Active Hospital Problems    Diagnosis  POA   • **Acute exacerbation of CHF (congestive heart failure) (CMS/[facility]) [I50.9]  Yes   • CKD (chronic kidney disease) stage 3, GFR 30-59 ml/min (CMS/[facility]) [N18.3]  Yes   • Chronic atrial fibrillation [I48.20]  Yes      Resolved Hospital Problems   No resolved problems to display.   79-year-old female with history of atrial fibrillation, chronic kidney disease and CHF presents with acute  CHF exacerbation with worsening dyspnea on exertion/shortness of air:     Plan:    **Acute CHF Exacerbation   -ECHO done 5/2019; EF 60%; will not repeat at this time   -CXR reviewed  -proBNP 3,101  -s/p Lasix 40 mg IV x 2  given in ED; with adequate output  -not on routine diuretic at home.  Good response here  -Currently off BiPAP and supplemental oxygen BiPAP   -daily weights, strict I/Os  -We will ask CHF navigator to see prior to discharge     **CKD Stage III  -previous CR 1.05 and 1.44 in 9/2019   -@ baseline  -Stable with IV diuresis     **Chronic atrial fibrillation   -EKG reviewed, rate controlled   -routine Cardizem and Toprol (w/ hold parameters) continued   -routine Eliquis continued   -continuous telemetry    Weakness  PT/OT evaluation    The Discharge Blueprint (criteria for discharge):   1. Pt maintaining saturations of 92% or higher on room air.  2. Tolerating oral diet w/ adequate output.  3. Stable creatinine/remains at baseline.  4. Possible discharge home later today     Electronically signed by GREG Redman, 12/13/19, 11:44 AM.

## 2019-12-13 NOTE — PLAN OF CARE
Problem: Patient Care Overview  Goal: Plan of Care Review  Flowsheets (Taken 12/13/2019 1413)  Plan of Care Reviewed With: patient  Outcome Summary: PT eval completed. Pt presents with weakness, difficulty walking, and decreased functional mobility independence per PLOF. Pt performed stand pivot t/f with Kathleen, ambulated in hallway with rollator with CGA. VSS. PT recommends d/c home with HHPT due to patient lives alone.

## 2019-12-13 NOTE — THERAPY EVALUATION
Patient Name: Mirian Sy  : 1940    MRN: [id number]                              Today's Date: 2019       Admit Date: 2019    Visit Dx:     ICD-10-CM ICD-9-CM   1. Acute on chronic congestive heart failure, unspecified heart failure type (CMS/HCC) I50.9 428.0   2. Hypoxia R09.02 799.02   3. Impaired mobility and activities of daily living Z74.09 799.89   4. Acute on chronic diastolic congestive heart failure (CMS/HCC) I50.33 428.33     428.0   5. Impaired functional mobility, balance, gait, and endurance Z74.09 V49.89     Patient Active Problem List   Diagnosis   • Abnormal gait   • Takotsubo syndrome   • Carpal tunnel syndrome   • Complex partial epilepsy (CMS/[facility])   • Depression   • Chronic gastritis   • HLD (hyperlipidemia)   • Hypertension   • Hyponatremia   • Nutritional anemia   • Dyssomnia   • Diplopia   • Xeroderma   • Preventative health care   • Frailty   • Tricuspid regurgitation   • Cerebrovascular accident (CMS/[facility])   • Syncope   • DVT (deep venous thrombosis) (CMS/[facility])   • Anorexia   • Anxiety   • Patent foramen ovale   • Mitral regurgitation   • Low weight   • Senile osteoporosis   • Iron deficiency   • Chronic atrial fibrillation   • Closed fracture of left ilium (CMS/[facility])   • GERD (gastroesophageal reflux disease)   • Fall at home   • Vertigo   • Internuclear ophthalmoplegia, right eye   • Atrial fibrillation with RVR (CMS/[facility])   • Bradycardia   • Chronic atrial fibrillation   • SABRINA (acute kidney injury) (CMS/[facility])   • Hyperkalemia   • Confusion   • Acute exacerbation of CHF (congestive heart failure) (CMS/[facility])   • CKD (chronic kidney disease) stage 3, GFR 30-59 ml/min (CMS/[facility])     Past Medical History:   Diagnosis Date   • Atrial fibrillation (CMS/HCC)     Chronic anticoagulation and rate control   • Basal cell carcinoma     Left leg and nose   • Cardiomyopathy (CMS/HCC)    • Cerebrovascular accident (CMS/[facility]) 2005    CT right parietal CVA. Carotid duplex  -50% to 79% left ICS 15% right ICS stenosis. MRI of the neck showed no significant carotid bifurcations of these. Negative CT of the head, 09/10/2012. Carotid duplex, 02/24/2014:  Shelf-like plaque in the CECE without significant elevation and velocities.  Patent vertebral arteries.   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    With dilated cardiomyopathy   • Decubitus ulcer of buttock 2014    Transient during fracture rehabilitation   • Depression 2002    Good response to citalopram and mirtazapine   • DVT (deep venous thrombosis) (CMS/[facility]) 2014    Superficial - right lesser saphenus vein - after hip fracture    • Fracture of bone of forefoot 1972    Right foot   • Fracture of ilium, left (CMS/[facility]) 05/2018    Accidental fall - Uncomplicated recovery   • GERD (gastroesophageal reflux disease) 2014    Chronic superficial gastritis   • HTN (hypertension) 2005   • Iron deficiency anemia due to chronic blood loss 2018    Predisposed by chronic anticoagulation and GERD   • Low body weight due to inadequate caloric intake Adulthood   • Mitral valve prolapse 1997    Mild MR   • Osteoarthritis    • Osteoporosis    • Patent foramen ovale 2005   • Pneumonia 1947   • SCC (squamous cell carcinoma), arm, right 2017   • Scoliosis    • Syncope 2014     undermined cause - [facility]   • Varicose veins 2005    Symptomatic     Past Surgical History:   Procedure Laterality Date   • ACHILLES TENDON SURGERY Left 1997    Repair of rupture left tendon with screws   • BREAST CYST EXCISION Left    • BREAST SURGERY Left 1982    Excision benign cyst   • CARDIAC CATHETERIZATION  2006    Severe acute coronary spasm   • CATARACT EXTRACTION WITH INTRAOCULAR LENS IMPLANT Bilateral 2015   • EYE CAPSULOTOMY WITH LASER Left 2016    Marked visual improvement   • FEMUR FRACTURE SURGERY Right 2015    Repair of shaft fracture   • HIP FRACTURE SURGERY Left 2014    left hip fracture with pin   • LUMBAR DISC SURGERY   1983   • OVARIAN CYST REMOVAL Left 1996   • SKIN CANCER EXCISION Right 2017    SCC - arm     General Information     [facility] Name 12/13/19 1327          PT Evaluation Time/Intention    Document Type  evaluation  -     Mode of Treatment  individual therapy  -[facility] Name 12/13/19 1327          General Information    Patient Profile Reviewed?  yes  -     Prior Level of Function  independent:;all household mobility;community mobility;gait;transfer;bed mobility;ADL's  -     Existing Precautions/Restrictions  fall  -     Barriers to Rehab  none identified  -[facility] Name 12/13/19 1327          Relationship/Environment    Lives With  alone  -[facility] Name 12/13/19 1327          Resource/Environmental Concerns    Current Living Arrangements  home/apartment/condo  -[facility] Name 12/13/19 1327          Home Main Entrance    Number of Stairs, Main Entrance  none ramp  -[facility] Name 12/13/19 1327          Stairs Within Home, Primary    Number of Stairs, Within Home, Primary  none  -[facility] Name 12/13/19 1327          Cognitive Assessment/Intervention- PT/OT    Orientation Status (Cognition)  oriented x 3  -[facility] Name 12/13/19 1327          Safety Issues, Functional Mobility    Safety Issues Affecting Function (Mobility)  insight into deficits/self awareness;awareness of need for assistance  -     Impairments Affecting Function (Mobility)  endurance/activity tolerance;strength  -       User Key  (r) = Recorded By, (t) = Taken By, (c) = Cosigned By    Initials Name Provider Type    SJ Lavonne Saravia, PT Physical Therapist        Mobility     Row Name 12/13/19 1410          Bed Mobility Assessment/Treatment    Bed Mobility Assessment/Treatment  supine-sit  -SJ     Supine-Sit [place] (Bed Mobility)  conditional independence  -     Assistive Device (Bed Mobility)  bed rails;head of bed elevated  -     Comment (Bed Mobility)  good safety  -     Row Name 12/13/19 1410          Transfer  Assessment/Treatment    Comment (Transfers)  cues for safety and use of brakes on rollator  -SJ     Row Name 12/13/19 1410          Bed-Chair Transfer    Bed-Chair [place] (Transfers)  minimum assist (75% patient effort);1 person assist  -SJ     Row Name 12/13/19 1410          Sit-Stand Transfer    Sit-Stand [place] (Transfers)  contact guard;1 person assist  -SJ     Assistive Device (Sit-Stand Transfers)  walker, 4-wheeled  -SJ     Row Name 12/13/19 1410          Gait/Stairs Assessment/Training    Gait/Stairs Assessment/Training  distance ambulated  -SJ     [place] Level (Gait)  contact guard;1 person assist  -SJ     Assistive Device (Gait)  walker, 4-wheeled  -SJ     Distance in Feet (Gait)  200  -SJ     Pattern (Gait)  step-through  -SJ     Deviations/Abnormal Patterns (Gait)  bilateral deviations;base of support, narrow;gait speed decreased;stride length decreased;el decreased  -SJ     Bilateral Gait Deviations  heel strike decreased;knee buckling, bilateral  -SJ     Comment (Gait/Stairs)  Pt ambulates with pre-existing gait abnormality, with bilat knee flex throughout. No LOB, VSS.   -SJ       User Key  (r) = Recorded By, (t) = Taken By, (c) = Cosigned By    Initials Name Provider Type    Lavonne Meza, PT Physical Therapist        Obj/Interventions     Row Name 12/13/19 1413          General ROM    GENERAL ROM COMMENTS  BLE's WFL  -SJ     Row Name 12/13/19 1413          MMT (Manual Muscle Testing)    General MMT Comments  BLE's 4+/5  -SJ     Row Name 12/13/19 1413          Static Sitting Balance    Level of [place] (Unsupported Sitting, Static Balance)  supervision  -SJ     Sitting Position (Unsupported Sitting, Static Balance)  sitting on edge of bed  -SJ     Row Name 12/13/19 1413          Dynamic Sitting Balance    Level of [place], Reaches Outside Midline (Sitting, Dynamic Balance)  contact guard assist  -SJ     Sitting Position, Reaches Outside Midline (Sitting, Dynamic  Balance)  sitting on edge of bed  -SJ     Row Name 12/13/19 1413          Static Standing Balance    Level of [place] (Supported Standing, Static Balance)  standby assist  -SJ     Assistive Device Utilized (Supported Standing, Static Balance)  walker, rolling  -SJ       User Key  (r) = Recorded By, (t) = Taken By, (c) = Cosigned By    Initials Name Provider Type    Lavonne Meza PT Physical Therapist        Goals/Plan     Row Name 12/13/19 1416          Transfer Goal 1 (PT)    Activity/Assistive Device (Transfer Goal 1, PT)  sit-to-stand/stand-to-sit;bed-to-chair/chair-to-bed  -SJ     [place] Level/Cues Needed (Transfer Goal 1, PT)  conditional independence  -SJ     Time Frame (Transfer Goal 1, PT)  long term goal (LTG);1 week  -SJ     [facility] Name 12/13/19 1416          Gait Training Goal 1 (PT)    Activity/Assistive Device (Gait Training Goal 1, PT)  walker, rolling;improve balance and speed  -SJ     [place] Level (Gait Training Goal 1, PT)  conditional independence  -SJ     Distance (Gait Goal 1, PT)  400  -SJ     Time Frame (Gait Training Goal 1, PT)  long term goal (LTG);1 week  -SJ       User Key  (r) = Recorded By, (t) = Taken By, (c) = Cosigned By    Initials Name Provider Type    Lavonne Meza PT Physical Therapist        Clinical Impression     [facility] Name 12/13/19 1413          Pain Assessment    Additional Documentation  Pain Scale: Numbers Pre/Post-Treatment (Group)  -[facility] 12/13/19 1413          Pain Scale: Numbers Pre/Post-Treatment    Pain Scale: Numbers, Pretreatment  0/10 - no pain  -     Pain Scale: Numbers, Post-Treatment  0/10 - no pain  -SJ     [facility] Name 12/13/19 1413          Plan of Care Review    Plan of Care Reviewed With  patient  -     Outcome Summary  PT eval completed. Pt presents with weakness, difficulty walking, and decreased functional mobility independence per PLOF. Pt performed stand pivot t/f with Kathleen, ambulated in hallway with rollator with  CGA. VSS. PT recommends d/c home with HHPT due to patient lives alone.   -     Row Name 12/13/19 1413          Physical Therapy Clinical Impression    Patient/Family Goals Statement (PT Clinical Impression)  return to home  -     Criteria for Skilled Interventions Met (PT Clinical Impression)  yes;treatment indicated  -SJ     Rehab Potential (PT Clinical Summary)  good, to achieve stated therapy goals  -     Predicted Duration of Therapy (PT)  1wk  -     Row Name 12/13/19 1413          Vital Signs    Pre Systolic BP Rehab  168  -SJ     Pre Treatment Diastolic BP  64  -SJ     Pretreatment Heart Rate (beats/min)  68  -SJ     Post SpO2 (%)  90  -SJ     O2 Delivery Post Treatment  room air  -     Row Name 12/13/19 1413          Positioning and Restraints    Pre-Treatment Position  in bed  -     Post Treatment Position  chair  -SJ     In Chair  notified nsg;reclined;call light within reach;encouraged to call for assist;heels elevated  -       User Key  (r) = Recorded By, (t) = Taken By, (c) = Cosigned By    Initials Name Provider Type    Lavonne Meza PT Physical Therapist        Outcome Measures     Row Name 12/13/19 1418          How much help from another person do you currently need...    Turning from your back to your side while in flat bed without using bedrails?  4  -SJ     Moving from lying on back to sitting on the side of a flat bed without bedrails?  4  -SJ     Moving to and from a bed to a chair (including a wheelchair)?  3  -SJ     Standing up from a chair using your arms (e.g., wheelchair, bedside chair)?  4  -SJ     Climbing 3-5 steps with a railing?  3  -SJ     To walk in hospital room?  3  -SJ     AM-PAC 6 Clicks Score (PT)  21  -     Row Name 12/13/19 1418          Functional Assessment    Outcome Measure Options  AM-PAC 6 Clicks Basic Mobility (PT)  -       User Key  (r) = Recorded By, (t) = Taken By, (c) = Cosigned By    Initials Name Provider Type    Lavonne Meza PT  Physical Therapist          PT Recommendation and Plan  Planned Therapy Interventions (PT Eval): balance training, bed mobility training, gait training, home exercise program, patient/family education, strengthening, transfer training  Outcome Summary/Treatment Plan (PT)  Anticipated Discharge Disposition (PT): home with home health  Plan of Care Reviewed With: patient  Outcome Summary: PT eval completed. Pt presents with weakness, difficulty walking, and decreased functional mobility independence per PLOF. Pt performed stand pivot t/f with [place], ambulated in hallway with rollator with CGA. VSS. PT recommends d/c home with HHPT due to patient lives alone.      Time Calculation:   PT Charges     Row Name 12/13/19 1419             Time Calculation    Start Time  1327  -SJ      PT Received On  12/13/19  -      PT Goal Re-Cert Due Date  12/23/19  -        User Key  (r) = Recorded By, (t) = Taken By, (c) = Cosigned By    Initials Name Provider Type     Lavonne Saravia PT Physical Therapist        Therapy Charges for Today     Code Description Service Date Service Provider Modifiers Qty    [id number]  PT EVAL MOD COMPLEXITY 4 12/13/2019 Lavonne Saravia PT GP 1          PT G-Codes  Outcome Measure Options: AM-PAC 6 Clicks Basic Mobility (PT)  AM-PAC 6 Clicks Score (PT): 21    Lavonne Saravia PT  12/13/2019

## 2019-12-13 NOTE — PROGRESS NOTES
Discharge Planning Assessment  [facility]     Patient Name: Mirian Sy  MRN: [id number]  Today's Date: 12/13/2019    Admit Date: 12/12/2019    Discharge Needs Assessment     Row Name 12/13/19 1225       Living Environment    Lives With  alone Pt resides in [place] [facility]    Current Living Arrangements  home/apartment/condo    Primary Care Provided by  self    Provides Primary Care For  no one, unable/limited ability to care for self    Family Caregiver if Needed  child(tammy), adult;other (see comments);sibling(s)    Family Caregiver Names  paid caregiver, DIl Valery and sister- Lolly    Quality of Family Relationships  helpful;involved;supportive    Able to Return to Prior Arrangements  yes       Transition Planning    Patient/Family Anticipates Transition to  home with help/services    Patient/Family Anticipated Services at Transition  home health care    Transportation Anticipated  family or friend will provide       Discharge Needs Assessment    Readmission Within the Last 30 Days  no previous admission in last 30 days    Concerns to be Addressed  discharge planning    Equipment Currently Used at Home  rollator;shower chair    Anticipated Changes Related to Illness  none    Equipment Needed After Discharge  none    Outpatient/Agency/Support Group Needs  homecare agency    Discharge Facility/Level of Care Needs  home with home health    Provided post acute provider list?  Yes    Post Acute Provider Lists  Home Health    Post Acuite Provider List  Delivered    Delivered To  Patient    Method of Delivery  In person    Patient's Choice of Community Agency(s)  [facility]        Discharge Plan     Row Name 12/13/19 4386       Plan    Plan  home with [facility]    Patient/Family in Agreement with Plan  yes    Plan Comments  CM spoke with pt, daughter in law Rasmussen and sister Lolly at bedside. Pt resides alone in [facility]. Pt has a private caregiver 2 days per week from 8-12pm. Pt uses a  rollator for assistance and a shower chair. Pt requires assistance with house keeping and meals however is able to bathe and dress herself. Pt plans to return home and feels home health services for pt/ot would be beneficial. CM reviewed available agencies and [facility] CM contacted Beat[facility] at [facility] and referral made, CM will need to notify home health at time of discharge.  [facility] therapy services will see pt by Wednesday 12/18, pt and family notified and agreeable.     Final Discharge Disposition Code  06 - home with home health care        Destination      Coordination has not been started for this encounter.      Durable Medical Equipment      Coordination has not been started for this encounter.      Dialysis/Infusion      Coordination has not been started for this encounter.      Home Medical Care - Selection Complete      Service Provider Request Status Selected Services Address Phone Number Fax Number    [facility] Selected Home Health Services [address] [id number] [phone]      Therapy      Coordination has not been started for this encounter.      Community Resources      Coordination has not been started for this encounter.          Demographic Summary     Row Name 12/13/19 1223       General Information    Admission Type  observation    Required Notices Provided  Observation Status Notice    Referral Source  admission list    Reason for Consult  discharge planning    Preferred Language  English    General Information Comments  PCP- Oren Engel       Contact Information    Permission Granted to Share Info With          Functional Status     Row Name 12/13/19 1224       Functional Status    Usual Activity Tolerance  moderate    Current Activity Tolerance  fair       Functional Status, IADL    Medications  independent    Meal Preparation  assistive equipment and person     Housekeeping  assistive equipment and person    Laundry  assistive equipment and person    Shopping  assistive equipment and person       Employment/    Employment/ Comments  Pt confirms she has Medicare and [employer], denies concerns or disruption in coverage. Pt has prescription drug coverage and denies issues obtaining or affording current medications.         Psychosocial    No documentation.       Abuse/Neglect    No documentation.       Legal    No documentation.       Substance Abuse    No documentation.       Patient Forms    No documentation.           Aure Massey RN

## 2019-12-14 VITALS
SYSTOLIC BLOOD PRESSURE: 112 MMHG | OXYGEN SATURATION: 94 % | RESPIRATION RATE: 14 BRPM | BODY MASS INDEX: 18.83 KG/M2 | TEMPERATURE: 97.6 F | HEART RATE: 77 BPM | HEIGHT: 65 IN | DIASTOLIC BLOOD PRESSURE: 64 MMHG | WEIGHT: 113 LBS

## 2019-12-14 PROCEDURE — G0008 ADMIN INFLUENZA VIRUS VAC: HCPCS | Performed by: FAMILY MEDICINE

## 2019-12-14 PROCEDURE — G0378 HOSPITAL OBSERVATION PER HR: HCPCS

## 2019-12-14 PROCEDURE — 94799 UNLISTED PULMONARY SVC/PX: CPT

## 2019-12-14 PROCEDURE — 90686 IIV4 VACC NO PRSV 0.5 ML IM: CPT | Performed by: FAMILY MEDICINE

## 2019-12-14 PROCEDURE — 99217 PR OBSERVATION CARE DISCHARGE MANAGEMENT: CPT | Performed by: NURSE PRACTITIONER

## 2019-12-14 PROCEDURE — 94660 CPAP INITIATION&MGMT: CPT

## 2019-12-14 PROCEDURE — 25010000002 INFLUENZA VAC SPLIT QUAD 0.5 ML SUSPENSION PREFILLED SYRINGE: Performed by: FAMILY MEDICINE

## 2019-12-14 RX ADMIN — LEVETIRACETAM 125 MG: 250 TABLET, FILM COATED ORAL at 09:00

## 2019-12-14 RX ADMIN — Medication 400 MG: at 09:00

## 2019-12-14 RX ADMIN — FAMOTIDINE 20 MG: 20 TABLET ORAL at 09:00

## 2019-12-14 RX ADMIN — METOPROLOL SUCCINATE 25 MG: 25 TABLET, EXTENDED RELEASE ORAL at 09:00

## 2019-12-14 RX ADMIN — SODIUM CHLORIDE, PRESERVATIVE FREE 10 ML: 5 INJECTION INTRAVENOUS at 09:01

## 2019-12-14 RX ADMIN — DILTIAZEM HYDROCHLORIDE 360 MG: 180 CAPSULE, COATED, EXTENDED RELEASE ORAL at 09:00

## 2019-12-14 RX ADMIN — FERROUS SULFATE TAB 325 MG (65 MG ELEMENTAL FE) 325 MG: 325 (65 FE) TAB at 09:00

## 2019-12-14 RX ADMIN — CITALOPRAM HYDROBROMIDE 20 MG: 20 TABLET ORAL at 09:00

## 2019-12-14 RX ADMIN — APIXABAN 5 MG: 5 TABLET, FILM COATED ORAL at 09:00

## 2019-12-14 RX ADMIN — INFLUENZA VIRUS VACCINE 0.5 ML: 15; 15; 15; 15 SUSPENSION INTRAMUSCULAR at 11:24

## 2019-12-14 NOTE — PROGRESS NOTES
Case Management Discharge Note      Final Note: Notified Aisha at [facility] of pt's DC today.    Provided post acute provider list?: Yes  Post Acute Provider Lists: Home Health  Post Acuite Provider List: Delivered  Delivered To: Patient  Method of Delivery: In person    Destination      No service has been selected for the patient.      Durable Medical Equipment      No service has been selected for the patient.      Dialysis/Infusion      No service has been selected for the patient.      Home Medical Care - Selection Complete      Service Provider Request Status Selected Services Address Phone Number Fax Number    [facility] Selected Home Health Services [address] [id number] [phone]      Therapy      No service has been selected for the patient.      Community Resources      No service has been selected for the patient.             Final Discharge Disposition Code: 06 - home with home health care

## 2019-12-14 NOTE — PLAN OF CARE
VS stable. No complaints of pain, nausea, or weakness. Pt on room air. Plan for DC in the morning.  Problem: Patient Care Overview  Goal: Plan of Care Review  Outcome: Ongoing (interventions implemented as appropriate)

## 2019-12-14 NOTE — DISCHARGE SUMMARY
[facility] Medicine Services  Clinical Decision Unit  DISCHARGE SUMMARY    Patient Name: Mirian Sy  : 1940  MRN: [id number]    Admission Date and Time: 2019  6:36 PM  Discharge Date and Time:  19    Primary Care Physician: Oren Engel DO    Hospital Course     Active Hospital Problems    Diagnosis  POA   • **Acute exacerbation of CHF (congestive heart failure) (CMS/Former[facility]) [I50.9]  Yes   • CKD (chronic kidney disease) stage 3, GFR 30-59 ml/min (CMS/Formerly [facility]) [N18.3]  Yes   • Chronic atrial fibrillation [I48.20]  Yes      Resolved Hospital Problems   No resolved problems to display.          Brief CDU Course:  Mirian Sy is a 79 y.o. female with history of atrial fibrillation, chronic kidney disease and CHF presents with acute CHF exacerbation with worsening dyspnea on exertion/shortness of air:       **Acute CHF Exacerbation   -ECHO done 2019; EF 60%  -CXR reviewed  -s/p Lasix 40 mg IV x 2  given in ED; with adequate output  -not on routine diuretic at home.    -Currently off BiPAP and supplemental oxygen BiPAP   -Has appointment at heart and vascular clinic on Monday, 2019 at 1:15 PM     **CKD Stage III  -previous CR 1.05 and 1.44 in 2019   -@ baseline with CR 1.47 today    **Chronic atrial fibrillation   -EKG reviewed, rate controlled   -routine Cardizem and Toprol continued   -routine Eliquis continued      Weakness  PT/OT evaluation completed and recommending home health PT at discharge--[facility] therapy services will see pt by 19     The Discharge Blueprint (criteria for discharge):   1. Pt maintaining saturations of 92% or higher on room air--met.  2. Tolerating oral diet w/ adequate output--met.  3. Stable creatinine/remains at baseline--met.          Key Discharge Recommendations:  -Follow-up with PCP in 1 week  -Follow-up with GREG Story at the heart and vascular clinic on 2019 at 1:15 PM    Discharge Evaluation     Vital  Signs:   Temp:  [97.4 °F (36.3 °C)-98.3 °F (36.8 °C)] 97.6 °F (36.4 °C)  Heart Rate:  [62-71] 67  Resp:  [14-18] 14  BP: ()/(64-78) 112/64     Physical Exam:  Constitutional: Awake, alert, resting in bed, no family present  Eyes: PERRLA, sclerae anicteric, no conjunctival injection  HENT: NCAT, mucous membranes moist  Neck: Supple, no thyromegaly, no lymphadenopathy, trachea midline  Respiratory: Clear to auscultation bilaterally, nonlabored respirations, oxygen saturation 92% on room air  Cardiovascular: RRR, no murmurs, rubs, or gallops, palpable pedal pulses bilaterally  Gastrointestinal: Positive bowel sounds, soft, nontender, nondistended  Musculoskeletal: No bilateral ankle edema, no clubbing or cyanosis to extremities, moves all extremities  Psychiatric: Appropriate affect, cooperative  Neurologic: Oriented x 3, strength symmetric in all extremities, Cranial Nerves grossly intact to confrontation, speech clear  Skin: No rashes     Discharge Medications and Follow-Up        Discharge Medications      Continue These Medications      Instructions Start Date   acetaminophen 325 MG tablet  Commonly known as:  TYLENOL   650 mg, Oral, Every 4 Hours PRN      atorvastatin 80 MG tablet  Commonly known as:  LIPITOR   80 mg, Oral, Nightly      calcium-vitamin D 500-200 MG-UNIT per tablet  Commonly known as:  OSCAL-500   1 tablet, Oral, Every Morning      CENTRUM SILVER 50+WOMEN PO   1 tablet, Oral, Daily      citalopram 20 MG tablet  Commonly known as:  CeleXA   TAKE 1 TABLET EVERY MORNING      dilTIAZem  MG 24 hr capsule  Commonly known as:  CARDIZEM CD   360 mg, Oral, Every 24 Hours Scheduled      ELIQUIS 5 MG tablet tablet  Generic drug:  apixaban   TAKE 1 TABLET EVERY 12 HOURS      ferrous sulfate 325 (65 FE) MG tablet   TAKE 1 TABLET DAILY WITH BREAKFAST      levETIRAcetam 250 MG tablet  Commonly known as:  KEPPRA   125 mg, Oral, Every 12 Hours      magnesium oxide 400 (241.3 Mg) MG tablet  tablet  Commonly known as:  MAGOX   400 mg, Oral, Daily      metoprolol succinate XL 25 MG 24 hr tablet  Commonly known as:  TOPROL-XL   25 mg, Oral, Daily      mirtazapine 7.5 MG tablet  Commonly known as:  REMERON   TAKE 1 TABLET AT BEDTIME      raNITIdine 150 MG capsule  Commonly known as:  ZANTAC   TAKE 1 CAPSULE EVERY NIGHT      VITAMIN D PO   1 tablet, Oral, Daily               Future Appointments   Date Time Provider Department Center   12/16/2019  1:15 PM Dinorah Hill APRN [facility]   1/6/2020 10:15 AM Oren Engel DO [facility] None   4/29/2020  3:30 PM Bri Olguin MD [facility] None           Time Spent on Discharge:  40 minutes    Electronically signed by GREG Joshi, 12/14/19, 7:45 AM.

## 2019-12-15 ENCOUNTER — READMISSION MANAGEMENT (OUTPATIENT)
Dept: CALL CENTER | Facility: HOSPITAL | Age: 79
End: 2019-12-15

## 2019-12-15 NOTE — OUTREACH NOTE
Prep Survey      Responses   Facility patient discharged from?  [facility]   Is patient eligible?  Yes   Discharge diagnosis  Acute exacerbation of CHF    Does the patient have one of the following disease processes/diagnoses(primary or secondary)?  CHF   Does the patient have Home health ordered?  Yes   What is the Home health agency?   [facility]   Is there a DME ordered?  No   Prep survey completed?  Yes          Kelly Suarez RN

## 2019-12-16 ENCOUNTER — TRANSITIONAL CARE MANAGEMENT TELEPHONE ENCOUNTER (OUTPATIENT)
Dept: FAMILY MEDICINE CLINIC | Facility: CLINIC | Age: 79
End: 2019-12-16

## 2019-12-16 ENCOUNTER — OFFICE VISIT (OUTPATIENT)
Dept: CARDIOLOGY | Facility: HOSPITAL | Age: 79
End: 2019-12-16

## 2019-12-16 VITALS
DIASTOLIC BLOOD PRESSURE: 62 MMHG | HEART RATE: 69 BPM | RESPIRATION RATE: 18 BRPM | SYSTOLIC BLOOD PRESSURE: 133 MMHG | BODY MASS INDEX: 17.33 KG/M2 | OXYGEN SATURATION: 94 % | WEIGHT: 104 LBS | TEMPERATURE: 98.6 F | HEIGHT: 65 IN

## 2019-12-16 DIAGNOSIS — I48.20 CHRONIC ATRIAL FIBRILLATION (HCC): ICD-10-CM

## 2019-12-16 DIAGNOSIS — I10 ESSENTIAL HYPERTENSION: ICD-10-CM

## 2019-12-16 DIAGNOSIS — I50.33 ACUTE ON CHRONIC DIASTOLIC CHF (CONGESTIVE HEART FAILURE) (HCC): Primary | ICD-10-CM

## 2019-12-16 DIAGNOSIS — E78.5 DYSLIPIDEMIA: ICD-10-CM

## 2019-12-16 PROCEDURE — 99214 OFFICE O/P EST MOD 30 MIN: CPT | Performed by: NURSE PRACTITIONER

## 2019-12-16 RX ORDER — FUROSEMIDE 20 MG/1
20 TABLET ORAL DAILY
Qty: 30 TABLET | Refills: 1 | Status: SHIPPED | OUTPATIENT
Start: 2019-12-16

## 2019-12-16 NOTE — PROGRESS NOTES
[facility]  Heart and Valve Center      Encounter Date:12/16/2019     Mirian Sy  [address] [facility] [address]  [unfilled]    1940    Oren Engel DO    Mirian Sy is a 79 y.o. female.      Subjective:     Chief Complaint:  Congestive Heart Failure and Establish Care       HPI 79-year-old female presents the office today for ongoing evaluation of her diastolic heart failure.She was hospitalized to [facility] 12/12/2019 to 12/14/2019 with heart failure exacerbation.  Echo from May 2019 showed an EF of 60%.  Patient was given 2 doses of IV Lasix 40 mg with adequate output.  She has a history of chronic kidney disease stage III.  Creatinine in the past 1.05, 1.44.  Discharge creatinine 1.47, baseline.  She has chronic atrial fibrillation and is currently rate controlled.  She is anticoagulated with Eliquis and denies any signs and symptoms of bleeding.  Notes ongoing weakness but reports energy level is slowly improving.  Notes ongoing pedal edema.  Notes occasional palpitations.  Denies chest pain, dyspnea, dizziness, presyncope, syncope.    Patient Active Problem List   Diagnosis   • Abnormal gait   • Takotsubo syndrome   • Carpal tunnel syndrome   • Complex partial epilepsy (CMS/HCC)   • Depression   • Chronic gastritis   • HLD (hyperlipidemia)   • Hypertension   • Hyponatremia   • Nutritional anemia   • Dyssomnia   • Diplopia   • Xeroderma   • Preventative health care   • Frailty   • Tricuspid regurgitation   • Cerebrovascular accident (CMS/HCC)   • Syncope   • DVT (deep venous thrombosis) (CMS/HCC)   • Anorexia   • Anxiety   • Patent foramen ovale   • Mitral regurgitation   • Low weight   • Senile osteoporosis   • Iron deficiency   • Chronic atrial fibrillation   • Closed fracture of left ilium (CMS/HCC)   • GERD (gastroesophageal reflux disease)   • Fall at home   • Vertigo   • Internuclear ophthalmoplegia, right eye   • Atrial fibrillation with RVR  (CMS/[facility])   • Bradycardia   • Chronic atrial fibrillation   • SABRINA (acute kidney injury) (CMS/Formerly [facility])   • Hyperkalemia   • Confusion   • Acute exacerbation of CHF (congestive heart failure) (CMS/[facility])   • CKD (chronic kidney disease) stage 3, GFR 30-59 ml/min (CMS/[facility])       Past Medical History:   Diagnosis Date   • Atrial fibrillation (CMS/[facility]) 2005    Chronic anticoagulation and rate control   • Basal cell carcinoma 2007    Left leg and nose   • Cardiomyopathy (CMS/HCC) 2006   • Cerebrovascular accident (CMS/HCC) 01/2005    CT right parietal CVA. Carotid duplex -50% to 79% left ICS 15% right ICS stenosis. MRI of the neck showed no significant carotid bifurcations of these. Negative CT of the head, 09/10/2012. Carotid duplex, 02/24/2014:  Shelf-like plaque in the CECE without significant elevation and velocities.  Patent vertebral arteries.   • Complex partial epilepsy ([facility]) 2014   • Coronary artery vasospasm (CMS/HCC) 2006    With dilated cardiomyopathy   • Decubitus ulcer of buttock 2014    Transient during fracture rehabilitation   • Depression 2002    Good response to citalopram and mirtazapine   • DVT (deep venous thrombosis) (CMS/[facility]) 2014    Superficial - right lesser saphenus vein - after hip fracture    • Fracture of bone of forefoot 1972    Right foot   • Fracture of ilium, left (CMS/[facility]) 05/2018    Accidental fall - Uncomplicated recovery   • GERD (gastroesophageal reflux disease) 2014    Chronic superficial gastritis   • HTN (hypertension) 2005   • Iron deficiency anemia due to chronic blood loss 2018    Predisposed by chronic anticoagulation and GERD   • Low body weight due to inadequate caloric intake Adulthood   • Mitral valve prolapse 1997    Mild MR   • Osteoarthritis    • Osteoporosis    • Patent foramen ovale 2005   • Pneumonia 1947   • SCC (squamous cell carcinoma), arm, right 2017   • Scoliosis    • Syncope 2014     undermined cause - [facility]   • Varicose veins 2005    Symptomatic       Past  Surgical History:   Procedure Laterality Date   • ACHILLES TENDON SURGERY Left     Repair of rupture left tendon with screws   • BREAST CYST EXCISION Left    • BREAST SURGERY Left     Excision benign cyst   • CARDIAC CATHETERIZATION  2006    Severe acute coronary spasm   • CATARACT EXTRACTION WITH INTRAOCULAR LENS IMPLANT Bilateral 2015   • EYE CAPSULOTOMY WITH LASER Left 2016    Marked visual improvement   • FEMUR FRACTURE SURGERY Right 2015    Repair of shaft fracture   • HIP FRACTURE SURGERY Left 2014    left hip fracture with pin   • LUMBAR DISC SURGERY  1983   • OVARIAN CYST REMOVAL Left    • SKIN CANCER EXCISION Right 2017    SCC - arm       Family History   Problem Relation Age of Onset   • Breast cancer Mother          age 59   • Diabetes Mother    • Hypertension Mother    • Heart disease Father          age 80   • Other Sister         Arthrodesis cervical   • Basal cell carcinoma Sister    • Bone cancer Sister    • Breast cancer Sister 57   • Diabetes Sister          age 56   • Hypertension Sister    • Melanoma Sister    • Arrhythmia Sister         Sinus   • Stroke Sister    • Allergies Son         Hay fever   • Diabetes Maternal Uncle    • Breast cancer Sister 59   • Breast cancer Sister 66   • Ovarian cancer Sister         DX AGE UNKNOWN       Social History     Socioeconomic History   • Marital status:      Spouse name: Not on file   • Number of children: Not on file   • Years of education: Not on file   • Highest education level: Not on file   Tobacco Use   • Smoking status: Former Smoker     Packs/day: 1.00     Years: 20.00     Pack years: 20.00     Types: Cigarettes   • Smokeless tobacco: Never Used   • Tobacco comment: Remote history   Substance and Sexual Activity   • Alcohol use: No   • Drug use: No   • Sexual activity: Defer   Social History Narrative    Domestic life   lives in private home alone - good support from local children        Denominational    Mosque         Sleep hygiene  in bed 9 PM to 6 AM for 9 hours of sleep        Caffeine use   1 1/2 cups coffee daily        Exercise habits  walks most days of the week. - Stretching each morning.          Diet   well-balanced diet with protein supplementations        Occupation    retired         Hearing  no impairment        Vision    no glasses needed after cataract surgery.          Driving   daytime only - good weather - local traffic       Allergies   Allergen Reactions   • Actonel [Risedronate Sodium] GI Intolerance   • Hctz [Hydrochlorothiazide]      hyponatremia   • Lisinopril      hyperkalemia         Current Outpatient Medications:   •  acetaminophen (TYLENOL) 325 MG tablet, Take 2 tablets by mouth Every 4 (Four) Hours As Needed for Mild Pain ., Disp: , Rfl:   •  atorvastatin (LIPITOR) 80 MG tablet, Take 1 tablet by mouth Every Night., Disp: 90 tablet, Rfl: 0  •  calcium-vitamin D (OSCAL-500) 500-200 MG-UNIT per tablet, Take 1 tablet by mouth every morning., Disp: , Rfl:   •  Cholecalciferol (VITAMIN D PO), Take 1 tablet by mouth Daily., Disp: , Rfl:   •  citalopram (CeleXA) 20 MG tablet, TAKE 1 TABLET EVERY MORNING, Disp: 90 tablet, Rfl: 1  •  diltiaZEM CD (CARDIZEM CD) 360 MG 24 hr capsule, Take 1 capsule by mouth Daily., Disp: 30 capsule, Rfl: 5  •  ELIQUIS 5 MG tablet tablet, TAKE 1 TABLET EVERY 12 HOURS, Disp: 60 tablet, Rfl: 12  •  ferrous sulfate 325 (65 FE) MG tablet, TAKE 1 TABLET DAILY WITH BREAKFAST, Disp: 90 tablet, Rfl: 3  •  furosemide (LASIX) 20 MG tablet, Take 1 tablet by mouth Daily., Disp: 30 tablet, Rfl: 1  •  levETIRAcetam (KEPPRA) 250 MG tablet, Take 0.5 tablets by mouth Every 12 (Twelve) Hours., Disp: 180 tablet, Rfl: 1  •  magnesium oxide (MAGOX) 400 (241.3 Mg) MG tablet tablet, Take 400 mg by mouth Daily., Disp: , Rfl:   •  metoprolol succinate XL (TOPROL-XL) 25 MG 24 hr tablet, Take 1 tablet by mouth Daily., Disp: 90 tablet, Rfl: 3  •  mirtazapine (REMERON) 7.5 MG tablet, TAKE 1  TABLET AT BEDTIME, Disp: 90 tablet, Rfl: 4  •  Multiple Vitamins-Minerals (CENTRUM SILVER 50+WOMEN PO), Take 1 tablet by mouth Daily., Disp: , Rfl:   •  ranitidine (ZANTAC) 150 MG capsule, TAKE 1 CAPSULE EVERY NIGHT, Disp: 90 capsule, Rfl: 1    The following portions of the patient's history were reviewed today and updated as appropriate: allergies, current medications, past family history, past medical history, past social history, past surgical history and problem list     Review of Systems   Constitution: Positive for malaise/fatigue. Negative for chills, decreased appetite, diaphoresis, fever, night sweats, weight gain and weight loss.   HENT: Negative for congestion, hearing loss, hoarse voice and nosebleeds.    Eyes: Negative for blurred vision, visual disturbance and visual halos.   Cardiovascular: Positive for irregular heartbeat and leg swelling. Negative for chest pain, claudication, cyanosis, dyspnea on exertion, near-syncope, orthopnea, palpitations, paroxysmal nocturnal dyspnea and syncope.   Respiratory: Negative for cough, hemoptysis, shortness of breath, sleep disturbances due to breathing, snoring, sputum production and wheezing.    Endocrine: Positive for cold intolerance.   Hematologic/Lymphatic: Negative for bleeding problem. Bruises/bleeds easily.   Skin: Negative for dry skin, itching and rash.   Musculoskeletal: Negative for arthritis, falls, joint pain, joint swelling and myalgias.        Ambulates with a rolling walker   Gastrointestinal: Negative for bloating, abdominal pain, constipation, diarrhea, flatus, heartburn, hematemesis, hematochezia, melena, nausea and vomiting.   Genitourinary: Positive for frequency. Negative for dysuria, hematuria, nocturia and urgency.   Neurological: Negative for excessive daytime sleepiness, dizziness, headaches, light-headedness, loss of balance and weakness.   Psychiatric/Behavioral: Negative for depression. The patient does not have insomnia and is not  "nervous/anxious.        Objective:     Vitals:    12/16/19 1326 12/16/19 1328 12/16/19 1329   BP: 137/94 132/68 133/62   BP Location: Left arm Right arm Right arm   Patient Position: Sitting Sitting Standing   Cuff Size: Small Adult Small Adult Small Adult   Pulse: 71  69   Resp: 18     Temp: 98.6 °F (37 °C)     TempSrc: Temporal     SpO2: 94%  94%   Weight: 47.2 kg (104 lb)     Height: 165.1 cm (65\")       Body mass index is 17.31 kg/m².  Physical Exam   Constitutional: She is oriented to person, place, and time. She appears well-developed and well-nourished. She is active and cooperative. No distress.   HENT:   Head: Normocephalic and atraumatic.   Mouth/Throat: Oropharynx is clear and moist.   Eyes: Pupils are equal, round, and reactive to light. Conjunctivae and EOM are normal.   Neck: Normal range of motion. Neck supple. No JVD present. No tracheal deviation present. No thyromegaly present.   Cardiovascular: Normal rate, normal heart sounds and intact distal pulses. An irregularly irregular rhythm present.   Pulmonary/Chest: Effort normal and breath sounds normal.   Abdominal: Soft. Bowel sounds are normal. She exhibits no distension. There is no tenderness.   Musculoskeletal: Normal range of motion. She exhibits edema (1+ pitting edema noted BLES).   Neurological: She is alert and oriented to person, place, and time.   Skin: Skin is warm, dry and intact.   Psychiatric: She has a normal mood and affect. Her behavior is normal.   Nursing note and vitals reviewed.      Lab and Diagnostic Review:  Results for orders placed or performed during the hospital encounter of 12/12/19   Influenza A & B PCR - Swab, Nasopharynx   Result Value Ref Range    Influenza A PCR Not Detected Not Detected    Influenza B PCR Not Detected Not Detected   Comprehensive Metabolic Panel   Result Value Ref Range    Glucose 99 65 - 99 mg/dL    BUN 26 (H) 8 - 23 mg/dL    Creatinine 1.36 (H) 0.57 - 1.00 mg/dL    Sodium 141 136 - 145 mmol/L    " Potassium 5.1 3.5 - 5.2 mmol/L    Chloride 105 98 - 107 mmol/L    CO2 25.0 22.0 - 29.0 mmol/L    Calcium 10.1 8.6 - 10.5 mg/dL    Total Protein 7.5 6.0 - 8.5 g/dL    Albumin 3.70 3.50 - 5.20 g/dL    ALT (SGPT) 26 1 - 33 U/L    AST (SGOT) 47 (H) 1 - 32 U/L    Alkaline Phosphatase 124 (H) 39 - 117 U/L    Total Bilirubin 0.4 0.2 - 1.2 mg/dL    eGFR Non African Amer 38 (L) >60 mL/min/1.73    Globulin 3.8 gm/dL    A/G Ratio 1.0 g/dL    BUN/Creatinine Ratio 19.1 7.0 - 25.0    Anion Gap 11.0 5.0 - 15.0 mmol/L   Magnesium   Result Value Ref Range    Magnesium 2.5 (H) 1.6 - 2.4 mg/dL   Troponin   Result Value Ref Range    Troponin T <0.010 0.000 - 0.030 ng/mL   TSH   Result Value Ref Range    TSH 2.370 0.270 - 4.200 uIU/mL   BNP   Result Value Ref Range    proBNP 3,101.0 (H) 5.0-1,800.0 pg/mL   CBC Auto Differential   Result Value Ref Range    WBC 7.91 3.40 - 10.80 10*3/mm3    RBC 3.78 3.77 - 5.28 10*6/mm3    Hemoglobin 11.6 (L) 12.0 - 15.9 g/dL    Hematocrit 36.6 34.0 - 46.6 %    MCV 96.8 79.0 - 97.0 fL    MCH 30.7 26.6 - 33.0 pg    MCHC 31.7 31.5 - 35.7 g/dL    RDW 15.5 (H) 12.3 - 15.4 %    RDW-SD 54.8 (H) 37.0 - 54.0 fl    MPV 10.2 6.0 - 12.0 fL    Platelets 189 140 - 450 10*3/mm3    Neutrophil % 59.9 42.7 - 76.0 %    Lymphocyte % 26.0 19.6 - 45.3 %    Monocyte % 11.4 5.0 - 12.0 %    Eosinophil % 2.0 0.3 - 6.2 %    Basophil % 0.4 0.0 - 1.5 %    Immature Grans % 0.3 0.0 - 0.5 %    Neutrophils, Absolute 4.74 1.70 - 7.00 10*3/mm3    Lymphocytes, Absolute 2.06 0.70 - 3.10 10*3/mm3    Monocytes, Absolute 0.90 0.10 - 0.90 10*3/mm3    Eosinophils, Absolute 0.16 0.00 - 0.40 10*3/mm3    Basophils, Absolute 0.03 0.00 - 0.20 10*3/mm3    Immature Grans, Absolute 0.02 0.00 - 0.05 10*3/mm3    nRBC 0.0 0.0 - 0.2 /100 WBC   Troponin   Result Value Ref Range    Troponin T <0.010 0.000 - 0.030 ng/mL   Urinalysis With Culture If Indicated - Urine, Clean Catch   Result Value Ref Range    Color, UA Yellow Yellow, Straw    Appearance, UA Clear  Clear    pH, UA 7.5 5.0 - 8.0    Specific Gravity, UA 1.009 1.001 - 1.030    Glucose, UA Negative Negative    Ketones, UA Negative Negative    Bilirubin, UA Negative Negative    Blood, UA Negative Negative    Protein, UA Negative Negative    Leuk Esterase, UA Trace (A) Negative    Nitrite, UA Negative Negative    Urobilinogen, UA 0.2 E.U./dL 0.2 - 1.0 E.U./dL   Basic Metabolic Panel   Result Value Ref Range    Glucose 96 65 - 99 mg/dL    BUN 26 (H) 8 - 23 mg/dL    Creatinine 1.47 (H) 0.57 - 1.00 mg/dL    Sodium 143 136 - 145 mmol/L    Potassium 4.4 3.5 - 5.2 mmol/L    Chloride 104 98 - 107 mmol/L    CO2 30.0 (H) 22.0 - 29.0 mmol/L    Calcium 9.8 8.6 - 10.5 mg/dL    eGFR Non African Amer 34 (L) >60 mL/min/1.73    BUN/Creatinine Ratio 17.7 7.0 - 25.0    Anion Gap 9.0 5.0 - 15.0 mmol/L   CBC Auto Differential   Result Value Ref Range    WBC 6.02 3.40 - 10.80 10*3/mm3    RBC 3.46 (L) 3.77 - 5.28 10*6/mm3    Hemoglobin 10.6 (L) 12.0 - 15.9 g/dL    Hematocrit 33.4 (L) 34.0 - 46.6 %    MCV 96.5 79.0 - 97.0 fL    MCH 30.6 26.6 - 33.0 pg    MCHC 31.7 31.5 - 35.7 g/dL    RDW 15.3 12.3 - 15.4 %    RDW-SD 54.5 (H) 37.0 - 54.0 fl    MPV 10.7 6.0 - 12.0 fL    Platelets 175 140 - 450 10*3/mm3    Neutrophil % 62.1 42.7 - 76.0 %    Lymphocyte % 21.4 19.6 - 45.3 %    Monocyte % 12.5 (H) 5.0 - 12.0 %    Eosinophil % 3.2 0.3 - 6.2 %    Basophil % 0.5 0.0 - 1.5 %    Immature Grans % 0.3 0.0 - 0.5 %    Neutrophils, Absolute 3.74 1.70 - 7.00 10*3/mm3    Lymphocytes, Absolute 1.29 0.70 - 3.10 10*3/mm3    Monocytes, Absolute 0.75 0.10 - 0.90 10*3/mm3    Eosinophils, Absolute 0.19 0.00 - 0.40 10*3/mm3    Basophils, Absolute 0.03 0.00 - 0.20 10*3/mm3    Immature Grans, Absolute 0.02 0.00 - 0.05 10*3/mm3    nRBC 0.0 0.0 - 0.2 /100 WBC   Urinalysis, Microscopic Only - Urine, Clean Catch   Result Value Ref Range    RBC, UA 3-6 (A) None Seen, 0-2 /HPF    WBC, UA 0-2 None Seen, 0-2 /HPF    Bacteria, UA None Seen None Seen, Trace /HPF    Squamous  Epithelial Cells, UA None Seen None Seen, 0-2 /HPF    Hyaline Casts, UA 0-6 0 - 6 /LPF    Methodology Automated Microscopy          Assessment and Plan:   1. Acute on chronic diastolic CHF (congestive heart failure) (CMS/HCC)  Begin lasix 20 mg daily po for next 3 days  Patient to receive a follow up call from [facility] Thursday or Friday to re-evaluate s/s of pedal edema  2. Essential hypertension  Well controlled on diltiazem   HTN Education provided today including signs and symptoms, medication management, daily blood pressure monitoring. Patient encouraged to call the Heart and Valve center with any abnormal readings.   3. Chronic atrial fibrillation  Rate controlled  CHADS-VASc Risk Assessment            7       Total Score        1 CHF    1 Hypertension    2 Age >/= 75    2 PRIOR STROKE/TIA/THROMBO    1 Sex: Female        Criteria that do not apply:    DM    Vascular Disease    Age 65-74        Anticoagulated with eliquis and denies any s/s of bleeding     4. Dyslipidemia  Statin therapy          It has been a pleasure to participate in the care of this patient.  Patient was instructed to call the Heart and Valve Center with any questions, concerns, or worsening symptoms.    *Please note that portions of this note were completed with a voice recognition program. Efforts were made to edit the dictations, but occasionally words are mistranscribed.

## 2019-12-17 ENCOUNTER — READMISSION MANAGEMENT (OUTPATIENT)
Dept: CALL CENTER | Facility: HOSPITAL | Age: 79
End: 2019-12-17

## 2019-12-17 NOTE — OUTREACH NOTE
CHF Week 1 Survey      Responses   Facility [facility] discharged from?  [facility]   Does the patient have one of the following disease processes/diagnoses(primary or secondary)?  CHF   Is there a successful TCM telephone encounter documented?  No   CHF Week 1 attempt successful?  No   Unsuccessful attempts  Attempt 1          Aure Godfrey RN

## 2019-12-18 ENCOUNTER — READMISSION MANAGEMENT (OUTPATIENT)
Dept: CALL CENTER | Facility: HOSPITAL | Age: 79
End: 2019-12-18

## 2019-12-18 NOTE — OUTREACH NOTE
Multiple attempts have been made to reach the patient by phone.  All attempts have been documented. Patient does NOT have a hospital follow up appointment scheduled.  TCM is applicable through 12/28/19

## 2019-12-18 NOTE — OUTREACH NOTE
CHF Week 1 Survey      Responses   Facility patient discharged from?  [facility]   Does the patient have one of the following disease processes/diagnoses(primary or secondary)?  CHF   Is there a successful TCM telephone encounter documented?  No   CHF Week 1 attempt successful?  No   Unsuccessful attempts  Attempt 2          Ke Collins RN

## 2019-12-20 ENCOUNTER — TELEPHONE (OUTPATIENT)
Dept: CARDIOLOGY | Facility: HOSPITAL | Age: 79
End: 2019-12-20

## 2019-12-23 ENCOUNTER — READMISSION MANAGEMENT (OUTPATIENT)
Dept: CALL CENTER | Facility: HOSPITAL | Age: 79
End: 2019-12-23

## 2019-12-23 NOTE — OUTREACH NOTE
CHF Week 2 Survey      Responses   Facility patient discharged from?  [place]   Does the patient have one of the following disease processes/diagnoses(primary or secondary)?  CHF   Week 2 attempt successful?  Yes   Call start time  1608   Call end time  1612   Is patient permission given to speak with other caregiver?  Yes   Meds reviewed with patient/caregiver?  Yes   Is the patient having any side effects they believe may be caused by any medication additions or changes?  No   Does the patient have all medications ordered at discharge?  N/A   Is the patient taking all medications as directed (includes completed medication regime)?  Yes   Medication comments  Had lasix for 3 days   Does the patient have a primary care provider?   Yes   Does the patient have an appointment with their PCP within 7 days of discharge?  Yes   Has the patient kept scheduled appointments due by today?  Yes   What is the Home health agency?   [facility]   Has home health visited the patient within 72 hours of discharge?  Yes   Psychosocial issues?  No   Did the patient receive a copy of their discharge instructions?  Yes   Nursing interventions  Reviewed instructions with patient   What is the patient's perception of their health status since discharge?  Improving   Nursing interventions  Nurse provided patient education   Is the patient weighing daily?  Yes   Does the patient have scales?  Yes   Daily weight interventions  Education provided on importance of daily weight   Is the patient able to teach back Heart Failure diet management?  Yes   Is the patient able to teach back Heart Failure Zones?  Yes   Is the patient able to teach back signs and symptoms of worsening condition? (i.e. weight gain, shortness of air, etc.)  Yes   Is the patient/caregiver able to teach back the hierarchy of who to call/visit for symptoms/problems? PCP, Specialist, Home health nurse, Urgent Care, ED, 911  Yes   Additional teach back comments  She is doing ok,  staying with family for the holidays, discussed daily weights.   CHF Week 2 call completed?  Yes          Puja Reese RN

## 2019-12-26 ENCOUNTER — TELEPHONE (OUTPATIENT)
Dept: FAMILY MEDICINE CLINIC | Facility: CLINIC | Age: 79
End: 2019-12-26

## 2019-12-30 RX ORDER — CITALOPRAM 20 MG/1
TABLET ORAL
Qty: 90 TABLET | Refills: 1 | Status: SHIPPED | OUTPATIENT
Start: 2019-12-30

## 2019-12-31 ENCOUNTER — READMISSION MANAGEMENT (OUTPATIENT)
Dept: CALL CENTER | Facility: HOSPITAL | Age: 79
End: 2019-12-31

## 2019-12-31 NOTE — OUTREACH NOTE
CHF Week 3 Survey      Responses   Facility patient discharged from?  [place]   Does the patient have one of the following disease processes/diagnoses(primary or secondary)?  CHF   Week 3 attempt successful?  No   Unsuccessful attempts  Attempt 1          Henna Arboleda RN

## 2020-01-03 ENCOUNTER — READMISSION MANAGEMENT (OUTPATIENT)
Dept: CALL CENTER | Facility: HOSPITAL | Age: 80
End: 2020-01-03

## 2020-01-03 NOTE — OUTREACH NOTE
CHF Week 3 Survey      Responses   Facility patient discharged from?  [place]   Does the patient have one of the following disease processes/diagnoses(primary or secondary)?  CHF   Week 3 attempt successful?  No   Unsuccessful attempts  Attempt 2          Lynette Jo RN

## 2020-01-06 ENCOUNTER — LAB (OUTPATIENT)
Dept: LAB | Facility: HOSPITAL | Age: 80
End: 2020-01-06

## 2020-01-06 ENCOUNTER — OFFICE VISIT (OUTPATIENT)
Dept: FAMILY MEDICINE CLINIC | Facility: CLINIC | Age: 80
End: 2020-01-06

## 2020-01-06 VITALS
SYSTOLIC BLOOD PRESSURE: 110 MMHG | HEIGHT: 65 IN | DIASTOLIC BLOOD PRESSURE: 60 MMHG | BODY MASS INDEX: 17.49 KG/M2 | WEIGHT: 105 LBS | HEART RATE: 73 BPM | OXYGEN SATURATION: 90 %

## 2020-01-06 DIAGNOSIS — E87.5 HYPERKALEMIA: ICD-10-CM

## 2020-01-06 DIAGNOSIS — I50.33 ACUTE ON CHRONIC DIASTOLIC CONGESTIVE HEART FAILURE (HCC): Primary | ICD-10-CM

## 2020-01-06 DIAGNOSIS — I50.33 ACUTE ON CHRONIC DIASTOLIC CONGESTIVE HEART FAILURE (HCC): ICD-10-CM

## 2020-01-06 DIAGNOSIS — D53.9 NUTRITIONAL ANEMIA: ICD-10-CM

## 2020-01-06 LAB
ANION GAP SERPL CALCULATED.3IONS-SCNC: 11 MMOL/L (ref 5–15)
BASOPHILS # BLD AUTO: 0.05 10*3/MM3 (ref 0–0.2)
BASOPHILS NFR BLD AUTO: 0.8 % (ref 0–1.5)
BUN BLD-MCNC: 27 MG/DL (ref 8–23)
BUN/CREAT SERPL: 18.8 (ref 7–25)
CALCIUM SPEC-SCNC: 10.1 MG/DL (ref 8.6–10.5)
CHLORIDE SERPL-SCNC: 104 MMOL/L (ref 98–107)
CO2 SERPL-SCNC: 28 MMOL/L (ref 22–29)
CREAT BLD-MCNC: 1.44 MG/DL (ref 0.57–1)
DEPRECATED RDW RBC AUTO: 46.3 FL (ref 37–54)
EOSINOPHIL # BLD AUTO: 0.12 10*3/MM3 (ref 0–0.4)
EOSINOPHIL NFR BLD AUTO: 1.8 % (ref 0.3–6.2)
ERYTHROCYTE [DISTWIDTH] IN BLOOD BY AUTOMATED COUNT: 14 % (ref 12.3–15.4)
FERRITIN SERPL-MCNC: 186 NG/ML (ref 13–150)
GFR SERPL CREATININE-BSD FRML MDRD: 35 ML/MIN/1.73
GLUCOSE BLD-MCNC: 89 MG/DL (ref 65–99)
HCT VFR BLD AUTO: 37.1 % (ref 34–46.6)
HGB BLD-MCNC: 12.6 G/DL (ref 12–15.9)
IMM GRANULOCYTES # BLD AUTO: 0.01 10*3/MM3 (ref 0–0.05)
IMM GRANULOCYTES NFR BLD AUTO: 0.2 % (ref 0–0.5)
LYMPHOCYTES # BLD AUTO: 1.86 10*3/MM3 (ref 0.7–3.1)
LYMPHOCYTES NFR BLD AUTO: 28.3 % (ref 19.6–45.3)
MCH RBC QN AUTO: 30.7 PG (ref 26.6–33)
MCHC RBC AUTO-ENTMCNC: 34 G/DL (ref 31.5–35.7)
MCV RBC AUTO: 90.5 FL (ref 79–97)
MONOCYTES # BLD AUTO: 0.72 10*3/MM3 (ref 0.1–0.9)
MONOCYTES NFR BLD AUTO: 11 % (ref 5–12)
NEUTROPHILS # BLD AUTO: 3.81 10*3/MM3 (ref 1.7–7)
NEUTROPHILS NFR BLD AUTO: 57.9 % (ref 42.7–76)
NRBC BLD AUTO-RTO: 0 /100 WBC (ref 0–0.2)
PLATELET # BLD AUTO: 270 10*3/MM3 (ref 140–450)
PMV BLD AUTO: 11.5 FL (ref 6–12)
POTASSIUM BLD-SCNC: 5.5 MMOL/L (ref 3.5–5.2)
RBC # BLD AUTO: 4.1 10*6/MM3 (ref 3.77–5.28)
SODIUM BLD-SCNC: 143 MMOL/L (ref 136–145)
WBC NRBC COR # BLD: 6.57 10*3/MM3 (ref 3.4–10.8)

## 2020-01-06 PROCEDURE — 99214 OFFICE O/P EST MOD 30 MIN: CPT | Performed by: FAMILY MEDICINE

## 2020-01-06 PROCEDURE — 85025 COMPLETE CBC W/AUTO DIFF WBC: CPT

## 2020-01-06 PROCEDURE — 80048 BASIC METABOLIC PNL TOTAL CA: CPT

## 2020-01-06 PROCEDURE — 82728 ASSAY OF FERRITIN: CPT

## 2020-01-21 NOTE — PROGRESS NOTES
Transitional Care Follow Up Visit  Subjective     Mirian Sy is a 79 y.o. female who presents for a transitional care management visit.    Within 48 business hours after discharge our office contacted her via telephone to coordinate her care and needs.      I reviewed and discussed the details of that call along with the discharge summary, hospital problems, inpatient lab results, inpatient diagnostic studies, and consultation reports with Mirian.    Patient Care Team:  Oren Engel DO as PCP - General (Family Medicine)  Oren Engel DO as PCP - Claims Attributed     Current outpatient and discharge medications have been reconciled for the patient.  Reviewed by: Oren Engel DO      Date of TCM Phone Call 5/23/2018 12/20/2018 9/18/2019   [facility]   Date of Admission - 12/18/2018 9/15/2019   Date of Discharge 5/22/2018 12/19/2018 9/17/2019   Discharge Disposition Home or Self Care Home-Health Care Sv Home or Self Care     Risk for Readmission (LACE) No data recorded    History of Present Illness   Course During Hospital Stay:    Mirian Sy is a 79 y.o. female with history of atrial fibrillation, chronic kidney disease and CHF presents with acute CHF exacerbation with worsening dyspnea on exertion/shortness of air:        **Acute CHF Exacerbation   -ECHO done 5/2019; EF 60%  -CXR reviewed  -s/p Lasix 40 mg IV x 2  given in ED; with adequate output  -not on routine diuretic at home.    -Currently off BiPAP and supplemental oxygen BiPAP   -Has appointment at heart and vascular clinic on Monday, 12/16/2019 at 1:15 PM     **CKD Stage III  -previous CR 1.05 and 1.44 in 9/2019   -@ baseline with CR 1.47 today     **Chronic atrial fibrillation   -EKG reviewed, rate controlled   -routine Cardizem and Toprol continued   -routine Eliquis continued      Weakness  PT/OT evaluation completed and recommending  "home health PT at discharge--[facility] therapy services will see pt by 12/18/19     The Discharge Blueprint (criteria for discharge):   1. Pt maintaining saturations of 92% or higher on room air--met.  2. Tolerating oral diet w/ adequate output--met.  3. Stable creatinine/remains at baseline--met.            Key Discharge Recommendations:  -Follow-up with PCP in 1 week  -Follow-up with GREG Story at the heart and vascular clinic on 12/16/2019 at 1:15 PM     Since her hospitalization she has been doing well.  She reports that her breathing has gotten significantly better.  She has lost a good amount of weight with the diuretics and feels significantly improved.    The following portions of the patient's history were reviewed and updated as appropriate: allergies, current medications, past family history, past medical history, past social history, past surgical history and problem list.    Review of Systems   Constitutional: Positive for fatigue.   Respiratory: Positive for shortness of breath.    Cardiovascular: Negative for chest pain and palpitations.       Objective   Blood pressure 110/60, pulse 73, height 165.1 cm (65\"), weight 47.6 kg (105 lb), SpO2 90 %.  Nursing note reviewed  Physical Exam  Const: NAD, A&Ox4, Pleasant, Cooperative  Eyes: EOMI, no conjunctivitis  ENT: No nasal discharge present, neck supple  Cardiac: Regular rate and rhythm, no cyanosis  Resp: Respiratory rate within normal limits, no increased work of breathing, no audible wheezing or retractions noted  Assessment/Plan     Mirian was seen today for follow-up.    Diagnoses and all orders for this visit:    Acute on chronic diastolic congestive heart failure (CMS/HCC)  -     Basic Metabolic Panel; Future    Hyperkalemia  -     Basic Metabolic Panel; Future    Nutritional anemia  -     CBC & Differential; Future  -     Ferritin; Future      #1 acute on chronic congestive heart failure  Significantly improved on medications, I would like to " check a BMP to ensure her kidney function remains baseline    #2 hyperkalemia  She had a mild hyperkalemia in the hospital, will check a BMP today to ensure this is resolved    #3 nutritional anemia  Appears secondary to low oral intake, may explain some of her fatigue and shortness of breath.  Of note, this is approximately her baseline.  Check CBC and ferritin today  There are no Patient Instructions on file for this visit.

## 2020-01-30 ENCOUNTER — TELEPHONE (OUTPATIENT)
Dept: FAMILY MEDICINE CLINIC | Facility: CLINIC | Age: 80
End: 2020-01-30

## 2020-01-30 RX ORDER — ATORVASTATIN CALCIUM 80 MG/1
80 TABLET, FILM COATED ORAL NIGHTLY
Qty: 90 TABLET | Refills: 1 | Status: SHIPPED | OUTPATIENT
Start: 2020-01-30

## 2020-01-30 NOTE — TELEPHONE ENCOUNTER
Pt son called requesting atorvastatin (LIPITOR) 80 MG tablet sent to [facility] 90 day supply     Pt callback [phone]

## 2020-01-31 ENCOUNTER — TELEPHONE (OUTPATIENT)
Dept: FAMILY MEDICINE CLINIC | Facility: CLINIC | Age: 80
End: 2020-01-31

## 2020-01-31 RX ORDER — MIRTAZAPINE 7.5 MG/1
7.5 TABLET, FILM COATED ORAL
Qty: 30 TABLET | Refills: 0 | Status: SHIPPED | OUTPATIENT
Start: 2020-01-31 | End: 2020-04-04 | Stop reason: HOSPADM

## 2020-01-31 NOTE — TELEPHONE ENCOUNTER
PT SON GABRIELLE CHU CALLED REQUESTING A 10-DAY REFILL ON THE FOLLOWING:     mirtazapine (REMERON) 7.5 MG tablet    PT IS COMPLETELY OUT, NORMALLY GOES TO [facility].     PLEASE SEND A 10-DAY REFILL TO     LOUISE AYALA  CONFIRMED    ADVISED SON THAT ON NEXT APPT, A RELEASE OF INFORMATION NEEDS TO BE COMPLETED.     PT CALLBACK [phone]

## 2020-02-01 ENCOUNTER — HOSPITAL ENCOUNTER (OUTPATIENT)
Facility: HOSPITAL | Age: 80
Setting detail: OBSERVATION
LOS: 1 days | Discharge: HOME | End: 2020-02-05
Attending: EMERGENCY MEDICINE | Admitting: INTERNAL MEDICINE

## 2020-02-01 ENCOUNTER — APPOINTMENT (OUTPATIENT)
Dept: GENERAL RADIOLOGY | Facility: HOSPITAL | Age: 80
End: 2020-02-01

## 2020-02-01 DIAGNOSIS — R53.1 WEAKNESS: ICD-10-CM

## 2020-02-01 DIAGNOSIS — R26.81 UNSTEADY GAIT: ICD-10-CM

## 2020-02-01 DIAGNOSIS — M10.9 GOUT OF RIGHT WRIST, UNSPECIFIED CAUSE, UNSPECIFIED CHRONICITY: Primary | ICD-10-CM

## 2020-02-01 DIAGNOSIS — N39.0 ACUTE UTI: ICD-10-CM

## 2020-02-01 PROBLEM — N18.30 CKD (CHRONIC KIDNEY DISEASE) STAGE 3, GFR 30-59 ML/MIN (HCC): Status: ACTIVE | Noted: 2020-02-01

## 2020-02-01 PROBLEM — M25.531 WRIST PAIN, ACUTE, RIGHT: Status: ACTIVE | Noted: 2020-02-01

## 2020-02-01 LAB
ANION GAP SERPL CALCULATED.3IONS-SCNC: 13 MMOL/L (ref 5–15)
BACTERIA UR QL AUTO: ABNORMAL /HPF
BASOPHILS # BLD AUTO: 0.03 10*3/MM3 (ref 0–0.2)
BASOPHILS NFR BLD AUTO: 0.3 % (ref 0–1.5)
BILIRUB UR QL STRIP: NEGATIVE
BUN BLD-MCNC: 30 MG/DL (ref 8–23)
BUN/CREAT SERPL: 28.8 (ref 7–25)
CALCIUM SPEC-SCNC: 9 MG/DL (ref 8.6–10.5)
CHLORIDE SERPL-SCNC: 101 MMOL/L (ref 98–107)
CLARITY UR: ABNORMAL
CO2 SERPL-SCNC: 24 MMOL/L (ref 22–29)
COLOR UR: ABNORMAL
CREAT BLD-MCNC: 1.04 MG/DL (ref 0.57–1)
DEPRECATED RDW RBC AUTO: 51.7 FL (ref 37–54)
EOSINOPHIL # BLD AUTO: 0.01 10*3/MM3 (ref 0–0.4)
EOSINOPHIL NFR BLD AUTO: 0.1 % (ref 0.3–6.2)
ERYTHROCYTE [DISTWIDTH] IN BLOOD BY AUTOMATED COUNT: 14.6 % (ref 12.3–15.4)
ERYTHROCYTE [SEDIMENTATION RATE] IN BLOOD: 64 MM/HR (ref 0–30)
GFR SERPL CREATININE-BSD FRML MDRD: 51 ML/MIN/1.73
GLUCOSE BLD-MCNC: 123 MG/DL (ref 65–99)
GLUCOSE UR STRIP-MCNC: NEGATIVE MG/DL
HCT VFR BLD AUTO: 34.3 % (ref 34–46.6)
HGB BLD-MCNC: 11.2 G/DL (ref 12–15.9)
HGB UR QL STRIP.AUTO: ABNORMAL
HYALINE CASTS UR QL AUTO: ABNORMAL /LPF
IMM GRANULOCYTES # BLD AUTO: 0.04 10*3/MM3 (ref 0–0.05)
IMM GRANULOCYTES NFR BLD AUTO: 0.4 % (ref 0–0.5)
KETONES UR QL STRIP: NEGATIVE
LEUKOCYTE ESTERASE UR QL STRIP.AUTO: ABNORMAL
LYMPHOCYTES # BLD AUTO: 1.66 10*3/MM3 (ref 0.7–3.1)
LYMPHOCYTES NFR BLD AUTO: 15.4 % (ref 19.6–45.3)
MCH RBC QN AUTO: 31.5 PG (ref 26.6–33)
MCHC RBC AUTO-ENTMCNC: 32.7 G/DL (ref 31.5–35.7)
MCV RBC AUTO: 96.3 FL (ref 79–97)
MONOCYTES # BLD AUTO: 2.02 10*3/MM3 (ref 0.1–0.9)
MONOCYTES NFR BLD AUTO: 18.7 % (ref 5–12)
NEUTROPHILS # BLD AUTO: 7.04 10*3/MM3 (ref 1.7–7)
NEUTROPHILS NFR BLD AUTO: 65.1 % (ref 42.7–76)
NITRITE UR QL STRIP: NEGATIVE
NRBC BLD AUTO-RTO: 0 /100 WBC (ref 0–0.2)
PH UR STRIP.AUTO: <=5 [PH] (ref 5–8)
PLATELET # BLD AUTO: 174 10*3/MM3 (ref 140–450)
PMV BLD AUTO: 10.6 FL (ref 6–12)
POTASSIUM BLD-SCNC: 4.3 MMOL/L (ref 3.5–5.2)
PROT UR QL STRIP: ABNORMAL
RBC # BLD AUTO: 3.56 10*6/MM3 (ref 3.77–5.28)
RBC # UR: ABNORMAL /HPF
REF LAB TEST METHOD: ABNORMAL
SODIUM BLD-SCNC: 138 MMOL/L (ref 136–145)
SP GR UR STRIP: 1.03 (ref 1–1.03)
SQUAMOUS #/AREA URNS HPF: ABNORMAL /HPF
URATE SERPL-MCNC: 5.8 MG/DL (ref 2.4–5.7)
UROBILINOGEN UR QL STRIP: ABNORMAL
WBC NRBC COR # BLD: 10.8 10*3/MM3 (ref 3.4–10.8)
WBC UR QL AUTO: ABNORMAL /HPF

## 2020-02-01 PROCEDURE — 99220 PR INITIAL OBSERVATION CARE/DAY 70 MINUTES: CPT | Performed by: INTERNAL MEDICINE

## 2020-02-01 PROCEDURE — 25010000002 CEFTRIAXONE PER 250 MG: Performed by: NURSE PRACTITIONER

## 2020-02-01 PROCEDURE — 96376 TX/PRO/DX INJ SAME DRUG ADON: CPT

## 2020-02-01 PROCEDURE — 93010 ELECTROCARDIOGRAM REPORT: CPT | Performed by: INTERNAL MEDICINE

## 2020-02-01 PROCEDURE — 85652 RBC SED RATE AUTOMATED: CPT | Performed by: EMERGENCY MEDICINE

## 2020-02-01 PROCEDURE — 96365 THER/PROPH/DIAG IV INF INIT: CPT

## 2020-02-01 PROCEDURE — 81001 URINALYSIS AUTO W/SCOPE: CPT | Performed by: NURSE PRACTITIONER

## 2020-02-01 PROCEDURE — 80048 BASIC METABOLIC PNL TOTAL CA: CPT | Performed by: NURSE PRACTITIONER

## 2020-02-01 PROCEDURE — 73110 X-RAY EXAM OF WRIST: CPT

## 2020-02-01 PROCEDURE — 96375 TX/PRO/DX INJ NEW DRUG ADDON: CPT

## 2020-02-01 PROCEDURE — 84550 ASSAY OF BLOOD/URIC ACID: CPT | Performed by: NURSE PRACTITIONER

## 2020-02-01 PROCEDURE — 93005 ELECTROCARDIOGRAM TRACING: CPT | Performed by: INTERNAL MEDICINE

## 2020-02-01 PROCEDURE — 99285 EMERGENCY DEPT VISIT HI MDM: CPT

## 2020-02-01 PROCEDURE — 85025 COMPLETE CBC W/AUTO DIFF WBC: CPT | Performed by: NURSE PRACTITIONER

## 2020-02-01 RX ORDER — IBUPROFEN 400 MG/1
400 TABLET ORAL ONCE
Status: COMPLETED | OUTPATIENT
Start: 2020-02-02 | End: 2020-02-02

## 2020-02-01 RX ORDER — HYDROCODONE BITARTRATE AND ACETAMINOPHEN 5; 325 MG/1; MG/1
1 TABLET ORAL ONCE
Status: COMPLETED | OUTPATIENT
Start: 2020-02-01 | End: 2020-02-01

## 2020-02-01 RX ORDER — ACETAMINOPHEN 325 MG/1
650 TABLET ORAL ONCE
Status: COMPLETED | OUTPATIENT
Start: 2020-02-01 | End: 2020-02-01

## 2020-02-01 RX ORDER — COLCHICINE 0.6 MG/1
1.2 TABLET ORAL ONCE
Status: COMPLETED | OUTPATIENT
Start: 2020-02-01 | End: 2020-02-01

## 2020-02-01 RX ORDER — COLCHICINE 0.6 MG/1
0.6 TABLET ORAL DAILY
Status: COMPLETED | OUTPATIENT
Start: 2020-02-02 | End: 2020-02-04

## 2020-02-01 RX ORDER — METOPROLOL TARTRATE 5 MG/5ML
5 INJECTION INTRAVENOUS ONCE
Status: COMPLETED | OUTPATIENT
Start: 2020-02-01 | End: 2020-02-01

## 2020-02-01 RX ADMIN — ACETAMINOPHEN 650 MG: 325 TABLET, FILM COATED ORAL at 23:16

## 2020-02-01 RX ADMIN — COLCHICINE 1.2 MG: 0.6 TABLET, FILM COATED ORAL at 21:28

## 2020-02-01 RX ADMIN — HYDROCODONE BITARTRATE AND ACETAMINOPHEN 1 TABLET: 5; 325 TABLET ORAL at 18:59

## 2020-02-01 RX ADMIN — METOPROLOL TARTRATE 5 MG: 1 INJECTION, SOLUTION INTRAVENOUS at 21:29

## 2020-02-01 RX ADMIN — METOPROLOL TARTRATE 5 MG: 1 INJECTION, SOLUTION INTRAVENOUS at 20:03

## 2020-02-01 RX ADMIN — CEFTRIAXONE 1 G: 1 INJECTION, POWDER, FOR SOLUTION INTRAMUSCULAR; INTRAVENOUS at 23:16

## 2020-02-01 NOTE — ED PROVIDER NOTES
Subjective   Mirian Sy is a 79 year old female presenting to the ED today with complaints of wrist pain. She reports at noon yesterday she began experiencing pain and noticed swelling to her right wrist. Her pain and swelling have persisted and continue to worsen. Due to her pain she states she is unable to flex or extend her right wrist. She also complains of redness to her right wrist and hand as well as pain at her right armpit that comes when someone tries to lift her. Additionally her family reports she has showed weakness for the past few days. She denies any pain at her fingers. She denies fever, chills, urinary symptoms, diarrhea, or constipation. She also denies any trauma or injury proceeding her symptoms. At baseline she ambulates with a walker, and states she has had difficulty using her walker secondary to her wrist pain. She denies a history of gout. There are no other acute complaints at this time.      History provided by:  Patient  Wrist Pain   Location:  Right wrist  Quality:  Pain, swelling, redness  Severity:  Moderate  Onset quality:  Sudden  Duration:  1 day  Timing:  Constant  Progression:  Worsening  Chronicity:  New  Associated symptoms: no abdominal pain, no diarrhea and no fever        Review of Systems   Constitutional: Negative for chills and fever.   Gastrointestinal: Negative for abdominal pain, constipation and diarrhea.   Genitourinary: Negative for difficulty urinating, dysuria and frequency.   Musculoskeletal: Positive for gait problem (secondary to wrist pain).        Positive for right wrist pain and swelling. Positive for right armpit pain when being lifted.    Skin: Positive for color change.        Positive for right wrist and hand erythema.   Neurological: Positive for weakness.   All other systems reviewed and are negative.      Past Medical History:   Diagnosis Date   • Atrial fibrillation (CMS/HCC) 2005    Chronic anticoagulation and rate control   • Basal cell  carcinoma 2007    Left leg and nose   • Cardiomyopathy (CMS/[facility]) 2006   • Cerebrovascular accident (CMS/[facility]) 01/2005    CT right parietal CVA. Carotid duplex -50% to 79% left ICS 15% right ICS stenosis. MRI of the neck showed no significant carotid bifurcations of these. Negative CT of the head, 09/10/2012. Carotid duplex, 02/24/2014:  Shelf-like plaque in the CECE without significant elevation and velocities.  Patent vertebral arteries.   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    With dilated cardiomyopathy   • Decubitus ulcer of buttock 2014    Transient during fracture rehabilitation   • Depression 2002    Good response to citalopram and mirtazapine   • DVT (deep venous thrombosis) (CMS/[facility]) 2014    Superficial - right lesser saphenus vein - after hip fracture    • Fracture of bone of forefoot 1972    Right foot   • Fracture of ilium, left (CMS/[facility]) 05/2018    Accidental fall - Uncomplicated recovery   • GERD (gastroesophageal reflux disease) 2014    Chronic superficial gastritis   • HTN (hypertension) 2005   • Iron deficiency anemia due to chronic blood loss 2018    Predisposed by chronic anticoagulation and GERD   • Low body weight due to inadequate caloric intake Adulthood   • Mitral valve prolapse 1997    Mild MR   • Osteoarthritis    • Osteoporosis    • Patent foramen ovale 2005   • Pneumonia 1947   • SCC (squamous cell carcinoma), arm, right 2017   • Scoliosis    • Syncope 2014     undermined cause - [facility]   • Varicose veins 2005    Symptomatic       Allergies   Allergen Reactions   • Actonel [Risedronate Sodium] GI Intolerance   • Hctz [Hydrochlorothiazide]      hyponatremia   • Lisinopril      hyperkalemia       Past Surgical History:   Procedure Laterality Date   • ACHILLES TENDON SURGERY Left 1997    Repair of rupture left tendon with screws   • BREAST CYST EXCISION Left    • BREAST SURGERY Left 1982    Excision benign cyst   • CARDIAC CATHETERIZATION  2006     Severe acute coronary spasm   • CATARACT EXTRACTION WITH INTRAOCULAR LENS IMPLANT Bilateral 2015   • EYE CAPSULOTOMY WITH LASER Left 2016    Marked visual improvement   • FEMUR FRACTURE SURGERY Right 2015    Repair of shaft fracture   • HIP FRACTURE SURGERY Left 2014    left hip fracture with pin   • LUMBAR DISC SURGERY     • OVARIAN CYST REMOVAL Left    • SKIN CANCER EXCISION Right 2017    SCC - arm       Family History   Problem Relation Age of Onset   • Breast cancer Mother          age 59   • Diabetes Mother    • Hypertension Mother    • Heart disease Father          age 80   • Other Sister         Arthrodesis cervical   • Basal cell carcinoma Sister    • Bone cancer Sister    • Breast cancer Sister 57   • Diabetes Sister          age 56   • Hypertension Sister    • Melanoma Sister    • Arrhythmia Sister         Sinus   • Stroke Sister    • Allergies Son         Hay fever   • Diabetes Maternal Uncle    • Breast cancer Sister 59   • Breast cancer Sister 66   • Ovarian cancer Sister         DX AGE UNKNOWN       Social History     Socioeconomic History   • Marital status:      Spouse name: Not on file   • Number of children: Not on file   • Years of education: Not on file   • Highest education level: Not on file   Tobacco Use   • Smoking status: Former Smoker     Packs/day: 1.00     Years: 20.00     Pack years: 20.00     Types: Cigarettes   • Smokeless tobacco: Never Used   • Tobacco comment: Remote history   Substance and Sexual Activity   • Alcohol use: No   • Drug use: No   • Sexual activity: Defer   Social History Narrative    Domestic life   lives in private home alone - good support from local children        Protestant    Jainism        Sleep hygiene  in bed 9 PM to 6 AM for 9 hours of sleep        Caffeine use   1 1/2 cups coffee daily        Exercise habits  walks most days of the week. - Stretching each morning.          Diet   well-balanced diet with protein supplementations         Occupation    retired         Hearing  no impairment        Vision    no glasses needed after cataract surgery.          Driving   daytime only - good weather - local traffic         Objective   Physical Exam   Constitutional: She is oriented to person, place, and time. She appears well-developed and well-nourished. No distress.   HENT:   Head: Normocephalic and atraumatic.   Eyes: Conjunctivae are normal. No scleral icterus.   Neck: Normal range of motion. Neck supple.   Cardiovascular: Normal heart sounds.   No murmur heard.  Irregularly irregular rhythm. Tachycardic rate. 2+ radial pulses bilaterally.    Pulmonary/Chest: Effort normal and breath sounds normal. No respiratory distress.   Abdominal: Soft. There is no tenderness.   Musculoskeletal: She exhibits tenderness.   Right wrist has significant tenderness and is mildly swollen. Limited range of motion to right wrist, secondary to pain.   Neurological: She is alert and oriented to person, place, and time.   Skin: Skin is warm and dry. Brisk capillary refill bilateral upper extremities. . There is erythema.   Right wrist is moderately erythematous.   Psychiatric: She has a normal mood and affect. Her behavior is normal.   Nursing note and vitals reviewed.      Procedures         ED Course  ED Course as of Feb 11 1316   Sat Feb 01, 2020 1955 Upon reevaluation, the patient's family states they would like the patient to be admitted due to her weakness. -EM    [RP]   1957 Paged hospitalist. -EM    [EM]   2017 Discussed case with Dr. AMOR, hospitalist, who says pt does not meet admission criteria -EM    [EM]   2219 Urine came back infected. Hospitalist paged for admission.     [EM]   2302 Ovidio Banks MD  I saw and evaluated this patient in the emergency department.  I helped direct care.    [MS]   3185 Daria Hartley nurse practitioner spoke with Dr. Lee , orthopedics on call.  Case was discussed in detail.  Dr. Lee felt that septic  arthritis was very unlikely given duration of onset as well as distal joint.  No indication for arthrocentesis at this time.    [MS]      ED Course User Index  [EM] Daria Hartley APRN  [MS] Ovidio Banks MD  [RP] Farzana Plummer     No results found for this or any previous visit (from the past 24 hour(s)).  Note: In addition to lab results from this visit, the labs listed above may include labs taken at another facility or during a different encounter within the last 24 hours. Please correlate lab times with ED admission and discharge times for further clarification of the services performed during this visit.    XR Wrist 3+ View Right   Final Result   Diffuse osteopenia without convincing radiographic evidence   for fracture.       DICTATED:   02/01/2020   EDITED/ls :   02/01/2020        This report was finalized on 2/2/2020 10:49 AM by Dr. Steve Cruz MD.            Vitals:    02/04/20 1906 02/05/20 0500 02/05/20 0817 02/05/20 0819   BP: 117/87  (!) 121/107 117/65   BP Location:   Left arm Left arm   Patient Position:   Sitting Sitting   Pulse: 63  73 75   Resp: 18  20    Temp:   97.6 °F (36.4 °C)    TempSrc:   Oral    SpO2:       Weight:  46.1 kg (101 lb 11 oz)     Height:         Medications   HYDROcodone-acetaminophen (NORCO) 5-325 MG per tablet 1 tablet (1 tablet Oral Given 2/1/20 1859)   metoprolol tartrate (LOPRESSOR) injection 5 mg (5 mg Intravenous Given 2/1/20 2003)   metoprolol tartrate (LOPRESSOR) injection 5 mg (5 mg Intravenous Given 2/1/20 2129)   colchicine tablet 1.2 mg (1.2 mg Oral Given 2/1/20 2128)   acetaminophen (TYLENOL) tablet 650 mg (650 mg Oral Given 2/1/20 2316)   cefTRIAXone (ROCEPHIN) 1 g/100 mL 0.9% NS (MBP) (0 g Intravenous Stopped 2/1/20 2353)   colchicine tablet 0.6 mg (0.6 mg Oral Given 2/4/20 0903)   ibuprofen (ADVIL,MOTRIN) tablet 400 mg (400 mg Oral Given 2/2/20 0024)   metoprolol succinate XL (TOPROL-XL) 24 hr tablet 25 mg (25 mg Oral Given 2/2/20 4606)     ECG/EMG  Results (last 24 hours)     ** No results found for the last 24 hours. **        ECG 12 Lead   Final Result   Test Reason : AFIB   Blood Pressure : **/** mmHG   Vent. Rate : 105 BPM     Atrial Rate : 105 BPM      P-R Int : 000 ms          QRS Dur : 084 ms       QT Int : 354 ms       P-R-T Axes : 000 093 167 degrees      QTc Int : 467 ms      Atrial fibrillation   Nonspecific T wave abnormality , probably digitalis effect   Abnormal ECG   When compared with ECG of 12-DEC-2019 20:11,   No significant change was found   Confirmed by ELIEL GOOD MD ([id number]) on 2/5/2020 1:47:40 PM      Referred By:  FITZ           Confirmed By:ELIEL GOOD MD        Spoke with Dr. Lee who is on-call for orthopedics.  Discussed case and feels that this is more of a pseudogout or gout presentation.  Recommends follow-up at the clinic on Monday morning at 830 with Dr. Mahmood.        Spoke with Dr. Elizabeth with hospital medicine who will admit the patient to telemetry           [place] Coma Scale Score: 15                            MDM    Final diagnoses:   Gout of right wrist, unspecified cause, unspecified chronicity   Weakness   Unsteady gait   Acute UTI       Documentation assistance provided by derian Plummer.  Information recorded by the derian was done at my direction and has been verified and validated by me.     Farzana Plummer  02/01/20 1830       Daria Hartley APRN  02/01/20 2225       Daria Hartley APRN  02/11/20 1316

## 2020-02-02 PROBLEM — I50.32 CHRONIC DIASTOLIC HEART FAILURE (HCC): Status: ACTIVE | Noted: 2020-02-02

## 2020-02-02 PROBLEM — M10.9 GOUT OF RIGHT WRIST: Status: ACTIVE | Noted: 2020-02-02

## 2020-02-02 PROBLEM — R63.6 UNDERWEIGHT: Status: ACTIVE | Noted: 2020-02-02

## 2020-02-02 LAB
ALBUMIN SERPL-MCNC: 2.8 G/DL (ref 3.5–5.2)
ALBUMIN/GLOB SERPL: 0.7 G/DL
ALP SERPL-CCNC: 91 U/L (ref 39–117)
ALT SERPL W P-5'-P-CCNC: 28 U/L (ref 1–33)
ANION GAP SERPL CALCULATED.3IONS-SCNC: 11 MMOL/L (ref 5–15)
AST SERPL-CCNC: 44 U/L (ref 1–32)
BASOPHILS # BLD AUTO: 0.03 10*3/MM3 (ref 0–0.2)
BASOPHILS NFR BLD AUTO: 0.5 % (ref 0–1.5)
BILIRUB SERPL-MCNC: 0.6 MG/DL (ref 0.2–1.2)
BUN BLD-MCNC: 31 MG/DL (ref 8–23)
BUN/CREAT SERPL: 27.4 (ref 7–25)
CALCIUM SPEC-SCNC: 8.5 MG/DL (ref 8.6–10.5)
CHLORIDE SERPL-SCNC: 103 MMOL/L (ref 98–107)
CHROMATIN AB SERPL-ACNC: 68.6 IU/ML (ref 0–14)
CO2 SERPL-SCNC: 25 MMOL/L (ref 22–29)
CREAT BLD-MCNC: 1.13 MG/DL (ref 0.57–1)
CRP SERPL-MCNC: 11.55 MG/DL (ref 0–0.5)
DEPRECATED RDW RBC AUTO: 52.9 FL (ref 37–54)
EOSINOPHIL # BLD AUTO: 0.08 10*3/MM3 (ref 0–0.4)
EOSINOPHIL NFR BLD AUTO: 1.3 % (ref 0.3–6.2)
ERYTHROCYTE [DISTWIDTH] IN BLOOD BY AUTOMATED COUNT: 14.6 % (ref 12.3–15.4)
GFR SERPL CREATININE-BSD FRML MDRD: 47 ML/MIN/1.73
GLOBULIN UR ELPH-MCNC: 3.8 GM/DL
GLUCOSE BLD-MCNC: 91 MG/DL (ref 65–99)
HCT VFR BLD AUTO: 33.1 % (ref 34–46.6)
HGB BLD-MCNC: 10.6 G/DL (ref 12–15.9)
IMM GRANULOCYTES # BLD AUTO: 0.01 10*3/MM3 (ref 0–0.05)
IMM GRANULOCYTES NFR BLD AUTO: 0.2 % (ref 0–0.5)
LYMPHOCYTES # BLD AUTO: 1.73 10*3/MM3 (ref 0.7–3.1)
LYMPHOCYTES NFR BLD AUTO: 27 % (ref 19.6–45.3)
MAGNESIUM SERPL-MCNC: 2.1 MG/DL (ref 1.6–2.4)
MCH RBC QN AUTO: 31.4 PG (ref 26.6–33)
MCHC RBC AUTO-ENTMCNC: 32 G/DL (ref 31.5–35.7)
MCV RBC AUTO: 97.9 FL (ref 79–97)
MONOCYTES # BLD AUTO: 1.2 10*3/MM3 (ref 0.1–0.9)
MONOCYTES NFR BLD AUTO: 18.8 % (ref 5–12)
NEUTROPHILS # BLD AUTO: 3.35 10*3/MM3 (ref 1.7–7)
NEUTROPHILS NFR BLD AUTO: 52.2 % (ref 42.7–76)
NRBC BLD AUTO-RTO: 0 /100 WBC (ref 0–0.2)
PLATELET # BLD AUTO: 164 10*3/MM3 (ref 140–450)
PMV BLD AUTO: 10.7 FL (ref 6–12)
POTASSIUM BLD-SCNC: 3.6 MMOL/L (ref 3.5–5.2)
PROT SERPL-MCNC: 6.6 G/DL (ref 6–8.5)
RBC # BLD AUTO: 3.38 10*6/MM3 (ref 3.77–5.28)
SODIUM BLD-SCNC: 139 MMOL/L (ref 136–145)
WBC NRBC COR # BLD: 6.4 10*3/MM3 (ref 3.4–10.8)

## 2020-02-02 PROCEDURE — 96366 THER/PROPH/DIAG IV INF ADDON: CPT

## 2020-02-02 PROCEDURE — G0378 HOSPITAL OBSERVATION PER HR: HCPCS

## 2020-02-02 PROCEDURE — 97162 PT EVAL MOD COMPLEX 30 MIN: CPT | Performed by: PHYSICAL THERAPIST

## 2020-02-02 PROCEDURE — 87086 URINE CULTURE/COLONY COUNT: CPT

## 2020-02-02 PROCEDURE — 85025 COMPLETE CBC W/AUTO DIFF WBC: CPT | Performed by: INTERNAL MEDICINE

## 2020-02-02 PROCEDURE — 86431 RHEUMATOID FACTOR QUANT: CPT | Performed by: INTERNAL MEDICINE

## 2020-02-02 PROCEDURE — 63710000001 PREDNISONE PER 1 MG: Performed by: INTERNAL MEDICINE

## 2020-02-02 PROCEDURE — 86140 C-REACTIVE PROTEIN: CPT | Performed by: EMERGENCY MEDICINE

## 2020-02-02 PROCEDURE — 83735 ASSAY OF MAGNESIUM: CPT | Performed by: INTERNAL MEDICINE

## 2020-02-02 PROCEDURE — 25010000002 CEFTRIAXONE PER 250 MG: Performed by: INTERNAL MEDICINE

## 2020-02-02 PROCEDURE — 63710000001 PREDNISONE PER 5 MG: Performed by: INTERNAL MEDICINE

## 2020-02-02 PROCEDURE — 80053 COMPREHEN METABOLIC PANEL: CPT | Performed by: INTERNAL MEDICINE

## 2020-02-02 PROCEDURE — 99226 PR SBSQ OBSERVATION CARE/DAY 35 MINUTES: CPT | Performed by: INTERNAL MEDICINE

## 2020-02-02 RX ORDER — LEVETIRACETAM 250 MG/1
125 TABLET ORAL EVERY 12 HOURS SCHEDULED
Status: DISCONTINUED | OUTPATIENT
Start: 2020-02-02 | End: 2020-02-05 | Stop reason: HOSPADM

## 2020-02-02 RX ORDER — FAMOTIDINE 20 MG/1
20 TABLET, FILM COATED ORAL DAILY
Status: DISCONTINUED | OUTPATIENT
Start: 2020-02-02 | End: 2020-02-05 | Stop reason: HOSPADM

## 2020-02-02 RX ORDER — ACETAMINOPHEN 160 MG/5ML
650 SOLUTION ORAL EVERY 4 HOURS PRN
Status: DISCONTINUED | OUTPATIENT
Start: 2020-02-02 | End: 2020-02-05 | Stop reason: HOSPADM

## 2020-02-02 RX ORDER — FERROUS SULFATE 325(65) MG
325 TABLET ORAL
Status: DISCONTINUED | OUTPATIENT
Start: 2020-02-02 | End: 2020-02-05 | Stop reason: HOSPADM

## 2020-02-02 RX ORDER — ACETAMINOPHEN 325 MG/1
650 TABLET ORAL EVERY 4 HOURS PRN
Status: DISCONTINUED | OUTPATIENT
Start: 2020-02-02 | End: 2020-02-05 | Stop reason: HOSPADM

## 2020-02-02 RX ORDER — METOPROLOL SUCCINATE 25 MG/1
25 TABLET, EXTENDED RELEASE ORAL DAILY
Status: DISCONTINUED | OUTPATIENT
Start: 2020-02-02 | End: 2020-02-02

## 2020-02-02 RX ORDER — FAMOTIDINE 20 MG/1
20 TABLET, FILM COATED ORAL
Status: DISCONTINUED | OUTPATIENT
Start: 2020-02-02 | End: 2020-02-02

## 2020-02-02 RX ORDER — METOPROLOL SUCCINATE 25 MG/1
25 TABLET, EXTENDED RELEASE ORAL ONCE
Status: COMPLETED | OUTPATIENT
Start: 2020-02-02 | End: 2020-02-02

## 2020-02-02 RX ORDER — CITALOPRAM 20 MG/1
20 TABLET ORAL EVERY MORNING
Status: DISCONTINUED | OUTPATIENT
Start: 2020-02-02 | End: 2020-02-05 | Stop reason: HOSPADM

## 2020-02-02 RX ORDER — SODIUM CHLORIDE 0.9 % (FLUSH) 0.9 %
10 SYRINGE (ML) INJECTION EVERY 12 HOURS SCHEDULED
Status: DISCONTINUED | OUTPATIENT
Start: 2020-02-02 | End: 2020-02-05 | Stop reason: HOSPADM

## 2020-02-02 RX ORDER — ONDANSETRON 2 MG/ML
4 INJECTION INTRAMUSCULAR; INTRAVENOUS EVERY 6 HOURS PRN
Status: DISCONTINUED | OUTPATIENT
Start: 2020-02-02 | End: 2020-02-05 | Stop reason: HOSPADM

## 2020-02-02 RX ORDER — MIRTAZAPINE 15 MG/1
7.5 TABLET, FILM COATED ORAL NIGHTLY PRN
Status: DISCONTINUED | OUTPATIENT
Start: 2020-02-02 | End: 2020-02-05 | Stop reason: HOSPADM

## 2020-02-02 RX ORDER — ACETAMINOPHEN 650 MG/1
650 SUPPOSITORY RECTAL EVERY 4 HOURS PRN
Status: DISCONTINUED | OUTPATIENT
Start: 2020-02-02 | End: 2020-02-05 | Stop reason: HOSPADM

## 2020-02-02 RX ORDER — METOPROLOL SUCCINATE 50 MG/1
50 TABLET, EXTENDED RELEASE ORAL DAILY
Status: DISCONTINUED | OUTPATIENT
Start: 2020-02-03 | End: 2020-02-05 | Stop reason: HOSPADM

## 2020-02-02 RX ORDER — ATORVASTATIN CALCIUM 40 MG/1
80 TABLET, FILM COATED ORAL NIGHTLY
Status: DISCONTINUED | OUTPATIENT
Start: 2020-02-02 | End: 2020-02-05 | Stop reason: HOSPADM

## 2020-02-02 RX ORDER — ONDANSETRON 4 MG/1
4 TABLET, FILM COATED ORAL EVERY 6 HOURS PRN
Status: DISCONTINUED | OUTPATIENT
Start: 2020-02-02 | End: 2020-02-05 | Stop reason: HOSPADM

## 2020-02-02 RX ORDER — SODIUM CHLORIDE 0.9 % (FLUSH) 0.9 %
10 SYRINGE (ML) INJECTION AS NEEDED
Status: DISCONTINUED | OUTPATIENT
Start: 2020-02-02 | End: 2020-02-05 | Stop reason: HOSPADM

## 2020-02-02 RX ORDER — DILTIAZEM HYDROCHLORIDE 180 MG/1
360 CAPSULE, COATED, EXTENDED RELEASE ORAL
Status: DISCONTINUED | OUTPATIENT
Start: 2020-02-02 | End: 2020-02-05 | Stop reason: HOSPADM

## 2020-02-02 RX ORDER — DOCUSATE SODIUM 100 MG/1
100 CAPSULE, LIQUID FILLED ORAL 2 TIMES DAILY PRN
Status: DISCONTINUED | OUTPATIENT
Start: 2020-02-02 | End: 2020-02-05 | Stop reason: HOSPADM

## 2020-02-02 RX ADMIN — METOPROLOL SUCCINATE 25 MG: 25 TABLET, EXTENDED RELEASE ORAL at 10:56

## 2020-02-02 RX ADMIN — PREDNISONE 30 MG: 20 TABLET ORAL at 10:56

## 2020-02-02 RX ADMIN — APIXABAN 5 MG: 5 TABLET, FILM COATED ORAL at 17:34

## 2020-02-02 RX ADMIN — FERROUS SULFATE TAB 325 MG (65 MG ELEMENTAL FE) 325 MG: 325 (65 FE) TAB at 09:00

## 2020-02-02 RX ADMIN — METOPROLOL SUCCINATE 25 MG: 25 TABLET, FILM COATED, EXTENDED RELEASE ORAL at 02:07

## 2020-02-02 RX ADMIN — IBUPROFEN 400 MG: 400 TABLET ORAL at 00:24

## 2020-02-02 RX ADMIN — SODIUM CHLORIDE, PRESERVATIVE FREE 10 ML: 5 INJECTION INTRAVENOUS at 09:00

## 2020-02-02 RX ADMIN — LEVETIRACETAM 125 MG: 250 TABLET, FILM COATED ORAL at 17:34

## 2020-02-02 RX ADMIN — CEFTRIAXONE 1 G: 1 INJECTION, POWDER, FOR SOLUTION INTRAMUSCULAR; INTRAVENOUS at 21:21

## 2020-02-02 RX ADMIN — ATORVASTATIN CALCIUM 80 MG: 40 TABLET, FILM COATED ORAL at 21:21

## 2020-02-02 RX ADMIN — ATORVASTATIN CALCIUM 80 MG: 40 TABLET, FILM COATED ORAL at 02:07

## 2020-02-02 RX ADMIN — SODIUM CHLORIDE, PRESERVATIVE FREE 10 ML: 5 INJECTION INTRAVENOUS at 21:21

## 2020-02-02 RX ADMIN — COLCHICINE 0.6 MG: 0.6 TABLET, FILM COATED ORAL at 09:00

## 2020-02-02 RX ADMIN — FAMOTIDINE 20 MG: 20 TABLET ORAL at 09:00

## 2020-02-02 RX ADMIN — CITALOPRAM HYDROBROMIDE 20 MG: 20 TABLET ORAL at 09:00

## 2020-02-02 RX ADMIN — APIXABAN 5 MG: 5 TABLET, FILM COATED ORAL at 02:07

## 2020-02-02 RX ADMIN — DILTIAZEM HYDROCHLORIDE 360 MG: 180 CAPSULE, COATED, EXTENDED RELEASE ORAL at 08:59

## 2020-02-02 RX ADMIN — Medication 1 TABLET: at 09:00

## 2020-02-02 RX ADMIN — LEVETIRACETAM 125 MG: 250 TABLET, FILM COATED ORAL at 02:07

## 2020-02-02 NOTE — THERAPY EVALUATION
Patient Name: Mirian Sy  : 1940    MRN: [id number]                              Today's Date: 2020       Admit Date: 2020    Visit Dx:     ICD-10-CM ICD-9-CM   1. Gout of right wrist, unspecified cause, unspecified chronicity M10.9 274.9   2. Weakness R53.1 780.79   3. Unsteady gait R26.81 781.2   4. Acute UTI N39.0 599.0     Patient Active Problem List   Diagnosis   • Abnormal gait   • Takotsubo syndrome   • Carpal tunnel syndrome   • Complex partial epilepsy (CMS/[facility])   • Depression   • Chronic gastritis   • HLD (hyperlipidemia)   • Hypertension   • Hyponatremia   • Nutritional anemia   • Dyssomnia   • Diplopia   • Xeroderma   • Preventative health care   • Frailty   • Tricuspid regurgitation   • Acute UTI (urinary tract infection)   • Cerebrovascular accident (CMS/[facility])   • Syncope   • DVT (deep venous thrombosis) (CMS/[facility])   • Anorexia   • Anxiety   • Patent foramen ovale   • Mitral regurgitation   • Low weight   • Senile osteoporosis   • Iron deficiency   • Chronic atrial fibrillation   • Closed fracture of left ilium (CMS/[facility])   • GERD (gastroesophageal reflux disease)   • Fall at home   • Vertigo   • Internuclear ophthalmoplegia, right eye   • Atrial fibrillation with RVR (CMS/[facility])   • Bradycardia   • Chronic atrial fibrillation   • SABRINA (acute kidney injury) (CMS/[facility])   • Hyperkalemia   • Confusion   • Acute exacerbation of CHF (congestive heart failure) (CMS/[facility])   • CKD (chronic kidney disease) stage 3, GFR 30-59 ml/min (CMS/[facility])   • CKD (chronic kidney disease) stage 3, GFR 30-59 ml/min (CMS/[facility])   • Wrist pain, acute, right   • Underweight   • Chronic diastolic heart failure (CMS/HCC)     Past Medical History:   Diagnosis Date   • Atrial fibrillation (CMS/HCC)     Chronic anticoagulation and rate control   • Basal cell carcinoma     Left leg and nose   • Cardiomyopathy (CMS/HCC)    • Cerebrovascular accident (CMS/[facility]) 2005    CT right parietal CVA. Carotid duplex -50%  to 79% left ICS 15% right ICS stenosis. MRI of the neck showed no significant carotid bifurcations of these. Negative CT of the head, 09/10/2012. Carotid duplex, 02/24/2014:  Shelf-like plaque in the CECE without significant elevation and velocities.  Patent vertebral arteries.   • Complex partial epilepsy (CMS/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) 2014    Superficial - right lesser saphenus vein - after hip fracture    • Fracture of bone of forefoot 1972    Right foot   • Fracture of ilium, left (CMS/[facility]) 05/2018    Accidental fall - Uncomplicated recovery   • GERD (gastroesophageal reflux disease) 2014    Chronic superficial gastritis   • HTN (hypertension) 2005   • Iron deficiency anemia due to chronic blood loss 2018    Predisposed by chronic anticoagulation and GERD   • Low body weight due to inadequate caloric intake Adulthood   • Mitral valve prolapse 1997    Mild MR   • Osteoarthritis    • Osteoporosis    • Patent foramen ovale 2005   • Pneumonia 1947   • SCC (squamous cell carcinoma), arm, right 2017   • Scoliosis    • Syncope 2014     undermined cause - [facility]   • Varicose veins 2005    Symptomatic     Past Surgical History:   Procedure Laterality Date   • ACHILLES TENDON SURGERY Left 1997    Repair of rupture left tendon with screws   • BREAST CYST EXCISION Left    • BREAST SURGERY Left 1982    Excision benign cyst   • CARDIAC CATHETERIZATION  2006    Severe acute coronary spasm   • CATARACT EXTRACTION WITH INTRAOCULAR LENS IMPLANT Bilateral 2015   • EYE CAPSULOTOMY WITH LASER Left 2016    Marked visual improvement   • FEMUR FRACTURE SURGERY Right 2015    Repair of shaft fracture   • HIP FRACTURE SURGERY Left 2014    left hip fracture with pin   • LUMBAR DISC SURGERY  1983    • OVARIAN CYST REMOVAL Left 1996   • SKIN CANCER EXCISION Right 2017    SCC - arm     General Information     Row Name 02/02/20 1411          PT Evaluation Time/Intention    Document Type  evaluation  -LM     Mode of Treatment  individual therapy;physical therapy  -LM     Row Name 02/02/20 1411          General Information    Patient Profile Reviewed?  yes  -LM     Prior Level of Function  independent:;all household mobility;gait;ADL's  -LM     Existing Precautions/Restrictions  fall;other (see comments) R Wrist Splint  -LM     Barriers to Rehab  none identified  -LM     Row Name 02/02/20 1411          Relationship/Environment    Lives With  child(tammy), adult Pt has been staying with her son and daughter in law  -LM     Row Name 02/02/20 1411          Resource/Environmental Concerns    Current Living Arrangements  home/apartment/condo  -LM     Row Name 02/02/20 1411          Home Main Entrance    Number of Stairs, Main Entrance  -- Entry Ramp  -LM     Row Name 02/02/20 1411          Stairs Within Home, Primary    Number of Stairs, Within Home, Primary  none  -LM     Row Name 02/02/20 1411          Cognitive Assessment/Intervention- PT/OT    Orientation Status (Cognition)  oriented x 3  -LM     Row Name 02/02/20 1411          Safety Issues, Functional Mobility    Safety Issues Affecting Function (Mobility)  safety precaution awareness;safety precautions follow-through/compliance  -LM     Impairments Affecting Function (Mobility)  balance;endurance/activity tolerance;pain;range of motion (ROM);strength  -LM     Comment, Safety Issues/Impairments (Mobility)  R Wrist Pain  -LM       User Key  (r) = Recorded By, (t) = Taken By, (c) = Cosigned By    Initials Name Provider Type    LM Annette Kenny, PT Physical Therapist        Mobility     Row Name 02/02/20 1411          Bed Mobility Assessment/Treatment    Bed Mobility Assessment/Treatment  supine-sit;sit-supine  -LM     Supine-Sit [place] (Bed Mobility)   supervision  -LM     Sit-Supine [place] (Bed Mobility)  supervision  -LM     Assistive Device (Bed Mobility)  bed rails;head of bed elevated  -LM     Comment (Bed Mobility)  Vc's for sequencing.  -LM     Row Name 02/02/20 1411          Transfer Assessment/Treatment    Comment (Transfers)  Vc's for hand placement.  Pt stood x 2 - first from EOB, second from low toilet.  Initially has a posterior lean but able to correct with vc's.  -LM     Row Name 02/02/20 1411          Sit-Stand Transfer    Sit-Stand [place] (Transfers)  contact guard;1 person assist  -LM     Row Name 02/02/20 1411          Gait/Stairs Assessment/Training    Gait/Stairs Assessment/Training  gait/ambulation independence;gait/ambulation assistive device  -LM     [place] Level (Gait)  contact guard;minimum assist (75% patient effort);1 person assist  -LM     Assistive Device (Gait)  walker, front-wheeled  -LM     Distance in Feet (Gait)  20' without AD (MinAx1); 100 feet with rw (CGAx1)  -LM     Deviations/Abnormal Patterns (Gait)  bilateral deviations;el decreased;stride length decreased  -LM     Bilateral Gait Deviations  weight shift ability decreased  -LM     Left Sided Gait Deviations  heel strike decreased  -LM     Comment (Gait/Stairs)  Pt initially ambulated to restroom with HHAx1 requiring MinAx1.  PT convinced pt to try using rw since she uses one at home, but just rest her R hand on it for balance and push through her L hand.  Pt tried this for 100 feet and was much more steady only requiring CGAx1.  Pt states it did not hurt her wrist any more than it was already hurting.  -LM     Row Name 02/02/20 1411          Mobility Assessment/Intervention    Extremity Weight-bearing Status  right upper extremity  -LM     Right Upper Extremity (Weight-bearing Status)  weight-bearing as tolerated (WBAT)  -LM       User Key  (r) = Recorded By, (t) = Taken By, (c) = Cosigned By    Initials Name Provider Type    Annette Helton, PT  Physical Therapist        Obj/Interventions     Row Name 02/02/20 1411          General ROM    GENERAL ROM COMMENTS  BLE AROM WFL  -LM     Row Name 02/02/20 1411          MMT (Manual Muscle Testing)    General MMT Comments  BLEs grossly 4/5 throughout  -LM     Row Name 02/02/20 1411          Static Sitting Balance    Level of [place] (Unsupported Sitting, Static Balance)  independent  -LM     Sitting Position (Unsupported Sitting, Static Balance)  sitting on edge of bed  -LM     Time Able to Maintain Position (Unsupported Sitting, Static Balance)  2 to 3 minutes  -LM     Row Name 02/02/20 1411          Static Standing Balance    Level of [place] (Supported Standing, Static Balance)  contact guard assist;1 person assist  -LM     Time Able to Maintain Position (Supported Standing, Static Balance)  15 to 30 seconds  -LM     Assistive Device Utilized (Supported Standing, Static Balance)  walker, rolling  -LM     Row Name 02/02/20 1411          Dynamic Standing Balance    Level of [place], Reaches Outside Midline (Standing, Dynamic Balance)  contact guard assist;1 person assist  -LM     Time Able to Maintain Position, Reaches Outside Midline (Standing, Dynamic Balance)  1 to 2 minutes  -LM     Assistive Device Utilized (Supported Standing, Dynamic Balance)  walker, rolling  -LM     Row Name 02/02/20 1411          Sensory Assessment/Intervention    Sensory General Assessment  no sensation deficits identified BLEs  -LM       User Key  (r) = Recorded By, (t) = Taken By, (c) = Cosigned By    Initials Name Provider Type    Annette Helton, PT Physical Therapist        Goals/Plan     Row Name 02/02/20 1411          Bed Mobility Goal 1 (PT)    Activity/Assistive Device (Bed Mobility Goal 1, PT)  sit to supine/supine to sit  -LM     [place] Level/Cues Needed (Bed Mobility Goal 1, PT)  conditional independence  -LM     Time Frame (Bed Mobility Goal 1, PT)  long term goal (LTG);2 weeks  -LM     Row Name  02/02/20 1411          Transfer Goal 1 (PT)    Activity/Assistive Device (Transfer Goal 1, PT)  bed-to-chair/chair-to-bed  -LM     [place] Level/Cues Needed (Transfer Goal 1, PT)  conditional independence  -LM     Time Frame (Transfer Goal 1, PT)  long term goal (LTG);2 weeks  -LM     Row Name 02/02/20 1411          Gait Training Goal 1 (PT)    Activity/Assistive Device (Gait Training Goal 1, PT)  gait (walking locomotion);assistive device use  -LM     [place] Level (Gait Training Goal 1, PT)  conditional independence  -LM     Distance (Gait Goal 1, PT)  150 feet  -LM     Time Frame (Gait Training Goal 1, PT)  long term goal (LTG);2 weeks  -LM       User Key  (r) = Recorded By, (t) = Taken By, (c) = Cosigned By    Initials Name Provider Type    Annette Helton, PT Physical Therapist        Clinical Impression     Row Name 02/02/20 1411          Pain Assessment    Additional Documentation  Pain Scale: Numbers Pre/Post-Treatment (Group)  -LM     [facility] Name 02/02/20 1411          Pain Scale: Numbers Pre/Post-Treatment    Pain Scale: Numbers, Pretreatment  5/10  -LM     Pain Scale: Numbers, Post-Treatment  4/10  -LM     Pain Location - Side  Right  -LM     Pain Location  wrist  -LM     Pain Intervention(s)  Repositioned;Ambulation/increased activity  -LM     Row Name 02/02/20 1411          Plan of Care Review    Plan of Care Reviewed With  patient  -[facility] Name 02/02/20 1411          Physical Therapy Clinical Impression    Criteria for Skilled Interventions Met (PT Clinical Impression)  yes;treatment indicated  -LM     Rehab Potential (PT Clinical Summary)  good, to achieve stated therapy goals  -LM     Row Name 02/02/20 1411          Vital Signs    Pre Systolic BP Rehab  82  -LM     Pre Treatment Diastolic BP  53  -LM     Intra Systolic BP Rehab  91 Sitting EOB  -LM     Intra Treatment Diastolic BP  57  -LM     Post Systolic BP Rehab  92  -LM     Post Treatment Diastolic BP  52  -LM     Pretreatment Heart  Rate (beats/min)  55  -LM     Posttreatment Heart Rate (beats/min)  57  -LM     Pre SpO2 (%)  91  -LM     O2 Delivery Pre Treatment  room air  -LM     Post SpO2 (%)  93  -LM     O2 Delivery Post Treatment  room air  -LM     Pre Patient Position  Supine  -LM     Intra Patient Position  Sitting  -LM     Post Patient Position  Supine  -LM     Row Name 02/02/20 1411          Positioning and Restraints    Pre-Treatment Position  in bed  -LM     Post Treatment Position  bed  -LM     In Bed  supine;call light within reach;encouraged to call for assist;exit alarm on;notified nsg  -LM       User Key  (r) = Recorded By, (t) = Taken By, (c) = Cosigned By    Initials Name Provider Type    LM Annette Kenny, MIGUELANGEL Physical Therapist        Outcome Measures     Row Name 02/02/20 1411          How much help from another person do you currently need...    Turning from your back to your side while in flat bed without using bedrails?  4  -LM     Moving from lying on back to sitting on the side of a flat bed without bedrails?  3  -LM     Moving to and from a bed to a chair (including a wheelchair)?  3  -LM     Standing up from a chair using your arms (e.g., wheelchair, bedside chair)?  3  -LM     Climbing 3-5 steps with a railing?  3  -LM     To walk in hospital room?  3  -LM     AM-PAC 6 Clicks Score (PT)  19  -LM     Row Name 02/02/20 1411          Functional Assessment    Outcome Measure Options  AM-PAC 6 Clicks Basic Mobility (PT)  -LM       User Key  (r) = Recorded By, (t) = Taken By, (c) = Cosigned By    Initials Name Provider Type    Annette Helton PT Physical Therapist          PT Recommendation and Plan  Planned Therapy Interventions (PT Eval): balance training, bed mobility training, gait training, home exercise program, motor coordination training, neuromuscular re-education, patient/family education, postural re-education, ROM (range of motion), strengthening, stretching, transfer training  Outcome Summary/Treatment Plan  (PT)  Anticipated Discharge Disposition (PT): home with 24/7 care, home with home health  Plan of Care Reviewed With: patient  Outcome Summary: PT evaluation completed on this date.  Pt transferred supine<-->sit with SBA, stood x 2 with CGA, and ambulated 20' with HHAx1 requiring MinAx1 + 100' using rw with CGAx1.  Pt much more steady when using rw - pt did well with resting the R hand on the walker and pushing through the LUE.  Skilled PT services warranted to improve mobility and safety.  Pt would benefit from further therapy at rehab - however, pt wishes to return home with 24/7 care and  PT.     Time Calculation:   PT Charges     Row Name 02/02/20 1411             Time Calculation    Start Time  1411  -LM      PT Received On  02/02/20  -LM      PT Goal Re-Cert Due Date  02/12/20  -        User Key  (r) = Recorded By, (t) = Taken By, (c) = Cosigned By    Initials Name Provider Type     Annette Kenny, PT Physical Therapist        Therapy Charges for Today     Code Description Service Date Service Provider Modifiers Qty    [id number] HC PT EVAL MOD COMPLEXITY 4 2/2/2020 Annette Kenny, PT GP 1          PT G-Codes  Outcome Measure Options: AM-PAC 6 Clicks Basic Mobility (PT)  AM-PAC 6 Clicks Score (PT): 19    Annette Kenny PT  2/2/2020        09-Dec-2019 22:13

## 2020-02-02 NOTE — CONSULTS
"                  Clinical Nutrition     Reason for Visit:   Identified at risk by screening criteria, BMI, MST score 2+      Patient Name: Mirian Sy  YOB: 1940  MRN: [id number]  Date of Encounter: 02/02/20 1:35 PM  Admission date: 2/1/2020    Nutrition Assessment   Assessment     Admission diagnosis  R wrist pain    Additional diagnosis/conditions/procedures this admission  ?inflammatory arthritis  Generalized weakness  Acute UTI    Additional PMH/procedures:  MVP  PAF  CM  HTN  DVT  GERD  Partial epilepsy  Scoliosis  Tobacco (quit)  Depression  CVA (2005)  CKD3    Achilles surgery  Femur fracture  Hip fracture    Reported/Observed/Food/Nutrition Related History:      Patient eating lunch at visit. Observed patient had eaten ~100% of lunch tray. Reports shes been living with her son the past 2 months and believes her son told her she was down 4 lbs from her UBW over the past 2 weeks. Patient reports her UBW is 100 - 105 lbs. She reports her appetite has been normal, son assists her with feeding during meals and she drinks Ensure supplements mid-morning (chocolate or strawberry). Denies any preferences at this time. Encouraged good oral intake and alternate selections with catering staff.      Anthropometrics     Height: 170.2 cm (67\")  Last filed wt: Weight: 46.7 kg (103 lb) (02/02/20 0044)  Weight Method: Bed scale    BMI: BMI (Calculated): 16.1  Underweight:<18.5kg/m2    Ideal Body Weight (IBW) (kg): 61.86  Admission wt: 101 lb  Method obtained: stated weight per charting on admission 2/1    Weight Change   UBW: pr reports 100 - 105 lbs  Weight change:   % wt change:   Time frame of weight loss:     Last 15 Recorded Weights   View Complete Flowsheet   Weight Weight (kg) Weight (lbs) Weight Method VISIT REPORT   2/2/2020 46.72 kg 103 lb Bed scale -   2/1/2020 45.813 kg 101 lb Stated -   1/6/2020 47.628 kg 105 lb - Report   12/16/2019 47.174 kg 104 lb - Report   12/14/2019 51.256 kg 113 lb Bed " scale -   12/13/2019 50.259 kg 110 lb 12.8 oz - -   12/13/2019 52.436 kg 115 lb 9.6 oz - -   12/12/2019 47.628 kg 105 lb Stated -   10/23/2019 47.174 kg 104 lb - Report   9/23/2019 47.174 kg 104 lb - Report   9/15/2019 49.442 kg 109 lb Standing scale -   9/15/2019 45.813 kg 101 lb Stated -   9/12/2019 45.813 kg 101 lb - Report   6/28/2019 47.174 kg 104 lb - Report   5/9/2019 45.4 kg 100 lb 1.4 oz - -       Labs reviewed     Results from last 7 days   Lab Units 02/02/20  0620 02/02/20  0114 02/01/20  1808   GLUCOSE mg/dL 91  --  123*   BUN mg/dL 31*  --  30*   CREATININE mg/dL 1.13*  --  1.04*   SODIUM mmol/L 139  --  138   CHLORIDE mmol/L 103  --  101   POTASSIUM mmol/L 3.6  --  4.3   MAGNESIUM mg/dL  --  2.1  --    ALT (SGPT) U/L 28  --   --      Results from last 7 days   Lab Units 02/02/20  0620 02/02/20  0225   ALBUMIN g/dL 2.80*  --    CRP mg/dL  --  11.55*           Lab Results   Lab Value Date/Time    HGBA1C 5.60 12/19/2018 0544    HGBA1C 5.3 11/04/2015 0610    HGBA1C 5.5 03/12/2015 1520       Medications reviewed   Pertinent: eliquis, calcium carb cholecalciferol, abx IV, colchicine, pepcid, iron, keppra, prednisone      Current Nutrition Prescription     PO: Diet Regular; Cardiac  Intake: insuff data 75% x 2 (MD reported 50% of breakfast and RD observed 100% lunch)    Nutrition Diagnosis     2/2  Problem No nutrition diagnosis at this time   Etiology    Signs/Symptoms        Nutrition Intervention   1.  Follow treatment progress, Care plan reviewed  2.  Advise alternate selection, Interview for preferences   3. Supplement provided - Chocolate Ensure HP Daily with Breakfast  4. RD to continue to monitor patients weight and PO intake/adequacy     Goal:   General: Nutrition support treatment  PO: Establish PO, Increase intake      Monitoring/Evaluation:   Per protocol, PO intake, Supplement intake, Weight    Will Continue to follow per protocol    Aliza Madden, VIKTORIAN, LD  Time Spent: 30 minutes

## 2020-02-02 NOTE — PLAN OF CARE
Problem: Patient Care Overview  Goal: Plan of Care Review  Outcome: Ongoing (interventions implemented as appropriate)  Flowsheets  Taken 2/2/2020 0433  Progress: no change  Taken 2/2/2020 0118  Plan of Care Reviewed With: patient  Goal: Discharge Needs Assessment  Outcome: Ongoing (interventions implemented as appropriate)  Flowsheets  Taken 2/2/2020 0102  Equipment Currently Used at Home: walker, rolling;shower chair;grab bar  Taken 2/2/2020 0433  Anticipated Changes Related to Illness: none  Transportation Concerns: car, none  Concerns to be Addressed: no discharge needs identified;denies needs/concerns at this time  Readmission Within the Last 30 Days: no previous admission in last 30 days  Patient/Family Anticipated Services at Transition:   Taken 2/2/2020 0105  Transportation Anticipated: family or friend will provide  Patient/Family Anticipates Transition to: home with family     Problem: Pain, Acute (Adult)  Goal: Acceptable Pain Control/Comfort Level  Description  Patient will demonstrate the desired outcomes by discharge/transition of care.  Outcome: Ongoing (interventions implemented as appropriate)  Flowsheets (Taken 2/2/2020 0433)  Acceptable Pain Control/Comfort Level: making progress toward outcome     Problem: Fall Risk (Adult)  Goal: Absence of Fall  Description  Patient will demonstrate the desired outcomes by discharge/transition of care.  Outcome: Ongoing (interventions implemented as appropriate)  Flowsheets (Taken 2/2/2020 0433)  Absence of Fall: making progress toward outcome

## 2020-02-02 NOTE — H&P
BYRNES/ Medicine History and Physical    Primary Care Physician: Oren Engel DO    CHIEF COMPLAIN :  Right wrist swelling.  (Moderate historian)      History of Present Illness  79-year-old female who lives with son, presented to ED with acute onset of  R.WRIST swelling-for past 2 days, acute onset no trauma, significant pain with flexion or extension of the wrist, no extensive erythema, tender to touch, wrist swelling extending to whole palm and fingers,  Do not complain of any fever, had similar wrist pain in the past but this time it is worse.  No known history of gout,  Also family noted her to be having noted her more tired than usual, felt weak, no focal weakness,  Patient herself denies any complaint of cough, chest pain, abdominal pain, dysuria or diarrhea.  On arrival to ED patient was in A. fib-RVR-heart rate up to 130, slowed down after total of 5 mg x 2 Lopressor IV push.  The patient did not complain of any palpitations lightheadedness or dizziness.  Last admission was in December 2019 secondary to acute CHF.      Review of Systems   Complete ROS done, which is negative except as above and HPI.  Old records reviewed and summarized in PM hx         Past Medical History:   Diagnosis Date   • Atrial fibrillation--CHADS2 VASC= 6, on Eliquis 5 mg/12, Cardizem 360 mg p.o. daily, Toprol-XL 25 mg p.o. daily.   2005            • Cardiomyopathy- currently on Lasix as needed, echo- 5/2019-EF of 60%.   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 2014   •     • Depression-Celexa 20 mg p.o. daily, mirtazapine. 2002       • DVT (deep venous thrombosis) (CMS/[facility]) 2014    Superficial - right lesser saphenus vein - after hip fracture         • GERD (gastroesophageal reflux disease)-Zantac 2014       • HTN (hypertension)   2005   • Iron deficiency anemia due to chronic blood loss-on iron sulfate 2018       •  Osteoarthritis/osteoporosis/scoliosis/chronic back pain    •     • Patent foramen ovale 2005     1947   • SCC (squamous cell carcinoma), arm, right 2017       Past Surgical History:   Procedure Laterality Date   • ACHILLES TENDON SURGERY Left 1997    Repair of rupture left tendon with screws   • BREAST CYST EXCISION Left    • BREAST SURGERY Left 1982    Excision benign cyst   • CARDIAC CATHETERIZATION  2006    Severe acute coronary spasm   • CATARACT EXTRACTION WITH INTRAOCULAR LENS IMPLANT Bilateral 2015   • EYE CAPSULOTOMY WITH LASER Left 2016    Marked visual improvement   • FEMUR FRACTURE SURGERY Right 2015    Repair of shaft fracture   • HIP FRACTURE SURGERY Left 2014    left hip fracture with pin   • LUMBAR DISC SURGERY  1983   • OVARIAN CYST REMOVAL Left 1996   • SKIN CANCER EXCISION Right 2017    SCC - arm       Family History:   family history includes Allergies in her son; Arrhythmia in her sister; Basal cell carcinoma in her sister; Bone cancer in her sister; Breast cancer in her mother; Breast cancer (age of onset: 57) in her sister; Breast cancer (age of onset: 59) in her sister; Breast cancer (age of onset: 66) in her sister; Diabetes in her maternal uncle, mother, and sister; Heart disease in her father; Hypertension in her mother and sister; Melanoma in her sister; Other in her sister; Ovarian cancer in her sister; Stroke in her sister.    Social History:    reports that she has quit smoking. Her smoking use included cigarettes. She has a 20.00 pack-year smoking history. She has never used smokeless tobacco. She reports that she does not drink alcohol or use drugs.    Medications:    (Not in a hospital admission)  Allergies   Allergen Reactions   • Actonel [Risedronate Sodium] GI Intolerance   • Hctz [Hydrochlorothiazide]      hyponatremia   • Lisinopril      hyperkalemia         PHYSICAL EXAM :    Vitals:    02/01/20 2200   BP: 116/62   Pulse: 105   Resp:  16   Temp:  Afebrile   SpO2:  93% on room  air     VITALS-   AS ABOVE.  GENERAL- Not distressed, malnourished,  RS- CTA-BL, ,  No wheezing , no crackles, good effort.  CVS- s1s2 irregular  ABD- soft, nontender, not distended, no organomegaly. BS good.  EXT- no lower extremity edema.  Right wrist swelling extending up to the hand as well as dorsal forearm as well, minimal erythema, tender to touch, flexion or extension of the finger causes significant pain, right shoulder- no gross abnormality or erythema or swelling noted, patient does have pain with external rotation as well as extension of the arm, pulses + .  NEURO- AAO-3, power 4/5 in lower Ext, right arm could not be raised-secondary to pain in the shoulder, no gross sensory deficit, cranial nerves intact.  EYES- Conjunctivae are normal. Pupils are equal, round, and reactive to light. No scleral icterus.   ENT- no external ear nose lesions, mucosa dry  NECK-  No tracheal deviation present. No thyromegaly present,No cervical lymphadenopathy.  JOINTS/MSK- no deformity, no swelling.  SKIN- no rash , warm to touch.  PSYCHIATRIC-  has a normal mood and affect.Thought content normal.           RESULTS REVIEWED :   Data.    Results from last 7 days   Lab Units 02/01/20  1808   WBC 10*3/mm3 10.80   HEMOGLOBIN g/dL 11.2*   HEMATOCRIT % 34.3   PLATELETS 10*3/mm3 174     Results from last 7 days   Lab Units 02/01/20  1808   SODIUM mmol/L 138   POTASSIUM mmol/L 4.3   CHLORIDE mmol/L 101   CO2 mmol/L 24.0   BUN mg/dL 30*   CREATININE mg/dL 1.04*   GLUCOSE mg/dL 123*   CALCIUM mg/dL 9.0       Brief Urine Lab Results  (Last result in the past 365 days)      Color   Clarity   Blood   Leuk Est   Nitrite   Protein   CREAT   Urine HCG        02/01/20 2149 Dark Yellow Cloudy Trace Moderate (2+) Negative 100 mg/dL (2+)               No results found for: PHART, PCO2    Ekg-A. fib 105, QTc 467, T wave flattening in all lateral leads, similar to old EKG.  Cx-not done.  Right wrist x-ray-no acute abnormality.  UA-moderate  leukocytes, WBC too numerous, bacteria 3+.  WBC of 11, neutrophils of 65, uric acid-5.8,  BUN/creatinine 30/1.04,  ESR 64,    I have personally reviewed current lab, radiology, and ekg .      Active Hospital Problems    Diagnosis POA   • CKD (chronic kidney disease) stage 3, GFR 30-59 ml/min (CMS/[facility]) [N18.3] Unknown   • Wrist pain, acute, right [M25.531] Yes   • Chronic atrial fibrillation [I48.20] Yes   • Cerebrovascular accident (CMS/[facility]) [I63.9] Yes        • Acute UTI (urinary tract infection) [N39.0] Unknown   • Takotsubo syndrome [I51.81] Yes            • Complex partial epilepsy (CMS/[facility]) [G40.209] Yes   • Depression [F32.9] Yes   • Chronic gastritis [K29.50] Yes   • HLD (hyperlipidemia) [E78.5] Yes   • Hypertension [I10] Yes   • Nutritional anemia [D53.9] Yes         Assessment / Plan   Right wrist pain- acute inflammatory arthritis- uric acid level borderline, patient had mild relief after the management in the ED with colchicine and Lortab.  -Case was discussed with Dr. Francis (orthopedics)- his impression was most likely inflammatory arthritis.  - We will continue low-dose colchicine, will give 1 dose of NSAIDs, opiate use limited, if still has poor improvement may benefit from steroids.  - No known history of RA-we will send the studies.  -Also complains of right shoulder pain though no acute inflammation is noted, will start with x-ray.    Generalized weakness/acute UTI-Rocephin IV started.    • Atrial fibrillation/rapid RVR in ED (controlled after 2 doses of Lopressor in ED)--CHADS2 VASC= 6, on Eliquis 5 mg/12, Cardizem 360 mg p.o. daily, Toprol-XL 25 mg p.o. daily.  - We will continue her home medications, Eliquis dose may need to be reduced given her body weight and renal function. 2005    - Denies any acute bleeding        • Cardiomyopathy- currently on Lasix as needed, echo- 5/2019-EF of 60%.-Appears to be stable, therefore not giving Lasix.   2006   • Cerebrovascular accident-minimal left-sided  residual deficit-does not take aspirin as per patient at home. 01/2005       • Complex partial epilepsy-Keppra 125 mg/12.-  We will continue home meds. 2014   •     • Depression-Celexa 20 mg p.o. daily, mirtazapine. 2002       • DVT (deep venous thrombosis) (CMS/[facility]) 2014    Superficial - right lesser saphenus vein - after hip fracture         • GERD (gastroesophageal reflux disease)-Zantac 2014       • HTN (hypertension)-controlled.   2005   • Iron deficiency anemia due to chronic blood loss-on iron sulfate-hemoglobin stable 2018       • Osteoarthritis/osteoporosis/scoliosis/chronic back pain        -DVT PXL  -TEDs/SCDs  -Lovenox-none since pt on eliquis      CODE STATUS:    Code Status and Medical Interventions:   Ordered at: 02/02/20 0008     Level Of Support Discussed With:    Patient     Code Status:    CPR     Medical Interventions (Level of Support Prior to Arrest):    Full       I discussed the patients findings and my recommendations with: patient/family.    I believe this patient meets INPATIENT status due to the need for care which can only be reasonably provided in an hospital setting such as aggressive/expedited ancillary services and/or consultation services, the necessity for IV medications, close physician monitoring and/or the possible need for procedures.  In such, I feel patient’s risk for adverse outcomes and need for care warrant INPATIENT evaluation and predict the patient’s care encounter to likely last beyond 2 midnights.        Sriram Coto MD  02/02/20  12:08 AM

## 2020-02-02 NOTE — PLAN OF CARE
Problem: Patient Care Overview  Goal: Plan of Care Review  Outcome: Ongoing (interventions implemented as appropriate)  Flowsheets (Taken 2/2/2020 1411)  Outcome Summary: PT evaluation completed on this date.  Pt transferred supine<-->sit with SBA, stood x 2 with CGA, and ambulated 20' with HHAx1 requiring MinAx1 + 100' using rw with CGAx1.  Pt much more steady when using rw - pt did well with resting the R hand on the walker and pushing through the LUE.  Skilled PT services warranted to improve mobility and safety.  Pt would benefit from further therapy at rehab - however, pt wishes to return home with 24/7 care and  PT.

## 2020-02-02 NOTE — PROGRESS NOTES
[facility] Medicine Services  PROGRESS NOTE    Patient Name: Mirian Sy  : 1940  MRN: [id number]    Date of Admission: 2020  Primary Care Physician: Oren Engel DO    Subjective   Subjective     CC:  F/u right wrist pain    HPI:  No family present.  Feels like her wrist is improved, but not normal.  HR remains elevated.  Ate about half her breakfast.    Review of Systems  Gen- No fevers, chills  CV- No chest pain, palpitations  Resp- No cough, dyspnea  GI- No N/V/D, abd pain    Objective   Objective     Vital Signs:   Temp:  [97.8 °F (36.6 °C)-99.4 °F (37.4 °C)] 97.8 °F (36.6 °C)  Heart Rate:  [] 102  Resp:  [18-19] 19  BP: ()/(62-97) 107/70        Physical Exam:  Constitutional: No acute distress, awake, alert, sitting up in bed, frail  HENT: NCAT, mucous membranes moist  Respiratory: Clear to auscultation bilaterally, respiratory effort normal   Cardiovascular: tachy, irregular, no murmurs, rubs, or gallops  Gastrointestinal: Positive bowel sounds, soft, nontender, nondistended  Musculoskeletal: right wrist in splint.  Removed and has impaired flexion/extension due to pain.  Mild swelling, no erythema  Psychiatric: Appropriate affect, cooperative  Neurologic: Oriented x 3, no focal deficits  Skin: No rashes      Results Reviewed:  Results from last 7 days   Lab Units 20  0620 20  1808   WBC 10*3/mm3 6.40 10.80   HEMOGLOBIN g/dL 10.6* 11.2*   HEMATOCRIT % 33.1* 34.3   PLATELETS 10*3/mm3 164 174     Results from last 7 days   Lab Units 20  0620 20  1808   SODIUM mmol/L 139 138   POTASSIUM mmol/L 3.6 4.3   CHLORIDE mmol/L 103 101   CO2 mmol/L 25.0 24.0   BUN mg/dL 31* 30*   CREATININE mg/dL 1.13* 1.04*   GLUCOSE mg/dL 91 123*   CALCIUM mg/dL 8.5* 9.0   ALT (SGPT) U/L 28  --    AST (SGOT) U/L 44*  --      Estimated Creatinine Clearance: 30.2 mL/min (A) (by C-G formula based on SCr of 1.13 mg/dL (H)).    Microbiology Results  Abnormal     None          Imaging Results (Last 24 Hours)     Procedure Component Value Units Date/Time    XR Wrist 3+ View Right [[id number]] Collected:  02/01/20 1800     Updated:  02/01/20 1806    Narrative:          EXAMINATION: XR RIGHT WRIST  3 VIEWS - 02/01/2020     INDICATION: Pain and swelling.      COMPARISON: None.     FINDINGS: 3 views of the right wrist were submitted for review. There is  diffuse osteopenia. The radius and ulna appear intact. The carpal bones  appear intact. No convincing evidence for acute osseous abnormalities.  The metacarpals appear intact. No soft tissue swelling identified. There  is calcification suggestive of the triangular fibrocartilage.           Impression:       Diffuse osteopenia without convincing radiographic evidence  for fracture.     DICTATED:   02/01/2020  EDITED/ls :   02/01/2020                 Results for orders placed during the hospital encounter of 05/07/19   Adult Transthoracic Echo Complete W/ Cont if Necessary Per Protocol    Narrative · Estimated EF = 60%.  · Left ventricular systolic function is normal.  · Trace-to-mild mitral valve regurgitation is present.  · Mild tricuspid valve regurgitation is present.  · Calculated right ventricular systolic pressure from tricuspid   regurgitation is 34.0 mmHg.  · No cardiac source for embolus is seen          I have reviewed the medications:  Scheduled Meds:    apixaban 5 mg Oral Q12H   atorvastatin 80 mg Oral Nightly   calcium carb-cholecalciferol 1 tablet Oral Daily   cefTRIAXone 1 g Intravenous Q24H   citalopram 20 mg Oral QAM   colchicine 0.6 mg Oral Daily   dilTIAZem  mg Oral Q24H   famotidine 20 mg Oral Daily   ferrous sulfate 325 mg Oral Daily With Breakfast   levETIRAcetam 125 mg Oral Q12H   metoprolol succinate XL 25 mg Oral Once   [START ON 2/3/2020] metoprolol succinate XL 50 mg Oral Daily   predniSONE 30 mg Oral Daily With Breakfast   sodium chloride 10 mL Intravenous Q12H     Continuous  Infusions:   PRN Meds:.•  acetaminophen **OR** acetaminophen **OR** acetaminophen  •  docusate sodium  •  mirtazapine  •  ondansetron **OR** ondansetron  •  sodium chloride    Assessment/Plan   Assessment & Plan     Active Hospital Problems    Diagnosis  POA   • **Wrist pain, acute, right [M25.531]  Yes     Priority: Medium   • CKD (chronic kidney disease) stage 3, GFR 30-59 ml/min (CMS/[facility]) [N18.3]  Yes     Priority: Medium   • Chronic atrial fibrillation [I48.20]  Yes     Priority: Medium   • Acute UTI (urinary tract infection) [N39.0]  Yes     Priority: Medium   • Underweight [R63.6]  Yes   • Chronic diastolic heart failure (CMS/[facility]) [I50.32]  Yes   • Cerebrovascular accident (CMS/[facility]) [I63.9]  Yes   • Takotsubo syndrome [I51.81]  Yes   • Complex partial epilepsy (CMS/[facility]) [G40.209]  Yes   • Depression [F32.9]  Yes   • HLD (hyperlipidemia) [E78.5]  Yes   • Hypertension [I10]  Yes   • Nutritional anemia [D53.9]  Yes      Resolved Hospital Problems   No resolved problems to display.        Brief Hospital Course to date:  Mirian Sy is a 79 y.o. female with a history of A. fib, CKD stage III, epilepsy on Keppra, chronic diastolic heart failure presented to the emergency room with complaints of acute onset of right wrist pain for 2 days.  Imaging was unremarkable.    - wrist is symptomatically improved after colchicine x 1 and ibuprofen.  Still swollen and limited ROM.  Could be gout vs psuedogout.  Given CKD, will hold further NSAID and give steroids.  If no improvement, consider ortho consult (ED spoke with Rosa who rec'd outpatient f/u)  - rapid a-fib on admission.  S/p IV lopressor.  Rate still in low 100's, will increase toprol XL dose to 50 and monitor.  Continue eliquis.  - abnormal U/A, culture pending.  Plan rocephin x 3 days.  - renal function stable  - continue keppra  - PT/OT evals pending.  Uses walker at baseline, given wrist pain expect worsening function.  May need rehab/SNF.    DVT  Prophylaxis:  eliquis    Disposition: I expect the patient to be discharged home vs SNF ~2 days.    CODE STATUS:   Code Status and Medical Interventions:   Ordered at: 02/02/20 0008     Level Of Support Discussed With:    Patient     Code Status:    CPR     Medical Interventions (Level of Support Prior to Arrest):    Full       Electronically signed by Ricardo Houston MD, 02/02/20, 9:59 AM.

## 2020-02-02 NOTE — PLAN OF CARE
Problem: Patient Care Overview  Goal: Plan of Care Review  Outcome: Ongoing (interventions implemented as appropriate)     Problem: Patient Care Overview  Goal: Individualization and Mutuality  Outcome: Ongoing (interventions implemented as appropriate)     Problem: Patient Care Overview  Goal: Discharge Needs Assessment  Outcome: Ongoing (interventions implemented as appropriate)     Problem: Patient Care Overview  Goal: Interprofessional Rounds/Family Conf  Outcome: Ongoing (interventions implemented as appropriate)     Problem: Pain, Acute (Adult)  Goal: Acceptable Pain Control/Comfort Level  Outcome: Ongoing (interventions implemented as appropriate)  Flowsheets (Taken 2/2/2020 1528)  Acceptable Pain Control/Comfort Level: making progress toward outcome     Problem: Fall Risk (Adult)  Goal: Absence of Fall  Outcome: Ongoing (interventions implemented as appropriate)  Flowsheets (Taken 2/2/2020 1528)  Absence of Fall: making progress toward outcome

## 2020-02-02 NOTE — PROGRESS NOTES
Discharge Planning Assessment  [facility]     Patient Name: Mirian Sy  MRN: [id number]  Today's Date: 2/2/2020    Admit Date: 2/1/2020    Discharge Needs Assessment     Row Name 02/02/20 1804       Living Environment    Lives With  child(tammy), adult    Name(s) of Who Lives With Patient  Ke Alicea (son) [phone]    Current Living Arrangements  home/apartment/condo    Primary Care Provided by  child(tammy)    Provides Primary Care For  no one, unable/limited ability to care for self    Family Caregiver if Needed  child(tammy), adult    Family Caregiver Names  Ke Alicea (son) [phone]    Quality of Family Relationships  helpful;involved;supportive    Able to Return to Prior Arrangements  yes       Resource/Environmental Concerns    Resource/Environmental Concerns  none       Transition Planning    Patient/Family Anticipates Transition to  home with family;home with help/services    Patient/Family Anticipated Services at Transition      Transportation Anticipated  family or friend will provide       Discharge Needs Assessment    Readmission Within the Last 30 Days  no previous admission in last 30 days    Concerns to be Addressed  discharge planning    Equipment Currently Used at Home  walker, standard;rollator;grab bar    Anticipated Changes Related to Illness  none    Equipment Needed After Discharge  none    Outpatient/Agency/Support Group Needs  homecare agency    Discharge Facility/Level of Care Needs  home with home health    Provided post acute provider list?  Yes    Post Acute Provider List  Home Health;DME Supplier;Inpatient Rehab    Post Acute Provider Quality & Resource List  Yes    Delivered To  Patient    Method of Delivery  In person    Patient's Choice of Community Agency(s)  Undecided    Current Discharge Risk  chronically ill        Discharge Plan     Row Name 02/02/20 1803       Plan    Plan  Home with home health versus rehab    Patient/Family in Agreement with Plan   yes    Plan Comments  Spoke with patient and sister at bedside.  Patient lives at home with her son and daughter in law who help to care for her full time.  She states that she is normally able to get up and down on her own with a walker, but she has had multiple fractures in the last year, and it is becomming increasingly difficult to get up and down. Patient states that she uses a rollator, standard walker, and grab bars.  She is not current with home health , but would be agreeable to it.  Patient would also be agreeable to rehab if she is qualifies.  She would be interested in [facility] or [facility].  She states that her PCP is Oren Engel Do and Her insurance provider is [employer] and [employer].  She states that she has prescription coverage and she is able to afford her meds most of the time.  She would like assistance with Eliquis.  She states that she plans home as soon as possible.  Family will transport.      Final Discharge Disposition Code  01 - home or self-care        Destination      Coordination has not been started for this encounter.      Durable Medical Equipment      Coordination has not been started for this encounter.      Dialysis/Infusion      Coordination has not been started for this encounter.      Home Medical Care      Coordination has not been started for this encounter.      Therapy      Coordination has not been started for this encounter.      Community Resources      Coordination has not been started for this encounter.          Demographic Summary     Row Name 02/02/20 1801       General Information    Admission Type  observation    Arrived From  home    Referral Source  admission list    Reason for Consult  discharge planning    Preferred Language  English     Used During This Interaction  no    General Information Comments  PCP:  Oren Engel DO        Functional Status     Row Name 02/02/20 1802       Functional Status    Usual Activity  Tolerance  fair    Current Activity Tolerance  fair       Functional Status, IADL    Medications  assistive person    Meal Preparation  assistive person    Housekeeping  assistive person    Laundry  assistive person    Shopping  assistive person        Psychosocial    No documentation.       Abuse/Neglect    No documentation.       Legal    No documentation.       Substance Abuse    No documentation.       Patient Forms    No documentation.           Raeann Bain RN

## 2020-02-03 LAB — BACTERIA SPEC AEROBE CULT: NORMAL

## 2020-02-03 PROCEDURE — 25010000002 CEFTRIAXONE PER 250 MG: Performed by: INTERNAL MEDICINE

## 2020-02-03 PROCEDURE — G0378 HOSPITAL OBSERVATION PER HR: HCPCS

## 2020-02-03 PROCEDURE — 97110 THERAPEUTIC EXERCISES: CPT | Performed by: PHYSICAL THERAPIST

## 2020-02-03 PROCEDURE — 63710000001 PREDNISONE PER 5 MG: Performed by: INTERNAL MEDICINE

## 2020-02-03 PROCEDURE — 99225 PR SBSQ OBSERVATION CARE/DAY 25 MINUTES: CPT | Performed by: INTERNAL MEDICINE

## 2020-02-03 PROCEDURE — 97166 OT EVAL MOD COMPLEX 45 MIN: CPT

## 2020-02-03 PROCEDURE — 96366 THER/PROPH/DIAG IV INF ADDON: CPT

## 2020-02-03 PROCEDURE — 97116 GAIT TRAINING THERAPY: CPT | Performed by: PHYSICAL THERAPIST

## 2020-02-03 RX ORDER — PREDNISONE 20 MG/1
20 TABLET ORAL
Status: DISCONTINUED | OUTPATIENT
Start: 2020-02-04 | End: 2020-02-05 | Stop reason: HOSPADM

## 2020-02-03 RX ADMIN — LEVETIRACETAM 125 MG: 250 TABLET, FILM COATED ORAL at 17:48

## 2020-02-03 RX ADMIN — DILTIAZEM HYDROCHLORIDE 360 MG: 180 CAPSULE, COATED, EXTENDED RELEASE ORAL at 09:14

## 2020-02-03 RX ADMIN — COLCHICINE 0.6 MG: 0.6 TABLET, FILM COATED ORAL at 09:14

## 2020-02-03 RX ADMIN — LEVETIRACETAM 125 MG: 250 TABLET, FILM COATED ORAL at 05:48

## 2020-02-03 RX ADMIN — APIXABAN 5 MG: 5 TABLET, FILM COATED ORAL at 05:48

## 2020-02-03 RX ADMIN — FERROUS SULFATE TAB 325 MG (65 MG ELEMENTAL FE) 325 MG: 325 (65 FE) TAB at 09:15

## 2020-02-03 RX ADMIN — APIXABAN 5 MG: 5 TABLET, FILM COATED ORAL at 17:47

## 2020-02-03 RX ADMIN — CEFTRIAXONE 1 G: 1 INJECTION, POWDER, FOR SOLUTION INTRAMUSCULAR; INTRAVENOUS at 13:23

## 2020-02-03 RX ADMIN — ATORVASTATIN CALCIUM 80 MG: 40 TABLET, FILM COATED ORAL at 20:37

## 2020-02-03 RX ADMIN — SODIUM CHLORIDE, PRESERVATIVE FREE 10 ML: 5 INJECTION INTRAVENOUS at 20:38

## 2020-02-03 RX ADMIN — SODIUM CHLORIDE, PRESERVATIVE FREE 10 ML: 5 INJECTION INTRAVENOUS at 09:15

## 2020-02-03 RX ADMIN — FAMOTIDINE 20 MG: 20 TABLET ORAL at 09:14

## 2020-02-03 RX ADMIN — METOPROLOL SUCCINATE 50 MG: 50 TABLET, EXTENDED RELEASE ORAL at 09:14

## 2020-02-03 RX ADMIN — CITALOPRAM HYDROBROMIDE 20 MG: 20 TABLET ORAL at 09:15

## 2020-02-03 RX ADMIN — PREDNISONE 30 MG: 20 TABLET ORAL at 09:18

## 2020-02-03 NOTE — THERAPY EVALUATION
Acute Care - Occupational Therapy Initial Evaluation  [facility]     Patient Name: Mirian Sy  : 1940  MRN: [id number]  Today's Date: 2/3/2020     Date of Referral to OT: 20  Referring Physician: MD Celso    Admit Date: 2020       ICD-10-CM ICD-9-CM   1. Gout of right wrist, unspecified cause, unspecified chronicity M10.9 274.9   2. Weakness R53.1 780.79   3. Unsteady gait R26.81 781.2   4. Acute UTI N39.0 599.0     Patient Active Problem List   Diagnosis   • Abnormal gait   • Takotsubo syndrome   • Carpal tunnel syndrome   • Complex partial epilepsy (CMS/HCC)   • Depression   • Chronic gastritis   • HLD (hyperlipidemia)   • Hypertension   • Hyponatremia   • Nutritional anemia   • Dyssomnia   • Diplopia   • Xeroderma   • Preventative health care   • Frailty   • Tricuspid regurgitation   • Acute UTI (urinary tract infection)   • Cerebrovascular accident (CMS/[facility])   • Syncope   • DVT (deep venous thrombosis) (CMS/HCC)   • Anorexia   • Anxiety   • Patent foramen ovale   • Mitral regurgitation   • Low weight   • Senile osteoporosis   • Iron deficiency   • Chronic atrial fibrillation   • Closed fracture of left ilium (CMS/HCC)   • GERD (gastroesophageal reflux disease)   • Fall at home   • Vertigo   • Internuclear ophthalmoplegia, right eye   • Atrial fibrillation with RVR (CMS/HCC)   • Bradycardia   • Chronic atrial fibrillation   • SABRINA (acute kidney injury) (CMS/HCC)   • Hyperkalemia   • Confusion   • Acute exacerbation of CHF (congestive heart failure) (CMS/HCC)   • CKD (chronic kidney disease) stage 3, GFR 30-59 ml/min (CMS/HCC)   • CKD (chronic kidney disease) stage 3, GFR 30-59 ml/min (CMS/HCC)   • Wrist pain, acute, right   • Underweight   • Chronic diastolic heart failure (CMS/HCC)     Past Medical History:   Diagnosis Date   • Atrial fibrillation (CMS/HCC)     Chronic anticoagulation and rate control   • Basal cell carcinoma     Left leg and nose   • Cardiomyopathy (CMS/HCC)  2006   • Cerebrovascular accident (CMS/[facility]) 01/2005    CT right parietal CVA. Carotid duplex -50% to 79% left ICS 15% right ICS stenosis. MRI of the neck showed no significant carotid bifurcations of these. Negative CT of the head, 09/10/2012. Carotid duplex, 02/24/2014:  Shelf-like plaque in the CECE without significant elevation and velocities.  Patent vertebral arteries.   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    With dilated cardiomyopathy   • Decubitus ulcer of buttock 2014    Transient during fracture rehabilitation   • Depression 2002    Good response to citalopram and mirtazapine   • DVT (deep venous thrombosis) (CMS/[facility]) 2014    Superficial - right lesser saphenus vein - after hip fracture    • Fracture of bone of forefoot 1972    Right foot   • Fracture of ilium, left (CMS/[facility]) 05/2018    Accidental fall - Uncomplicated recovery   • GERD (gastroesophageal reflux disease) 2014    Chronic superficial gastritis   • HTN (hypertension) 2005   • Iron deficiency anemia due to chronic blood loss 2018    Predisposed by chronic anticoagulation and GERD   • Low body weight due to inadequate caloric intake Adulthood   • Mitral valve prolapse 1997    Mild MR   • Osteoarthritis    • Osteoporosis    • Patent foramen ovale 2005   • Pneumonia 1947   • SCC (squamous cell carcinoma), arm, right 2017   • Scoliosis    • Syncope 2014     undermined cause - [facility]   • Varicose veins 2005    Symptomatic     Past Surgical History:   Procedure Laterality Date   • ACHILLES TENDON SURGERY Left 1997    Repair of rupture left tendon with screws   • BREAST CYST EXCISION Left    • BREAST SURGERY Left 1982    Excision benign cyst   • CARDIAC CATHETERIZATION  2006    Severe acute coronary spasm   • CATARACT EXTRACTION WITH INTRAOCULAR LENS IMPLANT Bilateral 2015   • EYE CAPSULOTOMY WITH LASER Left 2016    Marked visual improvement   • FEMUR FRACTURE SURGERY Right 2015    Repair of shaft  fracture   • HIP FRACTURE SURGERY Left 2014    left hip fracture with pin   • LUMBAR DISC SURGERY  1983   • OVARIAN CYST REMOVAL Left 1996   • SKIN CANCER EXCISION Right 2017    SCC - arm          OT ASSESSMENT FLOWSHEET (last 12 hours)      Occupational Therapy Evaluation     Row Name 02/03/20 1016                   OT Evaluation Time/Intention    Document Type  evaluation  -AC        Mode of Treatment  occupational therapy  -AC        Patient Effort  good  -AC           General Information    Patient Profile Reviewed?  yes  -AC        Referring Physician  MD Celso  -AC        Prior Level of Function  independent:;all household mobility;ADL's  -AC        Equipment Currently Used at Home  grab bar;shoe horn;sock aid;rollator;shower chair  -AC        Pertinent History of Current Functional Problem  Admit with R wrist pain   -AC        Existing Precautions/Restrictions  fall;oxygen therapy device and L/min gout right wrist,  brace right wrist  -AC        Barriers to Rehab  none identified  -AC           Relationship/Environment    Primary Source of Support/Comfort  child(tammy)  -AC        Lives With  child(tammy), adult  -AC        Concerns About Impact on Relationships  pt has been living with son and dtr-in-law  -AC           Resource/Environmental Concerns    Current Living Arrangements  home/apartment/condo  -AC           Home Main Entrance    Number of Stairs, Main Entrance  -- ramp  -AC           Cognitive Assessment/Intervention- PT/OT    Orientation Status (Cognition)  oriented x 3  -AC        Follows Commands (Cognition)  WFL  -AC           Safety Issues, Functional Mobility    Safety Issues Affecting Function (Mobility)  safety precaution awareness;safety precautions follow-through/compliance  -AC        Impairments Affecting Function (Mobility)  balance;endurance/activity tolerance;strength;postural/trunk control;pain  -AC           Mobility Assessment/Treatment    Extremity Weight-bearing Status  right  upper extremity  -AC        Right Upper Extremity (Weight-bearing Status)  weight-bearing as tolerated (WBAT)  -           Bed Mobility Assessment/Treatment    Bed Mobility Assessment/Treatment  supine-sit  -        Supine-Sit [place] (Bed Mobility)  supervision  -        Assistive Device (Bed Mobility)  bed rails;head of bed elevated  -           Functional Mobility    Functional Mobility- Ind. Level  minimum assist (75% patient effort)  -        Functional Mobility- Device  -- HHA on L  -AC        Functional Mobility-Distance (Feet)  20  -AC           Transfer Assessment/Treatment    Transfer Assessment/Treatment  sit-stand transfer;stand-sit transfer;toilet transfer  -           Sit-Stand Transfer    Sit-Stand [place] (Transfers)  contact guard  -        Assistive Device (Sit-Stand Transfers)  -- gt belt  -           Stand-Sit Transfer    Stand-Sit [place] (Transfers)  supervision  -           Toilet Transfer    Type (Toilet Transfer)  sit-stand;stand-sit  -        [place] Level (Toilet Transfer)  contact guard  -        Assistive Device (Toilet Transfer)  commode;grab bars/safety frame  -           ADL Assessment/Intervention    BADL Assessment/Intervention  lower body dressing;toileting  -           Lower Body Dressing Assessment/Training    Lower Body Dressing [place] Level  doff;don;socks  -        Lower Body Dressing Position  unsupported sitting  -        Comment (Lower Body Dressing)  pt required supervision with L sock, mod A R sock   -           Toileting Assessment/Training    [place] Level (Toileting)  adjust/manage clothing;perform perineal hygiene;supervision  -        Assistive Devices (Toileting)  commode;grab bar/safety frame  -        Toileting Position  unsupported sitting  -AC           BADL Safety/Performance    Impairments, BADL Safety/Performance  balance;strength;pain  -AC           General ROM    GENERAL ROM COMMENTS  BUE  AROM WFL;  did not test right wrist d/t pain  -AC           MMT (Manual Muscle Testing)    General MMT Comments  RUE 3+/5, LUE 4-/5  -AC           Sensory Assessment/Intervention    Sensory General Assessment  no sensation deficits identified BUE  -AC           Positioning and Restraints    Pre-Treatment Position  in bed  -AC        Post Treatment Position  chair  -AC        In Chair  reclined;call light within reach;encouraged to call for assist;exit alarm on;waffle cushion  -AC           Pain Scale: Numbers Pre/Post-Treatment    Pain Scale: Numbers, Pretreatment  5/10  -AC        Pain Scale: Numbers, Post-Treatment  5/10  -AC        Pain Location - Side  Right  -AC        Pain Location  wrist  -AC        Pain Intervention(s)  Repositioned  -AC           Plan of Care Review    Plan of Care Reviewed With  patient  -AC        Outcome Summary  OT eval complete.  Pt presents with R wrist pain, weakness and decreased balance limiting independence with self care.  Pt required supervsion with toileting tasks,  supervsion to don/doff R ssock, mod A L sock.  Pt required CGA toilet transfer,  min a to ambulate 20 ft given HHA on L.  OT will follow to advacne pt toward increased independence with ADLs.  Recommend home with 24/7 assist and HHOT upon d/c.   -AC           Clinical Impression (OT)    Date of Referral to OT  02/02/20  -AC        OT Diagnosis  ADL decline  -AC        Patient/Family Goals Statement (OT Eval)  increase ADL independence  -AC        Criteria for Skilled Therapeutic Interventions Met (OT Eval)  yes;treatment indicated  -AC        Rehab Potential (OT Eval)  good, to achieve stated therapy goals  -AC        Therapy Frequency (OT Eval)  daily  -AC        Care Plan Review (OT)  evaluation/treatment results reviewed  -AC        Anticipated Discharge Disposition (OT)  home with 24/7 care;home with home health  -AC           Vital Signs    Pre Systolic BP Rehab  111  -AC        Pre Treatment Diastolic BP  80   -AC        Pretreatment Heart Rate (beats/min)  63  -AC        Posttreatment Heart Rate (beats/min)  66  -AC        Pre SpO2 (%)  90  -AC        O2 Delivery Pre Treatment  supplemental O2  -AC        Post SpO2 (%)  94  -AC        O2 Delivery Post Treatment  room air  -AC        Pre Patient Position  Supine  -AC        Intra Patient Position  Standing  -AC        Post Patient Position  Sitting  -AC           Planned OT Interventions    Planned Therapy Interventions (OT Eval)  activity tolerance training;adaptive equipment training;functional balance retraining;occupation/activity based interventions;strengthening exercise;transfer/mobility retraining  -AC           OT Goals    Transfer Goal Selection (OT)  transfer, OT goal 1  -AC        Dressing Goal Selection (OT)  dressing, OT goal 1  -AC        Toileting Goal Selection (OT)  toileting, OT goal 1  -AC           Transfer Goal 1 (OT)    Activity/Assistive Device (Transfer Goal 1, OT)  bed-to-chair/chair-to-bed;toilet  -AC        [place] Level/Cues Needed (Transfer Goal 1, OT)  contact guard assist  -AC        Time Frame (Transfer Goal 1, OT)  by discharge  -AC        Progress/Outcome (Transfer Goal 1, OT)  goal ongoing  -AC           Dressing Goal 1 (OT)    Activity/Assistive Device (Dressing Goal 1, OT)  lower body dressing AE prn  -AC        [place]/Cues Needed (Dressing Goal 1, OT)  set-up required;supervision required  -AC        Time Frame (Dressing Goal 1, OT)  by discharge  -AC        Progress/Outcome (Dressing Goal 1, OT)  goal ongoing  -AC           Toileting Goal 1 (OT)    Activity/Device (Toileting Goal 1, OT)  adjust/manage clothing;perform perineal hygiene;grab bar/safety frame  -AC        [place] Level/Cues Needed (Toileting Goal 1, OT)  conditional independence  -AC        Time Frame (Toileting Goal 1, OT)  by discharge  -AC        Progress/Outcome (Toileting Goal 1, OT)  goal ongoing  -AC           Living Environment    Home  Accessibility  tub/shower is not walk in  -AC          User Key  (r) = Recorded By, (t) = Taken By, (c) = Cosigned By    Initials Name Effective Dates    Kristal Wadsworth, OT 06/23/15 -                OT Recommendation and Plan  Outcome Summary/Treatment Plan (OT)  Anticipated Discharge Disposition (OT): home with 24/7 care, home with home health  Planned Therapy Interventions (OT Eval): activity tolerance training, adaptive equipment training, functional balance retraining, occupation/activity based interventions, strengthening exercise, transfer/mobility retraining  Therapy Frequency (OT Eval): daily  Plan of Care Review  Plan of Care Reviewed With: patient  Plan of Care Reviewed With: patient  Outcome Summary: OT eval complete.  Pt presents with R wrist pain, weakness and decreased balance limiting independence with self care.  Pt required supervsion with toileting tasks,  supervsion to don/doff R ssock, mod A L sock.  Pt required CGA toilet transfer,  min a to ambulate 20 ft given HHA on L.  OT will follow to advacne pt toward increased independence with ADLs.  Recommend home with 24/7 assist and HHOT upon d/c.     Outcome Measures     Row Name 02/03/20 1016             How much help from another is currently needed...    Putting on and taking off regular lower body clothing?  2  -AC      Bathing (including washing, rinsing, and drying)  2  -AC      Toileting (which includes using toilet bed pan or urinal)  3  -AC      Putting on and taking off regular upper body clothing  3  -AC      Taking care of personal grooming (such as brushing teeth)  3  -AC      Eating meals  4  -AC      AM-PAC 6 Clicks Score (OT)  17  -AC         Functional Assessment    Outcome Measure Options  AM-PAC 6 Clicks Daily Activity (OT)  -AC        User Key  (r) = Recorded By, (t) = Taken By, (c) = Cosigned By    Initials Name Provider Type    Kristal Wadsworth, OT Occupational Therapist          Time Calculation:   Time Calculation- OT      Row Name 02/03/20 1016             Time Calculation- OT    OT Start Time  1016  -AC      OT Received On  02/03/20  -      OT Goal Re-Cert Due Date  02/13/20  -        User Key  (r) = Recorded By, (t) = Taken By, (c) = Cosigned By    Initials Name Provider Type    Kristal Wadsworth OT Occupational Therapist        Therapy Charges for Today     Code Description Service Date Service Provider Modifiers Qty    [id number] HC OT EVAL MOD COMPLEXITY 4 2/3/2020 Kristal Hudson OT GO 1               Kristal Hudson OT  2/3/2020

## 2020-02-03 NOTE — PLAN OF CARE
Problem: Patient Care Overview  Goal: Plan of Care Review  Flowsheets (Taken 2/3/2020 1016)  Plan of Care Reviewed With: patient  Outcome Summary: OT eval complete.  Pt presents with R wrist pain, weakness and decreased balance limiting independence with self care.  Pt required supervsion with toileting tasks,  supervsion to don/doff R sock, mod A L sock.  Pt required CGA toilet transfer,  min a to ambulate 20 ft given HHA on L.  OT will follow to advance pt toward increased independence with ADLs.  Recommend home with 24/7 assist and HHOT upon d/c.

## 2020-02-03 NOTE — THERAPY TREATMENT NOTE
Patient Name: Mirian Sy  : 1940    MRN: [id number]                              Today's Date: 2/3/2020       Admit Date: 2020    Visit Dx:     ICD-10-CM ICD-9-CM   1. Gout of right wrist, unspecified cause, unspecified chronicity M10.9 274.9   2. Weakness R53.1 780.79   3. Unsteady gait R26.81 781.2   4. Acute UTI N39.0 599.0     Patient Active Problem List   Diagnosis   • Abnormal gait   • Takotsubo syndrome   • Carpal tunnel syndrome   • Complex partial epilepsy (CMS/[facility])   • Depression   • Chronic gastritis   • HLD (hyperlipidemia)   • Hypertension   • Hyponatremia   • Nutritional anemia   • Dyssomnia   • Diplopia   • Xeroderma   • Preventative health care   • Frailty   • Tricuspid regurgitation   • Acute UTI (urinary tract infection)   • Cerebrovascular accident (CMS/[facility])   • Syncope   • DVT (deep venous thrombosis) (CMS/[facility])   • Anorexia   • Anxiety   • Patent foramen ovale   • Mitral regurgitation   • Low weight   • Senile osteoporosis   • Iron deficiency   • Chronic atrial fibrillation   • Closed fracture of left ilium (CMS/[facility])   • GERD (gastroesophageal reflux disease)   • Fall at home   • Vertigo   • Internuclear ophthalmoplegia, right eye   • Atrial fibrillation with RVR (CMS/[facility])   • Bradycardia   • Chronic atrial fibrillation   • SABRINA (acute kidney injury) (CMS/[facility])   • Hyperkalemia   • Confusion   • Acute exacerbation of CHF (congestive heart failure) (CMS/[facility])   • CKD (chronic kidney disease) stage 3, GFR 30-59 ml/min (CMS/[facility])   • CKD (chronic kidney disease) stage 3, GFR 30-59 ml/min (CMS/[facility])   • Wrist pain, acute, right   • Underweight   • Chronic diastolic heart failure (CMS/HCC)     Past Medical History:   Diagnosis Date   • Atrial fibrillation (CMS/HCC)     Chronic anticoagulation and rate control   • Basal cell carcinoma     Left leg and nose   • Cardiomyopathy (CMS/HCC)    • Cerebrovascular accident (CMS/[facility]) 2005    CT right parietal CVA. Carotid duplex -50%  to 79% left ICS 15% right ICS stenosis. MRI of the neck showed no significant carotid bifurcations of these. Negative CT of the head, 09/10/2012. Carotid duplex, 02/24/2014:  Shelf-like plaque in the CECE without significant elevation and velocities.  Patent vertebral arteries.   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    With dilated cardiomyopathy   • Decubitus ulcer of buttock 2014    Transient during fracture rehabilitation   • Depression 2002    Good response to citalopram and mirtazapine   • DVT (deep venous thrombosis) (CMS/[facility]) 2014    Superficial - right lesser saphenus vein - after hip fracture    • Fracture of bone of forefoot 1972    Right foot   • Fracture of ilium, left (CMS/[facility]) 05/2018    Accidental fall - Uncomplicated recovery   • GERD (gastroesophageal reflux disease) 2014    Chronic superficial gastritis   • HTN (hypertension) 2005   • Iron deficiency anemia due to chronic blood loss 2018    Predisposed by chronic anticoagulation and GERD   • Low body weight due to inadequate caloric intake Adulthood   • Mitral valve prolapse 1997    Mild MR   • Osteoarthritis    • Osteoporosis    • Patent foramen ovale 2005   • Pneumonia 1947   • SCC (squamous cell carcinoma), arm, right 2017   • Scoliosis    • Syncope 2014     undermined cause - [facility]   • Varicose veins 2005    Symptomatic     Past Surgical History:   Procedure Laterality Date   • ACHILLES TENDON SURGERY Left 1997    Repair of rupture left tendon with screws   • BREAST CYST EXCISION Left    • BREAST SURGERY Left 1982    Excision benign cyst   • CARDIAC CATHETERIZATION  2006    Severe acute coronary spasm   • CATARACT EXTRACTION WITH INTRAOCULAR LENS IMPLANT Bilateral 2015   • EYE CAPSULOTOMY WITH LASER Left 2016    Marked visual improvement   • FEMUR FRACTURE SURGERY Right 2015    Repair of shaft fracture   • HIP FRACTURE SURGERY Left 2014    left hip fracture with pin   • LUMBAR DISC SURGERY  1983    • OVARIAN CYST REMOVAL Left 1996   • SKIN CANCER EXCISION Right 2017    SCC - arm     General Information     Row Name 02/03/20 1533          PT Evaluation Time/Intention    Document Type  therapy note (daily note)  -LM     Mode of Treatment  individual therapy;physical therapy  -LM     Row Name 02/03/20 1533          General Information    Patient Profile Reviewed?  yes  -LM     Existing Precautions/Restrictions  fall;oxygen therapy device and L/min;other (see comments) R Wrist Brace  -LM     Row Name 02/03/20 1533          Cognitive Assessment/Intervention- PT/OT    Orientation Status (Cognition)  oriented x 3  -LM     Row Name 02/03/20 1533          Safety Issues, Functional Mobility    Safety Issues Affecting Function (Mobility)  safety precaution awareness;safety precautions follow-through/compliance  -LM     Impairments Affecting Function (Mobility)  balance;endurance/activity tolerance;pain;strength  -LM     Comment, Safety Issues/Impairments (Mobility)  R Wrist Pain  -LM       User Key  (r) = Recorded By, (t) = Taken By, (c) = Cosigned By    Initials Name Provider Type    LM Annette Kenny, PT Physical Therapist        Mobility     Row Name 02/03/20 1533          Bed Mobility Assessment/Treatment    Bed Mobility Assessment/Treatment  supine-sit  -LM     Supine-Sit [place] (Bed Mobility)  minimum assist (75% patient effort);1 person assist  -LM     Sit-Supine [place] (Bed Mobility)  supervision  -LM     Assistive Device (Bed Mobility)  bed rails;head of bed elevated  -LM     Comment (Bed Mobility)  Vc's for sequencing.  -LM     Row Name 02/03/20 1533          Sit-Stand Transfer    Sit-Stand [place] (Transfers)  contact guard;1 person assist  -LM     Assistive Device (Sit-Stand Transfers)  walker, front-wheeled  -LM     Row Name 02/03/20 1533          Gait/Stairs Assessment/Training    Gait/Stairs Assessment/Training  gait/ambulation independence;gait/ambulation assistive device  -LM      [place] Level (Gait)  contact guard;minimum assist (75% patient effort);1 person assist  -LM     Assistive Device (Gait)  walker, front-wheeled  -LM     Distance in Feet (Gait)  160 feet  -LM     Deviations/Abnormal Patterns (Gait)  bilateral deviations;el decreased;stride length decreased  -LM     Bilateral Gait Deviations  weight shift ability decreased  -LM     Left Sided Gait Deviations  heel strike decreased  -LM     Comment (Gait/Stairs)  Vc's for upright posture and to stay inside walker.  Pt mainly required CGA - however, pt had 2 small LOB's posteriorly requiring Artemio to correct.  Pt did well with using rw and just resting the R hand on the walker and pushing with the L hand.  -LM     Row Name 02/03/20 1533          Mobility Assessment/Intervention    Extremity Weight-bearing Status  right lower extremity  -LM     Right Upper Extremity (Weight-bearing Status)  weight-bearing as tolerated (WBAT)  -LM       User Key  (r) = Recorded By, (t) = Taken By, (c) = Cosigned By    Initials Name Provider Type    Annette Helton PT Physical Therapist        Obj/Interventions     Row Name 02/03/20 1533          Therapeutic Exercise    Lower Extremity (Therapeutic Exercise)  heel slides, bilateral;SLR (straight leg raise), bilateral  -LM     Lower Extremity Range of Motion (Therapeutic Exercise)  hip abduction/adduction, bilateral;ankle dorsiflexion/plantar flexion, bilateral  -LM     Exercise Type (Therapeutic Exercise)  AROM (active range of motion)  -LM     Position (Therapeutic Exercise)  supine  -LM     Expected Outcome (Therapeutic Exercise)  improve functional stability;improve performance, gait skills;improve performance, transfer skills  -LM     Comment (Therapeutic Exercise)  x25 AP; x15 remainder of ther ex  -LM       User Key  (r) = Recorded By, (t) = Taken By, (c) = Cosigned By    Initials Name Provider Type    Annette Helton PT Physical Therapist        Goals/Plan    No documentation.        Clinical Impression     Row Name 02/03/20 1533          Pain Assessment    Additional Documentation  Pain Scale: Numbers Pre/Post-Treatment (Group)  -LM     Row Name 02/03/20 1533          Pain Scale: Numbers Pre/Post-Treatment    Pain Scale: Numbers, Pretreatment  4/10  -LM     Pain Scale: Numbers, Post-Treatment  4/10  -LM     Pain Location - Side  Right  -LM     Pain Location  wrist  -LM     Pain Intervention(s)  Repositioned;Ambulation/increased activity  -LM     Row Name 02/03/20 1533          Plan of Care Review    Plan of Care Reviewed With  patient  -LM     Row Name 02/03/20 1533          Vital Signs    Pretreatment Heart Rate (beats/min)  54  -LM     Posttreatment Heart Rate (beats/min)  55  -LM     Pre SpO2 (%)  93  -LM     O2 Delivery Pre Treatment  supplemental O2  -LM     Post SpO2 (%)  92  -LM     O2 Delivery Post Treatment  supplemental O2  -LM     Pre Patient Position  Supine  -LM     Intra Patient Position  Standing  -LM     Post Patient Position  Supine  -LM     Row Name 02/03/20 1533          Positioning and Restraints    Pre-Treatment Position  in bed  -LM     Post Treatment Position  bed  -LM     In Bed  supine;call light within reach;encouraged to call for assist;exit alarm on;with family/caregiver;notified nsg  -LM       User Key  (r) = Recorded By, (t) = Taken By, (c) = Cosigned By    Initials Name Provider Type    LM Annette Kenny PT Physical Therapist        Outcome Measures     Row Name 02/03/20 1533          How much help from another person do you currently need...    Turning from your back to your side while in flat bed without using bedrails?  4  -LM     Moving from lying on back to sitting on the side of a flat bed without bedrails?  3  -LM     Moving to and from a bed to a chair (including a wheelchair)?  3  -LM     Standing up from a chair using your arms (e.g., wheelchair, bedside chair)?  3  -LM     Climbing 3-5 steps with a railing?  3  -LM     To walk in hospital room?  3   -LM     AM-[facility] 6 Clicks Score (PT)  19  -LM     Row Name 02/03/20 1533          Functional Assessment    Outcome Measure Options  AM-PAC 6 Clicks Basic Mobility (PT)  -LM       User Key  (r) = Recorded By, (t) = Taken By, (c) = Cosigned By    Initials Name Provider Type    Annette Helton PT Physical Therapist          PT Recommendation and Plan  Planned Therapy Interventions (PT Eval): balance training, bed mobility training, gait training, home exercise program, motor coordination training, neuromuscular re-education, patient/family education, postural re-education, ROM (range of motion), strengthening, stretching, transfer training  Outcome Summary/Treatment Plan (PT)  Anticipated Discharge Disposition (PT): home with 24/7 care, home with home health  Plan of Care Reviewed With: patient  Outcome Summary: Pt progressing well towards skilled PT goals.  Pt very pleasant and motivated to work with PT.  Pt transferred supine-->sit with MinAx1, stood with CGA, and ambulated 160 feet using rw.  Pt mainly required CGA with gait, but did have 2 LOB's posteriorly requiring Artemio to correct.  Pt tolerated BLE ther ex well without complaint.  Continue with skilled PT to improve mobility and safety.     Time Calculation:   PT Charges     Row Name 02/03/20 1533             Time Calculation    Start Time  1533  -LM      PT Received On  02/03/20  -LM      PT Goal Re-Cert Due Date  02/12/20  -LM         Timed Charges    66746 - PT Therapeutic Exercise Minutes  8  -LM      62295 - Gait Training Minutes   16  -LM        User Key  (r) = Recorded By, (t) = Taken By, (c) = Cosigned By    Initials Name Provider Type    Annette Helton PT Physical Therapist        Therapy Charges for Today     Code Description Service Date Service Provider Modifiers Qty    04172848908 HC PT EVAL MOD COMPLEXITY 4 2/2/2020 Annette Kenny, PT GP 1    63424725609 HC GAIT TRAINING EA 15 MIN 2/3/2020 Annette Kenny, PT GP 1    70794405069 HC PT THER PROC EA  15 MIN 2/3/2020 Annette Kenny, PT GP 1          PT G-Codes  Outcome Measure Options: AM-PAC 6 Clicks Basic Mobility (PT)  AM-PAC 6 Clicks Score (PT): 19  AM-PAC 6 Clicks Score (OT): 17    Annette Kenny, MIGUELANGEL  2/3/2020

## 2020-02-03 NOTE — PROGRESS NOTES
Continued Stay Note  [facility]     Patient Name: Mirian Sy  MRN: [id number]  Today's Date: 2/3/2020    Admit Date: 2/1/2020    Discharge Plan     Row Name 02/03/20 1246       Plan    Plan  discharge plan    Patient/Family in Agreement with Plan  yes    Plan Comments  Spoke with pt in room regarding discharge plan.  Pt is interested in rehab to get stronger before going home with son.  Referral made to [facility] per request.  CM will cont to follow,        Discharge Codes    No documentation.       Expected Discharge Date and Time     Expected Discharge Date Expected Discharge Time    Feb 5, 2020             Stephania Vuong RN

## 2020-02-03 NOTE — PLAN OF CARE
Problem: Patient Care Overview  Goal: Plan of Care Review  Outcome: Ongoing (interventions implemented as appropriate)  Flowsheets  Taken 2/3/2020 4015  Progress: no change  Outcome Summary: Patient rested throughout shift. Minimal urine output during night, oral rehydration initiated and encouraged. Patient denies pain or N/V. Patient anxious to go home. VSS. Will continue to monitor.  Taken 2/2/2020 2000  Plan of Care Reviewed With: patient

## 2020-02-04 PROBLEM — M25.531 WRIST PAIN, ACUTE, RIGHT: Status: RESOLVED | Noted: 2020-02-01 | Resolved: 2020-02-04

## 2020-02-04 PROCEDURE — G0378 HOSPITAL OBSERVATION PER HR: HCPCS

## 2020-02-04 PROCEDURE — 97116 GAIT TRAINING THERAPY: CPT

## 2020-02-04 PROCEDURE — 63710000001 PREDNISONE PER 1 MG: Performed by: INTERNAL MEDICINE

## 2020-02-04 PROCEDURE — 99225 PR SBSQ OBSERVATION CARE/DAY 25 MINUTES: CPT | Performed by: INTERNAL MEDICINE

## 2020-02-04 PROCEDURE — 97530 THERAPEUTIC ACTIVITIES: CPT

## 2020-02-04 RX ORDER — PREDNISONE 20 MG/1
TABLET ORAL
Qty: 3 TABLET | Refills: 0 | Status: SHIPPED | OUTPATIENT
Start: 2020-02-05 | End: 2020-02-09

## 2020-02-04 RX ORDER — METOPROLOL SUCCINATE 25 MG/1
50 TABLET, EXTENDED RELEASE ORAL DAILY
Qty: 90 TABLET | Refills: 3
Start: 2020-02-04 | End: 2020-04-04 | Stop reason: HOSPADM

## 2020-02-04 RX ADMIN — FAMOTIDINE 20 MG: 20 TABLET ORAL at 09:08

## 2020-02-04 RX ADMIN — CITALOPRAM HYDROBROMIDE 20 MG: 20 TABLET ORAL at 09:03

## 2020-02-04 RX ADMIN — LEVETIRACETAM 125 MG: 250 TABLET, FILM COATED ORAL at 05:17

## 2020-02-04 RX ADMIN — ATORVASTATIN CALCIUM 80 MG: 40 TABLET, FILM COATED ORAL at 21:02

## 2020-02-04 RX ADMIN — SODIUM CHLORIDE, PRESERVATIVE FREE 10 ML: 5 INJECTION INTRAVENOUS at 09:03

## 2020-02-04 RX ADMIN — PREDNISONE 20 MG: 20 TABLET ORAL at 09:03

## 2020-02-04 RX ADMIN — APIXABAN 5 MG: 5 TABLET, FILM COATED ORAL at 05:17

## 2020-02-04 RX ADMIN — FERROUS SULFATE TAB 325 MG (65 MG ELEMENTAL FE) 325 MG: 325 (65 FE) TAB at 09:03

## 2020-02-04 RX ADMIN — DILTIAZEM HYDROCHLORIDE 360 MG: 180 CAPSULE, COATED, EXTENDED RELEASE ORAL at 09:03

## 2020-02-04 RX ADMIN — COLCHICINE 0.6 MG: 0.6 TABLET, FILM COATED ORAL at 09:03

## 2020-02-04 RX ADMIN — LEVETIRACETAM 125 MG: 250 TABLET, FILM COATED ORAL at 17:36

## 2020-02-04 RX ADMIN — METOPROLOL SUCCINATE 50 MG: 50 TABLET, EXTENDED RELEASE ORAL at 09:02

## 2020-02-04 RX ADMIN — Medication 1 TABLET: at 09:02

## 2020-02-04 RX ADMIN — APIXABAN 5 MG: 5 TABLET, FILM COATED ORAL at 17:36

## 2020-02-04 NOTE — PROGRESS NOTES
[facility] Medicine Services  PROGRESS NOTE    Patient Name: Mirian Sy  : 1940  MRN: [id number]    Date of Admission: 2020  Primary Care Physician: Oren Engel DO    Subjective   Subjective     CC:  F/u right wrist pain    HPI:  Wrist feels much better.  She is interested in looking into rehab options after talking with her son as she has become weaker.    Review of Systems  Gen- No fevers, chills  CV- No chest pain, palpitations  Resp- No cough, dyspnea  GI- No N/V/D, abd pain    Objective   Objective     Vital Signs:   Temp:  [97.6 °F (36.4 °C)-97.8 °F (36.6 °C)] 97.8 °F (36.6 °C)  Heart Rate:  [58-72] 60  Resp:  [18-19] 18  BP: ()/(64-80) 115/70        Physical Exam:  Constitutional: No acute distress, awake, alert, sitting up in chair  HENT: NCAT, mucous membranes moist  Respiratory: Clear to auscultation bilaterally, respiratory effort normal   Cardiovascular: tachy, irregular, no murmurs, rubs, or gallops  Gastrointestinal: Positive bowel sounds, soft, nontender, nondistended  Musculoskeletal: Right wrist in splint.  Full ROM today when splint removed.  No swelling, no erythema  Psychiatric: Appropriate affect, cooperative  Neurologic: No focal deficits, speech is clear  Skin: No rashes      Results Reviewed:  Results from last 7 days   Lab Units 20  0620 20  1808   WBC 10*3/mm3 6.40 10.80   HEMOGLOBIN g/dL 10.6* 11.2*   HEMATOCRIT % 33.1* 34.3   PLATELETS 10*3/mm3 164 174     Results from last 7 days   Lab Units 20  0620 20  1808   SODIUM mmol/L 139 138   POTASSIUM mmol/L 3.6 4.3   CHLORIDE mmol/L 103 101   CO2 mmol/L 25.0 24.0   BUN mg/dL 31* 30*   CREATININE mg/dL 1.13* 1.04*   GLUCOSE mg/dL 91 123*   CALCIUM mg/dL 8.5* 9.0   ALT (SGPT) U/L 28  --    AST (SGOT) U/L 44*  --      Estimated Creatinine Clearance: 30.1 mL/min (A) (by C-G formula based on SCr of 1.13 mg/dL (H)).    Microbiology Results Abnormal     Procedure  Component Value - Date/Time    Urine Culture - Urine, Urine, Clean Catch [[id number]] Collected:  02/02/20 1333    Lab Status:  Final result Specimen:  Urine, Clean Catch Updated:  02/03/20 0917     Urine Culture >100,000 CFU/mL Mixed Melvina Isolated    Narrative:       Specimen contains mixed organisms of questionable pathogenicity which indicates contamination with commensal melvina.  Further identification is unlikely to provide clinically useful information.  Suggest recollection.          Imaging Results (Last 24 Hours)     ** No results found for the last 24 hours. **          Results for orders placed during the hospital encounter of 05/07/19   Adult Transthoracic Echo Complete W/ Cont if Necessary Per Protocol    Narrative · Estimated EF = 60%.  · Left ventricular systolic function is normal.  · Trace-to-mild mitral valve regurgitation is present.  · Mild tricuspid valve regurgitation is present.  · Calculated right ventricular systolic pressure from tricuspid   regurgitation is 34.0 mmHg.  · No cardiac source for embolus is seen          I have reviewed the medications:  Scheduled Meds:    apixaban 5 mg Oral Q12H   atorvastatin 80 mg Oral Nightly   calcium carb-cholecalciferol 1 tablet Oral Daily   cefTRIAXone 1 g Intravenous Q24H   citalopram 20 mg Oral QAM   colchicine 0.6 mg Oral Daily   dilTIAZem  mg Oral Q24H   famotidine 20 mg Oral Daily   ferrous sulfate 325 mg Oral Daily With Breakfast   levETIRAcetam 125 mg Oral Q12H   metoprolol succinate XL 50 mg Oral Daily   predniSONE 30 mg Oral Daily With Breakfast   sodium chloride 10 mL Intravenous Q12H     Continuous Infusions:   PRN Meds:.•  acetaminophen **OR** acetaminophen **OR** acetaminophen  •  docusate sodium  •  mirtazapine  •  ondansetron **OR** ondansetron  •  sodium chloride    Assessment/Plan   Assessment & Plan     Active Hospital Problems    Diagnosis  POA   • **Wrist pain, acute, right [M25.531]  Yes   • Underweight [R63.6]  Yes   •  Chronic diastolic heart failure (CMS/[facility]) [I50.32]  Yes   • CKD (chronic kidney disease) stage 3, GFR 30-59 ml/min (CMS/[facility]) [N18.3]  Yes   • Chronic atrial fibrillation [I48.20]  Yes   • Cerebrovascular accident (CMS/[facility]) [I63.9]  Yes   • Acute UTI (urinary tract infection) [N39.0]  Yes   • Takotsubo syndrome [I51.81]  Yes   • Complex partial epilepsy (CMS/[facility]) [G40.209]  Yes   • Depression [F32.9]  Yes   • HLD (hyperlipidemia) [E78.5]  Yes   • Hypertension [I10]  Yes   • Nutritional anemia [D53.9]  Yes      Resolved Hospital Problems   No resolved problems to display.        Brief Hospital Course to date:  Mirian Sy is a 79 y.o. female with a history of A. fib, CKD stage III, epilepsy on Keppra, chronic diastolic heart failure presented to the emergency room with complaints of acute onset of right wrist pain for 2 days.  Imaging was unremarkable.    Right wrist pain, suspected gout  -Significant improvement with colchicine and prednisone  -Plan to taper prednisone over 7 days  -Outpatient follow up with orthopedic surgery    A-fib with RVR  -Continue increased dose toprol XL 50   -Continue eliquis.    Possible UTI  -Ceftriaxone x 3 days to complete today.  -UA convincing but mixed melvina on culture    CKD III    Seizure disorder  -Continue keppra    DVT Prophylaxis:  eliquis    Disposition: I expect the patient to be discharged pending rehab evaluation    CODE STATUS:   Code Status and Medical Interventions:   Ordered at: 02/02/20 0008     Level Of Support Discussed With:    Patient     Code Status:    CPR     Medical Interventions (Level of Support Prior to Arrest):    Full       Electronically signed by Sandra Lay MD, 02/03/20, 8:35 PM.

## 2020-02-04 NOTE — PLAN OF CARE
Patient has improved enough for discharge only needs a little rehab for muscle strengthening, [facility] denied pt waiting to hear from Artemio if they don't accept pt she will go home with her son and HH.  VSS no pain.  Will continue to monitor.

## 2020-02-04 NOTE — PLAN OF CARE
Problem: Patient Care Overview  Goal: Plan of Care Review  Outcome: Ongoing (interventions implemented as appropriate)  Flowsheets (Taken 2/4/2020 7023)  Plan of Care Reviewed With: patient  Outcome Summary: Pt is motivated and progressing well toward PT goals.  She required cues for posture and positioning of RW, but reported decreased right wrist pain with improved ability to use RW.  Pt amb. 75ft with RW and CGA without LOB, but demonstrated SOA with SpO2 82% on supplemental O2 upon returning to seated position in room.  Pt would benefit from continued skilled inpatient PT to improve mobility and decrease risk of falls.  Recommend continued therapy at inpatient rehab facility upon D/C.

## 2020-02-04 NOTE — THERAPY TREATMENT NOTE
Patient Name: Mirian Sy  : 1940    MRN: [id number]                              Today's Date: 2020       Admit Date: 2020    Visit Dx:     ICD-10-CM ICD-9-CM   1. Gout of right wrist, unspecified cause, unspecified chronicity M10.9 274.9   2. Weakness R53.1 780.79   3. Unsteady gait R26.81 781.2   4. Acute UTI N39.0 599.0     Patient Active Problem List   Diagnosis   • Abnormal gait   • Takotsubo syndrome   • Carpal tunnel syndrome   • Complex partial epilepsy (CMS/[facility])   • Depression   • Chronic gastritis   • HLD (hyperlipidemia)   • Hypertension   • Hyponatremia   • Nutritional anemia   • Dyssomnia   • Diplopia   • Xeroderma   • Preventative health care   • Frailty   • Tricuspid regurgitation   • Acute UTI (urinary tract infection)   • Cerebrovascular accident (CMS/[facility])   • Syncope   • DVT (deep venous thrombosis) (CMS/[facility])   • Anorexia   • Anxiety   • Patent foramen ovale   • Mitral regurgitation   • Low weight   • Senile osteoporosis   • Iron deficiency   • Chronic atrial fibrillation   • Closed fracture of left ilium (CMS/[facility])   • GERD (gastroesophageal reflux disease)   • Fall at home   • Vertigo   • Internuclear ophthalmoplegia, right eye   • Atrial fibrillation with RVR (CMS/[facility])   • Bradycardia   • Chronic atrial fibrillation   • SABRINA (acute kidney injury) (CMS/[facility])   • Hyperkalemia   • Confusion   • Acute exacerbation of CHF (congestive heart failure) (CMS/[facility])   • CKD (chronic kidney disease) stage 3, GFR 30-59 ml/min (CMS/[facility])   • CKD (chronic kidney disease) stage 3, GFR 30-59 ml/min (CMS/[facility])   • Wrist pain, acute, right   • Underweight   • Chronic diastolic heart failure (CMS/HCC)     Past Medical History:   Diagnosis Date   • Atrial fibrillation (CMS/HCC)     Chronic anticoagulation and rate control   • Basal cell carcinoma     Left leg and nose   • Cardiomyopathy (CMS/HCC)    • Cerebrovascular accident (CMS/[facility]) 2005    CT right parietal CVA. Carotid duplex -50%  to 79% left ICS 15% right ICS stenosis. MRI of the neck showed no significant carotid bifurcations of these. Negative CT of the head, 09/10/2012. Carotid duplex, 02/24/2014:  Shelf-like plaque in the CECE without significant elevation and velocities.  Patent vertebral arteries.   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    With dilated cardiomyopathy   • Decubitus ulcer of buttock 2014    Transient during fracture rehabilitation   • Depression 2002    Good response to citalopram and mirtazapine   • DVT (deep venous thrombosis) (CMS/[facility]) 2014    Superficial - right lesser saphenus vein - after hip fracture    • Fracture of bone of forefoot 1972    Right foot   • Fracture of ilium, left (CMS/[facility]) 05/2018    Accidental fall - Uncomplicated recovery   • GERD (gastroesophageal reflux disease) 2014    Chronic superficial gastritis   • HTN (hypertension) 2005   • Iron deficiency anemia due to chronic blood loss 2018    Predisposed by chronic anticoagulation and GERD   • Low body weight due to inadequate caloric intake Adulthood   • Mitral valve prolapse 1997    Mild MR   • Osteoarthritis    • Osteoporosis    • Patent foramen ovale 2005   • Pneumonia 1947   • SCC (squamous cell carcinoma), arm, right 2017   • Scoliosis    • Syncope 2014     undermined cause - [facility]   • Varicose veins 2005    Symptomatic     Past Surgical History:   Procedure Laterality Date   • ACHILLES TENDON SURGERY Left 1997    Repair of rupture left tendon with screws   • BREAST CYST EXCISION Left    • BREAST SURGERY Left 1982    Excision benign cyst   • CARDIAC CATHETERIZATION  2006    Severe acute coronary spasm   • CATARACT EXTRACTION WITH INTRAOCULAR LENS IMPLANT Bilateral 2015   • EYE CAPSULOTOMY WITH LASER Left 2016    Marked visual improvement   • FEMUR FRACTURE SURGERY Right 2015    Repair of shaft fracture   • HIP FRACTURE SURGERY Left 2014    left hip fracture with pin   • LUMBAR DISC SURGERY  1983    • OVARIAN CYST REMOVAL Left 1996   • SKIN CANCER EXCISION Right 2017    SCC - arm     General Information     Row Name 02/04/20 1311          PT Evaluation Time/Intention    Document Type  therapy note (daily note)  -     Mode of Treatment  physical therapy  -     Row Name 02/04/20 1311          General Information    Patient Profile Reviewed?  yes  -     Existing Precautions/Restrictions  fall;oxygen therapy device and L/min  -     Barriers to Rehab  none identified  -     Row Name 02/04/20 1311          Cognitive Assessment/Intervention- PT/OT    Orientation Status (Cognition)  oriented x 4  -     Row Name 02/04/20 1311          Safety Issues, Functional Mobility    Safety Issues Affecting Function (Mobility)  awareness of need for assistance;safety precaution awareness;safety precautions follow-through/compliance;positioning of assistive device  -     Impairments Affecting Function (Mobility)  endurance/activity tolerance;strength;balance;postural/trunk control  -     Comment, Safety Issues/Impairments (Mobility)  Right wrist pain due to gout  -       User Key  (r) = Recorded By, (t) = Taken By, (c) = Cosigned By    Initials Name Provider Type     Savanna Fontenot, PT Physical Therapist        Mobility     Row Name 02/04/20 1313          Bed Mobility Assessment/Treatment    Bed Mobility Assessment/Treatment  supine-sit;scooting/bridging  -     Scooting/Bridging [place] (Bed Mobility)  supervision;1 person assist  -     Supine-Sit [place] (Bed Mobility)  supervision;1 person assist  -     Assistive Device (Bed Mobility)  head of bed elevated;bed rails  -     Row Name 02/04/20 1313          Bed-Chair Transfer    Bed-Chair [place] (Transfers)  contact guard;1 person assist;verbal cues  -     Assistive Device (Bed-Chair Transfers)  walker, front-wheeled  -     Row Name 02/04/20 1313          Sit-Stand Transfer    Sit-Stand [place] (Transfers)  contact guard;1  person assist  -LF     Assistive Device (Sit-Stand Transfers)  walker, front-wheeled  -LF     Row Name 02/04/20 1313          Gait/Stairs Assessment/Training    07429 - Gait Training Minutes   15  -LF     Gait/Stairs Assessment/Training  gait/ambulation independence;gait/ambulation assistive device;distance ambulated;gait pattern;gait deviations  -LF     [place] Level (Gait)  contact guard;1 person assist;verbal cues  -LF     Assistive Device (Gait)  walker, front-wheeled  -LF     Distance in Feet (Gait)  75ft  -LF     Pattern (Gait)  step-through  -LF     Deviations/Abnormal Patterns (Gait)  el decreased;stride length decreased  -LF     Bilateral Gait Deviations  forward flexed posture;heel strike decreased;weight shift ability decreased  -LF     Comment (Gait/Stairs)  Cues for posture and positioning of RW.  Pt reporting decreased right wrist pain with improved ability to use RW.    -     Row Name 02/04/20 1313          Mobility Assessment/Intervention    Extremity Weight-bearing Status  right upper extremity  -LF     Right Upper Extremity (Weight-bearing Status)  weight-bearing as tolerated (WBAT)  -       User Key  (r) = Recorded By, (t) = Taken By, (c) = Cosigned By    Initials Name Provider Type     Savanna Fontenot, PT Physical Therapist        Obj/Interventions     Row Name 02/04/20 1323          Static Sitting Balance    Level of [place] (Unsupported Sitting, Static Balance)  independent  -     Sitting Position (Unsupported Sitting, Static Balance)  sitting on edge of bed;sitting in chair  -     Time Able to Maintain Position (Unsupported Sitting, Static Balance)  more than 5 minutes  -     Row Name 02/04/20 1323          Dynamic Sitting Balance    Level of [place], Reaches Outside Midline (Sitting, Dynamic Balance)  supervision  -     Sitting Position, Reaches Outside Midline (Sitting, Dynamic Balance)  sitting on edge of bed;sitting in chair  -     Row Name 02/04/20  1323          Static Standing Balance    Level of [place] (Supported Standing, Static Balance)  contact guard assist;1 person assist  -LF     Time Able to Maintain Position (Supported Standing, Static Balance)  1 to 2 minutes  -LF     Assistive Device Utilized (Supported Standing, Static Balance)  walker, rolling  -LF     Row Name 02/04/20 1323          Dynamic Standing Balance    Level of [place], Reaches Outside Midline (Standing, Dynamic Balance)  contact guard assist;1 person assist  -LF     Time Able to Maintain Position, Reaches Outside Midline (Standing, Dynamic Balance)  3 to 4 minutes  -LF     Assistive Device Utilized (Supported Standing, Dynamic Balance)  walker, rolling  -LF       User Key  (r) = Recorded By, (t) = Taken By, (c) = Cosigned By    Initials Name Provider Type     Savanna Fontenot, PT Physical Therapist        Goals/Plan    No documentation.       Clinical Impression     Row Name 02/04/20 1325          Pain Scale: Numbers Pre/Post-Treatment    Pain Scale: Numbers, Pretreatment  0/10 - no pain  -     Pain Scale: Numbers, Post-Treatment  0/10 - no pain  -     Row Name 02/04/20 1325          Plan of Care Review    Plan of Care Reviewed With  patient  -LF     Progress  improving  -     Outcome Summary  Pt is motivated and progressing well toward PT goals.  She required cues for posture and positioning of RW, but reported decreased right wrist pain with improved ability to use RW.  Pt amb. 75ft with RW and CGA without LOB, but demonstrated SOA with SpO2 82% on supplemental O2 upon returning to seated position in room.  Pt would benefit from continued skilled inpatient PT to improve mobility and decrease risk of falls.  Recommend continued therapy at inpatient rehab facility upon D/C.  -     Row Name 02/04/20 1325          Physical Therapy Clinical Impression    Patient/Family Goals Statement (PT Clinical Impression)  inpatient rehab program before returning home with assist.   -LF     Criteria for Skilled Interventions Met (PT Clinical Impression)  yes;treatment indicated  -LF     Rehab Potential (PT Clinical Summary)  good, to achieve stated therapy goals  -LF     Row Name 02/04/20 1325          Vital Signs    Pre Systolic BP Rehab  123  -LF     Pre Treatment Diastolic BP  74  -LF     Post Systolic BP Rehab  108  -LF     Post Treatment Diastolic BP  81  -LF     Pretreatment Heart Rate (beats/min)  80  -LF     Posttreatment Heart Rate (beats/min)  80  -LF     Pre SpO2 (%)  82  -LF     O2 Delivery Pre Treatment  supplemental O2  -LF     Intra SpO2 (%)  82  -LF     O2 Delivery Intra Treatment  supplemental O2  -LF     Post SpO2 (%)  92  -LF     O2 Delivery Post Treatment  supplemental O2  -LF     Pre Patient Position  Supine  -LF     Intra Patient Position  Standing  -LF     Post Patient Position  Sitting  -LF     Recovery Time  2-3min  -LF     Rest Breaks   2  -LF     Row Name 02/04/20 1325          Positioning and Restraints    Pre-Treatment Position  in bed  -LF     Post Treatment Position  chair  -LF     In Chair  reclined;waffle cushion;legs elevated;exit alarm on;call light within reach;encouraged to call for assist;notified nsg  -LF       User Key  (r) = Recorded By, (t) = Taken By, (c) = Cosigned By    Initials Name Provider Type    LF Savanna Fontenot, PT Physical Therapist        Outcome Measures     Row Name 02/04/20 8892          How much help from another person do you currently need...    Turning from your back to your side while in flat bed without using bedrails?  4  -LF     Moving from lying on back to sitting on the side of a flat bed without bedrails?  4  -LF     Moving to and from a bed to a chair (including a wheelchair)?  3  -LF     Standing up from a chair using your arms (e.g., wheelchair, bedside chair)?  3  -LF     Climbing 3-5 steps with a railing?  3  -LF     To walk in hospital room?  3  -LF     AM-PAC 6 Clicks Score (PT)  20  -LF     Row Name 02/04/20 2249           Functional Assessment    Outcome Measure Options  AM-PAC 6 Clicks Basic Mobility (PT)  -LF       User Key  (r) = Recorded By, (t) = Taken By, (c) = Cosigned By    Initials Name Provider Type    Savanna Garza PT Physical Therapist          PT Recommendation and Plan     Outcome Summary/Treatment Plan (PT)  Anticipated Discharge Disposition (PT): inpatient rehabilitation facility  Plan of Care Reviewed With: patient  Progress: improving  Outcome Summary: Pt is motivated and progressing well toward PT goals.  She required cues for posture and positioning of RW, but reported decreased right wrist pain with improved ability to use RW.  Pt amb. 75ft with RW and CGA without LOB, but demonstrated SOA with SpO2 82% on supplemental O2 upon returning to seated position in room.  Pt would benefit from continued skilled inpatient PT to improve mobility and decrease risk of falls.  Recommend continued therapy at inpatient rehab facility upon D/C.     Time Calculation:   PT Charges     Row Name 02/04/20 1313 02/04/20 1130          Time Calculation    Start Time  --  1130  -LF     PT Received On  --  02/04/20  -LF     PT Goal Re-Cert Due Date  --  02/12/20  -LF        Timed Charges    43131 - Gait Training Minutes   15  -LF  --     97432 - PT Therapeutic Activity Minutes  --  10  -LF       User Key  (r) = Recorded By, (t) = Taken By, (c) = Cosigned By    Initials Name Provider Type    Savanna Garza PT Physical Therapist        Therapy Charges for Today     Code Description Service Date Service Provider Modifiers Qty    22201613504 HC GAIT TRAINING EA 15 MIN 2/4/2020 Savanna Fontenot, PT GP 1    89462267063 HC PT THERAPEUTIC ACT EA 15 MIN 2/4/2020 Savanna Fontenot, PT GP 1          PT G-Codes  Outcome Measure Options: AM-PAC 6 Clicks Basic Mobility (PT)  AM-PAC 6 Clicks Score (PT): 20  AM-PAC 6 Clicks Score (OT): 17    Savanna Fontenot PT  2/4/2020

## 2020-02-04 NOTE — PROGRESS NOTES
Continued Stay Note  [facility]     Patient Name: Mirian Sy  MRN: [id number]  Today's Date: 2/4/2020    Admit Date: 2/1/2020    Discharge Plan     Row Name 02/04/20 1501       Plan    Plan  discharge plan    Plan Comments  Spoke with son in room and sonAdrian on cell.  Explained to both sons that [facility] unable to bed and discussed discharge plan options with pt/sons:  home with son and  HH,   private pay for rehab or respit.  Family has decided on private pay for respit and requests referral to be made to [facility].  Referral made to Guillermina with [facility] and waiting to hear back.  Dr Lay, hospitalist aware.      Row Name 02/04/20 1218       Plan    Plan  discharge plan    Patient/Family in Agreement with Plan  yes    Plan Comments  [facility] unable to offer pt a bed for rehab.  CM informed pt and she is interested in HH,  but wants CM to call son, Dean.  Attempted to call her son, Dean, and no answer.  Message left on son's voicemail to call CM.  CM will cont to follow                                                                         Discharge Codes    No documentation.       Expected Discharge Date and Time     Expected Discharge Date Expected Discharge Time    Feb 4, 2020             Stephania Vuong, FRANNY

## 2020-02-04 NOTE — PROGRESS NOTES
Continued Stay Note  [facility]     Patient Name: Mirian Sy  MRN: [id number]  Today's Date: 2/4/2020    Admit Date: 2/1/2020    Discharge Plan     Row Name 02/04/20 1501       Plan    Plan  discharge plan    Row Name 02/04/20 1218       Plan    Plan  discharge plan    Patient/Family in Agreement with Plan  yes    Plan Comments  [facility] unable to offer pt a bed for rehab.  CM informed pt and she is interested in HH,  but wants CM to call son, Dean.  Attempted to call her son, Dean, and no answer.  Message left on son's voicemail to call CM.  CM will cont to follow                                                                         Discharge Codes    No documentation.       Expected Discharge Date and Time     Expected Discharge Date Expected Discharge Time    Feb 4, 2020             Stephania Vuong RN

## 2020-02-04 NOTE — PLAN OF CARE
Patient has rested in bed with no complaints today, VSS, waiting on placement at , will continue to monitor.

## 2020-02-05 VITALS
WEIGHT: 101.69 LBS | SYSTOLIC BLOOD PRESSURE: 117 MMHG | OXYGEN SATURATION: 93 % | HEIGHT: 67 IN | DIASTOLIC BLOOD PRESSURE: 65 MMHG | TEMPERATURE: 97.6 F | HEART RATE: 75 BPM | BODY MASS INDEX: 15.96 KG/M2 | RESPIRATION RATE: 20 BRPM

## 2020-02-05 PROCEDURE — 99217 PR OBSERVATION CARE DISCHARGE MANAGEMENT: CPT | Performed by: INTERNAL MEDICINE

## 2020-02-05 PROCEDURE — 97530 THERAPEUTIC ACTIVITIES: CPT

## 2020-02-05 PROCEDURE — G0378 HOSPITAL OBSERVATION PER HR: HCPCS

## 2020-02-05 PROCEDURE — 63710000001 PREDNISONE PER 1 MG: Performed by: INTERNAL MEDICINE

## 2020-02-05 PROCEDURE — 97110 THERAPEUTIC EXERCISES: CPT

## 2020-02-05 PROCEDURE — 97110 THERAPEUTIC EXERCISES: CPT | Performed by: PHYSICAL THERAPIST

## 2020-02-05 PROCEDURE — 97116 GAIT TRAINING THERAPY: CPT | Performed by: PHYSICAL THERAPIST

## 2020-02-05 RX ADMIN — SODIUM CHLORIDE, PRESERVATIVE FREE 10 ML: 5 INJECTION INTRAVENOUS at 08:52

## 2020-02-05 RX ADMIN — LEVETIRACETAM 125 MG: 250 TABLET, FILM COATED ORAL at 05:41

## 2020-02-05 RX ADMIN — PREDNISONE 20 MG: 20 TABLET ORAL at 08:51

## 2020-02-05 RX ADMIN — APIXABAN 5 MG: 5 TABLET, FILM COATED ORAL at 05:41

## 2020-02-05 RX ADMIN — METOPROLOL SUCCINATE 50 MG: 50 TABLET, EXTENDED RELEASE ORAL at 08:52

## 2020-02-05 RX ADMIN — FAMOTIDINE 20 MG: 20 TABLET ORAL at 08:51

## 2020-02-05 RX ADMIN — Medication 1 TABLET: at 08:51

## 2020-02-05 RX ADMIN — CITALOPRAM HYDROBROMIDE 20 MG: 20 TABLET ORAL at 08:51

## 2020-02-05 RX ADMIN — DILTIAZEM HYDROCHLORIDE 360 MG: 180 CAPSULE, COATED, EXTENDED RELEASE ORAL at 08:51

## 2020-02-05 RX ADMIN — FERROUS SULFATE TAB 325 MG (65 MG ELEMENTAL FE) 325 MG: 325 (65 FE) TAB at 08:51

## 2020-02-05 NOTE — PLAN OF CARE
VSS.  Denies pain or discomfort.  Wrist splint in place.  No complaints voiced. Will continue to monitor.

## 2020-02-05 NOTE — DISCHARGE SUMMARY
[facility] Medicine Services  DISCHARGE SUMMARY    Patient Name: Mirian Sy  : 1940  MRN: [id number]    Date of Admission: 2020  5:18 PM  Date of Discharge:  2020  Primary Care Physician: Oren Engel DO    Consults     No orders found from 1/3/2020 to 2020.          Hospital Course     Presenting Problem:   Gout of right wrist, unspecified cause, unspecified chronicity [M10.9]  Gout of right wrist, unspecified cause, unspecified chronicity [M10.9]    Active Hospital Problems    Diagnosis  POA   • Underweight [R63.6]  Yes   • Chronic diastolic heart failure (CMS/[facility]) [I50.32]  Yes   • CKD (chronic kidney disease) stage 3, GFR 30-59 ml/min (CMS/[facility]) [N18.3]  Yes   • Chronic atrial fibrillation [I48.20]  Yes   • Cerebrovascular accident (CMS/[facility]) [I63.9]  Yes   • Acute UTI (urinary tract infection) [N39.0]  Yes   • Takotsubo syndrome [I51.81]  Yes   • Complex partial epilepsy (CMS/[facility]) [G40.209]  Yes   • Depression [F32.9]  Yes   • HLD (hyperlipidemia) [E78.5]  Yes   • Hypertension [I10]  Yes   • Nutritional anemia [D53.9]  Yes      Resolved Hospital Problems    Diagnosis Date Resolved POA   • **Wrist pain, acute, right [M25.531] 2020 Yes          Hospital Course:  Mirian Sy is a 79 y.o. female with a history of A. fib, CKD stage III, epilepsy on Keppra, chronic diastolic heart failure presented to the emergency room with complaints of acute onset of right wrist pain for 2 days.  Imaging was unremarkable.     Right wrist pain, suspected gout  -Significant improvement with colchicine and prednisone  -Plan to taper prednisone over 7 days  -Outpatient follow up with orthopedic surgery if needed     A-fib with RVR  -Continue increased dose toprol XL 50   -Continue eliquis.     Possible UTI  -S/P 3 days ceftriaxone  -UA convincing but mixed melvina on culture     CKD III  -Stable     Seizure disorder  -Continue keppra      Discharge Follow Up  Recommendations for outpatient labs/diagnostics:  Follow up with PCP within 1 week    Day of Discharge     HPI:   Feels well today.  No wrist pain.    Review of Systems  Gen- No fevers, chills  CV- No chest pain, palpitations  Resp- No cough, dyspnea  GI- No N/V/D, abd pain      Vital Signs:   Temp:  [97.6 °F (36.4 °C)] 97.6 °F (36.4 °C)  Heart Rate:  [63-75] 75  Resp:  [18-20] 20  BP: (117-121)/() 117/65     Physical Exam:  Constitutional: No acute distress, awake, alert, sitting up in chair  HENT: NCAT, mucous membranes moist  Respiratory: Clear to auscultation bilaterally, respiratory effort normal   Cardiovascular: Rate controlled, irregular, no murmurs, rubs, or gallops  Gastrointestinal: Positive bowel sounds, soft, nontender, nondistended  Musculoskeletal: Right wrist in splint.  No swelling, no erythema  Psychiatric: Appropriate affect, cooperative  Neurologic: No focal deficits, speech is clear  Skin: No rashes    Pertinent  and/or Most Recent Results     Results from last 7 days   Lab Units 02/02/20  0620 02/01/20  1808   WBC 10*3/mm3 6.40 10.80   HEMOGLOBIN g/dL 10.6* 11.2*   HEMATOCRIT % 33.1* 34.3   PLATELETS 10*3/mm3 164 174   SODIUM mmol/L 139 138   POTASSIUM mmol/L 3.6 4.3   CHLORIDE mmol/L 103 101   CO2 mmol/L 25.0 24.0   BUN mg/dL 31* 30*   CREATININE mg/dL 1.13* 1.04*   GLUCOSE mg/dL 91 123*   CALCIUM mg/dL 8.5* 9.0     Results from last 7 days   Lab Units 02/02/20  0620   BILIRUBIN mg/dL 0.6   ALK PHOS U/L 91   ALT (SGPT) U/L 28   AST (SGOT) U/L 44*           Invalid input(s): TG, LDLCALC, LDLREALC        Brief Urine Lab Results  (Last result in the past 365 days)      Color   Clarity   Blood   Leuk Est   Nitrite   Protein   CREAT   Urine HCG        02/01/20 2149 Dark Yellow Cloudy Trace Moderate (2+) Negative 100 mg/dL (2+)               Microbiology Results Abnormal     Procedure Component Value - Date/Time    Urine Culture - Urine, Urine, Clean Catch [[id number]] Collected:  02/02/20 1339     Lab Status:  Final result Specimen:  Urine, Clean Catch Updated:  02/03/20 0917     Urine Culture >100,000 CFU/mL Mixed Melvina Isolated    Narrative:       Specimen contains mixed organisms of questionable pathogenicity which indicates contamination with commensal melvina.  Further identification is unlikely to provide clinically useful information.  Suggest recollection.          Imaging Results (All)     Procedure Component Value Units Date/Time    XR Wrist 3+ View Right [[id number]] Collected:  02/01/20 1800     Updated:  02/02/20 1052    Narrative:          EXAMINATION: XR RIGHT WRIST  3 VIEWS - 02/01/2020     INDICATION: Pain and swelling.      COMPARISON: None.     FINDINGS: 3 views of the right wrist were submitted for review. There is  diffuse osteopenia. The radius and ulna appear intact. The carpal bones  appear intact. No convincing evidence for acute osseous abnormalities.  The metacarpals appear intact. No soft tissue swelling identified. There  is calcification suggestive of the triangular fibrocartilage.           Impression:       Diffuse osteopenia without convincing radiographic evidence  for fracture.     DICTATED:   02/01/2020  EDITED/ls :   02/01/2020      This report was finalized on 2/2/2020 10:49 AM by Dr. Steve Cruz MD.             Results for orders placed during the hospital encounter of 05/07/19   Duplex Carotid Ultrasound CAR    Narrative · Right internal carotid artery stenosis of 0-49%.  · Left internal carotid artery stenosis of 0-49%.  · Bilateral heterogeneous calcific carotid atherosclerosis without   flow-limiting stenosis          Results for orders placed during the hospital encounter of 05/07/19   Duplex Carotid Ultrasound CAR    Narrative · Right internal carotid artery stenosis of 0-49%.  · Left internal carotid artery stenosis of 0-49%.  · Bilateral heterogeneous calcific carotid atherosclerosis without   flow-limiting stenosis          Results for orders placed during  the hospital encounter of 05/07/19   Adult Transthoracic Echo Complete W/ Cont if Necessary Per Protocol    Narrative · Estimated EF = 60%.  · Left ventricular systolic function is normal.  · Trace-to-mild mitral valve regurgitation is present.  · Mild tricuspid valve regurgitation is present.  · Calculated right ventricular systolic pressure from tricuspid   regurgitation is 34.0 mmHg.  · No cardiac source for embolus is seen          Plan for Follow-up of Pending Labs/Results: None pending    Discharge Details        Discharge Medications      New Medications      Instructions Start Date   predniSONE 20 MG tablet  Commonly known as:  DELTASONE   Take 1 tablet by mouth Daily With Breakfast for 2 days, THEN 0.5 tablets Daily With Breakfast for 2 days.   Start Date:  February 5, 2020        Changes to Medications      Instructions Start Date   furosemide 20 MG tablet  Commonly known as:  LASIX  What changed:    · when to take this  · reasons to take this   20 mg, Oral, Daily      metoprolol succinate XL 25 MG 24 hr tablet  Commonly known as:  TOPROL-XL  What changed:  how much to take   50 mg, Oral, Daily         Continue These Medications      Instructions Start Date   acetaminophen 325 MG tablet  Commonly known as:  TYLENOL   650 mg, Oral, Every 4 Hours PRN      atorvastatin 80 MG tablet  Commonly known as:  LIPITOR   80 mg, Oral, Nightly      calcium-vitamin D 500-200 MG-UNIT per tablet  Commonly known as:  OSCAL-500   1 tablet, Oral, Every Morning      CENTRUM SILVER 50+WOMEN PO   1 tablet, Oral, Daily      citalopram 20 MG tablet  Commonly known as:  CeleXA   TAKE 1 TABLET EVERY MORNING      dilTIAZem  MG 24 hr capsule  Commonly known as:  CARDIZEM CD   360 mg, Oral, Every 24 Hours Scheduled      ELIQUIS 5 MG tablet tablet  Generic drug:  apixaban   TAKE 1 TABLET EVERY 12 HOURS      ferrous sulfate 325 (65 FE) MG tablet   TAKE 1 TABLET DAILY WITH BREAKFAST      levETIRAcetam 250 MG tablet  Commonly known  as:  KEPPRA   125 mg, Oral, Every 12 Hours      magnesium oxide 400 (241.3 Mg) MG tablet tablet  Commonly known as:  MAGOX   400 mg, Oral, Daily      mirtazapine 7.5 MG tablet  Commonly known as:  REMERON   7.5 mg, Oral, Every Night at Bedtime      raNITIdine 150 MG capsule  Commonly known as:  ZANTAC   TAKE 1 CAPSULE EVERY NIGHT             Allergies   Allergen Reactions   • Actonel [Risedronate Sodium] GI Intolerance   • Hctz [Hydrochlorothiazide]      hyponatremia   • Lisinopril      hyperkalemia         Discharge Disposition:  Skilled Nursing Facility (DC - External)    Diet:  Hospital:  Diet Order   Procedures   • Diet Regular; Cardiac       Activity: As tolerated with assitance         CODE STATUS:    Code Status and Medical Interventions:   Ordered at: 02/02/20 0008     Level Of Support Discussed With:    Patient     Code Status:    CPR     Medical Interventions (Level of Support Prior to Arrest):    Full       Future Appointments   Date Time Provider Department Center   2/11/2020  9:45 AM Oren Engel DO [facility] None   2/17/2020  9:30 AM Oren Engel DO [facility] None   4/29/2020  3:30 PM Bri Olguin MD [facility] None       Additional Instructions for the Follow-ups that You Need to Schedule     Discharge Follow-up with PCP   As directed       Currently Documented PCP:    Oren Engel DO    PCP Phone Number:    [phone]     Follow Up Details:  Within 1 week               Time Spent on Discharge:  31 minutes    Electronically signed by Sandra Lay MD, 02/05/20, 11:23 AM.

## 2020-02-05 NOTE — THERAPY TREATMENT NOTE
Acute Care - Occupational Therapy Treatment Note  [facility]     Patient Name: Mirian Sy  : 1940  MRN: [id number]  Today's Date: 2020     Date of Referral to OT: 20  Referring Physician: MD Celso    Admit Date: 2020       ICD-10-CM ICD-9-CM   1. Gout of right wrist, unspecified cause, unspecified chronicity M10.9 274.9   2. Weakness R53.1 780.79   3. Unsteady gait R26.81 781.2   4. Acute UTI N39.0 599.0     Patient Active Problem List   Diagnosis   • Abnormal gait   • Takotsubo syndrome   • Carpal tunnel syndrome   • Complex partial epilepsy (CMS/HCC)   • Depression   • Chronic gastritis   • HLD (hyperlipidemia)   • Hypertension   • Hyponatremia   • Nutritional anemia   • Dyssomnia   • Diplopia   • Xeroderma   • Preventative health care   • Frailty   • Tricuspid regurgitation   • Acute UTI (urinary tract infection)   • Cerebrovascular accident (CMS/[facility])   • Syncope   • DVT (deep venous thrombosis) (CMS/[facility])   • Anorexia   • Anxiety   • Patent foramen ovale   • Mitral regurgitation   • Low weight   • Senile osteoporosis   • Iron deficiency   • Chronic atrial fibrillation   • Closed fracture of left ilium (CMS/[facility])   • GERD (gastroesophageal reflux disease)   • Fall at home   • Vertigo   • Internuclear ophthalmoplegia, right eye   • Atrial fibrillation with RVR (CMS/[facility])   • Bradycardia   • Chronic atrial fibrillation   • SABRINA (acute kidney injury) (CMS/[facility])   • Hyperkalemia   • Confusion   • Acute exacerbation of CHF (congestive heart failure) (CMS/[facility])   • CKD (chronic kidney disease) stage 3, GFR 30-59 ml/min (CMS/HCC)   • CKD (chronic kidney disease) stage 3, GFR 30-59 ml/min (CMS/HCC)   • Underweight   • Chronic diastolic heart failure (CMS/HCC)     Past Medical History:   Diagnosis Date   • Atrial fibrillation (CMS/HCC)     Chronic anticoagulation and rate control   • Basal cell carcinoma     Left leg and nose   • Cardiomyopathy (CMS/HCC)    • Cerebrovascular accident  (CMS/[facility]) 01/2005    CT right parietal CVA. Carotid duplex -50% to 79% left ICS 15% right ICS stenosis. MRI of the neck showed no significant carotid bifurcations of these. Negative CT of the head, 09/10/2012. Carotid duplex, 02/24/2014:  Shelf-like plaque in the CECE without significant elevation and velocities.  Patent vertebral arteries.   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    With dilated cardiomyopathy   • Decubitus ulcer of buttock 2014    Transient during fracture rehabilitation   • Depression 2002    Good response to citalopram and mirtazapine   • DVT (deep venous thrombosis) (CMS/[facility]) 2014    Superficial - right lesser saphenus vein - after hip fracture    • Fracture of bone of forefoot 1972    Right foot   • Fracture of ilium, left (CMS/[facility]) 05/2018    Accidental fall - Uncomplicated recovery   • GERD (gastroesophageal reflux disease) 2014    Chronic superficial gastritis   • HTN (hypertension) 2005   • Iron deficiency anemia due to chronic blood loss 2018    Predisposed by chronic anticoagulation and GERD   • Low body weight due to inadequate caloric intake Adulthood   • Mitral valve prolapse 1997    Mild MR   • Osteoarthritis    • Osteoporosis    • Patent foramen ovale 2005   • Pneumonia 1947   • SCC (squamous cell carcinoma), arm, right 2017   • Scoliosis    • Syncope 2014     undermined cause - [facility]   • Varicose veins 2005    Symptomatic     Past Surgical History:   Procedure Laterality Date   • ACHILLES TENDON SURGERY Left 1997    Repair of rupture left tendon with screws   • BREAST CYST EXCISION Left    • BREAST SURGERY Left 1982    Excision benign cyst   • CARDIAC CATHETERIZATION  2006    Severe acute coronary spasm   • CATARACT EXTRACTION WITH INTRAOCULAR LENS IMPLANT Bilateral 2015   • EYE CAPSULOTOMY WITH LASER Left 2016    Marked visual improvement   • FEMUR FRACTURE SURGERY Right 2015    Repair of shaft fracture   • HIP FRACTURE SURGERY Left  2014    left hip fracture with pin   • LUMBAR DISC SURGERY  1983   • OVARIAN CYST REMOVAL Left 1996   • SKIN CANCER EXCISION Right 2017    SCC - arm       Therapy Treatment    Rehabilitation Treatment Summary     Row Name 02/05/20 0750             Treatment Time/Intention    Discipline  occupational therapist  -AC      Document Type  therapy note (daily note)  -AC      Patient Effort  good  -AC      Existing Precautions/Restrictions  fall;oxygen therapy device and L/min R wrist brace  -AC      Recorded by [AC] Kristal Hudson, OT 02/05/20 0857      Row Name 02/05/20 0750             Vital Signs    Post Systolic BP Rehab  117  -AC      Post Treatment Diastolic BP  65  -AC      Pretreatment Heart Rate (beats/min)  68  -AC      Posttreatment Heart Rate (beats/min)  67  -AC      Pre SpO2 (%)  91  -AC      O2 Delivery Pre Treatment  supplemental O2  -AC      Post SpO2 (%)  90  -AC      O2 Delivery Post Treatment  supplemental O2  -AC      Pre Patient Position  Supine  -AC      Intra Patient Position  Standing  -AC      Post Patient Position  Sitting  -AC      Recorded by [AC] Kristal Hudson, OT 02/05/20 0857      Row Name 02/05/20 0750             Cognitive Assessment/Intervention    Additional Documentation  Cognitive Assessment/Intervention (Group)  -AC      Recorded by [AC] Kristal Hudson, OT 02/05/20 0857      Row Name 02/05/20 0750             Cognitive Assessment/Intervention- PT/OT    Orientation Status (Cognition)  oriented x 4  -AC      Follows Commands (Cognition)  WFL  -AC      Recorded by [AC] Kristal Hudson, OT 02/05/20 0857      Row Name 02/05/20 0750             Safety Issues, Functional Mobility    Safety Issues Affecting Function (Mobility)  safety precautions follow-through/compliance;safety precaution awareness  -AC      Impairments Affecting Function (Mobility)  balance;endurance/activity tolerance;pain;strength  -AC      Recorded by [AC] Kristal Hudson, OT 02/05/20 0857      Row Name 02/05/20 0750              Mobility Assessment/Intervention    Extremity Weight-bearing Status  right upper extremity  -AC      Right Upper Extremity (Weight-bearing Status)  weight-bearing as tolerated (WBAT)  -AC      Recorded by [AC] Kristal Hudson, OT 02/05/20 0857      Row Name 02/05/20 0750             Bed Mobility Assessment/Treatment    Bed Mobility Assessment/Treatment  supine-sit  -AC      Scooting/Bridging [place] (Bed Mobility)  conditional independence  -AC      Assistive Device (Bed Mobility)  bed rails;head of bed elevated  -AC      Recorded by [AC] Kristal Hudson, OT 02/05/20 0857      Row Name 02/05/20 0750             Functional Mobility    Functional Mobility- Ind. Level  contact guard assist  -AC      Functional Mobility- Device  rolling walker  -AC      Functional Mobility-Distance (Feet)  80  -AC      Recorded by [AC] Kristal Hudson, OT 02/05/20 0857      Row Name 02/05/20 0750             Transfer Assessment/Treatment    Transfer Assessment/Treatment  sit-stand transfer;stand-sit transfer  -AC      Recorded by [AC] Kristal Hudson, OT 02/05/20 0857      Row Name 02/05/20 0750             Sit-Stand Transfer    Sit-Stand [place] (Transfers)  contact guard  -AC      Assistive Device (Sit-Stand Transfers)  walker, front-wheeled  -AC      Recorded by [AC] Kristal Hudson, OT 02/05/20 0857      Row Name 02/05/20 0750             Stand-Sit Transfer    Stand-Sit [place] (Transfers)  supervision  -AC      Assistive Device (Stand-Sit Transfers)  walker, front-wheeled  -AC      Recorded by [AC] Kristal Hudson, OT 02/05/20 0857      Row Name 02/05/20 0750             ADL Assessment/Intervention    82612 - OT Self Care/Mgmt Minutes  8  -AC      BADL Assessment/Intervention  lower body dressing;grooming  -AC      Recorded by [AC] Kristal Hudson, OT 02/05/20 0857      Row Name 02/05/20 0750             Lower Body Dressing Assessment/Training    Lower Body Dressing [place] Level  doff;don;socks;minimum assist  (75% patient effort)  -AC      Lower Body Dressing Position  unsupported sitting  -AC      Recorded by [AC] Kristal Hudson, OT 02/05/20 0857      Row Name 02/05/20 0750             Grooming Assessment/Training    [place] Level (Grooming)  hair care, combing/brushing;oral care regimen;wash face, hands;supervision  -AC      Grooming Position  sink side  -AC      Recorded by [AC] Kristal Hudson, OT 02/05/20 0857      Row Name 02/05/20 0750             BADL Safety/Performance    Impairments, BADL Safety/Performance  balance;endurance/activity tolerance;strength;trunk/postural control  -AC      Recorded by [AC] Kristal Hudson, OT 02/05/20 0857      Row Name 02/05/20 0750             Motor Skills Assessment/Interventions    Additional Documentation  Therapeutic Exercise (Group);Therapeutic Exercise Interventions (Group)  -AC      Recorded by [AC] Kristal Hudson, OT 02/05/20 0857      Row Name 02/05/20 0750             Therapeutic Exercise    24404 - OT Therapeutic Exercise Minutes  8  -AC      81188 - OT Therapeutic Activity Minutes  10  -AC      Recorded by [AC] Kristal Hudson, OT 02/05/20 0857      Row Name 02/05/20 0750             Therapeutic Exercise    Upper Extremity Range of Motion (Therapeutic Exercise)  shoulder flexion/extension, bilateral;shoulder horizontal abduction/adduction, bilateral;elbow flexion/extension, bilateral  -AC      Exercise Type (Therapeutic Exercise)  AROM (active range of motion)  -AC      Position (Therapeutic Exercise)  seated  -AC      Sets/Reps (Therapeutic Exercise)  1/20  -AC      Expected Outcome (Therapeutic Exercise)  facilitate normal movement patterns;improve functional tolerance, self-care activity  -AC      Recorded by [AC] Kristal Hudson, OT 02/05/20 0857      Row Name 02/05/20 0750             Positioning and Restraints    Pre-Treatment Position  in bed  -AC      Post Treatment Position  chair  -AC      In Chair  reclined;call light within reach;exit alarm on  -AC       Recorded by [AC] Kristal Hudson, OT 02/05/20 0857      Row Name 02/05/20 0750             Pain Scale: Numbers Pre/Post-Treatment    Pain Scale: Numbers, Pretreatment  0/10 - no pain  -AC      Pain Scale: Numbers, Post-Treatment  0/10 - no pain  -AC      Pain Location - Side  Right  -AC      Pain Location  wrist  -AC      Pain Intervention(s)  Repositioned  -AC      Recorded by [AC] Kristal Hudson, OT 02/05/20 0857      Row Name 02/05/20 0750             Plan of Care Review    Plan of Care Reviewed With  patient  -AC      Outcome Summary  Pt with good progress with self care and mobility. Pt required min a to don socks,  supervision to complete grooming sinkside.  Pt conditional  independent supine to sit, CGA STS,  CGA to ambulate 60 ft with RW.  Pt with good effort and tolerance for 20 reps BUE ther ex.  Pt with no report of right wrist pain today.    -AC      Recorded by [AC] Kristal Hudson, OT 02/05/20 0902        User Key  (r) = Recorded By, (t) = Taken By, (c) = Cosigned By    Initials Name Effective Dates Discipline     Kristal Hudson, OT 06/23/15 -  OT                 OT Recommendation and Plan  Outcome Summary/Treatment Plan (OT)  Anticipated Discharge Disposition (OT): home with 24/7 care, home with home health  Planned Therapy Interventions (OT Eval): activity tolerance training, adaptive equipment training, functional balance retraining, occupation/activity based interventions, strengthening exercise, transfer/mobility retraining  Therapy Frequency (OT Eval): daily  Plan of Care Review  Plan of Care Reviewed With: patient  Plan of Care Reviewed With: patient  Outcome Summary: Pt with good progress with self care and mobility. Pt required min a to don socks,  supervision to complete grooming sinkside.  Pt conditional  independent supine to sit, CGA STS,  CGA to ambulate 60 ft with RW.  Pt with good effort and tolerance for 20 reps BUE ther ex.  Pt with no report of right wrist pain today.    Outcome  Measures     Row Name 02/05/20 0750 02/03/20 1016          How much help from another is currently needed...    Putting on and taking off regular lower body clothing?  3  -AC  2  -AC     Bathing (including washing, rinsing, and drying)  2  -AC  2  -AC     Toileting (which includes using toilet bed pan or urinal)  3  -AC  3  -AC     Putting on and taking off regular upper body clothing  3  -AC  3  -AC     Taking care of personal grooming (such as brushing teeth)  3  -AC  3  -AC     Eating meals  4  -AC  4  -AC     AM-PAC 6 Clicks Score (OT)  18  -AC  17  -AC        Functional Assessment    Outcome Measure Options  AM-PAC 6 Clicks Daily Activity (OT)  -AC  AM-PAC 6 Clicks Daily Activity (OT)  -AC       User Key  (r) = Recorded By, (t) = Taken By, (c) = Cosigned By    Initials Name Provider Type    AC Kristal Hudson OT Occupational Therapist           Time Calculation:   Time Calculation- OT     Row Name 02/05/20 0750             Time Calculation- OT    OT Start Time  0750  -      OT Received On  02/05/20  -      OT Goal Re-Cert Due Date  02/13/20  -         Timed Charges    75366 - OT Therapeutic Exercise Minutes  8  -AC      03293 - OT Therapeutic Activity Minutes  10  -AC      96941 - OT Self Care/Mgmt Minutes  8  -AC        User Key  (r) = Recorded By, (t) = Taken By, (c) = Cosigned By    Initials Name Provider Type     Kristal Hudson OT Occupational Therapist        Therapy Charges for Today     Code Description Service Date Service Provider Modifiers Qty    [id number]  OT THER PROC EA 15 MIN 2/5/2020 Kristal Hudson OT GO 1    [id number]  OT THERAPEUTIC ACT EA 15 MIN 2/5/2020 Kristal Hudson OT GO 1               Kristal Hudson OT  2/5/2020

## 2020-02-05 NOTE — PROGRESS NOTES
Case Management Discharge Note      Final Note: Plan is to [facility] private pay, and they can accept pt today.  Spoke with sonDean and family will transport around 2 pm.  Family is aware they will need to  any new med/prescriptions from local pharmacy and bring any existing meds to the facility for the facility to administer.  [facility](respit) has access to retrieve discharge summary from [facility].   Nurse can call report to [facility] at [phone] and send a copy of discharge summary with pt.                                                                                                           Provided post acute provider list?: Yes  Post Acute Provider List: Home Health, DME Supplier, Inpatient Rehab  Post Acute Provider Quality & Resource List: Yes  Delivered To: Patient  Method of Delivery: In person    Destination      No service has been selected for the patient.      Durable Medical Equipment      No service has been selected for the patient.      Dialysis/Infusion      No service has been selected for the patient.      Home Medical Care      No service has been selected for the patient.      Therapy      No service has been selected for the patient.      Community Resources      No service has been selected for the patient.             Final Discharge Disposition Code: 01 - home or self-care

## 2020-02-05 NOTE — PLAN OF CARE
Problem: Patient Care Overview  Goal: Plan of Care Review  Flowsheets (Taken 2/5/2020 5000)  Plan of Care Reviewed With: patient  Outcome Summary: Pt with good progress with self care and mobility. Pt required min a to don socks,  supervision to complete grooming sinkside.  Pt conditional  independent supine to sit, CGA STS,  CGA to ambulate 60 ft with RW.  Pt with good effort and tolerance for 20 reps BUE ther ex.  Pt with no report of right wrist pain today.

## 2020-02-05 NOTE — PROGRESS NOTES
[facility] Medicine Services  PROGRESS NOTE    Patient Name: Mirian Sy  : 1940  MRN: [id number]    Date of Admission: 2020  Primary Care Physician: Oren Engel DO    Subjective   Subjective     CC:  F/u right wrist pain    HPI:  Wrist feels back to normal.  No complaints.    Review of Systems  Gen- No fevers, chills  CV- No chest pain, palpitations  Resp- No cough, dyspnea  GI- No N/V/D, abd pain    Objective   Objective     Vital Signs:   Temp:  [97.6 °F (36.4 °C)] 97.6 °F (36.4 °C)  Heart Rate:  [63-70] 63  Resp:  [18] 18  BP: (117-123)/(74-87) 117/87        Physical Exam:  Constitutional: No acute distress, awake, alert, sitting up in bed eating breakfast using right hand  HENT: NCAT, mucous membranes moist  Respiratory: Clear to auscultation bilaterally, respiratory effort normal   Cardiovascular: tachy, irregular, no murmurs, rubs, or gallops  Gastrointestinal: Positive bowel sounds, soft, nontender, nondistended  Musculoskeletal: Full ROM right wrist.  No swelling, no erythema  Psychiatric: Appropriate affect, cooperative  Neurologic: No focal deficits, speech is clear  Skin: No rashes      Results Reviewed:  Results from last 7 days   Lab Units 20  0620 20  1808   WBC 10*3/mm3 6.40 10.80   HEMOGLOBIN g/dL 10.6* 11.2*   HEMATOCRIT % 33.1* 34.3   PLATELETS 10*3/mm3 164 174     Results from last 7 days   Lab Units 20  0620 20  1808   SODIUM mmol/L 139 138   POTASSIUM mmol/L 3.6 4.3   CHLORIDE mmol/L 103 101   CO2 mmol/L 25.0 24.0   BUN mg/dL 31* 30*   CREATININE mg/dL 1.13* 1.04*   GLUCOSE mg/dL 91 123*   CALCIUM mg/dL 8.5* 9.0   ALT (SGPT) U/L 28  --    AST (SGOT) U/L 44*  --      Estimated Creatinine Clearance: 30 mL/min (A) (by C-G formula based on SCr of 1.13 mg/dL (H)).    Microbiology Results Abnormal     Procedure Component Value - Date/Time    Urine Culture - Urine, Urine, Clean Catch [[id number]] Collected:  20 0212     Lab Status:  Final result Specimen:  Urine, Clean Catch Updated:  02/03/20 0917     Urine Culture >100,000 CFU/mL Mixed Melvina Isolated    Narrative:       Specimen contains mixed organisms of questionable pathogenicity which indicates contamination with commensal melvina.  Further identification is unlikely to provide clinically useful information.  Suggest recollection.          Imaging Results (Last 24 Hours)     ** No results found for the last 24 hours. **          Results for orders placed during the hospital encounter of 05/07/19   Adult Transthoracic Echo Complete W/ Cont if Necessary Per Protocol    Narrative · Estimated EF = 60%.  · Left ventricular systolic function is normal.  · Trace-to-mild mitral valve regurgitation is present.  · Mild tricuspid valve regurgitation is present.  · Calculated right ventricular systolic pressure from tricuspid   regurgitation is 34.0 mmHg.  · No cardiac source for embolus is seen          I have reviewed the medications:  Scheduled Meds:    apixaban 5 mg Oral Q12H   atorvastatin 80 mg Oral Nightly   calcium carb-cholecalciferol 1 tablet Oral Daily   citalopram 20 mg Oral QAM   dilTIAZem  mg Oral Q24H   famotidine 20 mg Oral Daily   ferrous sulfate 325 mg Oral Daily With Breakfast   levETIRAcetam 125 mg Oral Q12H   metoprolol succinate XL 50 mg Oral Daily   predniSONE 20 mg Oral Daily With Breakfast   sodium chloride 10 mL Intravenous Q12H     Continuous Infusions:   PRN Meds:.•  acetaminophen **OR** acetaminophen **OR** acetaminophen  •  docusate sodium  •  mirtazapine  •  ondansetron **OR** ondansetron  •  sodium chloride    Assessment/Plan   Assessment & Plan     Active Hospital Problems    Diagnosis  POA   • Underweight [R63.6]  Yes   • Chronic diastolic heart failure (CMS/[facility]) [I50.32]  Yes   • CKD (chronic kidney disease) stage 3, GFR 30-59 ml/min (CMS/[facility]) [N18.3]  Yes   • Chronic atrial fibrillation [I48.20]  Yes   • Cerebrovascular accident (CMS/[facility]) [I63.9]   Yes   • Acute UTI (urinary tract infection) [N39.0]  Yes   • Takotsubo syndrome [I51.81]  Yes   • Complex partial epilepsy (CMS/HCC) [G40.209]  Yes   • Depression [F32.9]  Yes   • HLD (hyperlipidemia) [E78.5]  Yes   • Hypertension [I10]  Yes   • Nutritional anemia [D53.9]  Yes      Resolved Hospital Problems    Diagnosis Date Resolved POA   • **Wrist pain, acute, right [M25.531] 02/04/2020 Yes        Brief Hospital Course to date:  Mirian Sy is a 79 y.o. female with a history of A. fib, CKD stage III, epilepsy on Keppra, chronic diastolic heart failure presented to the emergency room with complaints of acute onset of right wrist pain for 2 days.  Imaging was unremarkable.    Right wrist pain, suspected gout  -Significant improvement with colchicine and prednisone  -Plan to taper prednisone over 7 days  -Outpatient follow up with orthopedic surgery if needed    A-fib with RVR  -Continue increased dose toprol XL 50   -Continue eliquis.    Possible UTI  -S/P 3 days ceftriaxone  -UA convincing but mixed melvina on culture    CKD III  -Stable    Seizure disorder  -Continue keppra    DVT Prophylaxis:  eliquis    Disposition: I expect the patient to be discharged possibly to respite care    CODE STATUS:   Code Status and Medical Interventions:   Ordered at: 02/02/20 0008     Level Of Support Discussed With:    Patient     Code Status:    CPR     Medical Interventions (Level of Support Prior to Arrest):    Full       Electronically signed by Sandra Lay MD, 02/04/20, 11:01 PM.

## 2020-02-05 NOTE — THERAPY TREATMENT NOTE
Patient Name: Mirian Sy  : 1940    MRN: [id number]                              Today's Date: 2020       Admit Date: 2020    Visit Dx:     ICD-10-CM ICD-9-CM   1. Gout of right wrist, unspecified cause, unspecified chronicity M10.9 274.9   2. Weakness R53.1 780.79   3. Unsteady gait R26.81 781.2   4. Acute UTI N39.0 599.0     Patient Active Problem List   Diagnosis   • Abnormal gait   • Takotsubo syndrome   • Carpal tunnel syndrome   • Complex partial epilepsy (CMS/HCC)   • Depression   • Chronic gastritis   • HLD (hyperlipidemia)   • Hypertension   • Hyponatremia   • Nutritional anemia   • Dyssomnia   • Diplopia   • Xeroderma   • Preventative health care   • Frailty   • Tricuspid regurgitation   • Acute UTI (urinary tract infection)   • Cerebrovascular accident (CMS/[facility])   • Syncope   • DVT (deep venous thrombosis) (CMS/[facility])   • Anorexia   • Anxiety   • Patent foramen ovale   • Mitral regurgitation   • Low weight   • Senile osteoporosis   • Iron deficiency   • Chronic atrial fibrillation   • Closed fracture of left ilium (CMS/[facility])   • GERD (gastroesophageal reflux disease)   • Fall at home   • Vertigo   • Internuclear ophthalmoplegia, right eye   • Atrial fibrillation with RVR (CMS/[facility])   • Bradycardia   • Chronic atrial fibrillation   • SABRINA (acute kidney injury) (CMS/[facility])   • Hyperkalemia   • Confusion   • Acute exacerbation of CHF (congestive heart failure) (CMS/[facility])   • CKD (chronic kidney disease) stage 3, GFR 30-59 ml/min (CMS/HCC)   • CKD (chronic kidney disease) stage 3, GFR 30-59 ml/min (CMS/[facility])   • Underweight   • Chronic diastolic heart failure (CMS/HCC)     Past Medical History:   Diagnosis Date   • Atrial fibrillation (CMS/HCC)     Chronic anticoagulation and rate control   • Basal cell carcinoma     Left leg and nose   • Cardiomyopathy (CMS/HCC)    • Cerebrovascular accident (CMS/HCC) 2005    CT right parietal CVA. Carotid duplex -50% to 79% left ICS 15% right ICS  stenosis. MRI of the neck showed no significant carotid bifurcations of these. Negative CT of the head, 09/10/2012. Carotid duplex, 02/24/2014:  Shelf-like plaque in the CECE without significant elevation and velocities.  Patent vertebral arteries.   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    With dilated cardiomyopathy   • Decubitus ulcer of buttock 2014    Transient during fracture rehabilitation   • Depression 2002    Good response to citalopram and mirtazapine   • DVT (deep venous thrombosis) (CMS/[facility]) 2014    Superficial - right lesser saphenus vein - after hip fracture    • Fracture of bone of forefoot 1972    Right foot   • Fracture of ilium, left (CMS/[facility]) 05/2018    Accidental fall - Uncomplicated recovery   • GERD (gastroesophageal reflux disease) 2014    Chronic superficial gastritis   • HTN (hypertension) 2005   • Iron deficiency anemia due to chronic blood loss 2018    Predisposed by chronic anticoagulation and GERD   • Low body weight due to inadequate caloric intake Adulthood   • Mitral valve prolapse 1997    Mild MR   • Osteoarthritis    • Osteoporosis    • Patent foramen ovale 2005   • Pneumonia 1947   • SCC (squamous cell carcinoma), arm, right 2017   • Scoliosis    • Syncope 2014     undermined cause - [facility]   • Varicose veins 2005    Symptomatic     Past Surgical History:   Procedure Laterality Date   • ACHILLES TENDON SURGERY Left 1997    Repair of rupture left tendon with screws   • BREAST CYST EXCISION Left    • BREAST SURGERY Left 1982    Excision benign cyst   • CARDIAC CATHETERIZATION  2006    Severe acute coronary spasm   • CATARACT EXTRACTION WITH INTRAOCULAR LENS IMPLANT Bilateral 2015   • EYE CAPSULOTOMY WITH LASER Left 2016    Marked visual improvement   • FEMUR FRACTURE SURGERY Right 2015    Repair of shaft fracture   • HIP FRACTURE SURGERY Left 2014    left hip fracture with pin   • LUMBAR DISC SURGERY  1983   • OVARIAN CYST REMOVAL Left  1996   • SKIN CANCER EXCISION Right 2017    SCC - arm     General Information     Row Name 02/05/20 1114          PT Evaluation Time/Intention    Document Type  therapy note (daily note)  -LM     Mode of Treatment  individual therapy;physical therapy  -     Row Name 02/05/20 1114          General Information    Patient Profile Reviewed?  yes  -LM     Existing Precautions/Restrictions  fall;oxygen therapy device and L/min;other (see comments) R Wrist Brace  -LM     Row Name 02/05/20 1114          Cognitive Assessment/Intervention- PT/OT    Orientation Status (Cognition)  oriented x 4  -LM     Row Name 02/05/20 1114          Safety Issues, Functional Mobility    Safety Issues Affecting Function (Mobility)  safety precaution awareness;safety precautions follow-through/compliance  -LM     Impairments Affecting Function (Mobility)  balance;endurance/activity tolerance;shortness of breath;strength  -LM       User Key  (r) = Recorded By, (t) = Taken By, (c) = Cosigned By    Initials Name Provider Type    LM Annette Kenny, PT Physical Therapist        Mobility     Row Name 02/05/20 1114          Bed Mobility Assessment/Treatment    Comment (Bed Mobility)  Up in chair  -     Row Name 02/05/20 [facility]          Transfer Assessment/Treatment    Comment (Transfers)  Vc's for hand placement.  -     Row Name 02/05/20 1114          Sit-Stand Transfer    Sit-Stand Check (Transfers)  contact guard;1 person assist;verbal cues  -LM     Assistive Device (Sit-Stand Transfers)  walker, front-wheeled  -LM     Row Name 02/05/20 1114          Gait/Stairs Assessment/Training    Gait/Stairs Assessment/Training  gait/ambulation independence;gait/ambulation assistive device  -LM     Check Level (Gait)  contact guard;1 person assist  -LM     Assistive Device (Gait)  walker, front-wheeled  -LM     Distance in Feet (Gait)  180 feet  -LM     Deviations/Abnormal Patterns (Gait)  el decreased;stride length decreased  -LM      Bilateral Gait Deviations  forward flexed posture;heel strike decreased  -LM     Left Sided Gait Deviations  heel strike decreased  -LM     Comment (Gait/Stairs)  One standing rest break needed due to SOA.  Vc's for upright posture, to stay inside walker, and for PLB.  -LM     Row Name 02/05/20 1114          Mobility Assessment/Intervention    Extremity Weight-bearing Status  right upper extremity  -LM     Right Upper Extremity (Weight-bearing Status)  weight-bearing as tolerated (WBAT)  -LM       User Key  (r) = Recorded By, (t) = Taken By, (c) = Cosigned By    Initials Name Provider Type    Annette Helton PT Physical Therapist        Obj/Interventions     Row Name 02/05/20 1114          Therapeutic Exercise    Lower Extremity (Therapeutic Exercise)  LAQ (long arc quad), bilateral;marching while seated  -LM     Lower Extremity Range of Motion (Therapeutic Exercise)  ankle dorsiflexion/plantar flexion, bilateral  -LM     Exercise Type (Therapeutic Exercise)  AROM (active range of motion)  -LM     Position (Therapeutic Exercise)  seated  -LM     Sets/Reps (Therapeutic Exercise)  x15 each  -LM     Expected Outcome (Therapeutic Exercise)  improve functional stability;improve performance, gait skills;improve performance, transfer skills  -LM     Comment (Therapeutic Exercise)  Vc's for technique and to go through full ROM.  -LM       User Key  (r) = Recorded By, (t) = Taken By, (c) = Cosigned By    Initials Name Provider Type    Annette Helton PT Physical Therapist        Goals/Plan    No documentation.       Clinical Impression     Row Name 02/05/20 1114          Pain Assessment    Additional Documentation  Pain Scale: Numbers Pre/Post-Treatment (Group)  -LM     [facility] Name 02/05/20 1114          Pain Scale: Numbers Pre/Post-Treatment    Pain Scale: Numbers, Pretreatment  0/10 - no pain  -LM     Pain Scale: Numbers, Post-Treatment  0/10 - no pain  -LM     [facility] Name 02/05/20 1114          Plan of Care Review    Plan  of Care Reviewed With  patient  -LM     Row Name 02/05/20 1114          Vital Signs    Pre Systolic BP Rehab  117  -LM     Pre Treatment Diastolic BP  65  -LM     Pretreatment Heart Rate (beats/min)  75  -LM     Posttreatment Heart Rate (beats/min)  83  -LM     Pre SpO2 (%)  90  -LM     O2 Delivery Pre Treatment  supplemental O2  -LM     Post SpO2 (%)  94  -LM     O2 Delivery Post Treatment  supplemental O2  -LM     Pre Patient Position  Sitting  -LM     Intra Patient Position  Standing  -LM     Post Patient Position  Sitting  -LM     Row Name 02/05/20 1114          Positioning and Restraints    Pre-Treatment Position  sitting in chair/recliner  -LM     Post Treatment Position  chair  -LM     In Chair  reclined;call light within reach;encouraged to call for assist;waffle cushion;notified nsg  -LM       User Key  (r) = Recorded By, (t) = Taken By, (c) = Cosigned By    Initials Name Provider Type    Annette Helton, MIGUELANGEL Physical Therapist        Outcome Measures     Row Name 02/05/20 1114          How much help from another person do you currently need...    Turning from your back to your side while in flat bed without using bedrails?  4  -LM     Moving from lying on back to sitting on the side of a flat bed without bedrails?  3  -LM     Moving to and from a bed to a chair (including a wheelchair)?  3  -LM     Standing up from a chair using your arms (e.g., wheelchair, bedside chair)?  3  -LM     Climbing 3-5 steps with a railing?  3  -LM     To walk in hospital room?  3  -LM     AM-PAC 6 Clicks Score (PT)  19  -LM     Row Name 02/05/20 1114          Functional Assessment    Outcome Measure Options  AM-PAC 6 Clicks Basic Mobility (PT)  -LM       User Key  (r) = Recorded By, (t) = Taken By, (c) = Cosigned By    Initials Name Provider Type    Annette Helton PT Physical Therapist          PT Recommendation and Plan  Planned Therapy Interventions (PT Eval): balance training, bed mobility training, gait training, home  exercise program, motor coordination training, neuromuscular re-education, patient/family education, postural re-education, ROM (range of motion), strengthening, stretching, transfer training  Outcome Summary/Treatment Plan (PT)  Anticipated Discharge Disposition (PT): home with 24/7 care, home with home health  Plan of Care Reviewed With: patient  Outcome Summary: Pt progressing well towards skilled PT goals.  Pt stood with CGAx1 and ambulated 180 feet using rw with CGAx1.  One standing rest break needed due to SOA/fatigue.  Pt reports no pain in the wrist today.  Pt tolerated BLE ther ex without complaint.  Continue with skilled PT to improve mobility and safety.     Time Calculation:   PT Charges     Row Name 02/05/20 1114             Time Calculation    Start Time  1114  -LM      PT Received On  02/05/20  -LM      PT Goal Re-Cert Due Date  02/12/20  -LM         Timed Charges    01278 - PT Therapeutic Exercise Minutes  10  -LM      69322 - Gait Training Minutes   15  -LM        User Key  (r) = Recorded By, (t) = Taken By, (c) = Cosigned By    Initials Name Provider Type    LM Annette Kenny, PT Physical Therapist        Therapy Charges for Today     Code Description Service Date Service Provider Modifiers Qty    42121013511 HC GAIT TRAINING EA 15 MIN 2/5/2020 Annette Kenny, PT GP 1    48772752605 HC PT THER PROC EA 15 MIN 2/5/2020 Annette Kenny, PT GP 1          PT G-Codes  Outcome Measure Options: AM-PAC 6 Clicks Basic Mobility (PT)  AM-PAC 6 Clicks Score (PT): 19  AM-PAC 6 Clicks Score (OT): 18    Annette Kenny PT  2/5/2020

## 2020-02-05 NOTE — PLAN OF CARE
Problem: Patient Care Overview  Goal: Plan of Care Review  Outcome: Ongoing (interventions implemented as appropriate)  Flowsheets (Taken 2/5/2020 1114)  Outcome Summary: Pt progressing well towards skilled PT goals.  Pt stood with CGAx1 and ambulated 180 feet using rw with CGAx1.  One standing rest break needed due to SOA/fatigue.  Pt reports no pain in the wrist today.  Pt tolerated BLE ther ex without complaint.  Continue with skilled PT to improve mobility and safety.

## 2020-02-27 RX ORDER — DILTIAZEM HYDROCHLORIDE 120 MG/1
CAPSULE, EXTENDED RELEASE ORAL
Qty: 90 CAPSULE | Refills: 4 | Status: SHIPPED | OUTPATIENT
Start: 2020-02-27 | End: 2020-04-04 | Stop reason: HOSPADM

## 2020-03-05 ENCOUNTER — OFFICE VISIT (OUTPATIENT)
Dept: FAMILY MEDICINE CLINIC | Facility: CLINIC | Age: 80
End: 2020-03-05

## 2020-03-05 ENCOUNTER — APPOINTMENT (OUTPATIENT)
Dept: LAB | Facility: HOSPITAL | Age: 80
End: 2020-03-05

## 2020-03-05 VITALS
BODY MASS INDEX: 16.17 KG/M2 | HEART RATE: 72 BPM | SYSTOLIC BLOOD PRESSURE: 100 MMHG | HEIGHT: 67 IN | DIASTOLIC BLOOD PRESSURE: 60 MMHG | WEIGHT: 103 LBS

## 2020-03-05 DIAGNOSIS — D53.9 NUTRITIONAL ANEMIA: Primary | ICD-10-CM

## 2020-03-05 DIAGNOSIS — R09.89 PROLONGED CAPILLARY REFILL TIME: ICD-10-CM

## 2020-03-05 DIAGNOSIS — N18.30 CKD (CHRONIC KIDNEY DISEASE) STAGE 3, GFR 30-59 ML/MIN (HCC): ICD-10-CM

## 2020-03-05 DIAGNOSIS — R63.6 UNDERWEIGHT: ICD-10-CM

## 2020-03-05 LAB
ALBUMIN SERPL-MCNC: 3.7 G/DL (ref 3.5–5.2)
ALBUMIN/GLOB SERPL: 0.9 G/DL
ALP SERPL-CCNC: 112 U/L (ref 39–117)
ALT SERPL W P-5'-P-CCNC: 12 U/L (ref 1–33)
ANION GAP SERPL CALCULATED.3IONS-SCNC: 15.7 MMOL/L (ref 5–15)
AST SERPL-CCNC: 25 U/L (ref 1–32)
BILIRUB SERPL-MCNC: 0.6 MG/DL (ref 0.2–1.2)
BUN BLD-MCNC: 21 MG/DL (ref 8–23)
BUN/CREAT SERPL: 17.2 (ref 7–25)
CALCIUM SPEC-SCNC: 10.3 MG/DL (ref 8.6–10.5)
CHLORIDE SERPL-SCNC: 100 MMOL/L (ref 98–107)
CO2 SERPL-SCNC: 24.3 MMOL/L (ref 22–29)
CREAT BLD-MCNC: 1.22 MG/DL (ref 0.57–1)
DEPRECATED RDW RBC AUTO: 47.3 FL (ref 37–54)
ERYTHROCYTE [DISTWIDTH] IN BLOOD BY AUTOMATED COUNT: 14.1 % (ref 12.3–15.4)
FERRITIN SERPL-MCNC: 243 NG/ML (ref 13–150)
GFR SERPL CREATININE-BSD FRML MDRD: 43 ML/MIN/1.73
GLOBULIN UR ELPH-MCNC: 3.9 GM/DL
GLUCOSE BLD-MCNC: 84 MG/DL (ref 65–99)
HCT VFR BLD AUTO: 38.3 % (ref 34–46.6)
HGB BLD-MCNC: 12.7 G/DL (ref 12–15.9)
IRON 24H UR-MRATE: 41 MCG/DL (ref 37–145)
IRON SATN MFR SERPL: 14 % (ref 20–50)
MCH RBC QN AUTO: 30.5 PG (ref 26.6–33)
MCHC RBC AUTO-ENTMCNC: 33.2 G/DL (ref 31.5–35.7)
MCV RBC AUTO: 92.1 FL (ref 79–97)
PLATELET # BLD AUTO: 331 10*3/MM3 (ref 140–450)
PMV BLD AUTO: 11.5 FL (ref 6–12)
POTASSIUM BLD-SCNC: 4.8 MMOL/L (ref 3.5–5.2)
PROT SERPL-MCNC: 7.6 G/DL (ref 6–8.5)
RBC # BLD AUTO: 4.16 10*6/MM3 (ref 3.77–5.28)
RETICS # AUTO: 0.03 10*6/MM3 (ref 0.02–0.13)
RETICS/RBC NFR AUTO: 1.29 % (ref 0.7–1.9)
SODIUM BLD-SCNC: 140 MMOL/L (ref 136–145)
TIBC SERPL-MCNC: 289 MCG/DL (ref 298–536)
TRANSFERRIN SERPL-MCNC: 194 MG/DL (ref 200–360)
WBC NRBC COR # BLD: 10.61 10*3/MM3 (ref 3.4–10.8)

## 2020-03-05 PROCEDURE — 99214 OFFICE O/P EST MOD 30 MIN: CPT | Performed by: FAMILY MEDICINE

## 2020-03-05 PROCEDURE — 85027 COMPLETE CBC AUTOMATED: CPT | Performed by: FAMILY MEDICINE

## 2020-03-05 PROCEDURE — 80053 COMPREHEN METABOLIC PANEL: CPT | Performed by: FAMILY MEDICINE

## 2020-03-05 PROCEDURE — 85045 AUTOMATED RETICULOCYTE COUNT: CPT | Performed by: FAMILY MEDICINE

## 2020-03-05 PROCEDURE — 84466 ASSAY OF TRANSFERRIN: CPT | Performed by: FAMILY MEDICINE

## 2020-03-05 PROCEDURE — 83540 ASSAY OF IRON: CPT | Performed by: FAMILY MEDICINE

## 2020-03-05 PROCEDURE — 82728 ASSAY OF FERRITIN: CPT | Performed by: FAMILY MEDICINE

## 2020-03-05 NOTE — PROGRESS NOTES
Transitional Care Follow Up Visit  Subjective     Mirian Sy is a 80 y.o. female who presents for a transitional care management visit.    Within 48 business hours after discharge our office contacted her via telephone to coordinate her care and needs.      I reviewed and discussed the details of that call along with the discharge summary, hospital problems, inpatient lab results, inpatient diagnostic studies, and consultation reports with Mirian.    Patient Care Team:  Oren Engel DO as PCP - General (Family Medicine)  Oren Engel DO as PCP - Claims Attributed     Current outpatient and discharge medications have been reconciled for the patient.  Reviewed by: Oren Engel DO      Date of TCM Phone Call 5/23/2018 12/20/2018 9/18/2019   [facility]   Date of Admission - 12/18/2018 9/15/2019   Date of Discharge 5/22/2018 12/19/2018 9/17/2019   Discharge Disposition Home or Self Care Home-Health Care Sv Home or Self Care     Risk for Readmission (LACE) No data recorded    History of Present Illness   Course During Hospital Stay:    Mirian Sy is a 79 y.o. female with a history of A. fib, CKD stage III, epilepsy on Keppra, chronic diastolic heart failure presented to the emergency room with complaints of acute onset of right wrist pain for 2 days.  Imaging was unremarkable.     Right wrist pain, suspected gout  -Significant improvement with colchicine and prednisone  -Plan to taper prednisone over 7 days  -Outpatient follow up with orthopedic surgery if needed     A-fib with RVR  -Continue increased dose toprol XL 50   -Continue eliquis.     Possible UTI  -S/P 3 days ceftriaxone  -UA convincing but mixed melvina on culture     CKD III  -Stable     Seizure disorder  -Continue keppra        Discharge Follow Up Recommendations for outpatient labs/diagnostics:  Follow up with PCP within 1 week     She has  "been doing well since the hospital.  Overall she feels significantly improved.  She is having some low blood pressures still at home.  She has a history of anemia, is in need of recheck today.  She continues to have difficulty gaining weight, although her weight has been stable.  Her urinary symptoms have resolved.    The following portions of the patient's history were reviewed and updated as appropriate: allergies, current medications, past family history, past medical history, past social history, past surgical history and problem list.    Review of Systems   Constitutional: Positive for fever.   Respiratory: Negative.    Cardiovascular: Negative.    Genitourinary: Negative.        Objective   Blood pressure 100/60, pulse 72, height 170.2 cm (67.01\"), weight 46.7 kg (103 lb).  Nursing note reviewed  Physical Exam  Const: NAD, A&Ox4, Pleasant, Cooperative-very thin and frail  Eyes: EOMI, no conjunctivitis  ENT: No nasal discharge present, neck supple  Cardiac: Regular rate and rhythm, no cyanosis  Resp: Respiratory rate within normal limits, no increased work of breathing, no audible wheezing or retractions noted  Assessment/Plan     Problem List Items Addressed This Visit        Genitourinary    CKD (chronic kidney disease) stage 3, GFR 30-59 ml/min (CMS/[facility])       Hematopoietic and Hemostatic    Nutritional anemia - Primary    Relevant Orders    CBC (No Diff)    Iron Profile    Comprehensive Metabolic Panel    Ferritin    Reticulocytes       Other    Underweight      Other Visit Diagnoses     Prolonged capillary refill time            Oxygen level came up to normal after warming her hands.  She does have delayed capillary refill on exam, I would like her to have her blood counts checked today, she likely still has some degree of anemia.    Also concern for hypoperfusion, her blood pressure is exceedingly well controlled for her age and health status, I like her to try discontinuing the diltiazem and monitoring " her blood pressure over the next 2 weeks.  I will see her back at that point.    She has had difficulty gaining weight, she has required Marinol in the past.  If she continues to lose weight with weakness over the next couple of weeks, that may be a consideration for 6 to 12 weeks.    Patient Instructions   1.  Check labs today    2.  Discontinue diltiazem for next 2 weeks and monitor blood pressure

## 2020-03-12 ENCOUNTER — TELEPHONE (OUTPATIENT)
Dept: FAMILY MEDICINE CLINIC | Facility: CLINIC | Age: 80
End: 2020-03-12

## 2020-03-12 DIAGNOSIS — R63.6 UNDERWEIGHT: ICD-10-CM

## 2020-03-12 DIAGNOSIS — D53.9 NUTRITIONAL ANEMIA: Primary | ICD-10-CM

## 2020-03-12 NOTE — TELEPHONE ENCOUNTER
PTS SON CALLED AND SAID PATIENT HAS LOST 6 LBS LAST 2 WEEKS AND WOULD LIKE TO KNOW IF YOU  COULD CALL IN A MEDICATION TO STIMULATE HER APPETITE? PLEASE ADVISE    ZAC: [phone]

## 2020-03-13 ENCOUNTER — TELEPHONE (OUTPATIENT)
Dept: FAMILY MEDICINE CLINIC | Facility: CLINIC | Age: 80
End: 2020-03-13

## 2020-03-13 DIAGNOSIS — D53.9 NUTRITIONAL ANEMIA: ICD-10-CM

## 2020-03-13 DIAGNOSIS — R63.6 UNDERWEIGHT: ICD-10-CM

## 2020-03-13 RX ORDER — DRONABINOL 2.5 MG/1
2.5 CAPSULE ORAL
Qty: 60 CAPSULE | Refills: 0 | Status: SHIPPED | OUTPATIENT
Start: 2020-03-13 | End: 2020-03-13 | Stop reason: SDUPTHER

## 2020-03-13 RX ORDER — DRONABINOL 2.5 MG/1
2.5 CAPSULE ORAL
Qty: 60 CAPSULE | Refills: 0 | Status: SHIPPED | OUTPATIENT
Start: 2020-03-13

## 2020-03-13 NOTE — TELEPHONE ENCOUNTER
Patient's daughter called and stated that the medication meant to stimulate the patient's appetite was sent to the wrong pharmacy.  Prescription refills are needed for the following prescription(s):    dronabinol (MARINOL) 2.5 MG capsule    Pharmacy: [facility] in [facility]  PH: [phone]  FX: [phone]    Patient's daughter callback: [phone]  Pt's daughter is requesting a callback when the prescription is sent.    Please advise.

## 2020-03-13 NOTE — TELEPHONE ENCOUNTER
PT'S SON CALLED TO CHECK ON THE STATUS OF HIS REQUEST. REQUESTING A CALL BACK AT [employer].     CONTACT: [phone]

## 2020-03-13 NOTE — TELEPHONE ENCOUNTER
Patients son called, can dronabinol be switched to [facility] in [place]?   resolution continue with augmentin as prescribed for one week.  follow up with Dr Keen [facility] after discharge  Follow up with Dr Davis upon discharge Continue with blood pressure medication as precribed. Maintain a healthy diet that consist of low sugar, low fat, low sodium diet. Exercise frequently if possible.  Follow up with primary care physician in one week after discharge.

## 2020-03-13 NOTE — TELEPHONE ENCOUNTER
Called and informed pt son of rx being sent in. He verbalized understanding and had no further questions.

## 2020-03-19 ENCOUNTER — APPOINTMENT (OUTPATIENT)
Dept: CARDIOLOGY | Facility: HOSPITAL | Age: 80
End: 2020-03-19

## 2020-03-19 ENCOUNTER — APPOINTMENT (OUTPATIENT)
Dept: GENERAL RADIOLOGY | Facility: HOSPITAL | Age: 80
End: 2020-03-19

## 2020-03-19 ENCOUNTER — EPISODE CHANGES (OUTPATIENT)
Dept: CASE MANAGEMENT | Facility: OTHER | Age: 80
End: 2020-03-19

## 2020-03-19 ENCOUNTER — APPOINTMENT (OUTPATIENT)
Dept: CT IMAGING | Facility: HOSPITAL | Age: 80
End: 2020-03-19

## 2020-03-19 ENCOUNTER — HOSPITAL ENCOUNTER (INPATIENT)
Facility: HOSPITAL | Age: 80
LOS: 16 days | Discharge: HOSPICE | End: 2020-04-04
Attending: EMERGENCY MEDICINE | Admitting: INTERNAL MEDICINE

## 2020-03-19 DIAGNOSIS — Z74.09 IMPAIRED MOBILITY AND ADLS: ICD-10-CM

## 2020-03-19 DIAGNOSIS — I48.91 ATRIAL FIBRILLATION WITH RAPID VENTRICULAR RESPONSE (HCC): ICD-10-CM

## 2020-03-19 DIAGNOSIS — R50.9 ACUTE FEBRILE ILLNESS: ICD-10-CM

## 2020-03-19 DIAGNOSIS — R13.12 OROPHARYNGEAL DYSPHAGIA: Primary | ICD-10-CM

## 2020-03-19 DIAGNOSIS — I10 ELEVATED BLOOD PRESSURE READING WITH DIAGNOSIS OF HYPERTENSION: ICD-10-CM

## 2020-03-19 DIAGNOSIS — Z78.9 IMPAIRED MOBILITY AND ADLS: ICD-10-CM

## 2020-03-19 PROBLEM — J69.0 ASPIRATION PNEUMONIA DUE TO VOMITUS (HCC): Status: ACTIVE | Noted: 2020-03-19

## 2020-03-19 PROBLEM — I48.20 CHRONIC ATRIAL FIBRILLATION WITH RVR (HCC): Status: ACTIVE | Noted: 2020-03-19

## 2020-03-19 PROBLEM — R47.02 DYSPHASIA: Status: ACTIVE | Noted: 2020-03-19

## 2020-03-19 PROBLEM — N17.9 AKI (ACUTE KIDNEY INJURY) (HCC): Status: ACTIVE | Noted: 2020-03-19

## 2020-03-19 PROBLEM — J96.21 ACUTE AND CHRONIC RESPIRATORY FAILURE WITH HYPOXIA (HCC): Status: ACTIVE | Noted: 2020-03-19

## 2020-03-19 PROBLEM — A41.9 SEVERE SEPSIS (HCC): Status: ACTIVE | Noted: 2020-03-19

## 2020-03-19 PROBLEM — I50.33 ACUTE ON CHRONIC DIASTOLIC CHF (CONGESTIVE HEART FAILURE) (HCC): Status: ACTIVE | Noted: 2020-02-02

## 2020-03-19 PROBLEM — R65.20 SEVERE SEPSIS (HCC): Status: ACTIVE | Noted: 2020-03-19

## 2020-03-19 PROBLEM — E87.20 LACTIC ACIDOSIS: Status: ACTIVE | Noted: 2020-03-19

## 2020-03-19 LAB
ALBUMIN SERPL-MCNC: 3.7 G/DL (ref 3.5–5.2)
ALBUMIN/GLOB SERPL: 1 G/DL
ALP SERPL-CCNC: 92 U/L (ref 39–117)
ALT SERPL W P-5'-P-CCNC: 27 U/L (ref 1–33)
AMORPH URATE CRY URNS QL MICRO: ABNORMAL /HPF
ANION GAP SERPL CALCULATED.3IONS-SCNC: 14 MMOL/L (ref 5–15)
AST SERPL-CCNC: 47 U/L (ref 1–32)
B PARAPERT DNA SPEC QL NAA+PROBE: NOT DETECTED
B PERT DNA SPEC QL NAA+PROBE: NOT DETECTED
BACTERIA UR QL AUTO: ABNORMAL /HPF
BASOPHILS # BLD AUTO: 0.04 10*3/MM3 (ref 0–0.2)
BASOPHILS NFR BLD AUTO: 0.3 % (ref 0–1.5)
BILIRUB SERPL-MCNC: 0.7 MG/DL (ref 0.2–1.2)
BILIRUB UR QL STRIP: NEGATIVE
BUN BLD-MCNC: 43 MG/DL (ref 8–23)
BUN/CREAT SERPL: 31.6 (ref 7–25)
C PNEUM DNA NPH QL NAA+NON-PROBE: NOT DETECTED
CALCIUM SPEC-SCNC: 9.7 MG/DL (ref 8.6–10.5)
CHLORIDE SERPL-SCNC: 109 MMOL/L (ref 98–107)
CLARITY UR: CLEAR
CO2 SERPL-SCNC: 26 MMOL/L (ref 22–29)
COARSE GRAN CASTS URNS QL MICRO: ABNORMAL /LPF
COLOR UR: YELLOW
CREAT BLD-MCNC: 1.36 MG/DL (ref 0.57–1)
D-LACTATE SERPL-SCNC: 1.6 MMOL/L (ref 0.5–2)
D-LACTATE SERPL-SCNC: 3.6 MMOL/L (ref 0.5–2)
DEPRECATED RDW RBC AUTO: 54.7 FL (ref 37–54)
EOSINOPHIL # BLD AUTO: 0.01 10*3/MM3 (ref 0–0.4)
EOSINOPHIL NFR BLD AUTO: 0.1 % (ref 0.3–6.2)
ERYTHROCYTE [DISTWIDTH] IN BLOOD BY AUTOMATED COUNT: 15.5 % (ref 12.3–15.4)
FLUAV H1 2009 PAND RNA NPH QL NAA+PROBE: NOT DETECTED
FLUAV H1 HA GENE NPH QL NAA+PROBE: NOT DETECTED
FLUAV H3 RNA NPH QL NAA+PROBE: NOT DETECTED
FLUAV SUBTYP SPEC NAA+PROBE: NOT DETECTED
FLUBV RNA ISLT QL NAA+PROBE: NOT DETECTED
GFR SERPL CREATININE-BSD FRML MDRD: 38 ML/MIN/1.73
GLOBULIN UR ELPH-MCNC: 3.6 GM/DL
GLUCOSE BLD-MCNC: 101 MG/DL (ref 65–99)
GLUCOSE UR STRIP-MCNC: NEGATIVE MG/DL
HADV DNA SPEC NAA+PROBE: NOT DETECTED
HCOV 229E RNA SPEC QL NAA+PROBE: NOT DETECTED
HCOV HKU1 RNA SPEC QL NAA+PROBE: NOT DETECTED
HCOV NL63 RNA SPEC QL NAA+PROBE: NOT DETECTED
HCOV OC43 RNA SPEC QL NAA+PROBE: NOT DETECTED
HCT VFR BLD AUTO: 36.4 % (ref 34–46.6)
HGB BLD-MCNC: 11.5 G/DL (ref 12–15.9)
HGB UR QL STRIP.AUTO: NEGATIVE
HMPV RNA NPH QL NAA+NON-PROBE: NOT DETECTED
HOLD SPECIMEN: NORMAL
HOLD SPECIMEN: NORMAL
HPIV1 RNA SPEC QL NAA+PROBE: NOT DETECTED
HPIV2 RNA SPEC QL NAA+PROBE: NOT DETECTED
HPIV3 RNA NPH QL NAA+PROBE: NOT DETECTED
HPIV4 P GENE NPH QL NAA+PROBE: NOT DETECTED
HYALINE CASTS UR QL AUTO: ABNORMAL /LPF
IMM GRANULOCYTES # BLD AUTO: 0.07 10*3/MM3 (ref 0–0.05)
IMM GRANULOCYTES NFR BLD AUTO: 0.5 % (ref 0–0.5)
KETONES UR QL STRIP: ABNORMAL
LACTATE HOLD SPECIMEN: NORMAL
LEUKOCYTE ESTERASE UR QL STRIP.AUTO: ABNORMAL
LYMPHOCYTES # BLD AUTO: 1.02 10*3/MM3 (ref 0.7–3.1)
LYMPHOCYTES NFR BLD AUTO: 7.5 % (ref 19.6–45.3)
M PNEUMO IGG SER IA-ACNC: NOT DETECTED
MCH RBC QN AUTO: 30.9 PG (ref 26.6–33)
MCHC RBC AUTO-ENTMCNC: 31.6 G/DL (ref 31.5–35.7)
MCV RBC AUTO: 97.8 FL (ref 79–97)
MONOCYTES # BLD AUTO: 1.3 10*3/MM3 (ref 0.1–0.9)
MONOCYTES NFR BLD AUTO: 9.5 % (ref 5–12)
MRSA DNA SPEC QL NAA+PROBE: NEGATIVE
NEUTROPHILS # BLD AUTO: 11.19 10*3/MM3 (ref 1.7–7)
NEUTROPHILS NFR BLD AUTO: 82.1 % (ref 42.7–76)
NITRITE UR QL STRIP: NEGATIVE
NRBC BLD AUTO-RTO: 0 /100 WBC (ref 0–0.2)
NT-PROBNP SERPL-MCNC: ABNORMAL PG/ML (ref 5–1800)
PH UR STRIP.AUTO: 5.5 [PH] (ref 5–8)
PLATELET # BLD AUTO: 219 10*3/MM3 (ref 140–450)
PMV BLD AUTO: 11.2 FL (ref 6–12)
POTASSIUM BLD-SCNC: 4.4 MMOL/L (ref 3.5–5.2)
PROCALCITONIN SERPL-MCNC: 0.32 NG/ML (ref 0.1–0.25)
PROT SERPL-MCNC: 7.3 G/DL (ref 6–8.5)
PROT UR QL STRIP: ABNORMAL
RBC # BLD AUTO: 3.72 10*6/MM3 (ref 3.77–5.28)
RBC # UR: ABNORMAL /HPF
REF LAB TEST METHOD: ABNORMAL
RHINOVIRUS RNA SPEC NAA+PROBE: NOT DETECTED
RSV RNA NPH QL NAA+NON-PROBE: NOT DETECTED
SODIUM BLD-SCNC: 149 MMOL/L (ref 136–145)
SP GR UR STRIP: 1.03 (ref 1–1.03)
SQUAMOUS #/AREA URNS HPF: ABNORMAL /HPF
TROPONIN T SERPL-MCNC: 0.01 NG/ML (ref 0–0.03)
UROBILINOGEN UR QL STRIP: ABNORMAL
WBC NRBC COR # BLD: 13.63 10*3/MM3 (ref 3.4–10.8)
WBC UR QL AUTO: ABNORMAL /HPF
WHOLE BLOOD HOLD SPECIMEN: NORMAL
WHOLE BLOOD HOLD SPECIMEN: NORMAL

## 2020-03-19 PROCEDURE — 93005 ELECTROCARDIOGRAM TRACING: CPT | Performed by: EMERGENCY MEDICINE

## 2020-03-19 PROCEDURE — 25010000002 DIGOXIN PER 500 MCG: Performed by: INTERNAL MEDICINE

## 2020-03-19 PROCEDURE — 99222 1ST HOSP IP/OBS MODERATE 55: CPT | Performed by: INTERNAL MEDICINE

## 2020-03-19 PROCEDURE — 87641 MR-STAPH DNA AMP PROBE: CPT | Performed by: INTERNAL MEDICINE

## 2020-03-19 PROCEDURE — 87040 BLOOD CULTURE FOR BACTERIA: CPT | Performed by: EMERGENCY MEDICINE

## 2020-03-19 PROCEDURE — 25010000002 PIPERACILLIN SOD-TAZOBACTAM PER 1 G: Performed by: INTERNAL MEDICINE

## 2020-03-19 PROCEDURE — 81001 URINALYSIS AUTO W/SCOPE: CPT | Performed by: EMERGENCY MEDICINE

## 2020-03-19 PROCEDURE — 92610 EVALUATE SWALLOWING FUNCTION: CPT

## 2020-03-19 PROCEDURE — 85025 COMPLETE CBC W/AUTO DIFF WBC: CPT | Performed by: EMERGENCY MEDICINE

## 2020-03-19 PROCEDURE — 83880 ASSAY OF NATRIURETIC PEPTIDE: CPT | Performed by: EMERGENCY MEDICINE

## 2020-03-19 PROCEDURE — 25010000002 PIPERACILLIN SOD-TAZOBACTAM PER 1 G: Performed by: EMERGENCY MEDICINE

## 2020-03-19 PROCEDURE — 93306 TTE W/DOPPLER COMPLETE: CPT

## 2020-03-19 PROCEDURE — 99223 1ST HOSP IP/OBS HIGH 75: CPT | Performed by: INTERNAL MEDICINE

## 2020-03-19 PROCEDURE — 71045 X-RAY EXAM CHEST 1 VIEW: CPT

## 2020-03-19 PROCEDURE — 0100U HC BIOFIRE FILMARRAY RESP PANEL 2: CPT | Performed by: EMERGENCY MEDICINE

## 2020-03-19 PROCEDURE — 71250 CT THORAX DX C-: CPT

## 2020-03-19 PROCEDURE — 84145 PROCALCITONIN (PCT): CPT | Performed by: EMERGENCY MEDICINE

## 2020-03-19 PROCEDURE — 83605 ASSAY OF LACTIC ACID: CPT | Performed by: EMERGENCY MEDICINE

## 2020-03-19 PROCEDURE — 99285 EMERGENCY DEPT VISIT HI MDM: CPT

## 2020-03-19 PROCEDURE — 25010000002 VANCOMYCIN 10 G RECONSTITUTED SOLUTION: Performed by: EMERGENCY MEDICINE

## 2020-03-19 PROCEDURE — 93306 TTE W/DOPPLER COMPLETE: CPT | Performed by: INTERNAL MEDICINE

## 2020-03-19 PROCEDURE — 84484 ASSAY OF TROPONIN QUANT: CPT | Performed by: EMERGENCY MEDICINE

## 2020-03-19 PROCEDURE — 80053 COMPREHEN METABOLIC PANEL: CPT | Performed by: EMERGENCY MEDICINE

## 2020-03-19 RX ORDER — ONDANSETRON 4 MG/1
4 TABLET, FILM COATED ORAL EVERY 6 HOURS PRN
Status: DISCONTINUED | OUTPATIENT
Start: 2020-03-19 | End: 2020-04-04 | Stop reason: HOSPADM

## 2020-03-19 RX ORDER — SODIUM CHLORIDE 0.9 % (FLUSH) 0.9 %
10 SYRINGE (ML) INJECTION AS NEEDED
Status: DISCONTINUED | OUTPATIENT
Start: 2020-03-19 | End: 2020-04-04 | Stop reason: HOSPADM

## 2020-03-19 RX ORDER — MIRTAZAPINE 15 MG/1
7.5 TABLET, FILM COATED ORAL NIGHTLY
Status: DISCONTINUED | OUTPATIENT
Start: 2020-03-19 | End: 2020-04-04 | Stop reason: HOSPADM

## 2020-03-19 RX ORDER — FAMOTIDINE 20 MG/1
20 TABLET, FILM COATED ORAL NIGHTLY
Status: DISCONTINUED | OUTPATIENT
Start: 2020-03-19 | End: 2020-04-04 | Stop reason: HOSPADM

## 2020-03-19 RX ORDER — FUROSEMIDE 20 MG/1
20 TABLET ORAL DAILY
Status: DISCONTINUED | OUTPATIENT
Start: 2020-03-20 | End: 2020-03-24

## 2020-03-19 RX ORDER — ACETAMINOPHEN 650 MG/1
650 SUPPOSITORY RECTAL EVERY 4 HOURS PRN
Status: DISCONTINUED | OUTPATIENT
Start: 2020-03-19 | End: 2020-04-04 | Stop reason: HOSPADM

## 2020-03-19 RX ORDER — ACETAMINOPHEN 325 MG/1
650 TABLET ORAL EVERY 4 HOURS PRN
Status: DISCONTINUED | OUTPATIENT
Start: 2020-03-19 | End: 2020-04-04 | Stop reason: HOSPADM

## 2020-03-19 RX ORDER — MULTIPLE VITAMINS W/ MINERALS TAB 9MG-400MCG
1 TAB ORAL DAILY
Status: DISCONTINUED | OUTPATIENT
Start: 2020-03-19 | End: 2020-04-04 | Stop reason: HOSPADM

## 2020-03-19 RX ORDER — ASPIRIN 325 MG
325 TABLET ORAL ONCE
Status: COMPLETED | OUTPATIENT
Start: 2020-03-19 | End: 2020-03-19

## 2020-03-19 RX ORDER — CITALOPRAM 20 MG/1
20 TABLET ORAL EVERY MORNING
Status: DISCONTINUED | OUTPATIENT
Start: 2020-03-20 | End: 2020-04-04 | Stop reason: HOSPADM

## 2020-03-19 RX ORDER — DILTIAZEM HCL IN NACL,ISO-OSM 125 MG/125
5-15 PLASTIC BAG, INJECTION (ML) INTRAVENOUS CONTINUOUS
Status: DISCONTINUED | OUTPATIENT
Start: 2020-03-19 | End: 2020-03-20

## 2020-03-19 RX ORDER — FERROUS SULFATE 325(65) MG
325 TABLET ORAL
Status: DISCONTINUED | OUTPATIENT
Start: 2020-03-19 | End: 2020-04-03

## 2020-03-19 RX ORDER — ACETAMINOPHEN 500 MG
500 TABLET ORAL ONCE
Status: COMPLETED | OUTPATIENT
Start: 2020-03-19 | End: 2020-03-19

## 2020-03-19 RX ORDER — ACETAMINOPHEN 160 MG/5ML
650 SOLUTION ORAL EVERY 4 HOURS PRN
Status: DISCONTINUED | OUTPATIENT
Start: 2020-03-19 | End: 2020-04-04 | Stop reason: HOSPADM

## 2020-03-19 RX ORDER — FAMOTIDINE 20 MG/1
20 TABLET, FILM COATED ORAL 2 TIMES DAILY PRN
Status: DISCONTINUED | OUTPATIENT
Start: 2020-03-19 | End: 2020-04-04 | Stop reason: HOSPADM

## 2020-03-19 RX ORDER — DIGOXIN 0.25 MG/ML
125 INJECTION INTRAMUSCULAR; INTRAVENOUS ONCE
Status: COMPLETED | OUTPATIENT
Start: 2020-03-19 | End: 2020-03-19

## 2020-03-19 RX ORDER — ONDANSETRON 2 MG/ML
4 INJECTION INTRAMUSCULAR; INTRAVENOUS EVERY 6 HOURS PRN
Status: DISCONTINUED | OUTPATIENT
Start: 2020-03-19 | End: 2020-04-04 | Stop reason: HOSPADM

## 2020-03-19 RX ORDER — METOPROLOL SUCCINATE 50 MG/1
50 TABLET, EXTENDED RELEASE ORAL DAILY
Status: DISCONTINUED | OUTPATIENT
Start: 2020-03-19 | End: 2020-03-23

## 2020-03-19 RX ORDER — DOCUSATE SODIUM 100 MG/1
100 CAPSULE, LIQUID FILLED ORAL 2 TIMES DAILY PRN
Status: DISCONTINUED | OUTPATIENT
Start: 2020-03-19 | End: 2020-04-04 | Stop reason: HOSPADM

## 2020-03-19 RX ORDER — SODIUM CHLORIDE 0.9 % (FLUSH) 0.9 %
10 SYRINGE (ML) INJECTION AS NEEDED
Status: DISCONTINUED | OUTPATIENT
Start: 2020-03-19 | End: 2020-03-19

## 2020-03-19 RX ORDER — CHOLECALCIFEROL (VITAMIN D3) 125 MCG
5 CAPSULE ORAL NIGHTLY PRN
Status: DISCONTINUED | OUTPATIENT
Start: 2020-03-19 | End: 2020-04-04 | Stop reason: HOSPADM

## 2020-03-19 RX ORDER — SODIUM CHLORIDE 0.9 % (FLUSH) 0.9 %
10 SYRINGE (ML) INJECTION EVERY 12 HOURS SCHEDULED
Status: DISCONTINUED | OUTPATIENT
Start: 2020-03-19 | End: 2020-04-04 | Stop reason: HOSPADM

## 2020-03-19 RX ORDER — LEVETIRACETAM 250 MG/1
125 TABLET ORAL EVERY 12 HOURS SCHEDULED
Status: DISCONTINUED | OUTPATIENT
Start: 2020-03-19 | End: 2020-04-04 | Stop reason: HOSPADM

## 2020-03-19 RX ORDER — ATORVASTATIN CALCIUM 40 MG/1
80 TABLET, FILM COATED ORAL NIGHTLY
Status: DISCONTINUED | OUTPATIENT
Start: 2020-03-19 | End: 2020-04-04 | Stop reason: HOSPADM

## 2020-03-19 RX ORDER — DILTIAZEM HYDROCHLORIDE 120 MG/1
120 CAPSULE, COATED, EXTENDED RELEASE ORAL
Status: DISCONTINUED | OUTPATIENT
Start: 2020-03-19 | End: 2020-03-24

## 2020-03-19 RX ADMIN — FERROUS SULFATE TAB 325 MG (65 MG ELEMENTAL FE) 325 MG: 325 (65 FE) TAB at 15:54

## 2020-03-19 RX ADMIN — ACETAMINOPHEN 500 MG: 500 TABLET, FILM COATED ORAL at 08:18

## 2020-03-19 RX ADMIN — MAGNESIUM OXIDE TAB 400 MG (241.3 MG ELEMENTAL MG) 400 MG: 400 (241.3 MG) TAB at 15:54

## 2020-03-19 RX ADMIN — DIGOXIN 125 MCG: 0.25 INJECTION INTRAMUSCULAR; INTRAVENOUS at 15:55

## 2020-03-19 RX ADMIN — DILTIAZEM HYDROCHLORIDE 120 MG: 120 CAPSULE, COATED, EXTENDED RELEASE ORAL at 15:55

## 2020-03-19 RX ADMIN — ASPIRIN 325 MG ORAL TABLET 325 MG: 325 PILL ORAL at 08:18

## 2020-03-19 RX ADMIN — TAZOBACTAM SODIUM AND PIPERACILLIN SODIUM 3.38 G: 375; 3 INJECTION, SOLUTION INTRAVENOUS at 18:10

## 2020-03-19 RX ADMIN — ATORVASTATIN CALCIUM 80 MG: 40 TABLET, FILM COATED ORAL at 20:28

## 2020-03-19 RX ADMIN — VANCOMYCIN HYDROCHLORIDE 1000 MG: 10 INJECTION, POWDER, LYOPHILIZED, FOR SOLUTION INTRAVENOUS at 09:32

## 2020-03-19 RX ADMIN — MULTIPLE VITAMINS W/ MINERALS TAB 1 TABLET: TAB at 15:54

## 2020-03-19 RX ADMIN — METOPROLOL SUCCINATE 50 MG: 50 TABLET, EXTENDED RELEASE ORAL at 15:54

## 2020-03-19 RX ADMIN — Medication 5 MG/HR: at 08:36

## 2020-03-19 RX ADMIN — MIRTAZAPINE 7.5 MG: 15 TABLET, FILM COATED ORAL at 20:28

## 2020-03-19 RX ADMIN — TAZOBACTAM SODIUM AND PIPERACILLIN SODIUM 3.38 G: 375; 3 INJECTION, SOLUTION INTRAVENOUS at 08:45

## 2020-03-19 RX ADMIN — APIXABAN 2.5 MG: 2.5 TABLET, FILM COATED ORAL at 15:53

## 2020-03-19 RX ADMIN — FAMOTIDINE 20 MG: 20 TABLET ORAL at 20:28

## 2020-03-19 RX ADMIN — SODIUM CHLORIDE, PRESERVATIVE FREE 10 ML: 5 INJECTION INTRAVENOUS at 15:55

## 2020-03-19 RX ADMIN — LEVETIRACETAM 125 MG: 250 TABLET, FILM COATED ORAL at 20:37

## 2020-03-19 RX ADMIN — LEVETIRACETAM 125 MG: 250 TABLET, FILM COATED ORAL at 15:54

## 2020-03-20 ENCOUNTER — APPOINTMENT (OUTPATIENT)
Dept: GENERAL RADIOLOGY | Facility: HOSPITAL | Age: 80
End: 2020-03-20

## 2020-03-20 LAB
ANION GAP SERPL CALCULATED.3IONS-SCNC: 14 MMOL/L (ref 5–15)
ASCENDING AORTA: 3 CM
BH CV ECHO MEAS - AO MAX PG (FULL): 1.5 MMHG
BH CV ECHO MEAS - AO MAX PG: 2.4 MMHG
BH CV ECHO MEAS - AO MEAN PG (FULL): 0.72 MMHG
BH CV ECHO MEAS - AO MEAN PG: 1.3 MMHG
BH CV ECHO MEAS - AO ROOT AREA (BSA CORRECTED): 2.7
BH CV ECHO MEAS - AO ROOT AREA: 13.3 CM^2
BH CV ECHO MEAS - AO ROOT DIAM: 4.1 CM
BH CV ECHO MEAS - AO V2 MAX: 78.1 CM/SEC
BH CV ECHO MEAS - AO V2 MEAN: 54.2 CM/SEC
BH CV ECHO MEAS - AO V2 VTI: 12.2 CM
BH CV ECHO MEAS - ASC AORTA: 3 CM
BH CV ECHO MEAS - AVA(I,A): 2.2 CM^2
BH CV ECHO MEAS - AVA(I,D): 2.2 CM^2
BH CV ECHO MEAS - AVA(V,A): 1.9 CM^2
BH CV ECHO MEAS - AVA(V,D): 1.9 CM^2
BH CV ECHO MEAS - BSA(HAYCOCK): 1.4 M^2
BH CV ECHO MEAS - BSA: 1.5 M^2
BH CV ECHO MEAS - BZI_BMI: 15.7 KILOGRAMS/M^2
BH CV ECHO MEAS - BZI_METRIC_HEIGHT: 170.2 CM
BH CV ECHO MEAS - BZI_METRIC_WEIGHT: 45.4 KG
BH CV ECHO MEAS - EDV(CUBED): 61.9 ML
BH CV ECHO MEAS - EDV(MOD-SP2): 38 ML
BH CV ECHO MEAS - EDV(MOD-SP4): 38 ML
BH CV ECHO MEAS - EDV(TEICH): 68.2 ML
BH CV ECHO MEAS - EF(CUBED): 51.5 %
BH CV ECHO MEAS - EF(MOD-SP2): 50 %
BH CV ECHO MEAS - EF(MOD-SP4): 34.2 %
BH CV ECHO MEAS - EF(TEICH): 44 %
BH CV ECHO MEAS - ESV(CUBED): 30 ML
BH CV ECHO MEAS - ESV(MOD-SP2): 19 ML
BH CV ECHO MEAS - ESV(MOD-SP4): 25 ML
BH CV ECHO MEAS - ESV(TEICH): 38.2 ML
BH CV ECHO MEAS - FS: 21.4 %
BH CV ECHO MEAS - IVS/LVPW: 1
BH CV ECHO MEAS - IVSD: 1 CM
BH CV ECHO MEAS - LA DIMENSION: 4.3 CM
BH CV ECHO MEAS - LA/AO: 1.1
BH CV ECHO MEAS - LAD MAJOR: 7 CM
BH CV ECHO MEAS - LAT PEAK E' VEL: 11.2 CM/SEC
BH CV ECHO MEAS - LATERAL E/E' RATIO: 7
BH CV ECHO MEAS - LV DIASTOLIC VOL/BSA (35-75): 25.2 ML/M^2
BH CV ECHO MEAS - LV MASS(C)D: 123 GRAMS
BH CV ECHO MEAS - LV MASS(C)DI: 81.7 GRAMS/M^2
BH CV ECHO MEAS - LV MAX PG: 0.97 MMHG
BH CV ECHO MEAS - LV MEAN PG: 0.55 MMHG
BH CV ECHO MEAS - LV SYSTOLIC VOL/BSA (12-30): 16.6 ML/M^2
BH CV ECHO MEAS - LV V1 MAX: 49.1 CM/SEC
BH CV ECHO MEAS - LV V1 MEAN: 34.9 CM/SEC
BH CV ECHO MEAS - LV V1 VTI: 8.9 CM
BH CV ECHO MEAS - LVIDD: 4 CM
BH CV ECHO MEAS - LVIDS: 3.1 CM
BH CV ECHO MEAS - LVLD AP2: 6.3 CM
BH CV ECHO MEAS - LVLD AP4: 5.7 CM
BH CV ECHO MEAS - LVLS AP2: 5.7 CM
BH CV ECHO MEAS - LVLS AP4: 5.2 CM
BH CV ECHO MEAS - LVOT AREA (M): 3.1 CM^2
BH CV ECHO MEAS - LVOT AREA: 3 CM^2
BH CV ECHO MEAS - LVOT DIAM: 2 CM
BH CV ECHO MEAS - LVPWD: 0.98 CM
BH CV ECHO MEAS - MED PEAK E' VEL: 10.4 CM/SEC
BH CV ECHO MEAS - MEDIAL E/E' RATIO: 7.5
BH CV ECHO MEAS - MR ALIAS VEL: 30.8 CM/SEC
BH CV ECHO MEAS - MR ERO: 0.11 CM^2
BH CV ECHO MEAS - MR FLOW RATE: 51.9 CM^3/SEC
BH CV ECHO MEAS - MR MAX PG: 90 MMHG
BH CV ECHO MEAS - MR MAX VEL: 473.6 CM/SEC
BH CV ECHO MEAS - MR MEAN PG: 58.8 MMHG
BH CV ECHO MEAS - MR MEAN VEL: 357.2 CM/SEC
BH CV ECHO MEAS - MR PISA RADIUS: 0.52 CM
BH CV ECHO MEAS - MR PISA: 1.7 CM^2
BH CV ECHO MEAS - MR VOLUME: 14.5 ML
BH CV ECHO MEAS - MR VTI: 132.1 CM
BH CV ECHO MEAS - MV A MAX VEL: 41.6 CM/SEC
BH CV ECHO MEAS - MV AREA (1 DIAM): 8.3 CM^2
BH CV ECHO MEAS - MV DEC TIME: 0.12 SEC
BH CV ECHO MEAS - MV DIAM: 3.3 CM
BH CV ECHO MEAS - MV E MAX VEL: 79.7 CM/SEC
BH CV ECHO MEAS - MV E/A: 1.9
BH CV ECHO MEAS - MV FLOW AREA(1DIAM): 8.3 CM^2
BH CV ECHO MEAS - MV MAX PG: 3.2 MMHG
BH CV ECHO MEAS - MV MEAN PG: 1 MMHG
BH CV ECHO MEAS - MV V2 MAX: 89 CM/SEC
BH CV ECHO MEAS - MV V2 MEAN: 44.5 CM/SEC
BH CV ECHO MEAS - MV V2 VTI: 13.5 CM
BH CV ECHO MEAS - MVA(VTI): 2 CM^2
BH CV ECHO MEAS - PA ACC SLOPE: 377.9 CM/SEC^2
BH CV ECHO MEAS - PA ACC TIME: 0.1 SEC
BH CV ECHO MEAS - PA MAX PG: 2 MMHG
BH CV ECHO MEAS - PA PR(ACCEL): 36.2 MMHG
BH CV ECHO MEAS - PA V2 MAX: 71.2 CM/SEC
BH CV ECHO MEAS - PI END-D VEL: 131.2 CM/SEC
BH CV ECHO MEAS - RAP SYSTOLE: 15 MMHG
BH CV ECHO MEAS - RF(MV,AO)(1 DIAM): -0.44
BH CV ECHO MEAS - RF(MV,LVOT)(1DIAM): 0.76
BH CV ECHO MEAS - RVSP: 48.6 MMHG
BH CV ECHO MEAS - SI(AO): 107.5 ML/M^2
BH CV ECHO MEAS - SI(CUBED): 21.2 ML/M^2
BH CV ECHO MEAS - SI(LVOT): 17.8 ML/M^2
BH CV ECHO MEAS - SI(MOD-SP2): 12.6 ML/M^2
BH CV ECHO MEAS - SI(MOD-SP4): 8.6 ML/M^2
BH CV ECHO MEAS - SI(MV 1 DIAM): 74.5 ML/M^2
BH CV ECHO MEAS - SI(TEICH): 19.9 ML/M^2
BH CV ECHO MEAS - SV(AO): 161.9 ML
BH CV ECHO MEAS - SV(CUBED): 31.9 ML
BH CV ECHO MEAS - SV(LVOT): 26.9 ML
BH CV ECHO MEAS - SV(MOD-SP2): 19 ML
BH CV ECHO MEAS - SV(MOD-SP4): 13 ML
BH CV ECHO MEAS - SV(MV 1 DIAM): 112.2 ML
BH CV ECHO MEAS - SV(TEICH): 30 ML
BH CV ECHO MEAS - TAPSE (>1.6): 1.1 CM2
BH CV ECHO MEAS - TR MAX PG: 33.6 MMHG
BH CV ECHO MEAS - TR MAX VEL: 289.9 CM/SEC
BH CV ECHO MEASUREMENTS AVERAGE E/E' RATIO: 7.38
BH CV VAS BP LEFT ARM: NORMAL MMHG
BH CV XLRA - RV BASE: 4.6 CM
BH CV XLRA - RV LENGTH: 6.2 CM
BH CV XLRA - RV MID: 3.1 CM
BH CV XLRA - TDI S': 7.02 CM/SEC
BUN BLD-MCNC: 39 MG/DL (ref 8–23)
BUN/CREAT SERPL: 32.8 (ref 7–25)
CALCIUM SPEC-SCNC: 8.8 MG/DL (ref 8.6–10.5)
CHLORIDE SERPL-SCNC: 108 MMOL/L (ref 98–107)
CO2 SERPL-SCNC: 21 MMOL/L (ref 22–29)
CREAT BLD-MCNC: 1.19 MG/DL (ref 0.57–1)
DEPRECATED RDW RBC AUTO: 57.7 FL (ref 37–54)
ERYTHROCYTE [DISTWIDTH] IN BLOOD BY AUTOMATED COUNT: 15.7 % (ref 12.3–15.4)
GFR SERPL CREATININE-BSD FRML MDRD: 44 ML/MIN/1.73
GLUCOSE BLD-MCNC: 91 MG/DL (ref 65–99)
HCT VFR BLD AUTO: 36.9 % (ref 34–46.6)
HGB BLD-MCNC: 11.2 G/DL (ref 12–15.9)
LEFT ATRIUM VOLUME INDEX: 98.3 ML/M^2
LEFT ATRIUM VOLUME: 148 ML
LV EF 2D ECHO EST: 50 %
MCH RBC QN AUTO: 30.9 PG (ref 26.6–33)
MCHC RBC AUTO-ENTMCNC: 30.4 G/DL (ref 31.5–35.7)
MCV RBC AUTO: 101.7 FL (ref 79–97)
MR PISA EROA: 0.11 CM2
MV REGURGITANT FRACTION: 13 %
MV REGURGITANT VOLUME: 15 CC
MV VENA CONTRACTA: 0.6 CM
PISA ALIASING VEL: 30.8 M/S
PISA RADIUS: 0.5 CM
PLATELET # BLD AUTO: 168 10*3/MM3 (ref 140–450)
PMV BLD AUTO: 11.4 FL (ref 6–12)
POTASSIUM BLD-SCNC: 4.1 MMOL/L (ref 3.5–5.2)
RBC # BLD AUTO: 3.63 10*6/MM3 (ref 3.77–5.28)
SODIUM BLD-SCNC: 143 MMOL/L (ref 136–145)
WBC NRBC COR # BLD: 15.14 10*3/MM3 (ref 3.4–10.8)

## 2020-03-20 PROCEDURE — 74230 X-RAY XM SWLNG FUNCJ C+: CPT

## 2020-03-20 PROCEDURE — 85027 COMPLETE CBC AUTOMATED: CPT | Performed by: INTERNAL MEDICINE

## 2020-03-20 PROCEDURE — 99232 SBSQ HOSP IP/OBS MODERATE 35: CPT | Performed by: INTERNAL MEDICINE

## 2020-03-20 PROCEDURE — 99233 SBSQ HOSP IP/OBS HIGH 50: CPT | Performed by: INTERNAL MEDICINE

## 2020-03-20 PROCEDURE — 25010000002 PIPERACILLIN SOD-TAZOBACTAM PER 1 G: Performed by: INTERNAL MEDICINE

## 2020-03-20 PROCEDURE — 80048 BASIC METABOLIC PNL TOTAL CA: CPT | Performed by: INTERNAL MEDICINE

## 2020-03-20 PROCEDURE — 92611 MOTION FLUOROSCOPY/SWALLOW: CPT

## 2020-03-20 RX ADMIN — METOPROLOL SUCCINATE 50 MG: 50 TABLET, EXTENDED RELEASE ORAL at 09:31

## 2020-03-20 RX ADMIN — SODIUM CHLORIDE, PRESERVATIVE FREE 10 ML: 5 INJECTION INTRAVENOUS at 20:27

## 2020-03-20 RX ADMIN — MIRTAZAPINE 7.5 MG: 15 TABLET, FILM COATED ORAL at 20:26

## 2020-03-20 RX ADMIN — CITALOPRAM HYDROBROMIDE 20 MG: 20 TABLET ORAL at 06:11

## 2020-03-20 RX ADMIN — SODIUM CHLORIDE, PRESERVATIVE FREE 10 ML: 5 INJECTION INTRAVENOUS at 09:41

## 2020-03-20 RX ADMIN — MULTIPLE VITAMINS W/ MINERALS TAB 1 TABLET: TAB at 09:32

## 2020-03-20 RX ADMIN — FAMOTIDINE 20 MG: 20 TABLET ORAL at 20:26

## 2020-03-20 RX ADMIN — BARIUM SULFATE 20 ML: 400 PASTE ORAL at 09:16

## 2020-03-20 RX ADMIN — LEVETIRACETAM 125 MG: 250 TABLET, FILM COATED ORAL at 20:26

## 2020-03-20 RX ADMIN — MAGNESIUM OXIDE TAB 400 MG (241.3 MG ELEMENTAL MG) 400 MG: 400 (241.3 MG) TAB at 09:30

## 2020-03-20 RX ADMIN — DILTIAZEM HYDROCHLORIDE 120 MG: 120 CAPSULE, COATED, EXTENDED RELEASE ORAL at 09:31

## 2020-03-20 RX ADMIN — TAZOBACTAM SODIUM AND PIPERACILLIN SODIUM 3.38 G: 375; 3 INJECTION, SOLUTION INTRAVENOUS at 15:51

## 2020-03-20 RX ADMIN — APIXABAN 2.5 MG: 2.5 TABLET, FILM COATED ORAL at 15:51

## 2020-03-20 RX ADMIN — TAZOBACTAM SODIUM AND PIPERACILLIN SODIUM 3.38 G: 375; 3 INJECTION, SOLUTION INTRAVENOUS at 01:22

## 2020-03-20 RX ADMIN — APIXABAN 2.5 MG: 2.5 TABLET, FILM COATED ORAL at 20:26

## 2020-03-20 RX ADMIN — FUROSEMIDE 20 MG: 20 TABLET ORAL at 09:31

## 2020-03-20 RX ADMIN — APIXABAN 2.5 MG: 2.5 TABLET, FILM COATED ORAL at 01:30

## 2020-03-20 RX ADMIN — Medication 5 MG: at 20:26

## 2020-03-20 RX ADMIN — BARIUM SULFATE 100 ML: 0.81 POWDER, FOR SUSPENSION ORAL at 09:16

## 2020-03-20 RX ADMIN — ATORVASTATIN CALCIUM 80 MG: 40 TABLET, FILM COATED ORAL at 20:26

## 2020-03-20 RX ADMIN — TAZOBACTAM SODIUM AND PIPERACILLIN SODIUM 3.38 G: 375; 3 INJECTION, SOLUTION INTRAVENOUS at 23:49

## 2020-03-20 RX ADMIN — LEVETIRACETAM 125 MG: 250 TABLET, FILM COATED ORAL at 09:31

## 2020-03-20 RX ADMIN — FERROUS SULFATE TAB 325 MG (65 MG ELEMENTAL FE) 325 MG: 325 (65 FE) TAB at 09:31

## 2020-03-20 RX ADMIN — TAZOBACTAM SODIUM AND PIPERACILLIN SODIUM 3.38 G: 375; 3 INJECTION, SOLUTION INTRAVENOUS at 09:30

## 2020-03-21 LAB
ANION GAP SERPL CALCULATED.3IONS-SCNC: 15 MMOL/L (ref 5–15)
ARTERIAL PATENCY WRIST A: ABNORMAL
ATMOSPHERIC PRESS: ABNORMAL MM[HG]
BASE EXCESS BLDA CALC-SCNC: 2.2 MMOL/L (ref 0–2)
BDY SITE: ABNORMAL
BODY TEMPERATURE: 37 C
BUN BLD-MCNC: 38 MG/DL (ref 8–23)
BUN/CREAT SERPL: 26.2 (ref 7–25)
CALCIUM SPEC-SCNC: 8.6 MG/DL (ref 8.6–10.5)
CHLORIDE SERPL-SCNC: 108 MMOL/L (ref 98–107)
CO2 BLDA-SCNC: 26.4 MMOL/L (ref 22–33)
CO2 SERPL-SCNC: 23 MMOL/L (ref 22–29)
COHGB MFR BLD: 1.4 % (ref 0–2)
CREAT BLD-MCNC: 1.45 MG/DL (ref 0.57–1)
DEPRECATED RDW RBC AUTO: 57.1 FL (ref 37–54)
ERYTHROCYTE [DISTWIDTH] IN BLOOD BY AUTOMATED COUNT: 15.9 % (ref 12.3–15.4)
GFR SERPL CREATININE-BSD FRML MDRD: 35 ML/MIN/1.73
GLUCOSE BLD-MCNC: 118 MG/DL (ref 65–99)
HCO3 BLDA-SCNC: 25.3 MMOL/L (ref 20–26)
HCT VFR BLD AUTO: 34.3 % (ref 34–46.6)
HCT VFR BLD CALC: 31.8 %
HGB BLD-MCNC: 10.5 G/DL (ref 12–15.9)
HGB BLDA-MCNC: 10.4 G/DL (ref 14–18)
HOROWITZ INDEX BLD+IHG-RTO: 100 %
MCH RBC QN AUTO: 30.3 PG (ref 26.6–33)
MCHC RBC AUTO-ENTMCNC: 30.6 G/DL (ref 31.5–35.7)
MCV RBC AUTO: 99.1 FL (ref 79–97)
METHGB BLD QL: 0.8 % (ref 0–1.5)
MODALITY: ABNORMAL
NOTE: ABNORMAL
OXYHGB MFR BLDV: 91 % (ref 94–99)
PCO2 BLDA: 33.4 MM HG (ref 35–45)
PCO2 TEMP ADJ BLD: 33.4 MM HG (ref 35–45)
PH BLDA: 7.49 PH UNITS (ref 7.35–7.45)
PH, TEMP CORRECTED: 7.49 PH UNITS
PLATELET # BLD AUTO: 172 10*3/MM3 (ref 140–450)
PMV BLD AUTO: 11.3 FL (ref 6–12)
PO2 BLDA: 63 MM HG (ref 83–108)
PO2 TEMP ADJ BLD: 63 MM HG (ref 83–108)
POTASSIUM BLD-SCNC: 3.8 MMOL/L (ref 3.5–5.2)
RBC # BLD AUTO: 3.46 10*6/MM3 (ref 3.77–5.28)
SODIUM BLD-SCNC: 146 MMOL/L (ref 136–145)
VENTILATOR MODE: ABNORMAL
WBC NRBC COR # BLD: 13.3 10*3/MM3 (ref 3.4–10.8)

## 2020-03-21 PROCEDURE — 82805 BLOOD GASES W/O2 SATURATION: CPT

## 2020-03-21 PROCEDURE — 25010000002 PIPERACILLIN SOD-TAZOBACTAM PER 1 G: Performed by: INTERNAL MEDICINE

## 2020-03-21 PROCEDURE — 97161 PT EVAL LOW COMPLEX 20 MIN: CPT

## 2020-03-21 PROCEDURE — 99232 SBSQ HOSP IP/OBS MODERATE 35: CPT | Performed by: HOSPITALIST

## 2020-03-21 PROCEDURE — 97165 OT EVAL LOW COMPLEX 30 MIN: CPT

## 2020-03-21 PROCEDURE — 99232 SBSQ HOSP IP/OBS MODERATE 35: CPT | Performed by: PHYSICIAN ASSISTANT

## 2020-03-21 PROCEDURE — 80048 BASIC METABOLIC PNL TOTAL CA: CPT | Performed by: INTERNAL MEDICINE

## 2020-03-21 PROCEDURE — 36600 WITHDRAWAL OF ARTERIAL BLOOD: CPT

## 2020-03-21 PROCEDURE — 85027 COMPLETE CBC AUTOMATED: CPT | Performed by: INTERNAL MEDICINE

## 2020-03-21 RX ADMIN — MAGNESIUM OXIDE TAB 400 MG (241.3 MG ELEMENTAL MG) 400 MG: 400 (241.3 MG) TAB at 08:51

## 2020-03-21 RX ADMIN — TAZOBACTAM SODIUM AND PIPERACILLIN SODIUM 3.38 G: 375; 3 INJECTION, SOLUTION INTRAVENOUS at 23:19

## 2020-03-21 RX ADMIN — TAZOBACTAM SODIUM AND PIPERACILLIN SODIUM 3.38 G: 375; 3 INJECTION, SOLUTION INTRAVENOUS at 16:24

## 2020-03-21 RX ADMIN — LEVETIRACETAM 125 MG: 250 TABLET, FILM COATED ORAL at 20:15

## 2020-03-21 RX ADMIN — LEVETIRACETAM 125 MG: 250 TABLET, FILM COATED ORAL at 08:51

## 2020-03-21 RX ADMIN — MIRTAZAPINE 7.5 MG: 15 TABLET, FILM COATED ORAL at 20:15

## 2020-03-21 RX ADMIN — APIXABAN 2.5 MG: 2.5 TABLET, FILM COATED ORAL at 08:51

## 2020-03-21 RX ADMIN — METOPROLOL SUCCINATE 50 MG: 50 TABLET, EXTENDED RELEASE ORAL at 08:51

## 2020-03-21 RX ADMIN — MULTIPLE VITAMINS W/ MINERALS TAB 1 TABLET: TAB at 08:50

## 2020-03-21 RX ADMIN — DOCUSATE SODIUM 100 MG: 100 CAPSULE, LIQUID FILLED ORAL at 08:50

## 2020-03-21 RX ADMIN — FAMOTIDINE 20 MG: 20 TABLET ORAL at 20:19

## 2020-03-21 RX ADMIN — APIXABAN 2.5 MG: 2.5 TABLET, FILM COATED ORAL at 20:15

## 2020-03-21 RX ADMIN — CITALOPRAM HYDROBROMIDE 20 MG: 20 TABLET ORAL at 06:00

## 2020-03-21 RX ADMIN — SODIUM CHLORIDE, PRESERVATIVE FREE 10 ML: 5 INJECTION INTRAVENOUS at 08:52

## 2020-03-21 RX ADMIN — DILTIAZEM HYDROCHLORIDE 120 MG: 120 CAPSULE, COATED, EXTENDED RELEASE ORAL at 08:50

## 2020-03-21 RX ADMIN — TAZOBACTAM SODIUM AND PIPERACILLIN SODIUM 3.38 G: 375; 3 INJECTION, SOLUTION INTRAVENOUS at 08:53

## 2020-03-21 RX ADMIN — Medication 5 MG: at 20:15

## 2020-03-21 RX ADMIN — SODIUM CHLORIDE, PRESERVATIVE FREE 10 ML: 5 INJECTION INTRAVENOUS at 20:14

## 2020-03-21 RX ADMIN — FUROSEMIDE 20 MG: 20 TABLET ORAL at 08:49

## 2020-03-21 RX ADMIN — ATORVASTATIN CALCIUM 80 MG: 40 TABLET, FILM COATED ORAL at 20:15

## 2020-03-21 RX ADMIN — FERROUS SULFATE TAB 325 MG (65 MG ELEMENTAL FE) 325 MG: 325 (65 FE) TAB at 08:50

## 2020-03-21 RX ADMIN — DOCUSATE SODIUM 100 MG: 100 CAPSULE, LIQUID FILLED ORAL at 20:15

## 2020-03-22 LAB
ANION GAP SERPL CALCULATED.3IONS-SCNC: 11 MMOL/L (ref 5–15)
B PARAPERT DNA SPEC QL NAA+PROBE: NOT DETECTED
B PERT DNA SPEC QL NAA+PROBE: NOT DETECTED
BUN BLD-MCNC: 27 MG/DL (ref 8–23)
BUN/CREAT SERPL: 20.3 (ref 7–25)
C PNEUM DNA NPH QL NAA+NON-PROBE: NOT DETECTED
CALCIUM SPEC-SCNC: 8.7 MG/DL (ref 8.6–10.5)
CHLORIDE SERPL-SCNC: 107 MMOL/L (ref 98–107)
CO2 SERPL-SCNC: 25 MMOL/L (ref 22–29)
CREAT BLD-MCNC: 1.33 MG/DL (ref 0.57–1)
FLUAV H1 2009 PAND RNA NPH QL NAA+PROBE: NOT DETECTED
FLUAV H1 HA GENE NPH QL NAA+PROBE: NOT DETECTED
FLUAV H3 RNA NPH QL NAA+PROBE: NOT DETECTED
FLUAV SUBTYP SPEC NAA+PROBE: NOT DETECTED
FLUBV RNA ISLT QL NAA+PROBE: NOT DETECTED
GFR SERPL CREATININE-BSD FRML MDRD: 38 ML/MIN/1.73
GLUCOSE BLD-MCNC: 107 MG/DL (ref 65–99)
HADV DNA SPEC NAA+PROBE: NOT DETECTED
HCOV 229E RNA SPEC QL NAA+PROBE: NOT DETECTED
HCOV HKU1 RNA SPEC QL NAA+PROBE: NOT DETECTED
HCOV NL63 RNA SPEC QL NAA+PROBE: NOT DETECTED
HCOV OC43 RNA SPEC QL NAA+PROBE: NOT DETECTED
HMPV RNA NPH QL NAA+NON-PROBE: NOT DETECTED
HPIV1 RNA SPEC QL NAA+PROBE: NOT DETECTED
HPIV2 RNA SPEC QL NAA+PROBE: NOT DETECTED
HPIV3 RNA NPH QL NAA+PROBE: NOT DETECTED
HPIV4 P GENE NPH QL NAA+PROBE: NOT DETECTED
M PNEUMO IGG SER IA-ACNC: NOT DETECTED
POTASSIUM BLD-SCNC: 3.6 MMOL/L (ref 3.5–5.2)
RHINOVIRUS RNA SPEC NAA+PROBE: NOT DETECTED
RSV RNA NPH QL NAA+NON-PROBE: NOT DETECTED
SODIUM BLD-SCNC: 143 MMOL/L (ref 136–145)

## 2020-03-22 PROCEDURE — 25010000002 PIPERACILLIN SOD-TAZOBACTAM PER 1 G: Performed by: INTERNAL MEDICINE

## 2020-03-22 PROCEDURE — 80048 BASIC METABOLIC PNL TOTAL CA: CPT | Performed by: PHYSICIAN ASSISTANT

## 2020-03-22 PROCEDURE — 0100U HC BIOFIRE FILMARRAY RESP PANEL 2: CPT | Performed by: HOSPITALIST

## 2020-03-22 PROCEDURE — 99232 SBSQ HOSP IP/OBS MODERATE 35: CPT | Performed by: PHYSICIAN ASSISTANT

## 2020-03-22 PROCEDURE — 99232 SBSQ HOSP IP/OBS MODERATE 35: CPT | Performed by: HOSPITALIST

## 2020-03-22 RX ORDER — AMOXICILLIN 250 MG
2 CAPSULE ORAL 2 TIMES DAILY
Status: DISCONTINUED | OUTPATIENT
Start: 2020-03-22 | End: 2020-03-24

## 2020-03-22 RX ADMIN — FUROSEMIDE 20 MG: 20 TABLET ORAL at 08:54

## 2020-03-22 RX ADMIN — DILTIAZEM HYDROCHLORIDE 120 MG: 120 CAPSULE, COATED, EXTENDED RELEASE ORAL at 08:54

## 2020-03-22 RX ADMIN — ATORVASTATIN CALCIUM 80 MG: 40 TABLET, FILM COATED ORAL at 22:09

## 2020-03-22 RX ADMIN — MAGNESIUM OXIDE TAB 400 MG (241.3 MG ELEMENTAL MG) 400 MG: 400 (241.3 MG) TAB at 08:54

## 2020-03-22 RX ADMIN — LEVETIRACETAM 125 MG: 250 TABLET, FILM COATED ORAL at 08:54

## 2020-03-22 RX ADMIN — APIXABAN 2.5 MG: 2.5 TABLET, FILM COATED ORAL at 22:09

## 2020-03-22 RX ADMIN — SODIUM CHLORIDE, PRESERVATIVE FREE 10 ML: 5 INJECTION INTRAVENOUS at 22:10

## 2020-03-22 RX ADMIN — METOPROLOL SUCCINATE 50 MG: 50 TABLET, EXTENDED RELEASE ORAL at 08:54

## 2020-03-22 RX ADMIN — TAZOBACTAM SODIUM AND PIPERACILLIN SODIUM 3.38 G: 375; 3 INJECTION, SOLUTION INTRAVENOUS at 08:59

## 2020-03-22 RX ADMIN — TAZOBACTAM SODIUM AND PIPERACILLIN SODIUM 3.38 G: 375; 3 INJECTION, SOLUTION INTRAVENOUS at 17:16

## 2020-03-22 RX ADMIN — MIRTAZAPINE 7.5 MG: 15 TABLET, FILM COATED ORAL at 22:10

## 2020-03-22 RX ADMIN — MULTIPLE VITAMINS W/ MINERALS TAB 1 TABLET: TAB at 08:54

## 2020-03-22 RX ADMIN — APIXABAN 2.5 MG: 2.5 TABLET, FILM COATED ORAL at 08:54

## 2020-03-22 RX ADMIN — FAMOTIDINE 20 MG: 20 TABLET ORAL at 22:09

## 2020-03-22 RX ADMIN — SENNOSIDES AND DOCUSATE SODIUM 2 TABLET: 8.6; 5 TABLET ORAL at 22:10

## 2020-03-22 RX ADMIN — Medication 5 MG: at 22:09

## 2020-03-22 RX ADMIN — FERROUS SULFATE TAB 325 MG (65 MG ELEMENTAL FE) 325 MG: 325 (65 FE) TAB at 08:55

## 2020-03-22 RX ADMIN — CITALOPRAM HYDROBROMIDE 20 MG: 20 TABLET ORAL at 05:05

## 2020-03-22 RX ADMIN — LEVETIRACETAM 125 MG: 250 TABLET, FILM COATED ORAL at 22:09

## 2020-03-23 PROCEDURE — 99232 SBSQ HOSP IP/OBS MODERATE 35: CPT | Performed by: INTERNAL MEDICINE

## 2020-03-23 PROCEDURE — 25010000002 PIPERACILLIN SOD-TAZOBACTAM PER 1 G: Performed by: INTERNAL MEDICINE

## 2020-03-23 PROCEDURE — 94799 UNLISTED PULMONARY SVC/PX: CPT

## 2020-03-23 PROCEDURE — 97110 THERAPEUTIC EXERCISES: CPT

## 2020-03-23 PROCEDURE — 99232 SBSQ HOSP IP/OBS MODERATE 35: CPT | Performed by: HOSPITALIST

## 2020-03-23 PROCEDURE — 97116 GAIT TRAINING THERAPY: CPT

## 2020-03-23 PROCEDURE — 94660 CPAP INITIATION&MGMT: CPT

## 2020-03-23 RX ORDER — METOPROLOL SUCCINATE 25 MG/1
25 TABLET, EXTENDED RELEASE ORAL DAILY
Status: DISCONTINUED | OUTPATIENT
Start: 2020-03-24 | End: 2020-03-24

## 2020-03-23 RX ORDER — DIGOXIN 125 MCG
125 TABLET ORAL EVERY OTHER DAY
Status: DISCONTINUED | OUTPATIENT
Start: 2020-03-23 | End: 2020-04-01

## 2020-03-23 RX ADMIN — CITALOPRAM HYDROBROMIDE 20 MG: 20 TABLET ORAL at 06:21

## 2020-03-23 RX ADMIN — MAGNESIUM OXIDE TAB 400 MG (241.3 MG ELEMENTAL MG) 400 MG: 400 (241.3 MG) TAB at 09:35

## 2020-03-23 RX ADMIN — LEVETIRACETAM 125 MG: 250 TABLET, FILM COATED ORAL at 21:35

## 2020-03-23 RX ADMIN — FAMOTIDINE 20 MG: 20 TABLET ORAL at 21:35

## 2020-03-23 RX ADMIN — APIXABAN 2.5 MG: 2.5 TABLET, FILM COATED ORAL at 09:35

## 2020-03-23 RX ADMIN — MIRTAZAPINE 7.5 MG: 15 TABLET, FILM COATED ORAL at 21:35

## 2020-03-23 RX ADMIN — Medication 5 MG: at 21:35

## 2020-03-23 RX ADMIN — TAZOBACTAM SODIUM AND PIPERACILLIN SODIUM 3.38 G: 375; 3 INJECTION, SOLUTION INTRAVENOUS at 09:35

## 2020-03-23 RX ADMIN — FERROUS SULFATE TAB 325 MG (65 MG ELEMENTAL FE) 325 MG: 325 (65 FE) TAB at 09:35

## 2020-03-23 RX ADMIN — SENNOSIDES AND DOCUSATE SODIUM 2 TABLET: 8.6; 5 TABLET ORAL at 21:35

## 2020-03-23 RX ADMIN — METOPROLOL SUCCINATE 50 MG: 50 TABLET, EXTENDED RELEASE ORAL at 09:35

## 2020-03-23 RX ADMIN — ATORVASTATIN CALCIUM 80 MG: 40 TABLET, FILM COATED ORAL at 21:35

## 2020-03-23 RX ADMIN — LEVETIRACETAM 125 MG: 250 TABLET, FILM COATED ORAL at 09:35

## 2020-03-23 RX ADMIN — SODIUM CHLORIDE, PRESERVATIVE FREE 10 ML: 5 INJECTION INTRAVENOUS at 09:39

## 2020-03-23 RX ADMIN — DIGOXIN 125 MCG: 125 TABLET ORAL at 09:49

## 2020-03-23 RX ADMIN — APIXABAN 2.5 MG: 2.5 TABLET, FILM COATED ORAL at 21:35

## 2020-03-23 RX ADMIN — MULTIPLE VITAMINS W/ MINERALS TAB 1 TABLET: TAB at 09:34

## 2020-03-23 RX ADMIN — TAZOBACTAM SODIUM AND PIPERACILLIN SODIUM 3.38 G: 375; 3 INJECTION, SOLUTION INTRAVENOUS at 16:40

## 2020-03-23 RX ADMIN — SODIUM CHLORIDE, PRESERVATIVE FREE 10 ML: 5 INJECTION INTRAVENOUS at 21:36

## 2020-03-23 RX ADMIN — TAZOBACTAM SODIUM AND PIPERACILLIN SODIUM 3.38 G: 375; 3 INJECTION, SOLUTION INTRAVENOUS at 00:39

## 2020-03-23 RX ADMIN — SENNOSIDES AND DOCUSATE SODIUM 2 TABLET: 8.6; 5 TABLET ORAL at 09:34

## 2020-03-23 RX ADMIN — FUROSEMIDE 20 MG: 20 TABLET ORAL at 09:35

## 2020-03-23 RX ADMIN — DILTIAZEM HYDROCHLORIDE 120 MG: 120 CAPSULE, COATED, EXTENDED RELEASE ORAL at 09:34

## 2020-03-24 LAB
ANION GAP SERPL CALCULATED.3IONS-SCNC: 11 MMOL/L (ref 5–15)
BACTERIA SPEC AEROBE CULT: NORMAL
BACTERIA SPEC AEROBE CULT: NORMAL
BASOPHILS # BLD AUTO: 0.03 10*3/MM3 (ref 0–0.2)
BASOPHILS NFR BLD AUTO: 0.4 % (ref 0–1.5)
BUN BLD-MCNC: 22 MG/DL (ref 8–23)
BUN/CREAT SERPL: 17.2 (ref 7–25)
CALCIUM SPEC-SCNC: 9 MG/DL (ref 8.6–10.5)
CHLORIDE SERPL-SCNC: 105 MMOL/L (ref 98–107)
CO2 SERPL-SCNC: 24 MMOL/L (ref 22–29)
CREAT BLD-MCNC: 1.28 MG/DL (ref 0.57–1)
DEPRECATED RDW RBC AUTO: 52.5 FL (ref 37–54)
EOSINOPHIL # BLD AUTO: 0.27 10*3/MM3 (ref 0–0.4)
EOSINOPHIL NFR BLD AUTO: 3.5 % (ref 0.3–6.2)
ERYTHROCYTE [DISTWIDTH] IN BLOOD BY AUTOMATED COUNT: 15.1 % (ref 12.3–15.4)
GFR SERPL CREATININE-BSD FRML MDRD: 40 ML/MIN/1.73
GLUCOSE BLD-MCNC: 106 MG/DL (ref 65–99)
HCT VFR BLD AUTO: 31.6 % (ref 34–46.6)
HGB BLD-MCNC: 9.8 G/DL (ref 12–15.9)
IMM GRANULOCYTES # BLD AUTO: 0.03 10*3/MM3 (ref 0–0.05)
IMM GRANULOCYTES NFR BLD AUTO: 0.4 % (ref 0–0.5)
LYMPHOCYTES # BLD AUTO: 0.91 10*3/MM3 (ref 0.7–3.1)
LYMPHOCYTES NFR BLD AUTO: 11.8 % (ref 19.6–45.3)
MAGNESIUM SERPL-MCNC: 2.1 MG/DL (ref 1.6–2.4)
MCH RBC QN AUTO: 29.5 PG (ref 26.6–33)
MCHC RBC AUTO-ENTMCNC: 31 G/DL (ref 31.5–35.7)
MCV RBC AUTO: 95.2 FL (ref 79–97)
MONOCYTES # BLD AUTO: 1.13 10*3/MM3 (ref 0.1–0.9)
MONOCYTES NFR BLD AUTO: 14.6 % (ref 5–12)
NEUTROPHILS # BLD AUTO: 5.35 10*3/MM3 (ref 1.7–7)
NEUTROPHILS NFR BLD AUTO: 69.3 % (ref 42.7–76)
NRBC BLD AUTO-RTO: 0 /100 WBC (ref 0–0.2)
NT-PROBNP SERPL-MCNC: 8683 PG/ML (ref 5–1800)
PHOSPHATE SERPL-MCNC: 2.6 MG/DL (ref 2.5–4.5)
PLATELET # BLD AUTO: 190 10*3/MM3 (ref 140–450)
PMV BLD AUTO: 11.4 FL (ref 6–12)
POTASSIUM BLD-SCNC: 3.7 MMOL/L (ref 3.5–5.2)
PROCALCITONIN SERPL-MCNC: 0.66 NG/ML (ref 0.1–0.25)
RBC # BLD AUTO: 3.32 10*6/MM3 (ref 3.77–5.28)
SODIUM BLD-SCNC: 140 MMOL/L (ref 136–145)
WBC NRBC COR # BLD: 7.72 10*3/MM3 (ref 3.4–10.8)

## 2020-03-24 PROCEDURE — 94660 CPAP INITIATION&MGMT: CPT

## 2020-03-24 PROCEDURE — 85025 COMPLETE CBC W/AUTO DIFF WBC: CPT | Performed by: HOSPITALIST

## 2020-03-24 PROCEDURE — 94799 UNLISTED PULMONARY SVC/PX: CPT

## 2020-03-24 PROCEDURE — 80048 BASIC METABOLIC PNL TOTAL CA: CPT | Performed by: HOSPITALIST

## 2020-03-24 PROCEDURE — 25010000002 PIPERACILLIN SOD-TAZOBACTAM PER 1 G: Performed by: INTERNAL MEDICINE

## 2020-03-24 PROCEDURE — 84100 ASSAY OF PHOSPHORUS: CPT | Performed by: HOSPITALIST

## 2020-03-24 PROCEDURE — 99232 SBSQ HOSP IP/OBS MODERATE 35: CPT | Performed by: HOSPITALIST

## 2020-03-24 PROCEDURE — 97530 THERAPEUTIC ACTIVITIES: CPT

## 2020-03-24 PROCEDURE — 83735 ASSAY OF MAGNESIUM: CPT | Performed by: HOSPITALIST

## 2020-03-24 PROCEDURE — 92526 ORAL FUNCTION THERAPY: CPT

## 2020-03-24 PROCEDURE — 97110 THERAPEUTIC EXERCISES: CPT

## 2020-03-24 PROCEDURE — 97535 SELF CARE MNGMENT TRAINING: CPT | Performed by: OCCUPATIONAL THERAPIST

## 2020-03-24 PROCEDURE — 83880 ASSAY OF NATRIURETIC PEPTIDE: CPT | Performed by: HOSPITALIST

## 2020-03-24 PROCEDURE — 84145 PROCALCITONIN (PCT): CPT | Performed by: HOSPITALIST

## 2020-03-24 PROCEDURE — 99232 SBSQ HOSP IP/OBS MODERATE 35: CPT | Performed by: INTERNAL MEDICINE

## 2020-03-24 RX ORDER — FUROSEMIDE 20 MG/1
20 TABLET ORAL
Status: DISCONTINUED | OUTPATIENT
Start: 2020-03-24 | End: 2020-03-25

## 2020-03-24 RX ORDER — VERAPAMIL HYDROCHLORIDE 80 MG/1
40 TABLET ORAL EVERY 12 HOURS SCHEDULED
Status: DISCONTINUED | OUTPATIENT
Start: 2020-03-24 | End: 2020-03-25

## 2020-03-24 RX ORDER — METOPROLOL SUCCINATE 50 MG/1
50 TABLET, EXTENDED RELEASE ORAL DAILY
Status: DISCONTINUED | OUTPATIENT
Start: 2020-03-24 | End: 2020-03-26

## 2020-03-24 RX ADMIN — CITALOPRAM HYDROBROMIDE 20 MG: 20 TABLET ORAL at 05:51

## 2020-03-24 RX ADMIN — FAMOTIDINE 20 MG: 20 TABLET ORAL at 22:00

## 2020-03-24 RX ADMIN — SODIUM CHLORIDE, PRESERVATIVE FREE 10 ML: 5 INJECTION INTRAVENOUS at 08:44

## 2020-03-24 RX ADMIN — Medication 5 MG: at 20:40

## 2020-03-24 RX ADMIN — FAMOTIDINE 20 MG: 20 TABLET, FILM COATED ORAL at 20:40

## 2020-03-24 RX ADMIN — TAZOBACTAM SODIUM AND PIPERACILLIN SODIUM 3.38 G: 375; 3 INJECTION, SOLUTION INTRAVENOUS at 17:39

## 2020-03-24 RX ADMIN — TAZOBACTAM SODIUM AND PIPERACILLIN SODIUM 3.38 G: 375; 3 INJECTION, SOLUTION INTRAVENOUS at 01:10

## 2020-03-24 RX ADMIN — VERAPAMIL HYDROCHLORIDE 40 MG: 80 TABLET, FILM COATED ORAL at 08:47

## 2020-03-24 RX ADMIN — ACETAMINOPHEN 650 MG: 325 TABLET, FILM COATED ORAL at 21:09

## 2020-03-24 RX ADMIN — MIRTAZAPINE 7.5 MG: 15 TABLET, FILM COATED ORAL at 20:39

## 2020-03-24 RX ADMIN — APIXABAN 2.5 MG: 2.5 TABLET, FILM COATED ORAL at 11:53

## 2020-03-24 RX ADMIN — FUROSEMIDE 20 MG: 20 TABLET ORAL at 20:39

## 2020-03-24 RX ADMIN — MULTIPLE VITAMINS W/ MINERALS TAB 1 TABLET: TAB at 08:46

## 2020-03-24 RX ADMIN — SENNOSIDES AND DOCUSATE SODIUM 2 TABLET: 8.6; 5 TABLET ORAL at 08:46

## 2020-03-24 RX ADMIN — LEVETIRACETAM 125 MG: 250 TABLET, FILM COATED ORAL at 08:46

## 2020-03-24 RX ADMIN — TAZOBACTAM SODIUM AND PIPERACILLIN SODIUM 3.38 G: 375; 3 INJECTION, SOLUTION INTRAVENOUS at 08:44

## 2020-03-24 RX ADMIN — FERROUS SULFATE TAB 325 MG (65 MG ELEMENTAL FE) 325 MG: 325 (65 FE) TAB at 08:46

## 2020-03-24 RX ADMIN — METOPROLOL SUCCINATE 50 MG: 50 TABLET, EXTENDED RELEASE ORAL at 08:47

## 2020-03-24 RX ADMIN — LEVETIRACETAM 125 MG: 250 TABLET, FILM COATED ORAL at 20:39

## 2020-03-24 RX ADMIN — ATORVASTATIN CALCIUM 80 MG: 40 TABLET, FILM COATED ORAL at 20:39

## 2020-03-24 RX ADMIN — MAGNESIUM OXIDE TAB 400 MG (241.3 MG ELEMENTAL MG) 400 MG: 400 (241.3 MG) TAB at 08:46

## 2020-03-24 RX ADMIN — FUROSEMIDE 20 MG: 20 TABLET ORAL at 08:47

## 2020-03-24 RX ADMIN — APIXABAN 2.5 MG: 2.5 TABLET, FILM COATED ORAL at 20:39

## 2020-03-24 RX ADMIN — VERAPAMIL HYDROCHLORIDE 40 MG: 80 TABLET, FILM COATED ORAL at 21:10

## 2020-03-25 ENCOUNTER — APPOINTMENT (OUTPATIENT)
Dept: GENERAL RADIOLOGY | Facility: HOSPITAL | Age: 80
End: 2020-03-25

## 2020-03-25 LAB
ANION GAP SERPL CALCULATED.3IONS-SCNC: 12 MMOL/L (ref 5–15)
BUN BLD-MCNC: 20 MG/DL (ref 8–23)
BUN/CREAT SERPL: 13.8 (ref 7–25)
CALCIUM SPEC-SCNC: 9.1 MG/DL (ref 8.6–10.5)
CHLORIDE SERPL-SCNC: 103 MMOL/L (ref 98–107)
CO2 SERPL-SCNC: 25 MMOL/L (ref 22–29)
CREAT BLD-MCNC: 1.45 MG/DL (ref 0.57–1)
DEPRECATED RDW RBC AUTO: 51.8 FL (ref 37–54)
ERYTHROCYTE [DISTWIDTH] IN BLOOD BY AUTOMATED COUNT: 15.2 % (ref 12.3–15.4)
GFR SERPL CREATININE-BSD FRML MDRD: 35 ML/MIN/1.73
GLUCOSE BLD-MCNC: 97 MG/DL (ref 65–99)
HCT VFR BLD AUTO: 31.2 % (ref 34–46.6)
HGB BLD-MCNC: 9.9 G/DL (ref 12–15.9)
MAGNESIUM SERPL-MCNC: 2.5 MG/DL (ref 1.6–2.4)
MCH RBC QN AUTO: 29.6 PG (ref 26.6–33)
MCHC RBC AUTO-ENTMCNC: 31.7 G/DL (ref 31.5–35.7)
MCV RBC AUTO: 93.4 FL (ref 79–97)
PHOSPHATE SERPL-MCNC: 3.1 MG/DL (ref 2.5–4.5)
PLATELET # BLD AUTO: 225 10*3/MM3 (ref 140–450)
PMV BLD AUTO: 11.4 FL (ref 6–12)
POTASSIUM BLD-SCNC: 3.8 MMOL/L (ref 3.5–5.2)
RBC # BLD AUTO: 3.34 10*6/MM3 (ref 3.77–5.28)
SODIUM BLD-SCNC: 140 MMOL/L (ref 136–145)
WBC NRBC COR # BLD: 7.88 10*3/MM3 (ref 3.4–10.8)

## 2020-03-25 PROCEDURE — 99232 SBSQ HOSP IP/OBS MODERATE 35: CPT | Performed by: INTERNAL MEDICINE

## 2020-03-25 PROCEDURE — 80048 BASIC METABOLIC PNL TOTAL CA: CPT | Performed by: HOSPITALIST

## 2020-03-25 PROCEDURE — 71046 X-RAY EXAM CHEST 2 VIEWS: CPT

## 2020-03-25 PROCEDURE — 97530 THERAPEUTIC ACTIVITIES: CPT | Performed by: OCCUPATIONAL THERAPIST

## 2020-03-25 PROCEDURE — 94799 UNLISTED PULMONARY SVC/PX: CPT

## 2020-03-25 PROCEDURE — 85027 COMPLETE CBC AUTOMATED: CPT | Performed by: HOSPITALIST

## 2020-03-25 PROCEDURE — 84100 ASSAY OF PHOSPHORUS: CPT | Performed by: HOSPITALIST

## 2020-03-25 PROCEDURE — 97110 THERAPEUTIC EXERCISES: CPT

## 2020-03-25 PROCEDURE — 25010000002 PIPERACILLIN SOD-TAZOBACTAM PER 1 G: Performed by: INTERNAL MEDICINE

## 2020-03-25 PROCEDURE — 83735 ASSAY OF MAGNESIUM: CPT | Performed by: HOSPITALIST

## 2020-03-25 PROCEDURE — 97110 THERAPEUTIC EXERCISES: CPT | Performed by: OCCUPATIONAL THERAPIST

## 2020-03-25 PROCEDURE — 94660 CPAP INITIATION&MGMT: CPT

## 2020-03-25 PROCEDURE — 92526 ORAL FUNCTION THERAPY: CPT

## 2020-03-25 PROCEDURE — 99232 SBSQ HOSP IP/OBS MODERATE 35: CPT | Performed by: FAMILY MEDICINE

## 2020-03-25 PROCEDURE — 97530 THERAPEUTIC ACTIVITIES: CPT

## 2020-03-25 RX ORDER — VERAPAMIL HYDROCHLORIDE 80 MG/1
40 TABLET ORAL EVERY 8 HOURS SCHEDULED
Status: DISCONTINUED | OUTPATIENT
Start: 2020-03-25 | End: 2020-03-30

## 2020-03-25 RX ORDER — FUROSEMIDE 40 MG/1
40 TABLET ORAL DAILY
Status: DISCONTINUED | OUTPATIENT
Start: 2020-03-26 | End: 2020-03-29

## 2020-03-25 RX ADMIN — TAZOBACTAM SODIUM AND PIPERACILLIN SODIUM 3.38 G: 375; 3 INJECTION, SOLUTION INTRAVENOUS at 00:42

## 2020-03-25 RX ADMIN — APIXABAN 2.5 MG: 2.5 TABLET, FILM COATED ORAL at 20:37

## 2020-03-25 RX ADMIN — METOPROLOL SUCCINATE 50 MG: 50 TABLET, EXTENDED RELEASE ORAL at 08:15

## 2020-03-25 RX ADMIN — FUROSEMIDE 20 MG: 20 TABLET ORAL at 18:29

## 2020-03-25 RX ADMIN — DIGOXIN 125 MCG: 125 TABLET ORAL at 08:15

## 2020-03-25 RX ADMIN — VERAPAMIL HYDROCHLORIDE 40 MG: 80 TABLET, FILM COATED ORAL at 20:39

## 2020-03-25 RX ADMIN — VERAPAMIL HYDROCHLORIDE 40 MG: 80 TABLET, FILM COATED ORAL at 08:14

## 2020-03-25 RX ADMIN — VERAPAMIL HYDROCHLORIDE 40 MG: 80 TABLET, FILM COATED ORAL at 21:42

## 2020-03-25 RX ADMIN — TAZOBACTAM SODIUM AND PIPERACILLIN SODIUM 3.38 G: 375; 3 INJECTION, SOLUTION INTRAVENOUS at 17:27

## 2020-03-25 RX ADMIN — FAMOTIDINE 20 MG: 20 TABLET ORAL at 20:38

## 2020-03-25 RX ADMIN — FUROSEMIDE 20 MG: 20 TABLET ORAL at 08:15

## 2020-03-25 RX ADMIN — APIXABAN 2.5 MG: 2.5 TABLET, FILM COATED ORAL at 21:41

## 2020-03-25 RX ADMIN — Medication 5 MG: at 20:39

## 2020-03-25 RX ADMIN — ATORVASTATIN CALCIUM 80 MG: 40 TABLET, FILM COATED ORAL at 20:37

## 2020-03-25 RX ADMIN — LEVETIRACETAM 125 MG: 250 TABLET, FILM COATED ORAL at 20:37

## 2020-03-25 RX ADMIN — TAZOBACTAM SODIUM AND PIPERACILLIN SODIUM 3.38 G: 375; 3 INJECTION, SOLUTION INTRAVENOUS at 08:14

## 2020-03-25 RX ADMIN — FERROUS SULFATE TAB 325 MG (65 MG ELEMENTAL FE) 325 MG: 325 (65 FE) TAB at 08:14

## 2020-03-25 RX ADMIN — MAGNESIUM OXIDE TAB 400 MG (241.3 MG ELEMENTAL MG) 400 MG: 400 (241.3 MG) TAB at 08:15

## 2020-03-25 RX ADMIN — VERAPAMIL HYDROCHLORIDE 40 MG: 80 TABLET, FILM COATED ORAL at 14:57

## 2020-03-25 RX ADMIN — SODIUM CHLORIDE, PRESERVATIVE FREE 10 ML: 5 INJECTION INTRAVENOUS at 08:15

## 2020-03-25 RX ADMIN — LEVETIRACETAM 125 MG: 250 TABLET, FILM COATED ORAL at 08:14

## 2020-03-25 RX ADMIN — MIRTAZAPINE 7.5 MG: 15 TABLET, FILM COATED ORAL at 20:38

## 2020-03-25 RX ADMIN — CITALOPRAM HYDROBROMIDE 20 MG: 20 TABLET ORAL at 06:43

## 2020-03-25 RX ADMIN — APIXABAN 2.5 MG: 2.5 TABLET, FILM COATED ORAL at 12:22

## 2020-03-25 RX ADMIN — SODIUM CHLORIDE, PRESERVATIVE FREE 10 ML: 5 INJECTION INTRAVENOUS at 20:40

## 2020-03-25 RX ADMIN — MULTIPLE VITAMINS W/ MINERALS TAB 1 TABLET: TAB at 08:14

## 2020-03-26 LAB
ANION GAP SERPL CALCULATED.3IONS-SCNC: 13 MMOL/L (ref 5–15)
BASOPHILS # BLD AUTO: 0.05 10*3/MM3 (ref 0–0.2)
BASOPHILS NFR BLD AUTO: 0.5 % (ref 0–1.5)
BUN BLD-MCNC: 18 MG/DL (ref 8–23)
BUN/CREAT SERPL: 13.6 (ref 7–25)
CALCIUM SPEC-SCNC: 9 MG/DL (ref 8.6–10.5)
CHLORIDE SERPL-SCNC: 101 MMOL/L (ref 98–107)
CO2 SERPL-SCNC: 25 MMOL/L (ref 22–29)
CREAT BLD-MCNC: 1.32 MG/DL (ref 0.57–1)
DEPRECATED RDW RBC AUTO: 50.4 FL (ref 37–54)
EOSINOPHIL # BLD AUTO: 0.14 10*3/MM3 (ref 0–0.4)
EOSINOPHIL NFR BLD AUTO: 1.3 % (ref 0.3–6.2)
ERYTHROCYTE [DISTWIDTH] IN BLOOD BY AUTOMATED COUNT: 15 % (ref 12.3–15.4)
GFR SERPL CREATININE-BSD FRML MDRD: 39 ML/MIN/1.73
GLUCOSE BLD-MCNC: 112 MG/DL (ref 65–99)
HCT VFR BLD AUTO: 30.3 % (ref 34–46.6)
HGB BLD-MCNC: 10 G/DL (ref 12–15.9)
IMM GRANULOCYTES # BLD AUTO: 0.03 10*3/MM3 (ref 0–0.05)
IMM GRANULOCYTES NFR BLD AUTO: 0.3 % (ref 0–0.5)
LYMPHOCYTES # BLD AUTO: 1.23 10*3/MM3 (ref 0.7–3.1)
LYMPHOCYTES NFR BLD AUTO: 11.2 % (ref 19.6–45.3)
MCH RBC QN AUTO: 30.5 PG (ref 26.6–33)
MCHC RBC AUTO-ENTMCNC: 33 G/DL (ref 31.5–35.7)
MCV RBC AUTO: 92.4 FL (ref 79–97)
MONOCYTES # BLD AUTO: 1.37 10*3/MM3 (ref 0.1–0.9)
MONOCYTES NFR BLD AUTO: 12.5 % (ref 5–12)
NEUTROPHILS # BLD AUTO: 8.14 10*3/MM3 (ref 1.7–7)
NEUTROPHILS NFR BLD AUTO: 74.2 % (ref 42.7–76)
NRBC BLD AUTO-RTO: 0 /100 WBC (ref 0–0.2)
PLATELET # BLD AUTO: 255 10*3/MM3 (ref 140–450)
PMV BLD AUTO: 11.2 FL (ref 6–12)
POTASSIUM BLD-SCNC: 3.4 MMOL/L (ref 3.5–5.2)
RBC # BLD AUTO: 3.28 10*6/MM3 (ref 3.77–5.28)
SODIUM BLD-SCNC: 139 MMOL/L (ref 136–145)
WBC NRBC COR # BLD: 10.96 10*3/MM3 (ref 3.4–10.8)

## 2020-03-26 PROCEDURE — 97110 THERAPEUTIC EXERCISES: CPT

## 2020-03-26 PROCEDURE — 85025 COMPLETE CBC W/AUTO DIFF WBC: CPT | Performed by: FAMILY MEDICINE

## 2020-03-26 PROCEDURE — 93005 ELECTROCARDIOGRAM TRACING: CPT | Performed by: NURSE PRACTITIONER

## 2020-03-26 PROCEDURE — 99232 SBSQ HOSP IP/OBS MODERATE 35: CPT | Performed by: FAMILY MEDICINE

## 2020-03-26 PROCEDURE — 97530 THERAPEUTIC ACTIVITIES: CPT

## 2020-03-26 PROCEDURE — 97535 SELF CARE MNGMENT TRAINING: CPT | Performed by: OCCUPATIONAL THERAPIST

## 2020-03-26 PROCEDURE — 94799 UNLISTED PULMONARY SVC/PX: CPT

## 2020-03-26 PROCEDURE — 94660 CPAP INITIATION&MGMT: CPT

## 2020-03-26 PROCEDURE — 99238 HOSP IP/OBS DSCHRG MGMT 30/<: CPT | Performed by: INTERNAL MEDICINE

## 2020-03-26 PROCEDURE — 93010 ELECTROCARDIOGRAM REPORT: CPT | Performed by: INTERNAL MEDICINE

## 2020-03-26 PROCEDURE — 92526 ORAL FUNCTION THERAPY: CPT

## 2020-03-26 PROCEDURE — 80048 BASIC METABOLIC PNL TOTAL CA: CPT | Performed by: FAMILY MEDICINE

## 2020-03-26 PROCEDURE — 25010000002 PIPERACILLIN SOD-TAZOBACTAM PER 1 G: Performed by: INTERNAL MEDICINE

## 2020-03-26 RX ADMIN — TAZOBACTAM SODIUM AND PIPERACILLIN SODIUM 3.38 G: 375; 3 INJECTION, SOLUTION INTRAVENOUS at 09:21

## 2020-03-26 RX ADMIN — Medication 5 MG: at 20:18

## 2020-03-26 RX ADMIN — FAMOTIDINE 20 MG: 20 TABLET ORAL at 20:18

## 2020-03-26 RX ADMIN — SODIUM CHLORIDE, PRESERVATIVE FREE 10 ML: 5 INJECTION INTRAVENOUS at 20:21

## 2020-03-26 RX ADMIN — ATORVASTATIN CALCIUM 80 MG: 40 TABLET, FILM COATED ORAL at 20:19

## 2020-03-26 RX ADMIN — APIXABAN 2.5 MG: 2.5 TABLET, FILM COATED ORAL at 09:22

## 2020-03-26 RX ADMIN — CITALOPRAM HYDROBROMIDE 20 MG: 20 TABLET ORAL at 06:25

## 2020-03-26 RX ADMIN — MIRTAZAPINE 7.5 MG: 15 TABLET, FILM COATED ORAL at 20:19

## 2020-03-26 RX ADMIN — VERAPAMIL HYDROCHLORIDE 40 MG: 80 TABLET, FILM COATED ORAL at 20:18

## 2020-03-26 RX ADMIN — MULTIPLE VITAMINS W/ MINERALS TAB 1 TABLET: TAB at 09:22

## 2020-03-26 RX ADMIN — FUROSEMIDE 40 MG: 40 TABLET ORAL at 09:22

## 2020-03-26 RX ADMIN — TAZOBACTAM SODIUM AND PIPERACILLIN SODIUM 3.38 G: 375; 3 INJECTION, SOLUTION INTRAVENOUS at 16:05

## 2020-03-26 RX ADMIN — FERROUS SULFATE TAB 325 MG (65 MG ELEMENTAL FE) 325 MG: 325 (65 FE) TAB at 09:22

## 2020-03-26 RX ADMIN — MAGNESIUM OXIDE TAB 400 MG (241.3 MG ELEMENTAL MG) 400 MG: 400 (241.3 MG) TAB at 09:22

## 2020-03-26 RX ADMIN — APIXABAN 2.5 MG: 2.5 TABLET, FILM COATED ORAL at 20:19

## 2020-03-26 RX ADMIN — METOPROLOL SUCCINATE 75 MG: 50 TABLET, EXTENDED RELEASE ORAL at 09:22

## 2020-03-26 RX ADMIN — LEVETIRACETAM 125 MG: 250 TABLET, FILM COATED ORAL at 09:22

## 2020-03-26 RX ADMIN — TAZOBACTAM SODIUM AND PIPERACILLIN SODIUM 3.38 G: 375; 3 INJECTION, SOLUTION INTRAVENOUS at 00:20

## 2020-03-26 RX ADMIN — VERAPAMIL HYDROCHLORIDE 40 MG: 80 TABLET, FILM COATED ORAL at 06:25

## 2020-03-26 RX ADMIN — SODIUM CHLORIDE, PRESERVATIVE FREE 10 ML: 5 INJECTION INTRAVENOUS at 09:22

## 2020-03-26 RX ADMIN — VERAPAMIL HYDROCHLORIDE 40 MG: 80 TABLET, FILM COATED ORAL at 13:46

## 2020-03-26 RX ADMIN — LEVETIRACETAM 125 MG: 250 TABLET, FILM COATED ORAL at 20:18

## 2020-03-27 ENCOUNTER — APPOINTMENT (OUTPATIENT)
Dept: GENERAL RADIOLOGY | Facility: HOSPITAL | Age: 80
End: 2020-03-27

## 2020-03-27 LAB
ANION GAP SERPL CALCULATED.3IONS-SCNC: 14 MMOL/L (ref 5–15)
ARTERIAL PATENCY WRIST A: ABNORMAL
ATMOSPHERIC PRESS: ABNORMAL MM[HG]
BASE EXCESS BLDA CALC-SCNC: 2.7 MMOL/L (ref 0–2)
BDY SITE: ABNORMAL
BODY TEMPERATURE: 37 C
BUN BLD-MCNC: 23 MG/DL (ref 8–23)
BUN/CREAT SERPL: 14.4 (ref 7–25)
CALCIUM SPEC-SCNC: 9.2 MG/DL (ref 8.6–10.5)
CHLORIDE SERPL-SCNC: 98 MMOL/L (ref 98–107)
CO2 BLDA-SCNC: 28 MMOL/L (ref 22–33)
CO2 SERPL-SCNC: 27 MMOL/L (ref 22–29)
COHGB MFR BLD: 1.3 % (ref 0–2)
CREAT BLD-MCNC: 1.6 MG/DL (ref 0.57–1)
D-LACTATE SERPL-SCNC: 1.3 MMOL/L (ref 0.5–2)
DEPRECATED RDW RBC AUTO: 50.5 FL (ref 37–54)
EPAP: 0
ERYTHROCYTE [DISTWIDTH] IN BLOOD BY AUTOMATED COUNT: 15.3 % (ref 12.3–15.4)
GFR SERPL CREATININE-BSD FRML MDRD: 31 ML/MIN/1.73
GLUCOSE BLD-MCNC: 147 MG/DL (ref 65–99)
HCO3 BLDA-SCNC: 26.8 MMOL/L (ref 20–26)
HCT VFR BLD AUTO: 31.4 % (ref 34–46.6)
HCT VFR BLD CALC: 31.3 %
HGB BLD-MCNC: 10.2 G/DL (ref 12–15.9)
HGB BLDA-MCNC: 10.2 G/DL (ref 14–18)
HOROWITZ INDEX BLD+IHG-RTO: 50 %
IPAP: 0
MCH RBC QN AUTO: 29.7 PG (ref 26.6–33)
MCHC RBC AUTO-ENTMCNC: 32.5 G/DL (ref 31.5–35.7)
MCV RBC AUTO: 91.5 FL (ref 79–97)
METHGB BLD QL: 1 % (ref 0–1.5)
MODALITY: ABNORMAL
NOTE: ABNORMAL
OXYHGB MFR BLDV: 93.9 % (ref 94–99)
PAW @ PEAK INSP FLOW SETTING VENT: 0 CMH2O
PCO2 BLDA: 38.4 MM HG (ref 35–45)
PCO2 TEMP ADJ BLD: 38.4 MM HG (ref 35–45)
PH BLDA: 7.45 PH UNITS (ref 7.35–7.45)
PH, TEMP CORRECTED: 7.45 PH UNITS
PLATELET # BLD AUTO: 334 10*3/MM3 (ref 140–450)
PMV BLD AUTO: 10.7 FL (ref 6–12)
PO2 BLDA: 80 MM HG (ref 83–108)
PO2 TEMP ADJ BLD: 80 MM HG (ref 83–108)
POTASSIUM BLD-SCNC: 3.7 MMOL/L (ref 3.5–5.2)
RBC # BLD AUTO: 3.43 10*6/MM3 (ref 3.77–5.28)
SODIUM BLD-SCNC: 139 MMOL/L (ref 136–145)
TOTAL RATE: 0 BREATHS/MINUTE
WBC NRBC COR # BLD: 11.86 10*3/MM3 (ref 3.4–10.8)

## 2020-03-27 PROCEDURE — 94660 CPAP INITIATION&MGMT: CPT

## 2020-03-27 PROCEDURE — 25010000002 PIPERACILLIN SOD-TAZOBACTAM PER 1 G: Performed by: INTERNAL MEDICINE

## 2020-03-27 PROCEDURE — 99232 SBSQ HOSP IP/OBS MODERATE 35: CPT | Performed by: INTERNAL MEDICINE

## 2020-03-27 PROCEDURE — 71045 X-RAY EXAM CHEST 1 VIEW: CPT

## 2020-03-27 PROCEDURE — 94799 UNLISTED PULMONARY SVC/PX: CPT

## 2020-03-27 PROCEDURE — 25010000002 ACETAZOLAMIDE PER 500 MG: Performed by: INTERNAL MEDICINE

## 2020-03-27 PROCEDURE — 80048 BASIC METABOLIC PNL TOTAL CA: CPT | Performed by: FAMILY MEDICINE

## 2020-03-27 PROCEDURE — 99223 1ST HOSP IP/OBS HIGH 75: CPT | Performed by: INTERNAL MEDICINE

## 2020-03-27 PROCEDURE — 36600 WITHDRAWAL OF ARTERIAL BLOOD: CPT

## 2020-03-27 PROCEDURE — 83605 ASSAY OF LACTIC ACID: CPT | Performed by: PHYSICIAN ASSISTANT

## 2020-03-27 PROCEDURE — 83880 ASSAY OF NATRIURETIC PEPTIDE: CPT | Performed by: PHYSICIAN ASSISTANT

## 2020-03-27 PROCEDURE — 85027 COMPLETE CBC AUTOMATED: CPT | Performed by: FAMILY MEDICINE

## 2020-03-27 PROCEDURE — 99232 SBSQ HOSP IP/OBS MODERATE 35: CPT | Performed by: FAMILY MEDICINE

## 2020-03-27 PROCEDURE — 82805 BLOOD GASES W/O2 SATURATION: CPT

## 2020-03-27 PROCEDURE — 97110 THERAPEUTIC EXERCISES: CPT

## 2020-03-27 PROCEDURE — 92526 ORAL FUNCTION THERAPY: CPT

## 2020-03-27 RX ORDER — ACETAZOLAMIDE SODIUM 500 MG/5ML
500 INJECTION, POWDER, LYOPHILIZED, FOR SOLUTION INTRAVENOUS ONCE
Status: COMPLETED | OUTPATIENT
Start: 2020-03-27 | End: 2020-03-27

## 2020-03-27 RX ORDER — METOPROLOL SUCCINATE 100 MG/1
100 TABLET, EXTENDED RELEASE ORAL DAILY
Status: DISCONTINUED | OUTPATIENT
Start: 2020-03-27 | End: 2020-04-04 | Stop reason: HOSPADM

## 2020-03-27 RX ORDER — FUROSEMIDE 10 MG/ML
40 INJECTION INTRAMUSCULAR; INTRAVENOUS ONCE
Status: COMPLETED | OUTPATIENT
Start: 2020-03-28 | End: 2020-03-27

## 2020-03-27 RX ADMIN — MULTIPLE VITAMINS W/ MINERALS TAB 1 TABLET: TAB at 07:48

## 2020-03-27 RX ADMIN — TAZOBACTAM SODIUM AND PIPERACILLIN SODIUM 3.38 G: 375; 3 INJECTION, SOLUTION INTRAVENOUS at 02:33

## 2020-03-27 RX ADMIN — FERROUS SULFATE TAB 325 MG (65 MG ELEMENTAL FE) 325 MG: 325 (65 FE) TAB at 07:46

## 2020-03-27 RX ADMIN — VERAPAMIL HYDROCHLORIDE 40 MG: 80 TABLET, FILM COATED ORAL at 05:32

## 2020-03-27 RX ADMIN — LEVETIRACETAM 125 MG: 250 TABLET, FILM COATED ORAL at 07:49

## 2020-03-27 RX ADMIN — VERAPAMIL HYDROCHLORIDE 40 MG: 80 TABLET, FILM COATED ORAL at 15:47

## 2020-03-27 RX ADMIN — FAMOTIDINE 20 MG: 20 TABLET ORAL at 20:18

## 2020-03-27 RX ADMIN — MAGNESIUM OXIDE TAB 400 MG (241.3 MG ELEMENTAL MG) 400 MG: 400 (241.3 MG) TAB at 07:49

## 2020-03-27 RX ADMIN — METOPROLOL SUCCINATE 100 MG: 100 TABLET, EXTENDED RELEASE ORAL at 07:54

## 2020-03-27 RX ADMIN — DIGOXIN 125 MCG: 125 TABLET ORAL at 07:47

## 2020-03-27 RX ADMIN — SODIUM CHLORIDE, PRESERVATIVE FREE 10 ML: 5 INJECTION INTRAVENOUS at 20:19

## 2020-03-27 RX ADMIN — TAZOBACTAM SODIUM AND PIPERACILLIN SODIUM 3.38 G: 375; 3 INJECTION, SOLUTION INTRAVENOUS at 07:54

## 2020-03-27 RX ADMIN — WATER 500 MG: 1 INJECTION INTRAMUSCULAR; INTRAVENOUS; SUBCUTANEOUS at 15:50

## 2020-03-27 RX ADMIN — APIXABAN 2.5 MG: 2.5 TABLET, FILM COATED ORAL at 07:49

## 2020-03-27 RX ADMIN — FUROSEMIDE 40 MG: 10 INJECTION, SOLUTION INTRAMUSCULAR; INTRAVENOUS at 23:49

## 2020-03-27 RX ADMIN — FUROSEMIDE 40 MG: 40 TABLET ORAL at 07:47

## 2020-03-27 RX ADMIN — LEVETIRACETAM 125 MG: 250 TABLET, FILM COATED ORAL at 20:18

## 2020-03-27 RX ADMIN — MIRTAZAPINE 7.5 MG: 15 TABLET, FILM COATED ORAL at 20:18

## 2020-03-27 RX ADMIN — ATORVASTATIN CALCIUM 80 MG: 40 TABLET, FILM COATED ORAL at 20:16

## 2020-03-27 RX ADMIN — CITALOPRAM HYDROBROMIDE 20 MG: 20 TABLET ORAL at 05:32

## 2020-03-28 LAB
ANION GAP SERPL CALCULATED.3IONS-SCNC: 16 MMOL/L (ref 5–15)
BACTERIA UR QL AUTO: ABNORMAL /HPF
BASOPHILS # BLD AUTO: 0.05 10*3/MM3 (ref 0–0.2)
BASOPHILS NFR BLD AUTO: 0.4 % (ref 0–1.5)
BILIRUB UR QL STRIP: NEGATIVE
BUN BLD-MCNC: 28 MG/DL (ref 8–23)
BUN/CREAT SERPL: 14.8 (ref 7–25)
CALCIUM SPEC-SCNC: 9.1 MG/DL (ref 8.6–10.5)
CHLORIDE SERPL-SCNC: 98 MMOL/L (ref 98–107)
CLARITY UR: ABNORMAL
CO2 SERPL-SCNC: 26 MMOL/L (ref 22–29)
COLOR UR: YELLOW
CREAT BLD-MCNC: 1.89 MG/DL (ref 0.57–1)
D-LACTATE SERPL-SCNC: 1.7 MMOL/L (ref 0.5–2)
DEPRECATED RDW RBC AUTO: 53.1 FL (ref 37–54)
EOSINOPHIL # BLD AUTO: 0.09 10*3/MM3 (ref 0–0.4)
EOSINOPHIL NFR BLD AUTO: 0.7 % (ref 0.3–6.2)
ERYTHROCYTE [DISTWIDTH] IN BLOOD BY AUTOMATED COUNT: 15.4 % (ref 12.3–15.4)
GFR SERPL CREATININE-BSD FRML MDRD: 26 ML/MIN/1.73
GLUCOSE BLD-MCNC: 105 MG/DL (ref 65–99)
GLUCOSE UR STRIP-MCNC: NEGATIVE MG/DL
HCT VFR BLD AUTO: 31.8 % (ref 34–46.6)
HGB BLD-MCNC: 9.9 G/DL (ref 12–15.9)
HGB UR QL STRIP.AUTO: NEGATIVE
HYALINE CASTS UR QL AUTO: ABNORMAL /LPF
IMM GRANULOCYTES # BLD AUTO: 0.06 10*3/MM3 (ref 0–0.05)
IMM GRANULOCYTES NFR BLD AUTO: 0.4 % (ref 0–0.5)
KETONES UR QL STRIP: NEGATIVE
LEUKOCYTE ESTERASE UR QL STRIP.AUTO: ABNORMAL
LYMPHOCYTES # BLD AUTO: 1.8 10*3/MM3 (ref 0.7–3.1)
LYMPHOCYTES NFR BLD AUTO: 13.3 % (ref 19.6–45.3)
MCH RBC QN AUTO: 29.5 PG (ref 26.6–33)
MCHC RBC AUTO-ENTMCNC: 31.1 G/DL (ref 31.5–35.7)
MCV RBC AUTO: 94.6 FL (ref 79–97)
MONOCYTES # BLD AUTO: 2.08 10*3/MM3 (ref 0.1–0.9)
MONOCYTES NFR BLD AUTO: 15.3 % (ref 5–12)
NEUTROPHILS # BLD AUTO: 9.48 10*3/MM3 (ref 1.7–7)
NEUTROPHILS NFR BLD AUTO: 69.9 % (ref 42.7–76)
NITRITE UR QL STRIP: NEGATIVE
NRBC BLD AUTO-RTO: 0 /100 WBC (ref 0–0.2)
NT-PROBNP SERPL-MCNC: 6547 PG/ML (ref 5–1800)
PH UR STRIP.AUTO: 7.5 [PH] (ref 5–8)
PLATELET # BLD AUTO: 326 10*3/MM3 (ref 140–450)
PMV BLD AUTO: 11 FL (ref 6–12)
POTASSIUM BLD-SCNC: 3.6 MMOL/L (ref 3.5–5.2)
PROCALCITONIN SERPL-MCNC: 1 NG/ML (ref 0.1–0.25)
PROT UR QL STRIP: ABNORMAL
RBC # BLD AUTO: 3.36 10*6/MM3 (ref 3.77–5.28)
RBC # UR: ABNORMAL /HPF
REF LAB TEST METHOD: ABNORMAL
SODIUM BLD-SCNC: 140 MMOL/L (ref 136–145)
SP GR UR STRIP: 1.02 (ref 1–1.03)
SQUAMOUS #/AREA URNS HPF: ABNORMAL /HPF
UROBILINOGEN UR QL STRIP: ABNORMAL
WBC NRBC COR # BLD: 13.56 10*3/MM3 (ref 3.4–10.8)
WBC UR QL AUTO: ABNORMAL /HPF

## 2020-03-28 PROCEDURE — 80048 BASIC METABOLIC PNL TOTAL CA: CPT | Performed by: FAMILY MEDICINE

## 2020-03-28 PROCEDURE — 81001 URINALYSIS AUTO W/SCOPE: CPT | Performed by: FAMILY MEDICINE

## 2020-03-28 PROCEDURE — 94799 UNLISTED PULMONARY SVC/PX: CPT

## 2020-03-28 PROCEDURE — 99291 CRITICAL CARE FIRST HOUR: CPT | Performed by: PHYSICIAN ASSISTANT

## 2020-03-28 PROCEDURE — 25010000002 FUROSEMIDE PER 20 MG: Performed by: PHYSICIAN ASSISTANT

## 2020-03-28 PROCEDURE — 85025 COMPLETE CBC W/AUTO DIFF WBC: CPT | Performed by: FAMILY MEDICINE

## 2020-03-28 PROCEDURE — 99232 SBSQ HOSP IP/OBS MODERATE 35: CPT | Performed by: FAMILY MEDICINE

## 2020-03-28 PROCEDURE — 83605 ASSAY OF LACTIC ACID: CPT | Performed by: FAMILY MEDICINE

## 2020-03-28 PROCEDURE — 97116 GAIT TRAINING THERAPY: CPT

## 2020-03-28 PROCEDURE — 94660 CPAP INITIATION&MGMT: CPT

## 2020-03-28 PROCEDURE — 84145 PROCALCITONIN (PCT): CPT | Performed by: FAMILY MEDICINE

## 2020-03-28 PROCEDURE — 25010000002 CEFTRIAXONE PER 250 MG: Performed by: FAMILY MEDICINE

## 2020-03-28 PROCEDURE — 97530 THERAPEUTIC ACTIVITIES: CPT

## 2020-03-28 PROCEDURE — 87040 BLOOD CULTURE FOR BACTERIA: CPT | Performed by: FAMILY MEDICINE

## 2020-03-28 RX ORDER — FUROSEMIDE 10 MG/ML
20 INJECTION INTRAMUSCULAR; INTRAVENOUS ONCE
Status: COMPLETED | OUTPATIENT
Start: 2020-03-28 | End: 2020-03-28

## 2020-03-28 RX ADMIN — SODIUM CHLORIDE, PRESERVATIVE FREE 10 ML: 5 INJECTION INTRAVENOUS at 21:31

## 2020-03-28 RX ADMIN — CITALOPRAM HYDROBROMIDE 20 MG: 20 TABLET ORAL at 06:10

## 2020-03-28 RX ADMIN — DOXYCYCLINE 100 MG: 100 INJECTION, POWDER, LYOPHILIZED, FOR SOLUTION INTRAVENOUS at 21:30

## 2020-03-28 RX ADMIN — Medication 5 MG: at 21:28

## 2020-03-28 RX ADMIN — MIRTAZAPINE 7.5 MG: 15 TABLET, FILM COATED ORAL at 21:30

## 2020-03-28 RX ADMIN — LEVETIRACETAM 125 MG: 250 TABLET, FILM COATED ORAL at 21:29

## 2020-03-28 RX ADMIN — FERROUS SULFATE TAB 325 MG (65 MG ELEMENTAL FE) 325 MG: 325 (65 FE) TAB at 09:10

## 2020-03-28 RX ADMIN — SODIUM CHLORIDE, PRESERVATIVE FREE 10 ML: 5 INJECTION INTRAVENOUS at 09:11

## 2020-03-28 RX ADMIN — FUROSEMIDE 40 MG: 40 TABLET ORAL at 09:10

## 2020-03-28 RX ADMIN — ATORVASTATIN CALCIUM 80 MG: 40 TABLET, FILM COATED ORAL at 21:28

## 2020-03-28 RX ADMIN — FUROSEMIDE 20 MG: 10 INJECTION, SOLUTION INTRAMUSCULAR; INTRAVENOUS at 02:34

## 2020-03-28 RX ADMIN — CEFTRIAXONE SODIUM 1 G: 1 INJECTION, POWDER, FOR SOLUTION INTRAMUSCULAR; INTRAVENOUS at 10:42

## 2020-03-28 RX ADMIN — METOPROLOL SUCCINATE 100 MG: 100 TABLET, EXTENDED RELEASE ORAL at 09:10

## 2020-03-28 RX ADMIN — MAGNESIUM OXIDE TAB 400 MG (241.3 MG ELEMENTAL MG) 400 MG: 400 (241.3 MG) TAB at 09:10

## 2020-03-28 RX ADMIN — FAMOTIDINE 20 MG: 20 TABLET ORAL at 21:29

## 2020-03-28 RX ADMIN — DOXYCYCLINE 100 MG: 100 INJECTION, POWDER, LYOPHILIZED, FOR SOLUTION INTRAVENOUS at 09:15

## 2020-03-28 RX ADMIN — MULTIPLE VITAMINS W/ MINERALS TAB 1 TABLET: TAB at 09:10

## 2020-03-28 RX ADMIN — DOCUSATE SODIUM 100 MG: 100 CAPSULE, LIQUID FILLED ORAL at 21:29

## 2020-03-28 RX ADMIN — LEVETIRACETAM 125 MG: 250 TABLET, FILM COATED ORAL at 09:11

## 2020-03-29 ENCOUNTER — APPOINTMENT (OUTPATIENT)
Dept: GENERAL RADIOLOGY | Facility: HOSPITAL | Age: 80
End: 2020-03-29

## 2020-03-29 LAB
ANION GAP SERPL CALCULATED.3IONS-SCNC: 15 MMOL/L (ref 5–15)
BASOPHILS # BLD AUTO: 0.05 10*3/MM3 (ref 0–0.2)
BASOPHILS NFR BLD AUTO: 0.4 % (ref 0–1.5)
BUN BLD-MCNC: 38 MG/DL (ref 8–23)
BUN/CREAT SERPL: 17.7 (ref 7–25)
CALCIUM SPEC-SCNC: 9.2 MG/DL (ref 8.6–10.5)
CHLORIDE SERPL-SCNC: 101 MMOL/L (ref 98–107)
CO2 SERPL-SCNC: 27 MMOL/L (ref 22–29)
CREAT BLD-MCNC: 2.15 MG/DL (ref 0.57–1)
DEPRECATED RDW RBC AUTO: 53.6 FL (ref 37–54)
EOSINOPHIL # BLD AUTO: 0.1 10*3/MM3 (ref 0–0.4)
EOSINOPHIL NFR BLD AUTO: 0.8 % (ref 0.3–6.2)
ERYTHROCYTE [DISTWIDTH] IN BLOOD BY AUTOMATED COUNT: 15.5 % (ref 12.3–15.4)
GFR SERPL CREATININE-BSD FRML MDRD: 22 ML/MIN/1.73
GLUCOSE BLD-MCNC: 111 MG/DL (ref 65–99)
HCT VFR BLD AUTO: 32 % (ref 34–46.6)
HGB BLD-MCNC: 10 G/DL (ref 12–15.9)
IMM GRANULOCYTES # BLD AUTO: 0.04 10*3/MM3 (ref 0–0.05)
IMM GRANULOCYTES NFR BLD AUTO: 0.3 % (ref 0–0.5)
LYMPHOCYTES # BLD AUTO: 1.3 10*3/MM3 (ref 0.7–3.1)
LYMPHOCYTES NFR BLD AUTO: 10.7 % (ref 19.6–45.3)
MCH RBC QN AUTO: 30.1 PG (ref 26.6–33)
MCHC RBC AUTO-ENTMCNC: 31.3 G/DL (ref 31.5–35.7)
MCV RBC AUTO: 96.4 FL (ref 79–97)
MONOCYTES # BLD AUTO: 1.84 10*3/MM3 (ref 0.1–0.9)
MONOCYTES NFR BLD AUTO: 15.1 % (ref 5–12)
NEUTROPHILS # BLD AUTO: 8.84 10*3/MM3 (ref 1.7–7)
NEUTROPHILS NFR BLD AUTO: 72.7 % (ref 42.7–76)
NRBC BLD AUTO-RTO: 0 /100 WBC (ref 0–0.2)
PLATELET # BLD AUTO: 366 10*3/MM3 (ref 140–450)
PMV BLD AUTO: 10.6 FL (ref 6–12)
POTASSIUM BLD-SCNC: 3.3 MMOL/L (ref 3.5–5.2)
RBC # BLD AUTO: 3.32 10*6/MM3 (ref 3.77–5.28)
SODIUM BLD-SCNC: 143 MMOL/L (ref 136–145)
WBC NRBC COR # BLD: 12.17 10*3/MM3 (ref 3.4–10.8)

## 2020-03-29 PROCEDURE — 25010000002 CEFTRIAXONE PER 250 MG: Performed by: FAMILY MEDICINE

## 2020-03-29 PROCEDURE — 97110 THERAPEUTIC EXERCISES: CPT

## 2020-03-29 PROCEDURE — 94799 UNLISTED PULMONARY SVC/PX: CPT

## 2020-03-29 PROCEDURE — 71045 X-RAY EXAM CHEST 1 VIEW: CPT

## 2020-03-29 PROCEDURE — 94660 CPAP INITIATION&MGMT: CPT

## 2020-03-29 PROCEDURE — 97535 SELF CARE MNGMENT TRAINING: CPT

## 2020-03-29 PROCEDURE — 99232 SBSQ HOSP IP/OBS MODERATE 35: CPT | Performed by: INTERNAL MEDICINE

## 2020-03-29 PROCEDURE — 85025 COMPLETE CBC W/AUTO DIFF WBC: CPT | Performed by: FAMILY MEDICINE

## 2020-03-29 PROCEDURE — 80048 BASIC METABOLIC PNL TOTAL CA: CPT | Performed by: FAMILY MEDICINE

## 2020-03-29 RX ADMIN — SODIUM CHLORIDE, PRESERVATIVE FREE 10 ML: 5 INJECTION INTRAVENOUS at 09:12

## 2020-03-29 RX ADMIN — MIRTAZAPINE 7.5 MG: 15 TABLET, FILM COATED ORAL at 20:29

## 2020-03-29 RX ADMIN — VERAPAMIL HYDROCHLORIDE 40 MG: 80 TABLET, FILM COATED ORAL at 14:38

## 2020-03-29 RX ADMIN — FERROUS SULFATE TAB 325 MG (65 MG ELEMENTAL FE) 325 MG: 325 (65 FE) TAB at 09:11

## 2020-03-29 RX ADMIN — METOPROLOL SUCCINATE 100 MG: 100 TABLET, EXTENDED RELEASE ORAL at 09:11

## 2020-03-29 RX ADMIN — FUROSEMIDE 40 MG: 40 TABLET ORAL at 09:10

## 2020-03-29 RX ADMIN — LEVETIRACETAM 125 MG: 250 TABLET, FILM COATED ORAL at 09:11

## 2020-03-29 RX ADMIN — CEFTRIAXONE SODIUM 1 G: 1 INJECTION, POWDER, FOR SOLUTION INTRAMUSCULAR; INTRAVENOUS at 10:57

## 2020-03-29 RX ADMIN — DOXYCYCLINE 100 MG: 100 INJECTION, POWDER, LYOPHILIZED, FOR SOLUTION INTRAVENOUS at 21:06

## 2020-03-29 RX ADMIN — DIGOXIN 125 MCG: 125 TABLET ORAL at 09:11

## 2020-03-29 RX ADMIN — FAMOTIDINE 20 MG: 20 TABLET ORAL at 20:29

## 2020-03-29 RX ADMIN — ATORVASTATIN CALCIUM 80 MG: 40 TABLET, FILM COATED ORAL at 20:29

## 2020-03-29 RX ADMIN — MULTIPLE VITAMINS W/ MINERALS TAB 1 TABLET: TAB at 09:10

## 2020-03-29 RX ADMIN — VERAPAMIL HYDROCHLORIDE 40 MG: 80 TABLET, FILM COATED ORAL at 22:45

## 2020-03-29 RX ADMIN — DOXYCYCLINE 100 MG: 100 INJECTION, POWDER, LYOPHILIZED, FOR SOLUTION INTRAVENOUS at 09:12

## 2020-03-29 RX ADMIN — CITALOPRAM HYDROBROMIDE 20 MG: 20 TABLET ORAL at 06:18

## 2020-03-29 RX ADMIN — MAGNESIUM OXIDE TAB 400 MG (241.3 MG ELEMENTAL MG) 400 MG: 400 (241.3 MG) TAB at 09:11

## 2020-03-29 RX ADMIN — SODIUM CHLORIDE, PRESERVATIVE FREE 10 ML: 5 INJECTION INTRAVENOUS at 20:29

## 2020-03-29 RX ADMIN — LEVETIRACETAM 125 MG: 250 TABLET, FILM COATED ORAL at 20:29

## 2020-03-30 ENCOUNTER — APPOINTMENT (OUTPATIENT)
Dept: CT IMAGING | Facility: HOSPITAL | Age: 80
End: 2020-03-30

## 2020-03-30 LAB
ANION GAP SERPL CALCULATED.3IONS-SCNC: 14 MMOL/L (ref 5–15)
APPEARANCE FLD: ABNORMAL
APTT PPP: 40.6 SECONDS (ref 24–37)
BUN BLD-MCNC: 39 MG/DL (ref 8–23)
BUN/CREAT SERPL: 20.4 (ref 7–25)
CALCIUM SPEC-SCNC: 9.2 MG/DL (ref 8.6–10.5)
CHLORIDE SERPL-SCNC: 102 MMOL/L (ref 98–107)
CO2 SERPL-SCNC: 27 MMOL/L (ref 22–29)
COLOR FLD: ABNORMAL
CREAT BLD-MCNC: 1.91 MG/DL (ref 0.57–1)
GFR SERPL CREATININE-BSD FRML MDRD: 25 ML/MIN/1.73
GLUCOSE BLD-MCNC: 108 MG/DL (ref 65–99)
INR PPP: 1.68 (ref 0.85–1.16)
LDH FLD-CCNC: 315 U/L
LYMPHOCYTES NFR FLD MANUAL: 25 %
MACROPHAGE FLUID: 32 %
MAGNESIUM SERPL-MCNC: 2.4 MG/DL (ref 1.6–2.4)
MESOTHL CELL NFR FLD MANUAL: 8 %
MONOCYTES NFR FLD: 3 %
NEUTROPHILS NFR FLD MANUAL: 32 %
PH FLD: 7.8 [PH]
PHOSPHATE SERPL-MCNC: 3.6 MG/DL (ref 2.5–4.5)
POTASSIUM BLD-SCNC: 3.2 MMOL/L (ref 3.5–5.2)
PROT FLD-MCNC: 2.9 G/DL
PROTHROMBIN TIME: 19.5 SECONDS (ref 11.5–14)
RBC # FLD AUTO: ABNORMAL /MM3
SODIUM BLD-SCNC: 143 MMOL/L (ref 136–145)
WBC # FLD AUTO: 1893 /MM3

## 2020-03-30 PROCEDURE — 87070 CULTURE OTHR SPECIMN AEROBIC: CPT | Performed by: INTERNAL MEDICINE

## 2020-03-30 PROCEDURE — 87206 SMEAR FLUORESCENT/ACID STAI: CPT | Performed by: INTERNAL MEDICINE

## 2020-03-30 PROCEDURE — 25010000003 LIDOCAINE 1 % SOLUTION: Performed by: RADIOLOGY

## 2020-03-30 PROCEDURE — 99232 SBSQ HOSP IP/OBS MODERATE 35: CPT | Performed by: INTERNAL MEDICINE

## 2020-03-30 PROCEDURE — 94660 CPAP INITIATION&MGMT: CPT

## 2020-03-30 PROCEDURE — 88305 TISSUE EXAM BY PATHOLOGIST: CPT | Performed by: INTERNAL MEDICINE

## 2020-03-30 PROCEDURE — 25010000002 CEFTRIAXONE PER 250 MG: Performed by: FAMILY MEDICINE

## 2020-03-30 PROCEDURE — 87102 FUNGUS ISOLATION CULTURE: CPT | Performed by: INTERNAL MEDICINE

## 2020-03-30 PROCEDURE — 97530 THERAPEUTIC ACTIVITIES: CPT

## 2020-03-30 PROCEDURE — 83986 ASSAY PH BODY FLUID NOS: CPT | Performed by: INTERNAL MEDICINE

## 2020-03-30 PROCEDURE — 83615 LACTATE (LD) (LDH) ENZYME: CPT | Performed by: INTERNAL MEDICINE

## 2020-03-30 PROCEDURE — 80048 BASIC METABOLIC PNL TOTAL CA: CPT | Performed by: INTERNAL MEDICINE

## 2020-03-30 PROCEDURE — 87015 SPECIMEN INFECT AGNT CONCNTJ: CPT | Performed by: INTERNAL MEDICINE

## 2020-03-30 PROCEDURE — 88112 CYTOPATH CELL ENHANCE TECH: CPT | Performed by: INTERNAL MEDICINE

## 2020-03-30 PROCEDURE — 84157 ASSAY OF PROTEIN OTHER: CPT | Performed by: INTERNAL MEDICINE

## 2020-03-30 PROCEDURE — 85730 THROMBOPLASTIN TIME PARTIAL: CPT | Performed by: RADIOLOGY

## 2020-03-30 PROCEDURE — 89051 BODY FLUID CELL COUNT: CPT | Performed by: INTERNAL MEDICINE

## 2020-03-30 PROCEDURE — 75989 ABSCESS DRAINAGE UNDER X-RAY: CPT

## 2020-03-30 PROCEDURE — 99233 SBSQ HOSP IP/OBS HIGH 50: CPT | Performed by: INTERNAL MEDICINE

## 2020-03-30 PROCEDURE — C1729 CATH, DRAINAGE: HCPCS

## 2020-03-30 PROCEDURE — 84100 ASSAY OF PHOSPHORUS: CPT | Performed by: INTERNAL MEDICINE

## 2020-03-30 PROCEDURE — 92526 ORAL FUNCTION THERAPY: CPT

## 2020-03-30 PROCEDURE — 87205 SMEAR GRAM STAIN: CPT | Performed by: INTERNAL MEDICINE

## 2020-03-30 PROCEDURE — 0W993ZZ DRAINAGE OF RIGHT PLEURAL CAVITY, PERCUTANEOUS APPROACH: ICD-10-PCS | Performed by: INTERNAL MEDICINE

## 2020-03-30 PROCEDURE — 83735 ASSAY OF MAGNESIUM: CPT | Performed by: INTERNAL MEDICINE

## 2020-03-30 PROCEDURE — 94799 UNLISTED PULMONARY SVC/PX: CPT

## 2020-03-30 PROCEDURE — 85610 PROTHROMBIN TIME: CPT | Performed by: RADIOLOGY

## 2020-03-30 PROCEDURE — 87116 MYCOBACTERIA CULTURE: CPT | Performed by: INTERNAL MEDICINE

## 2020-03-30 RX ORDER — VERAPAMIL HYDROCHLORIDE 80 MG/1
80 TABLET ORAL EVERY 8 HOURS SCHEDULED
Status: DISCONTINUED | OUTPATIENT
Start: 2020-03-30 | End: 2020-04-03

## 2020-03-30 RX ORDER — POTASSIUM CHLORIDE 1.5 G/1.77G
40 POWDER, FOR SOLUTION ORAL ONCE
Status: COMPLETED | OUTPATIENT
Start: 2020-03-30 | End: 2020-03-30

## 2020-03-30 RX ORDER — LIDOCAINE HYDROCHLORIDE 10 MG/ML
10 INJECTION, SOLUTION INFILTRATION; PERINEURAL ONCE
Status: COMPLETED | OUTPATIENT
Start: 2020-03-30 | End: 2020-03-30

## 2020-03-30 RX ADMIN — LEVETIRACETAM 125 MG: 250 TABLET, FILM COATED ORAL at 20:34

## 2020-03-30 RX ADMIN — FAMOTIDINE 20 MG: 20 TABLET ORAL at 20:34

## 2020-03-30 RX ADMIN — SODIUM CHLORIDE, PRESERVATIVE FREE 10 ML: 5 INJECTION INTRAVENOUS at 09:52

## 2020-03-30 RX ADMIN — DOXYCYCLINE 100 MG: 100 INJECTION, POWDER, LYOPHILIZED, FOR SOLUTION INTRAVENOUS at 20:33

## 2020-03-30 RX ADMIN — VERAPAMIL HYDROCHLORIDE 80 MG: 80 TABLET, FILM COATED ORAL at 15:04

## 2020-03-30 RX ADMIN — DOCUSATE SODIUM 100 MG: 100 CAPSULE, LIQUID FILLED ORAL at 15:03

## 2020-03-30 RX ADMIN — SODIUM CHLORIDE, PRESERVATIVE FREE 10 ML: 5 INJECTION INTRAVENOUS at 20:33

## 2020-03-30 RX ADMIN — MIRTAZAPINE 7.5 MG: 15 TABLET, FILM COATED ORAL at 20:34

## 2020-03-30 RX ADMIN — ATORVASTATIN CALCIUM 80 MG: 40 TABLET, FILM COATED ORAL at 20:34

## 2020-03-30 RX ADMIN — POTASSIUM CHLORIDE 40 MEQ: 1.5 POWDER, FOR SOLUTION ORAL at 15:03

## 2020-03-30 RX ADMIN — CEFTRIAXONE SODIUM 1 G: 1 INJECTION, POWDER, FOR SOLUTION INTRAMUSCULAR; INTRAVENOUS at 10:53

## 2020-03-30 RX ADMIN — LIDOCAINE HYDROCHLORIDE 10 ML: 10 INJECTION, SOLUTION INFILTRATION; PERINEURAL at 13:40

## 2020-03-30 RX ADMIN — LEVETIRACETAM 125 MG: 250 TABLET, FILM COATED ORAL at 15:03

## 2020-03-30 RX ADMIN — DOXYCYCLINE 100 MG: 100 INJECTION, POWDER, LYOPHILIZED, FOR SOLUTION INTRAVENOUS at 09:52

## 2020-03-30 RX ADMIN — VERAPAMIL HYDROCHLORIDE 80 MG: 80 TABLET, FILM COATED ORAL at 20:34

## 2020-03-31 ENCOUNTER — APPOINTMENT (OUTPATIENT)
Dept: GENERAL RADIOLOGY | Facility: HOSPITAL | Age: 80
End: 2020-03-31

## 2020-03-31 LAB
ANION GAP SERPL CALCULATED.3IONS-SCNC: 14 MMOL/L (ref 5–15)
BUN BLD-MCNC: 41 MG/DL (ref 8–23)
BUN/CREAT SERPL: 22.5 (ref 7–25)
CALCIUM SPEC-SCNC: 9.1 MG/DL (ref 8.6–10.5)
CHLORIDE SERPL-SCNC: 108 MMOL/L (ref 98–107)
CO2 SERPL-SCNC: 23 MMOL/L (ref 22–29)
CREAT BLD-MCNC: 1.82 MG/DL (ref 0.57–1)
DIGOXIN SERPL-MCNC: 1.33 NG/ML (ref 0.6–1.2)
GFR SERPL CREATININE-BSD FRML MDRD: 27 ML/MIN/1.73
GLUCOSE BLD-MCNC: 129 MG/DL (ref 65–99)
LAB AP CASE REPORT: NORMAL
PATH REPORT.FINAL DX SPEC: NORMAL
POTASSIUM BLD-SCNC: 4.2 MMOL/L (ref 3.5–5.2)
SODIUM BLD-SCNC: 145 MMOL/L (ref 136–145)

## 2020-03-31 PROCEDURE — 94799 UNLISTED PULMONARY SVC/PX: CPT

## 2020-03-31 PROCEDURE — 99232 SBSQ HOSP IP/OBS MODERATE 35: CPT | Performed by: INTERNAL MEDICINE

## 2020-03-31 PROCEDURE — 71045 X-RAY EXAM CHEST 1 VIEW: CPT

## 2020-03-31 PROCEDURE — 94660 CPAP INITIATION&MGMT: CPT

## 2020-03-31 PROCEDURE — 80048 BASIC METABOLIC PNL TOTAL CA: CPT | Performed by: INTERNAL MEDICINE

## 2020-03-31 PROCEDURE — 80162 ASSAY OF DIGOXIN TOTAL: CPT | Performed by: NURSE PRACTITIONER

## 2020-03-31 PROCEDURE — 25010000002 CEFTRIAXONE PER 250 MG: Performed by: FAMILY MEDICINE

## 2020-03-31 PROCEDURE — 92526 ORAL FUNCTION THERAPY: CPT

## 2020-03-31 RX ORDER — CEFDINIR 300 MG/1
300 CAPSULE ORAL
Status: COMPLETED | OUTPATIENT
Start: 2020-04-01 | End: 2020-04-03

## 2020-03-31 RX ORDER — DOXYCYCLINE 100 MG/1
100 CAPSULE ORAL EVERY 12 HOURS SCHEDULED
Status: COMPLETED | OUTPATIENT
Start: 2020-03-31 | End: 2020-04-03

## 2020-03-31 RX ADMIN — APIXABAN 2.5 MG: 2.5 TABLET, FILM COATED ORAL at 21:50

## 2020-03-31 RX ADMIN — VERAPAMIL HYDROCHLORIDE 80 MG: 80 TABLET, FILM COATED ORAL at 21:49

## 2020-03-31 RX ADMIN — FERROUS SULFATE TAB 325 MG (65 MG ELEMENTAL FE) 325 MG: 325 (65 FE) TAB at 09:53

## 2020-03-31 RX ADMIN — DOXYCYCLINE 100 MG: 100 INJECTION, POWDER, LYOPHILIZED, FOR SOLUTION INTRAVENOUS at 09:52

## 2020-03-31 RX ADMIN — ATORVASTATIN CALCIUM 80 MG: 40 TABLET, FILM COATED ORAL at 21:49

## 2020-03-31 RX ADMIN — DIGOXIN 125 MCG: 125 TABLET ORAL at 09:53

## 2020-03-31 RX ADMIN — CEFTRIAXONE SODIUM 1 G: 1 INJECTION, POWDER, FOR SOLUTION INTRAMUSCULAR; INTRAVENOUS at 09:52

## 2020-03-31 RX ADMIN — LEVETIRACETAM 125 MG: 250 TABLET, FILM COATED ORAL at 21:50

## 2020-03-31 RX ADMIN — MAGNESIUM OXIDE TAB 400 MG (241.3 MG ELEMENTAL MG) 400 MG: 400 (241.3 MG) TAB at 09:52

## 2020-03-31 RX ADMIN — MULTIPLE VITAMINS W/ MINERALS TAB 1 TABLET: TAB at 09:52

## 2020-03-31 RX ADMIN — CITALOPRAM HYDROBROMIDE 20 MG: 20 TABLET ORAL at 05:42

## 2020-03-31 RX ADMIN — SODIUM CHLORIDE, PRESERVATIVE FREE 10 ML: 5 INJECTION INTRAVENOUS at 09:53

## 2020-03-31 RX ADMIN — VERAPAMIL HYDROCHLORIDE 80 MG: 80 TABLET, FILM COATED ORAL at 15:07

## 2020-03-31 RX ADMIN — METOPROLOL SUCCINATE 100 MG: 100 TABLET, EXTENDED RELEASE ORAL at 09:53

## 2020-03-31 RX ADMIN — DOXYCYCLINE 100 MG: 100 CAPSULE ORAL at 21:49

## 2020-03-31 RX ADMIN — LEVETIRACETAM 125 MG: 250 TABLET, FILM COATED ORAL at 09:52

## 2020-03-31 RX ADMIN — MIRTAZAPINE 7.5 MG: 15 TABLET, FILM COATED ORAL at 21:49

## 2020-03-31 RX ADMIN — DOCUSATE SODIUM 100 MG: 100 CAPSULE, LIQUID FILLED ORAL at 09:52

## 2020-03-31 RX ADMIN — FAMOTIDINE 20 MG: 20 TABLET ORAL at 21:49

## 2020-04-01 ENCOUNTER — TELEPHONE (OUTPATIENT)
Dept: FAMILY MEDICINE CLINIC | Facility: CLINIC | Age: 80
End: 2020-04-01

## 2020-04-01 LAB
ANION GAP SERPL CALCULATED.3IONS-SCNC: 10 MMOL/L (ref 5–15)
BUN BLD-MCNC: 39 MG/DL (ref 8–23)
BUN/CREAT SERPL: 25 (ref 7–25)
CALCIUM SPEC-SCNC: 8.9 MG/DL (ref 8.6–10.5)
CHLORIDE SERPL-SCNC: 110 MMOL/L (ref 98–107)
CO2 SERPL-SCNC: 27 MMOL/L (ref 22–29)
CREAT BLD-MCNC: 1.56 MG/DL (ref 0.57–1)
GFR SERPL CREATININE-BSD FRML MDRD: 32 ML/MIN/1.73
GIE STN SPEC: NORMAL
GLUCOSE BLD-MCNC: 113 MG/DL (ref 65–99)
POTASSIUM BLD-SCNC: 3.9 MMOL/L (ref 3.5–5.2)
SODIUM BLD-SCNC: 147 MMOL/L (ref 136–145)

## 2020-04-01 PROCEDURE — 80048 BASIC METABOLIC PNL TOTAL CA: CPT | Performed by: INTERNAL MEDICINE

## 2020-04-01 PROCEDURE — 97530 THERAPEUTIC ACTIVITIES: CPT

## 2020-04-01 PROCEDURE — 99232 SBSQ HOSP IP/OBS MODERATE 35: CPT | Performed by: INTERNAL MEDICINE

## 2020-04-01 PROCEDURE — 94799 UNLISTED PULMONARY SVC/PX: CPT

## 2020-04-01 PROCEDURE — 94660 CPAP INITIATION&MGMT: CPT

## 2020-04-01 RX ORDER — AMOXICILLIN 250 MG
2 CAPSULE ORAL ONCE
Status: COMPLETED | OUTPATIENT
Start: 2020-04-01 | End: 2020-04-01

## 2020-04-01 RX ADMIN — LEVETIRACETAM 125 MG: 250 TABLET, FILM COATED ORAL at 09:09

## 2020-04-01 RX ADMIN — SODIUM CHLORIDE, PRESERVATIVE FREE 10 ML: 5 INJECTION INTRAVENOUS at 22:02

## 2020-04-01 RX ADMIN — MIRTAZAPINE 7.5 MG: 15 TABLET, FILM COATED ORAL at 22:01

## 2020-04-01 RX ADMIN — DOXYCYCLINE 100 MG: 100 CAPSULE ORAL at 09:08

## 2020-04-01 RX ADMIN — VERAPAMIL HYDROCHLORIDE 80 MG: 80 TABLET, FILM COATED ORAL at 06:36

## 2020-04-01 RX ADMIN — DOXYCYCLINE 100 MG: 100 CAPSULE ORAL at 22:02

## 2020-04-01 RX ADMIN — SENNOSIDES AND DOCUSATE SODIUM 2 TABLET: 8.6; 5 TABLET ORAL at 16:37

## 2020-04-01 RX ADMIN — ATORVASTATIN CALCIUM 80 MG: 40 TABLET, FILM COATED ORAL at 22:02

## 2020-04-01 RX ADMIN — CITALOPRAM HYDROBROMIDE 20 MG: 20 TABLET ORAL at 06:36

## 2020-04-01 RX ADMIN — FERROUS SULFATE TAB 325 MG (65 MG ELEMENTAL FE) 325 MG: 325 (65 FE) TAB at 09:08

## 2020-04-01 RX ADMIN — Medication 5 MG: at 22:01

## 2020-04-01 RX ADMIN — VERAPAMIL HYDROCHLORIDE 80 MG: 80 TABLET, FILM COATED ORAL at 22:01

## 2020-04-01 RX ADMIN — METOPROLOL SUCCINATE 100 MG: 100 TABLET, EXTENDED RELEASE ORAL at 09:09

## 2020-04-01 RX ADMIN — MULTIPLE VITAMINS W/ MINERALS TAB 1 TABLET: TAB at 09:08

## 2020-04-01 RX ADMIN — FAMOTIDINE 20 MG: 20 TABLET ORAL at 22:02

## 2020-04-01 RX ADMIN — VERAPAMIL HYDROCHLORIDE 80 MG: 80 TABLET, FILM COATED ORAL at 14:18

## 2020-04-01 RX ADMIN — SODIUM CHLORIDE, PRESERVATIVE FREE 10 ML: 5 INJECTION INTRAVENOUS at 09:09

## 2020-04-01 RX ADMIN — APIXABAN 2.5 MG: 2.5 TABLET, FILM COATED ORAL at 09:08

## 2020-04-01 RX ADMIN — LEVETIRACETAM 125 MG: 250 TABLET, FILM COATED ORAL at 22:02

## 2020-04-01 RX ADMIN — CEFDINIR 300 MG: 300 CAPSULE ORAL at 09:11

## 2020-04-01 RX ADMIN — APIXABAN 2.5 MG: 2.5 TABLET, FILM COATED ORAL at 22:01

## 2020-04-01 NOTE — PLAN OF CARE
Pt VSS this shift. Afib on monitor. A&O to all but time. Spoke with son via phone call today for update. 6L NC. Pt denied c/o pain. No SOA reported. Pt denies further needs from RN at this time. Will continue to monitor.

## 2020-04-01 NOTE — PROGRESS NOTES
"[facility] Daily Note       LOS: 13 days   Patient Care Team:  Oren Engel DO as PCP - General (Family Medicine)  Oren Engel DO as PCP - Claims Attributed    Chief Complaint:  Afib; CHF    Subjective     Subjective: No complaints this morning.  Feeling better.  Remains on BiPAP at night and on high flow nasal cannula during the day.  Feels like she is doing better.      Review of Systems:   As above.    Medications:    apixaban 2.5 mg Oral Q12H   atorvastatin 80 mg Oral Nightly   cefdinir 300 mg Oral Q24H   citalopram 20 mg Oral QAM   digoxin 125 mcg Oral Every Other Day   doxycycline 100 mg Oral Q12H   famotidine 20 mg Oral Nightly   ferrous sulfate 325 mg Oral Daily With Breakfast   levETIRAcetam 125 mg Oral Q12H   metoprolol succinate  mg Oral Daily   mirtazapine 7.5 mg Oral Nightly   multivitamin with minerals 1 tablet Oral Daily   sodium chloride 10 mL Intravenous Q12H   verapamil 80 mg Oral Q8H       Objective     Vital Sign Min/Max for last 24 hours  Temp  Min: 97.9 °F (36.6 °C)  Max: 99.7 °F (37.6 °C)   BP  Min: 103/66  Max: 132/65   Pulse  Min: 58  Max: 98   Resp  Min: 21  Max: 24   SpO2  Min: 83 %  Max: 100 %   Flow (L/min)  Min: 6  Max: 50   Weight  Min: 52.2 kg (115 lb)  Max: 52.2 kg (115 lb)      Intake/Output Summary (Last 24 hours) at 4/1/2020 0749  Last data filed at 3/31/2020 0952  Gross per 24 hour   Intake 200 ml   Output --   Net 200 ml        Flowsheet Rows      First Filed Value   Admission Height  170.2 cm (67\") Documented at 03/19/2020 0652   Admission Weight  45.4 kg (100 lb) Documented at 03/19/2020 0652          Physical Exam:    General: Alert and oriented   Cardiovascular: Heart has a nondisplaced focal PMI. Regular rate and rhythm without murmur, gallop or rub.  Lungs: A few expiratory wheezes are heard.  Equal expansion is noted.  Extremities: Show no edema.  Skin: warm and dry.  Neurologic: nonfocal      Tele: Probable A. fib however the rate is slow " and appears nearly regular.  Currently 64 bpm.       Results Review:    I reviewed the patient's new clinical results.    Labs:    Results from last 7 days   Lab Units 04/01/20  0614 03/31/20  0334 03/30/20  0613   SODIUM mmol/L 147* 145 143   POTASSIUM mmol/L 3.9 4.2 3.2*   CHLORIDE mmol/L 110* 108* 102   CO2 mmol/L 27.0 23.0 27.0   BUN mg/dL 39* 41* 39*   CREATININE mg/dL 1.56* 1.82* 1.91*   CALCIUM mg/dL 8.9 9.1 9.2   GLUCOSE mg/dL 113* 129* 108*     Results from last 7 days   Lab Units 03/29/20  0756 03/28/20  0406 03/27/20  1151   WBC 10*3/mm3 12.17* 13.56* 11.86*   HEMOGLOBIN g/dL 10.0* 9.9* 10.2*   HEMATOCRIT % 32.0* 31.8* 31.4*   PLATELETS 10*3/mm3 366 326 334     Lab Results   Component Value Date    TROPONINI 0.02 06/25/2015    TROPONINI <0.01 06/25/2015    TROPONINI 0.01 02/24/2014    TROPONINT 0.011 03/19/2020    TROPONINT <0.010 12/12/2019    TROPONINT <0.010 12/12/2019     Lab Results   Component Value Date    CHOL 165 12/19/2018    CHOL 182 12/17/2018    CHOL 164 09/13/2018     Lab Results   Component Value Date    TRIG 44 12/19/2018    TRIG 72 12/17/2018    TRIG 57 09/13/2018     Lab Results   Component Value Date    HDL 51 12/19/2018    HDL 58 12/17/2018    HDL 53 09/13/2018     No components found for: LDLCALC  Lab Results   Component Value Date    INR 1.68 (H) 03/30/2020    INR 1.38 12/22/2015    INR 1.3 (H) 11/03/2015    PROTIME 19.5 (H) 03/30/2020    PROTIME 14.8 (H) 12/22/2015    PROTIME 15.6 (H) 11/03/2015         Echo 3/20/20:  · Left ventricular systolic function is low normal.  · Estimated EF = 50%.  · Moderate mitral valve regurgitation is present  · Severe tricuspid valve regurgitation is present.  · Calculated right ventricular systolic pressure from tricuspid regurgitation is 48.6 mmHg.    Assessment   Assessment:  1) Chronic atrial fibrillation with RVR  - On Eliquis 2.5 mg bid (reduced owed to low BMI and crcl).     2) Acute on chronic diastolic hear failure  - h/o Takotsubo in 2006  with recovery of EF.   - Echo 3/20/20: EF 50%.   - s/p thoracentesis, 3/30/20 with removal of 700 mL of fluid from the right chest.     3) Hypertension, controlled.     4) Malnourished, anorexia with BMI of 15, debility  - per medicine    5) Severe sepsis due to aspiration PNA  - per medicine    6) SABRINA on CKD    Plan:    Continue Toprol- mg daily  Continue verapamil at 80 mg every 8 hours with anticipate change to XR dosing at discharge  Continue Eliquis  Discontinue digoxin as her rate is better controlled  Continue pulmonary toilet.      We will sign off.  Call again if needed.  Rate appears well controlled.       CODE STATUS no CPR/no intubation.        Electronically signed by GREG Sosa, 04/01/20, 7:49 AM.    I have seen and examined the patient, case was discussed with the physician extender, reviewed the above note, necessary changes were made and I agree with the final note.  Bri Olguin MD, FACC

## 2020-04-01 NOTE — THERAPY PROGRESS REPORT/RE-CERT
Acute Care - Occupational Therapy Progress Note  [facility]     Patient Name: Mirian Sy  : 1940  MRN: [id number]  Today's Date: 2020  Onset of Illness/Injury or Date of Surgery: 20  Date of Referral to OT: 20  Referring Physician: MD Racheal     Admit Date: 3/19/2020       ICD-10-CM ICD-9-CM   1. Oropharyngeal dysphagia R13.12 787.22   2. Acute febrile illness R50.9 780.60   3. Atrial fibrillation with rapid ventricular response (CMS/[facility]) I48.91 427.31   4. Elevated blood pressure reading with diagnosis of hypertension I10 401.9   5. Impaired mobility and ADLs Z74.09 799.89     Patient Active Problem List   Diagnosis   • Abnormal gait   • Takotsubo syndrome   • Carpal tunnel syndrome   • Complex partial epilepsy (CMS/[facility])   • Depression   • Chronic gastritis   • HLD (hyperlipidemia)   • Hypertension   • Hyponatremia   • Nutritional anemia   • Dyssomnia   • Diplopia   • Xeroderma   • Preventative health care   • Frailty   • Debility   • Tricuspid regurgitation   • Acute UTI (urinary tract infection)   • Cerebrovascular accident (CMS/[facility])   • Syncope   • DVT (deep venous thrombosis) (CMS/[facility])   • Anorexia   • Anxiety   • Patent foramen ovale   • Mitral regurgitation   • Low weight   • Senile osteoporosis   • Iron deficiency   • Chronic atrial fibrillation   • Closed fracture of left ilium (CMS/[facility])   • GERD (gastroesophageal reflux disease)   • Fall at home   • Vertigo   • Internuclear ophthalmoplegia, right eye   • Atrial fibrillation with RVR (CMS/[facility])   • Bradycardia   • Chronic atrial fibrillation   • SABRINA (acute kidney injury) (CMS/[facility])   • Hyperkalemia   • Confusion   • Acute exacerbation of CHF (congestive heart failure) (CMS/[facility])   • CKD (chronic kidney disease) stage 3, GFR 30-59 ml/min (CMS/[facility])   • CKD (chronic kidney disease) stage 3, GFR 30-59 ml/min (CMS/[facility])   • Underweight   • Acute on chronic diastolic CHF (congestive heart failure) (CMS/[facility])   • SABRINA (acute kidney  injury) (CMS/[facility])   • Chronic atrial fibrillation with RVR   • Severe sepsis (CMS/[facility])   • Aspiration pneumonia due to vomitus (CMS/[facility])   • Dysphasia   • Lactic acidosis   • Acute and chronic respiratory failure with hypoxia (CMS/[facility])   • Acute febrile illness     Past Medical History:   Diagnosis Date   • Atrial fibrillation (CMS/[facility]) 2005    Chronic anticoagulation and rate control   • Basal cell carcinoma 2007    Left leg and nose   • Cardiomyopathy (CMS/[facility]) 2006   • Cerebrovascular accident (CMS/[facility]) 01/2005    CT right parietal CVA. Carotid duplex -50% to 79% left ICS 15% right ICS stenosis. MRI of the neck showed no significant carotid bifurcations of these. Negative CT of the head, 09/10/2012. Carotid duplex, 02/24/2014:  Shelf-like plaque in the CECE without significant elevation and velocities.  Patent vertebral arteries.   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    With dilated cardiomyopathy   • Decubitus ulcer of buttock 2014    Transient during fracture rehabilitation   • Depression 2002    Good response to citalopram and mirtazapine   • DVT (deep venous thrombosis) (CMS/[facility]) 2014    Superficial - right lesser saphenus vein - after hip fracture    • Fracture of bone of forefoot 1972    Right foot   • Fracture of ilium, left (CMS/[facility]) 05/2018    Accidental fall - Uncomplicated recovery   • GERD (gastroesophageal reflux disease) 2014    Chronic superficial gastritis   • HTN (hypertension) 2005   • Iron deficiency anemia due to chronic blood loss 2018    Predisposed by chronic anticoagulation and GERD   • Low body weight due to inadequate caloric intake Adulthood   • Mitral valve prolapse 1997    Mild MR   • Osteoarthritis    • Osteoporosis    • Patent foramen ovale 2005   • Pneumonia 1947   • SCC (squamous cell carcinoma), arm, right 2017   • Scoliosis    • Syncope 2014     undermined cause - [facility]   • Varicose veins 2005    Symptomatic     Past Surgical  History:   Procedure Laterality Date   • ACHILLES TENDON SURGERY Left 1997    Repair of rupture left tendon with screws   • BREAST CYST EXCISION Left    • BREAST SURGERY Left 1982    Excision benign cyst   • CARDIAC CATHETERIZATION  2006    Severe acute coronary spasm   • CATARACT EXTRACTION WITH INTRAOCULAR LENS IMPLANT Bilateral 2015   • EYE CAPSULOTOMY WITH LASER Left 2016    Marked visual improvement   • FEMUR FRACTURE SURGERY Right 2015    Repair of shaft fracture   • HIP FRACTURE SURGERY Left 2014    left hip fracture with pin   • LUMBAR DISC SURGERY  1983   • OVARIAN CYST REMOVAL Left 1996   • SKIN CANCER EXCISION Right 2017    SCC - arm       Therapy Treatment    Rehabilitation Treatment Summary     Row Name 04/01/20 1059             Treatment Time/Intention    Discipline  occupational therapist  -AN      Document Type  progress note/recertification  -AN2      Subjective Information  no complaints  -AN      Mode of Treatment  occupational therapy  -AN      Patient Effort  good  -AN      Existing Precautions/Restrictions  cardiac;oxygen therapy device and L/min  -AN      Patient Response to Treatment  Pt having very difficult time with standing/sitting EOB this date, [facility] lift to chair  -AN      Recorded by [AN] Milady Garcia OT 04/01/20 1138  [AN2] Milady Garcia OT 04/01/20 1142      Row Name 04/01/20 1059             Cognitive Assessment/Intervention- PT/OT    Affect/Mental Status (Cognitive)  WFL  -AN      Orientation Status (Cognition)  oriented x 3  -AN      Follows Commands (Cognition)  WFL  -AN      Recorded by [AN] Milady Garcia OT 04/01/20 1138      Row Name 04/01/20 1059             Bed-Chair Transfer    Bed-Chair [place] (Transfers)  dependent (less than 25% patient effort)  -AN      Assistive Device (Bed-Chair Transfers)  mechanical lift/aid  -AN      Recorded by [AN] Milady Garcia OT 04/01/20 1138      Row Name 04/01/20 1059             Sit-Stand Transfer    Sit-Stand [place]  (Transfers)  (S) maximum assist (25% patient effort);2 person assist unable to extend knees fully, sliding feet forward  -AN      Recorded by [AN] Milady Garcia, OT 04/01/20 1138      Row Name 04/01/20 1059             Stand-Sit Transfer    Stand-Sit [place] (Transfers)  maximum assist (25% patient effort)  -AN      Recorded by [AN] Milady Garcia, OT 04/01/20 1138      Row Name 04/01/20 1059             Upper Body Dressing Assessment/Training    Upper Body Dressing [place] Level  doff;don;maximum assist (25% patient effort) hospital gown  -AN      Recorded by [AN] Milady Garcia, OT 04/01/20 1138      Row Name 04/01/20 1059             Grooming Assessment/Training    [place] Level (Grooming)  hair care, combing/brushing;wash face, hands;supervision;set up  -AN      Grooming Position  supported sitting  -AN      Recorded by [AN] Milady Garcia, OT 04/01/20 1138      Row Name 04/01/20 1059             Toileting Assessment/Training    Comment (Toileting)  pt with urinary incontinence  -AN      Recorded by [AN] Milady Garcia, OT 04/01/20 1138      Row Name 04/01/20 1059             Therapeutic Exercise    90399 - OT Therapeutic Activity Minutes  24  -AN      Recorded by [AN] Milady Garcia, OT 04/01/20 1138      Row Name 04/01/20 1059             Dynamic Sitting Balance    Level of [place], Reaches Outside Midline (Sitting, Dynamic Balance)  moderate assist, 50 to 74% patient effort  -AN      Sitting Position, Reaches Outside Midline (Sitting, Dynamic Balance)  sitting on edge of bed  -AN      Recorded by [AN] Milady Garcia, OT 04/01/20 1138      Row Name 04/01/20 1059             Static Standing Balance    Level of [place] (Supported Standing, Static Balance)  maximal assist, 25 to 49% patient effort;2 person assist;unable to perform activity  -AN      Comment (Supported Standing, Static Balance)  unble to fully extend knees and perform stand  -AN      Recorded by [AN] Milady Garcia, OT  04/01/20 1138      Row Name 04/01/20 1059             Positioning and Restraints    Pre-Treatment Position  in bed  -AN      Post Treatment Position  chair  -AN      In Chair  reclined;call light within reach;encouraged to call for assist;with family/caregiver;on mechanical lift sling;legs elevated  -AN      Recorded by [AN] Milady Garcia, OT 04/01/20 1138      Row Name 04/01/20 1059             Pain Scale: Numbers Pre/Post-Treatment    Pain Scale: Numbers, Pretreatment  0/10 - no pain  -AN      Pain Scale: Numbers, Post-Treatment  0/10 - no pain  -AN      Recorded by [AN] Milady Garcia, OT 04/01/20 1138      Row Name 04/01/20 1059             Plan of Care Review    Plan of Care Reviewed With  patient  -AN      Recorded by [AN] Milady Garcia, OT 04/01/20 1138      Row Name 04/01/20 1059             Outcome Summary/Treatment Plan (OT)    Daily Summary of Progress (OT)  progress towards functional goals is fair  -AN      Anticipated Discharge Disposition (OT)  skilled nursing facility  -AN      Recorded by [AN] Milady Garcia, OT 04/01/20 1138        User Key  (r) = Recorded By, (t) = Taken By, (c) = Cosigned By    Initials Name Effective Dates Discipline    AN Milady Garcia, OT 06/22/15 -  OT                 OT Recommendation and Plan  Outcome Summary/Treatment Plan (OT)  Daily Summary of Progress (OT): progress towards functional goals is fair  Anticipated Discharge Disposition (OT): skilled nursing facility  Daily Summary of Progress (OT): progress towards functional goals is fair  Plan of Care Review  Plan of Care Reviewed With: patient  Plan of Care Reviewed With: patient  Outcome Summary: Pt max x 2 with standing attempts this date, unable to fully extend knees, feet slide forward. Max assist to change gown, set up wash face and comb hair. Will need SNF at discharge.  Outcome Measures     Row Name 04/01/20 1059             How much help from another is currently needed...    Putting on and taking off regular  lower body clothing?  2  -AN      Bathing (including washing, rinsing, and drying)  2  -AN      Toileting (which includes using toilet bed pan or urinal)  2  -AN      Putting on and taking off regular upper body clothing  3  -AN      Taking care of personal grooming (such as brushing teeth)  3  -AN      Eating meals  3  -AN      AM-PAC 6 Clicks Score (OT)  15  -AN        User Key  (r) = Recorded By, (t) = Taken By, (c) = Cosigned By    Initials Name Provider Type    Milady Seaman OT Occupational Therapist           Time Calculation:   Time Calculation- OT     Row Name 04/01/20 1141 04/01/20 1059          Time Calculation- OT    OT Start Time  1059  -AN  --     Total Timed Code Minutes- OT  24 minute(s)  -AN  --     OT Received On  04/01/20  -AN  --     OT Goal Re-Cert Due Date  04/11/20  -AN  --        Timed Charges    87251 - OT Therapeutic Activity Minutes  --  24  -AN       User Key  (r) = Recorded By, (t) = Taken By, (c) = Cosigned By    Initials Name Provider Type    Milady Seaman OT Occupational Therapist        Therapy Charges for Today     Code Description Service Date Service Provider Modifiers Qty    [id number]  OT THERAPEUTIC ACT EA 15 MIN 4/1/2020 Milady Garcia OT GO 2               Milady Garcia OT  4/1/2020

## 2020-04-01 NOTE — DISCHARGE PLACEMENT REQUEST
"Richie Chu (80 y.o. Female)   Referred 4/1/2020 by Dr. Best  PCP-Dr. Oren Engel  Dx-Cardiomyopathy  Note-Pt will be returning to own home at [address] [facility] [address]    Date of Birth Social Security Number Address Home Phone MRN    1940  [id number]  [facility] [address] [phone] [id number]    Rastafarian Marital Status          Lutheran        Admission Date Admission Type Admitting Provider Attending Provider Department, Room/Bed    3/19/20 Emergency Butch Bustillo MD [place], Masoud, MD [facility] 3F, S311/1    Discharge Date Discharge Disposition Discharge Destination                       Attending Provider:  Butch Bustillo MD    Allergies:  Actonel [Risedronate Sodium], Hctz [Hydrochlorothiazide], Lisinopril    Isolation:  None   Infection:  None   Code Status:  No CPR    Ht:  170.2 cm (67\")   Wt:  52.2 kg (115 lb)    Admission Cmt:  None   Principal Problem:  Aspiration pneumonia due to vomitus (CMS/[facility]) [J69.0]                 Active Insurance as of 3/19/2020     Primary Coverage     Payor Plan Insurance Group Employer/Plan Group    MEDICARE MEDICARE A & B      Payor Plan Address Payor Plan Phone Number Payor Plan Fax Number Effective Dates    [address] [phone]  2/1/2006 - None Entered    [facility] [address]       Subscriber Name Subscriber Birth Date Member ID       RICHIE CHU T 1940 [id number]           Secondary Coverage     Payor Plan Insurance Group Employer/Plan Group    [facility] SUPP [id number]     Payor Plan Address Payor Plan Phone Number Payor Plan Fax Number Effective Dates    [address]   2/1/2006 - None Entered    [facility] [id number]       Subscriber Name Subscriber Birth Date Member ID       RICHIE CHU T 1940 [id number]                 Emergency Contacts      (Rel.) Home Phone Work Phone Mobile Phone    obed Alicea (Son) [phone] -- --    Dean Chu Jr " (Son) [phone] -- [phone]    Ty Alicea (Son) -- -- --            Insurance Information                MEDICARE/MEDICARE A & B Phone: [phone]    Subscriber: Mirian Sy Subscriber#: [id number]    Group#:  Precert#:         ANTHEM BLUE CROSS/ANTHEM BC  SUPP Phone:     Subscriber: Mirian Sy Subscriber#: [id number]    Group#: [id number] Precert#:              History & Physical      Shravan Karimi MD at 20 1640          H&P reviewed. The patient was examined and there are no changes to the H&P.          Electronically signed by Shravan Karimi MD at 20 1640   Source Note              [facility] Medicine Services  HISTORY AND PHYSICAL    Patient Name: Mirian Sy  : 1940  MRN: [id number]  Primary Care Physician: Oren Engel DO  Date of admission: 3/19/2020      Subjective   Subjective     Chief Complaint:  Shortness of breath    HPI:  Mirian Sy is a 80 y.o. female with complicated past medical history and problems listed below presents the emergency room with severe persistent shortness of breath that began this morning upon wakening it was associated with fever and she reports general weakness.  She stated she has had an aspiration event yesterday when she was eating lunch and she said she was talking too much when she chewed and choked on a chunk of chicken.  She does note occasional cough during my evaluation but denied this per ER physician.  In the ER she was found to be febrile with rapid atrial fibrillation and was started on diltiazem drip.  She had tachypnea and hypoxia which resolved with nasal cannula oxygen.  She received a CT scan which was felt to be consistent with bibasilar aspiration pneumonia.  CT scan per radiologist was not consistent with typical findings of COVID19.  She was started on vancomycin and Zosyn in the emergency room and she will be admitted to hospital medicine services for further  management.      Review of Systems   Constitutional: Positive for chills, fatigue and fever.   HENT: Negative for sinus pressure and sinus pain.    Eyes: Negative.    Respiratory: Positive for cough and shortness of breath.    Cardiovascular: Negative for chest pain and palpitations.   Gastrointestinal: Negative for diarrhea and nausea.   Endocrine: Negative.    Genitourinary: Negative for dysuria and hematuria.   Musculoskeletal: Negative for neck pain and neck stiffness.   Skin: Negative for rash and wound.   Allergic/Immunologic: Negative.    Neurological: Negative for light-headedness and numbness.   Hematological: Negative for adenopathy. Does not bruise/bleed easily.   Psychiatric/Behavioral: Negative for confusion. The patient is not nervous/anxious.       Personal History     Past Medical History:   Diagnosis Date   • Atrial fibrillation (CMS/[facility]) 2005    Chronic anticoagulation and rate control   • Basal cell carcinoma 2007    Left leg and nose   • Cardiomyopathy (CMS/[facility]) 2006   • Cerebrovascular accident (CMS/[facility]) 01/2005    CT right parietal CVA. Carotid duplex -50% to 79% left ICS 15% right ICS stenosis. MRI of the neck showed no significant carotid bifurcations of these. Negative CT of the head, 09/10/2012. Carotid duplex, 02/24/2014:  Shelf-like plaque in the CECE without significant elevation and velocities.  Patent vertebral arteries.   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    With dilated cardiomyopathy   • Decubitus ulcer of buttock 2014    Transient during fracture rehabilitation   • Depression 2002    Good response to citalopram and mirtazapine   • DVT (deep venous thrombosis) (CMS/[facility]) 2014    Superficial - right lesser saphenus vein - after hip fracture    • Fracture of bone of forefoot 1972    Right foot   • Fracture of ilium, left (CMS/[facility]) 05/2018    Accidental fall - Uncomplicated recovery   • GERD (gastroesophageal reflux disease) 2014     Chronic superficial gastritis   • HTN (hypertension) 2005   • Iron deficiency anemia due to chronic blood loss 2018    Predisposed by chronic anticoagulation and GERD   • Low body weight due to inadequate caloric intake Adulthood   • Mitral valve prolapse 1997    Mild MR   • Osteoarthritis    • Osteoporosis    • Patent foramen ovale 2005   • Pneumonia 1947   • SCC (squamous cell carcinoma), arm, right 2017   • Scoliosis    • Syncope 2014     undermined cause - [facility]   • Varicose veins 2005    Symptomatic       Past Surgical History:   Procedure Laterality Date   • ACHILLES TENDON SURGERY Left 1997    Repair of rupture left tendon with screws   • BREAST CYST EXCISION Left    • BREAST SURGERY Left 1982    Excision benign cyst   • CARDIAC CATHETERIZATION  2006    Severe acute coronary spasm   • CATARACT EXTRACTION WITH INTRAOCULAR LENS IMPLANT Bilateral 2015   • EYE CAPSULOTOMY WITH LASER Left 2016    Marked visual improvement   • FEMUR FRACTURE SURGERY Right 2015    Repair of shaft fracture   • HIP FRACTURE SURGERY Left 2014    left hip fracture with pin   • LUMBAR DISC SURGERY  1983   • OVARIAN CYST REMOVAL Left 1996   • SKIN CANCER EXCISION Right 2017    SCC - arm       Family History: family history includes Allergies in her son; Arrhythmia in her sister; Basal cell carcinoma in her sister; Bone cancer in her sister; Breast cancer in her mother; Breast cancer (age of onset: 57) in her sister; Breast cancer (age of onset: 59) in her sister; Breast cancer (age of onset: 66) in her sister; Diabetes in her maternal uncle, mother, and sister; Heart disease in her father; Hypertension in her mother and sister; Melanoma in her sister; Other in her sister; Ovarian cancer in her sister; Stroke in her sister.     Social History:  reports that she has quit smoking. Her smoking use included cigarettes. She has a 20.00 pack-year smoking history. She has never used smokeless tobacco. She reports that she does not drink alcohol or  use drugs.  Social History     Social History Narrative    Domestic life   lives in private home alone - good support from local children        Orthodoxy    Anabaptist        Sleep hygiene  in bed 9 PM to 6 AM for 9 hours of sleep        Caffeine use   1 1/2 cups coffee daily        Exercise habits  walks most days of the week. - Stretching each morning.          Diet   well-balanced diet with protein supplementations        Occupation    retired         Hearing  no impairment        Vision    no glasses needed after cataract surgery.          Driving   daytime only - good weather - local traffic       Medications:  Available home medication information reviewed.    Allergies   Allergen Reactions   • Actonel [Risedronate Sodium] GI Intolerance   • Hctz [Hydrochlorothiazide]      hyponatremia   • Lisinopril      hyperkalemia       Objective   Objective     Vital Signs:   Temp:  [101.2 °F (38.4 °C)] 101.2 °F (38.4 °C)  Heart Rate:  [106-151] 106  Resp:  [38] 38  BP: (107-144)/() 117/76        Physical Exam   Constitutional: Awake, alert  Eyes: Pupils equal, sclerae anicteric, no conjunctival injection  HENT: NCAT, mucous membranes moist  Neck: Supple, no thyromegaly, no lymphadenopathy, trachea midline  Respiratory: Rales and rhonchi's bilaterally, most significant at the bases, tachypnea noted respirations 24 during my examination and 97% on 2 L nasal cannula.  Not particularly distressed on oxygen but reportedly was initially on room air.  Cardiovascular: Irregular and borderline tachycardia with rate in the low 100s, palpable radial pulses bilaterally  Gastrointestinal: Positive bowel sounds, soft, nontender, nondistended  Musculoskeletal: Severely underweight, BMI is 15, frail and debilitated with muscle wasting noted, trace lower extremity edema   Psychiatric: Appropriate affect, cooperative  Neurologic: Oriented x 3, strength symmetric in all extremities, Cranial Nerves grossly intact to  confrontation, speech clear  Skin: No rashes or jaundice      Results Reviewed:  I have personally reviewed current lab and radiology data.    Results from last 7 days   Lab Units 03/19/20  0716   WBC 10*3/mm3 13.63*   HEMOGLOBIN g/dL 11.5*   HEMATOCRIT % 36.4   PLATELETS 10*3/mm3 219     Results from last 7 days   Lab Units 03/19/20  0716   SODIUM mmol/L 149*   POTASSIUM mmol/L 4.4   CHLORIDE mmol/L 109*   CO2 mmol/L 26.0   BUN mg/dL 43*   CREATININE mg/dL 1.36*   GLUCOSE mg/dL 101*   CALCIUM mg/dL 9.7   ALT (SGPT) U/L 27   AST (SGOT) U/L 47*   TROPONIN T ng/mL 0.011   PROBNP pg/mL 13,882.0*   LACTATE mmol/L 3.6*   PROCALCITONIN ng/mL 0.32*     Estimated Creatinine Clearance: 24 mL/min (A) (by C-G formula based on SCr of 1.36 mg/dL (H)).  Brief Urine Lab Results  (Last result in the past 365 days)      Color   Clarity   Blood   Leuk Est   Nitrite   Protein   CREAT   Urine HCG        03/19/20 0852 Yellow Clear Negative Trace Negative 100 mg/dL (2+)             Imaging Results (Last 24 Hours)     Procedure Component Value Units Date/Time    CT Chest Without Contrast [[id number]] Collected:  03/19/20 1030     Updated:  03/19/20 1138    Narrative:       EXAMINATION: CT CHEST WO CONTRAST-03/19/2020:      INDICATION: Febrile illness with SOA; R50.9-Fever, unspecified;  I48.91-Unspecified atrial fibrillation; I10-Essential (primary)  hypertension, shortness of air, febrile illness.     TECHNIQUE: Multiple axial CT imaging was obtained of the chest without  the administration of intravenous contrast.     The radiation dose reduction device was turned on for each scan per the  ALARA (As Low as Reasonably Achievable) protocol.     COMPARISON: NONE.     FINDINGS: The cardiac chambers are enlarged. Vascular calcification seen  within the ascending thoracic aorta. Largest AP dimension measures 3.6  cm with a transverse dimension of 3.9 cm. The remainder of the thoracic  aorta is normal in caliber. The cardiac chambers are within  normal  limits.  No pericardial effusion. There are small bilateral pleural  effusions with patchy airspace disease seen within the lower lung fields  bilaterally. Severe emphysematous changes identified diffusely  throughout the lung fields. Aspiration pneumonia cannot be excluded.  There is minimal interseptal thickening seen in the right middle lobe  concerning for airspace disease as well. Bronchiectatic changes  centrally. No changes seen within the spine. The visualized upper  abdomen is grossly unremarkable.       Impression:       Small bilateral pleural effusions with consolidation seen at  the lung bases bilaterally and some interseptal thickening suggesting  bibasilar infiltrate such as aspiration. Only minimal interseptal  thickening and increased markings seen within the right middle lobe and  lingula. Cardiac chambers are enlarged.     D:  03/19/2020  E:  03/19/2020     This report was finalized on 3/19/2020 11:35 AM by Dr. Carla Hunter MD.       XR Chest 1 View [[id number]] Collected:  03/19/20 0811     Updated:  03/19/20 0908    Narrative:       EXAMINATION: XR CHEST 1 VW-      INDICATION: Shortness of air triage protocol.      COMPARISON: 12/12/2019.     FINDINGS: Portable chest reveals heart to be enlarged. Increased  pulmonary vascularity bilaterally. Degenerative change is seen within  the spine.  Small right pleural effusion. Vascular calcification is seen  within the thoracic aorta.       Impression:       Heart is enlarged with increased pulmonary vascularity  bilaterally. Small right pleural effusion. Apical pleural thickening  bilaterally.     D:  03/19/2020  E:  03/19/2020     This report was finalized on 3/19/2020 9:04 AM by Dr. Carla Hunter MD.           Results for orders placed during the hospital encounter of 05/07/19   Adult Transthoracic Echo Complete W/ Cont if Necessary Per Protocol    Narrative · Estimated EF = 60%.  · Left ventricular systolic function is normal.  ·  Trace-to-mild mitral valve regurgitation is present.  · Mild tricuspid valve regurgitation is present.  · Calculated right ventricular systolic pressure from tricuspid   regurgitation is 34.0 mmHg.  · No cardiac source for embolus is seen          Assessment/Plan   Assessment & Plan     Active Hospital Problems    Diagnosis POA   • **Aspiration pneumonia due to vomitus (CMS/[facility]) [J69.0] Yes   • SABRINA (acute kidney injury) (CMS/[facility]) [N17.9] Yes   • Chronic atrial fibrillation with RVR [I48.20] Yes   • Severe sepsis (CMS/[facility]) [A41.9, R65.20] Yes   • Dysphasia [R47.02] Yes   • Lactic acidosis [E87.2] Yes   • Acute and chronic respiratory failure with hypoxia (CMS/[facility]) [J96.21] Yes   • Acute febrile illness [R50.9] Yes   • Acute on chronic diastolic CHF (congestive heart failure) (CMS/[facility]) [I50.33] Yes   • Underweight [R63.6] Yes   • CKD (chronic kidney disease) stage 3, GFR 30-59 ml/min (CMS/[facility]) [N18.3] Yes   • Chronic atrial fibrillation [I48.20] Yes   • Anorexia [R63.0] Yes     CHRONIC       • Anxiety [F41.9] Yes   • Debility [R53.81] Yes   • Hypertension [I10] Yes     80-year-old female with multiple comorbid conditions including stage III chronic kidney disease with baseline creatinine 1.0, chronic atrial fibrillation on chronic anticoagulation, history of dysphasia currently on a regular diet, chronic diastolic heart failure, chronic anorexia with low BMI of 15, osteoporosis, anxiety, debility, essential hypertension who presents to the hospital with fever, lactic acidosis, and findings on CT scan consistent with severe sepsis due to aspiration pneumonia after recent aspiration event at home.    Severe sepsis due to aspiration pneumonia with aspiration event and history of dysphasia:  Patient with acute kidney injury and lactic acidosis and sepsis all felt to be related to aspiration pneumonia.  Imaging consistent with aspiration pneumonia per radiologist.  Images personally reviewed and infiltrate noted.  Patient  with known aspiration event and history of dysphasia.  Consult speech therapy.  Continue Zosyn.  Received vancomycin in the emergency department.  Check MRSA screen and will only continue if positive.  Appears hypervolemic, avoid additional fluid unless hypotensive.  Repeat lactic acid pending.    Atrial fibrillation with RVR:  Chronic atrial fibrillation history noted.  Currently on diltiazem drip.  Restart nightly diltiazem tonight.  Consult cardiology to assist with CHF and atrial fibrillation control.  Continue anticoagulation however due to age, renal dysfunction, and severely low weight will reduce dose to 2.5 mg twice daily.    Acute kidney injury on stage III chronic kidney disease:  Baseline creatinine 1.0.  Currently 1.36.  Likely related to rapid atrial fibrillation and infection.  Cardiorenal also possible.  Appears hypervolemic, avoid IV fluid for now and treat infection and monitor carefully urine output and renal function.    Acute on chronic diastolic congestive heart failure: Previous echocardiogram from May 2019 reviewed EF 60% on most recent echocardiogram.  proBNP significantly elevated from previous levels.  Continue metoprolol.  Currently appears too sick for IV diuresis, monitor volume status for now and can give diuretics if hypoxia worsens.    Acute hypoxic respiratory failure: 85% on room air oxygen.  Currently well controlled 97% on 2 L.  Likely due to pneumonia and her morbid issues.  Wean oxygen as tolerated.    Continue chronic medications for remaining of medical issues however hold Marinol while acutely ill due to risk of confusion.  Repeat laboratory studies tomorrow.  Due to severity of illness and comorbid conditions prognosis is guarded.  Treatment plan explained to patient who agrees with the plan.    COVID-19 RISK SCREEN   1.   Has the patient been exposed to a known COVID-19 (+) person or a person under investigation (PUI) for COVID-19 infection?  -- NO    2. Has the patient  traveled to or are they from a Level III endemic country? --  NO    3. Has the patient traveled to or are they from a local community endemic area?   --  YES (lives in [facility])    4. Does the patient have baseline higher exposure risk such as working in healthcare field or currently residing in healthcare facility?  --  NO    No risk factors aside from living in [place] [facility] which is considered an endemic area, unclear etiology of aspiration pneumonia as noted aspiration event and findings of imaging consistent with aspiration and not typical for COVID.  No sick exposures.     DVT prophylaxis: Anticoagulation    CODE STATUS:    Code Status and Medical Interventions:   Ordered at: 20 1154     Code Status:    CPR     Medical Interventions (Level of Support Prior to Arrest):    Full       Admission Status:  I believe this patient meets INPATIENT status due to aspiration pneumonia.  I feel patient’s risk for adverse outcomes and need for care warrant INPATIENT evaluation and I predict the patient’s care encounter to likely last beyond 2 midnights.      Electronically signed by Darrell Springer MD, 20, 11:58 AM.      Electronically signed by Darrell Springer MD at 20 1223             Darrell Springer MD at 20 1158              [facility] Medicine Services  HISTORY AND PHYSICAL    Patient Name: Mirian Sy  : 1940  MRN: [id number]  Primary Care Physician: Oren Engel DO  Date of admission: 3/19/2020      Subjective   Subjective     Chief Complaint:  Shortness of breath    HPI:  Mirian Sy is a 80 y.o. female with complicated past medical history and problems listed below presents the emergency room with severe persistent shortness of breath that began this morning upon wakening it was associated with fever and she reports general weakness.  She stated she has had an aspiration event yesterday when she was eating lunch  and she said she was talking too much when she chewed and choked on a chunk of chicken.  She does note occasional cough during my evaluation but denied this per ER physician.  In the ER she was found to be febrile with rapid atrial fibrillation and was started on diltiazem drip.  She had tachypnea and hypoxia which resolved with nasal cannula oxygen.  She received a CT scan which was felt to be consistent with bibasilar aspiration pneumonia.  CT scan per radiologist was not consistent with typical findings of COVID19.  She was started on vancomycin and Zosyn in the emergency room and she will be admitted to hospital medicine services for further management.      Review of Systems   Constitutional: Positive for chills, fatigue and fever.   HENT: Negative for sinus pressure and sinus pain.    Eyes: Negative.    Respiratory: Positive for cough and shortness of breath.    Cardiovascular: Negative for chest pain and palpitations.   Gastrointestinal: Negative for diarrhea and nausea.   Endocrine: Negative.    Genitourinary: Negative for dysuria and hematuria.   Musculoskeletal: Negative for neck pain and neck stiffness.   Skin: Negative for rash and wound.   Allergic/Immunologic: Negative.    Neurological: Negative for light-headedness and numbness.   Hematological: Negative for adenopathy. Does not bruise/bleed easily.   Psychiatric/Behavioral: Negative for confusion. The patient is not nervous/anxious.       Personal History     Past Medical History:   Diagnosis Date   • Atrial fibrillation (CMS/HCC) 2005    Chronic anticoagulation and rate control   • Basal cell carcinoma 2007    Left leg and nose   • Cardiomyopathy (CMS/HCC) 2006   • Cerebrovascular accident (CMS/HCC) 01/2005    CT right parietal CVA. Carotid duplex -50% to 79% left ICS 15% right ICS stenosis. MRI of the neck showed no significant carotid bifurcations of these. Negative CT of the head, 09/10/2012. Carotid duplex, 02/24/2014:  Shelf-like plaque in the  CECE without significant elevation and velocities.  Patent vertebral arteries.   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    With dilated cardiomyopathy   • Decubitus ulcer of buttock 2014    Transient during fracture rehabilitation   • Depression 2002    Good response to citalopram and mirtazapine   • DVT (deep venous thrombosis) (CMS/[facility]) 2014    Superficial - right lesser saphenus vein - after hip fracture    • Fracture of bone of forefoot 1972    Right foot   • Fracture of ilium, left (CMS/[facility]) 05/2018    Accidental fall - Uncomplicated recovery   • GERD (gastroesophageal reflux disease) 2014    Chronic superficial gastritis   • HTN (hypertension) 2005   • Iron deficiency anemia due to chronic blood loss 2018    Predisposed by chronic anticoagulation and GERD   • Low body weight due to inadequate caloric intake Adulthood   • Mitral valve prolapse 1997    Mild MR   • Osteoarthritis    • Osteoporosis    • Patent foramen ovale 2005   • Pneumonia 1947   • SCC (squamous cell carcinoma), arm, right 2017   • Scoliosis    • Syncope 2014     undermined cause - [facility]   • Varicose veins 2005    Symptomatic       Past Surgical History:   Procedure Laterality Date   • ACHILLES TENDON SURGERY Left 1997    Repair of rupture left tendon with screws   • BREAST CYST EXCISION Left    • BREAST SURGERY Left 1982    Excision benign cyst   • CARDIAC CATHETERIZATION  2006    Severe acute coronary spasm   • CATARACT EXTRACTION WITH INTRAOCULAR LENS IMPLANT Bilateral 2015   • EYE CAPSULOTOMY WITH LASER Left 2016    Marked visual improvement   • FEMUR FRACTURE SURGERY Right 2015    Repair of shaft fracture   • HIP FRACTURE SURGERY Left 2014    left hip fracture with pin   • LUMBAR DISC SURGERY  1983   • OVARIAN CYST REMOVAL Left 1996   • SKIN CANCER EXCISION Right 2017    SCC - arm       Family History: family history includes Allergies in her son; Arrhythmia in her sister; Basal cell carcinoma  in her sister; Bone cancer in her sister; Breast cancer in her mother; Breast cancer (age of onset: 57) in her sister; Breast cancer (age of onset: 59) in her sister; Breast cancer (age of onset: 66) in her sister; Diabetes in her maternal uncle, mother, and sister; Heart disease in her father; Hypertension in her mother and sister; Melanoma in her sister; Other in her sister; Ovarian cancer in her sister; Stroke in her sister.     Social History:  reports that she has quit smoking. Her smoking use included cigarettes. She has a 20.00 pack-year smoking history. She has never used smokeless tobacco. She reports that she does not drink alcohol or use drugs.  Social History     Social History Narrative    Domestic life   lives in private home alone - good support from local children        Faith    [employer]        Sleep hygiene  in bed 9 PM to 6 AM for 9 hours of sleep        Caffeine use   1 1/2 cups coffee daily        Exercise habits  walks most days of the week. - Stretching each morning.          Diet   well-balanced diet with protein supplementations        Occupation    retired         Hearing  no impairment        Vision    no glasses needed after cataract surgery.          Driving   daytime only - good weather - local traffic       Medications:  Available home medication information reviewed.    Allergies   Allergen Reactions   • Actonel [Risedronate Sodium] GI Intolerance   • Hctz [Hydrochlorothiazide]      hyponatremia   • Lisinopril      hyperkalemia       Objective   Objective     Vital Signs:   Temp:  [101.2 °F (38.4 °C)] 101.2 °F (38.4 °C)  Heart Rate:  [106-151] 106  Resp:  [38] 38  BP: (107-144)/() 117/76        Physical Exam   Constitutional: Awake, alert  Eyes: Pupils equal, sclerae anicteric, no conjunctival injection  HENT: NCAT, mucous membranes moist  Neck: Supple, no thyromegaly, no lymphadenopathy, trachea midline  Respiratory: Rales and rhonchi's bilaterally, most  significant at the bases, tachypnea noted respirations 24 during my examination and 97% on 2 L nasal cannula.  Not particularly distressed on oxygen but reportedly was initially on room air.  Cardiovascular: Irregular and borderline tachycardia with rate in the low 100s, palpable radial pulses bilaterally  Gastrointestinal: Positive bowel sounds, soft, nontender, nondistended  Musculoskeletal: Severely underweight, BMI is 15, frail and debilitated with muscle wasting noted, trace lower extremity edema   Psychiatric: Appropriate affect, cooperative  Neurologic: Oriented x 3, strength symmetric in all extremities, Cranial Nerves grossly intact to confrontation, speech clear  Skin: No rashes or jaundice      Results Reviewed:  I have personally reviewed current lab and radiology data.    Results from last 7 days   Lab Units 03/19/20  0716   WBC 10*3/mm3 13.63*   HEMOGLOBIN g/dL 11.5*   HEMATOCRIT % 36.4   PLATELETS 10*3/mm3 219     Results from last 7 days   Lab Units 03/19/20  0716   SODIUM mmol/L 149*   POTASSIUM mmol/L 4.4   CHLORIDE mmol/L 109*   CO2 mmol/L 26.0   BUN mg/dL 43*   CREATININE mg/dL 1.36*   GLUCOSE mg/dL 101*   CALCIUM mg/dL 9.7   ALT (SGPT) U/L 27   AST (SGOT) U/L 47*   TROPONIN T ng/mL 0.011   PROBNP pg/mL 13,882.0*   LACTATE mmol/L 3.6*   PROCALCITONIN ng/mL 0.32*     Estimated Creatinine Clearance: 24 mL/min (A) (by C-G formula based on SCr of 1.36 mg/dL (H)).  Brief Urine Lab Results  (Last result in the past 365 days)      Color   Clarity   Blood   Leuk Est   Nitrite   Protein   CREAT   Urine HCG        03/19/20 0852 Yellow Clear Negative Trace Negative 100 mg/dL (2+)             Imaging Results (Last 24 Hours)     Procedure Component Value Units Date/Time    CT Chest Without Contrast [[id number]] Collected:  03/19/20 1030     Updated:  03/19/20 1138    Narrative:       EXAMINATION: CT CHEST WO CONTRAST-03/19/2020:      INDICATION: Febrile illness with SOA; R50.9-Fever,  unspecified;  I48.91-Unspecified atrial fibrillation; I10-Essential (primary)  hypertension, shortness of air, febrile illness.     TECHNIQUE: Multiple axial CT imaging was obtained of the chest without  the administration of intravenous contrast.     The radiation dose reduction device was turned on for each scan per the  ALARA (As Low as Reasonably Achievable) protocol.     COMPARISON: NONE.     FINDINGS: The cardiac chambers are enlarged. Vascular calcification seen  within the ascending thoracic aorta. Largest AP dimension measures 3.6  cm with a transverse dimension of 3.9 cm. The remainder of the thoracic  aorta is normal in caliber. The cardiac chambers are within normal  limits.  No pericardial effusion. There are small bilateral pleural  effusions with patchy airspace disease seen within the lower lung fields  bilaterally. Severe emphysematous changes identified diffusely  throughout the lung fields. Aspiration pneumonia cannot be excluded.  There is minimal interseptal thickening seen in the right middle lobe  concerning for airspace disease as well. Bronchiectatic changes  centrally. No changes seen within the spine. The visualized upper  abdomen is grossly unremarkable.       Impression:       Small bilateral pleural effusions with consolidation seen at  the lung bases bilaterally and some interseptal thickening suggesting  bibasilar infiltrate such as aspiration. Only minimal interseptal  thickening and increased markings seen within the right middle lobe and  lingula. Cardiac chambers are enlarged.     D:  03/19/2020  E:  03/19/2020     This report was finalized on 3/19/2020 11:35 AM by Dr. Carla Hunter MD.       XR Chest 1 View [[id number]] Collected:  03/19/20 0811     Updated:  03/19/20 0908    Narrative:       EXAMINATION: XR CHEST 1 VW-      INDICATION: Shortness of air triage protocol.      COMPARISON: 12/12/2019.     FINDINGS: Portable chest reveals heart to be enlarged.  Increased  pulmonary vascularity bilaterally. Degenerative change is seen within  the spine.  Small right pleural effusion. Vascular calcification is seen  within the thoracic aorta.       Impression:       Heart is enlarged with increased pulmonary vascularity  bilaterally. Small right pleural effusion. Apical pleural thickening  bilaterally.     D:  03/19/2020  E:  03/19/2020     This report was finalized on 3/19/2020 9:04 AM by Dr. Carla Hunter MD.           Results for orders placed during the hospital encounter of 05/07/19   Adult Transthoracic Echo Complete W/ Cont if Necessary Per Protocol    Narrative · Estimated EF = 60%.  · Left ventricular systolic function is normal.  · Trace-to-mild mitral valve regurgitation is present.  · Mild tricuspid valve regurgitation is present.  · Calculated right ventricular systolic pressure from tricuspid   regurgitation is 34.0 mmHg.  · No cardiac source for embolus is seen          Assessment/Plan   Assessment & Plan     Active Hospital Problems    Diagnosis POA   • **Aspiration pneumonia due to vomitus (CMS/[facility]) [J69.0] Yes   • SABRINA (acute kidney injury) (CMS/[facility]) [N17.9] Yes   • Chronic atrial fibrillation with RVR [I48.20] Yes   • Severe sepsis (CMS/[facility]) [A41.9, R65.20] Yes   • Dysphasia [R47.02] Yes   • Lactic acidosis [E87.2] Yes   • Acute and chronic respiratory failure with hypoxia (CMS/[facility]) [J96.21] Yes   • Acute febrile illness [R50.9] Yes   • Acute on chronic diastolic CHF (congestive heart failure) (CMS/[facility]) [I50.33] Yes   • Underweight [R63.6] Yes   • CKD (chronic kidney disease) stage 3, GFR 30-59 ml/min (CMS/[facility]) [N18.3] Yes   • Chronic atrial fibrillation [I48.20] Yes   • Anorexia [R63.0] Yes     CHRONIC       • Anxiety [F41.9] Yes   • Debility [R53.81] Yes   • Hypertension [I10] Yes     80-year-old female with multiple comorbid conditions including stage III chronic kidney disease with baseline creatinine 1.0, chronic atrial fibrillation on chronic  anticoagulation, history of dysphasia currently on a regular diet, chronic diastolic heart failure, chronic anorexia with low BMI of 15, osteoporosis, anxiety, debility, essential hypertension who presents to the hospital with fever, lactic acidosis, and findings on CT scan consistent with severe sepsis due to aspiration pneumonia after recent aspiration event at home.    Severe sepsis due to aspiration pneumonia with aspiration event and history of dysphasia:  Patient with acute kidney injury and lactic acidosis and sepsis all felt to be related to aspiration pneumonia.  Imaging consistent with aspiration pneumonia per radiologist.  Images personally reviewed and infiltrate noted.  Patient with known aspiration event and history of dysphasia.  Consult speech therapy.  Continue Zosyn.  Received vancomycin in the emergency department.  Check MRSA screen and will only continue if positive.  Appears hypervolemic, avoid additional fluid unless hypotensive.  Repeat lactic acid pending.    Atrial fibrillation with RVR:  Chronic atrial fibrillation history noted.  Currently on diltiazem drip.  Restart nightly diltiazem tonight.  Consult cardiology to assist with CHF and atrial fibrillation control.  Continue anticoagulation however due to age, renal dysfunction, and severely low weight will reduce dose to 2.5 mg twice daily.    Acute kidney injury on stage III chronic kidney disease:  Baseline creatinine 1.0.  Currently 1.36.  Likely related to rapid atrial fibrillation and infection.  Cardiorenal also possible.  Appears hypervolemic, avoid IV fluid for now and treat infection and monitor carefully urine output and renal function.    Acute on chronic diastolic congestive heart failure: Previous echocardiogram from May 2019 reviewed EF 60% on most recent echocardiogram.  proBNP significantly elevated from previous levels.  Continue metoprolol.  Currently appears too sick for IV diuresis, monitor volume status for now and  can give diuretics if hypoxia worsens.    Acute hypoxic respiratory failure: 85% on room air oxygen.  Currently well controlled 97% on 2 L.  Likely due to pneumonia and her morbid issues.  Wean oxygen as tolerated.    Continue chronic medications for remaining of medical issues however hold Marinol while acutely ill due to risk of confusion.  Repeat laboratory studies tomorrow.  Due to severity of illness and comorbid conditions prognosis is guarded.  Treatment plan explained to patient who agrees with the plan.    COVID-19 RISK SCREEN     5. Has the patient been exposed to a known COVID-19 (+) person or a person under investigation (PUI) for COVID-19 infection?  -- NO    6. Has the patient traveled to or are they from a Level III endemic country? --  NO    7. Has the patient traveled to or are they from a local community endemic area?   --  YES (lives in [facility])    8. Does the patient have baseline higher exposure risk such as working in healthcare field or currently residing in healthcare facility?  --  NO    No risk factors aside from living in [facility] which is considered an endemic area, unclear etiology of aspiration pneumonia as noted aspiration event and findings of imaging consistent with aspiration and not typical for COVID.  No sick exposures.     DVT prophylaxis: Anticoagulation    CODE STATUS:    Code Status and Medical Interventions:   Ordered at: 03/19/20 1154     Code Status:    CPR     Medical Interventions (Level of Support Prior to Arrest):    Full       Admission Status:  I believe this patient meets INPATIENT status due to aspiration pneumonia.  I feel patient’s risk for adverse outcomes and need for care warrant INPATIENT evaluation and I predict the patient’s care encounter to likely last beyond 2 midnights.      Electronically signed by Darrell Springer MD, 03/19/20, 11:58 AM.      Electronically signed by Darrell Springer MD at 03/19/20 1224          Physician  Progress Notes (last 48 hours) (Notes from 20 1453 through 20 1453)      Butch Bustillo MD at 20 1211              [facility] Medicine Services  PROGRESS NOTE    Patient Name: Mirian Sy  : 1940  MRN: [id number]    Date of Admission: 3/19/2020  Primary Care Physician: Oren Engel DO    Subjective   Subjective     CC:    F/u dyspnea    HPI:    Resting in bed in no acute distress and eating her breakfast. Tells me she is better today.  On 5 L of nasal cannula and not on high flow.  no fever or chills.  No chest pain, palpitation.  No cough or coryza.  No nausea, vomiting, diarrhea, abdominal pain.    Review of Systems    Gen- No fevers, chills  CV- No chest pain, palpitations  Resp- No cough, dyspnea at rest  GI- No N/V/D, abd pain    Objective   Objective     Vital Signs:   Temp:  [98 °F (36.7 °C)-99.7 °F (37.6 °C)] 98 °F (36.7 °C)  Heart Rate:  [58-97] 71  Resp:  [21-24] 22  BP: (103-130)/(65-90) 103/66        Physical Exam:    Constitutional: No acute distress, looks comfortable.  HENT: NCAT,  PERRLA  Respiratory: decreased breath sounds throughout, breathing effort is normal.  This morning she is only on nasal cannula and  Cardiovascular: Irregular, no murmur or gallop, rubs.  Gastrointestinal: Positive bowel sounds, soft, nontender, nondistended  Musculoskeletal: No bilateral ankle edema, very low muscle mass.  Psychiatric: Appropriate affect, cooperative  Neurologic: Awake, alert, follows commands, no focality.  Skin: no rash     Results Reviewed:  Results from last 7 days   Lab Units 20  0836 20  0756 20  0406 20  1151   WBC 10*3/mm3  --  12.17* 13.56* 11.86*   HEMOGLOBIN g/dL  --  10.0* 9.9* 10.2*   HEMATOCRIT %  --  32.0* 31.8* 31.4*   PLATELETS 10*3/mm3  --  366 326 334   INR  1.68*  --   --   --    PROCALCITONIN ng/mL  --   --  1.00*  --      Results from last 7 days   Lab Units 20  0614 20  0334  03/30/20  0613  03/27/20  2237   SODIUM mmol/L 147* 145 143   < >  --    POTASSIUM mmol/L 3.9 4.2 3.2*   < >  --    CHLORIDE mmol/L 110* 108* 102   < >  --    CO2 mmol/L 27.0 23.0 27.0   < >  --    BUN mg/dL 39* 41* 39*   < >  --    CREATININE mg/dL 1.56* 1.82* 1.91*   < >  --    GLUCOSE mg/dL 113* 129* 108*   < >  --    CALCIUM mg/dL 8.9 9.1 9.2   < >  --    PROBNP pg/mL  --   --   --   --  6,547.0*    < > = values in this interval not displayed.     Estimated Creatinine Clearance: 24.1 mL/min (A) (by C-G formula based on SCr of 1.56 mg/dL (H)).    Microbiology Results Abnormal     Procedure Component Value - Date/Time    Blood Culture - Blood, Arm, Right [[id number]] Collected:  03/28/20 0902    Lab Status:  Preliminary result Specimen:  Blood from Arm, Right Updated:  04/01/20 0930     Blood Culture No growth at 4 days    Blood Culture - Blood, Arm, Left [[id number]] Collected:  03/28/20 0903    Lab Status:  Preliminary result Specimen:  Blood from Arm, Left Updated:  04/01/20 0930     Blood Culture No growth at 4 days    Body Fluid Culture - Body Fluid, Pleural Cavity [[id number]] Collected:  03/30/20 1347    Lab Status:  Preliminary result Specimen:  Body Fluid from Pleural Cavity Updated:  04/01/20 0655     Body Fluid Culture No growth at 2 days     Gram Stain Many (4+) WBCs per low power field      No organisms seen    AFB Culture - Body Fluid, Pleural Cavity [[id number]] Collected:  03/30/20 1347    Lab Status:  Preliminary result Specimen:  Body Fluid from Pleural Cavity Updated:  03/31/20 1125     AFB Stain No acid fast bacilli seen on concentrated smear    Blood Culture - Blood, Arm, Left [[id number]] Collected:  03/19/20 0717    Lab Status:  Final result Specimen:  Blood from Arm, Left Updated:  03/24/20 0745     Blood Culture No growth at 5 days    Blood Culture - Blood, Wrist, Left [[id number]] Collected:  03/19/20 0717    Lab Status:  Final result Specimen:  Blood from Wrist, Left Updated:  03/24/20 0745      Blood Culture No growth at 5 days    Respiratory Panel, PCR - Swab, Nasopharynx [[id number]]  (Normal) Collected:  03/22/20 1407    Lab Status:  Final result Specimen:  Swab from Nasopharynx Updated:  03/22/20 1532     ADENOVIRUS, PCR Not Detected     Coronavirus 229E Not Detected     Coronavirus HKU1 Not Detected     Coronavirus NL63 Not Detected     Coronavirus OC43 Not Detected     Human Metapneumovirus Not Detected     Human Rhinovirus/Enterovirus Not Detected     Influenza B PCR Not Detected     Parainfluenza Virus 1 Not Detected     Parainfluenza Virus 2 Not Detected     Parainfluenza Virus 3 Not Detected     Parainfluenza Virus 4 Not Detected     Bordetella pertussis pcr Not Detected     Influenza A H1 2009 PCR Not Detected     Chlamydophila pneumoniae PCR Not Detected     Mycoplasma pneumo by PCR Not Detected     Influenza A PCR Not Detected     Influenza A H3 Not Detected     Influenza A H1 Not Detected     RSV, PCR Not Detected     Bordetella parapertussis PCR Not Detected    Narrative:       The coronavirus on the RVP is NOT COVID-19 and is NOT indicative of infection with COVID-19.     MRSA Screen, PCR - Swab, Nares [[id number]]  (Normal) Collected:  03/19/20 1132    Lab Status:  Final result Specimen:  Swab from Nares Updated:  03/19/20 1312     MRSA PCR Negative    Narrative:       MRSA Negative    Respiratory Panel, PCR - Swab, Nasopharynx [[id number]]  (Normal) Collected:  03/19/20 0716    Lab Status:  Final result Specimen:  Swab from Nasopharynx Updated:  03/19/20 1015     ADENOVIRUS, PCR Not Detected     Coronavirus 229E Not Detected     Coronavirus HKU1 Not Detected     Coronavirus NL63 Not Detected     Coronavirus OC43 Not Detected     Human Metapneumovirus Not Detected     Human Rhinovirus/Enterovirus Not Detected     Influenza B PCR Not Detected     Parainfluenza Virus 1 Not Detected     Parainfluenza Virus 2 Not Detected     Parainfluenza Virus 3 Not Detected     Parainfluenza Virus 4 Not  Detected     Bordetella pertussis pcr Not Detected     Influenza A H1 2009 PCR Not Detected     Chlamydophila pneumoniae PCR Not Detected     Mycoplasma pneumo by PCR Not Detected     Influenza A PCR Not Detected     Influenza A H3 Not Detected     Influenza A H1 Not Detected     RSV, PCR Not Detected     Bordetella parapertussis PCR Not Detected    Narrative:       The coronavirus on the RVP is NOT COVID-19 and is NOT indicative of infection with COVID-19.           Imaging Results (Last 24 Hours)     ** No results found for the last 24 hours. **          Results for orders placed during the hospital encounter of 03/19/20   Adult Transthoracic Echo Complete W/ Cont if Necessary Per Protocol    Narrative · Left ventricular systolic function is low normal.  · Estimated EF = 50%.  · Moderate mitral valve regurgitation is present  · Severe tricuspid valve regurgitation is present.  · Calculated right ventricular systolic pressure from tricuspid   regurgitation is 48.6 mmHg.          I have reviewed the medications:  Scheduled Meds:    apixaban 2.5 mg Oral Q12H   atorvastatin 80 mg Oral Nightly   cefdinir 300 mg Oral Q24H   citalopram 20 mg Oral QAM   doxycycline 100 mg Oral Q12H   famotidine 20 mg Oral Nightly   ferrous sulfate 325 mg Oral Daily With Breakfast   levETIRAcetam 125 mg Oral Q12H   metoprolol succinate  mg Oral Daily   mirtazapine 7.5 mg Oral Nightly   multivitamin with minerals 1 tablet Oral Daily   sodium chloride 10 mL Intravenous Q12H   verapamil 80 mg Oral Q8H     Continuous Infusions:     PRN Meds:.•  acetaminophen **OR** acetaminophen **OR** acetaminophen  •  docusate sodium  •  famotidine  •  melatonin  •  ondansetron **OR** ondansetron  •  sodium chloride    Assessment/Plan   Assessment & Plan     Active Hospital Problems    Diagnosis  POA   • **Aspiration pneumonia due to vomitus (CMS/[facility]) [J69.0]  Yes   • SABRINA (acute kidney injury) (CMS/[facility]) [N17.9]  Yes   • Chronic atrial fibrillation with  RVR [I48.20]  Yes   • Severe sepsis (CMS/HCC) [A41.9, R65.20]  Yes   • Dysphasia [R47.02]  Yes   • Lactic acidosis [E87.2]  Yes   • Acute and chronic respiratory failure with hypoxia (CMS/[facility]) [J96.21]  Yes   • Acute febrile illness [R50.9]  Yes   • Acute on chronic diastolic CHF (congestive heart failure) (CMS/[facility]) [I50.33]  Yes   • Underweight [R63.6]  Yes   • CKD (chronic kidney disease) stage 3, GFR 30-59 ml/min (CMS/[facility]) [N18.3]  Yes   • Chronic atrial fibrillation [I48.20]  Yes   • Anorexia [R63.0]  Yes   • Anxiety [F41.9]  Yes   • Debility [R53.81]  Yes   • Hypertension [I10]  Yes      Resolved Hospital Problems   No resolved problems to display.        Brief Hospital Course to date:  Mirian Sy is a 80 y.o. female who presented to the ER with fever and shortness of breath.  She was admitted to the hospital with severe sepsis present on admission and treated for aspiration pneumonia.  Additionally she was found to be in A. fib RVR.  She was seen by Dr. Olguin on admission due to acute on chronic diastolic heart failure and chronic atrial fibrillation with RVR.  She has been struggling with shortness of breath and has been intermittently on BiPAP and high flow nasal cannula.  She appears to be with acute on chronic respiratory failure and currently we are struggling to get her off high flow nasal cannula.  (I have copied the above from my colleagues note.  However, I have checked the accuracy of the information myself independently.)    Severe Sepsis (resolved)    Aspiration Pneumonia (resolved)    Acute on Chronic Respiratory failure  -No CT evidence of COVID-19  -Pulmonology is following   - CT guided thoracentesis done , labs are so far bland    Leukocytosis, much improved    Afib with RVR, rate is better controlled.  Patient  on Eliquis.    Acute on Chronic CHF  -DR Olguin following     CKD III  -Monitor creatinine.           DVT Prophylaxis: Apixaban 2.5 mg oral every 12 hours,  mechanincal    Disposition: I expect the patient to be discharged TBD    CODE STATUS:  DNR  Code Status and Medical Interventions:   Ordered at: 03/31/20 1435     Limited Support to NOT Include:    Intubation     Level Of Support Discussed With:    Patient    Next of Kin (If No Surrogate)     Code Status:    No CPR     Medical Interventions (Level of Support Prior to Arrest):    Limited         Electronically signed by Butch Bustillo MD, 04/01/20, 12:11.      Electronically signed by Butch Bustillo MD at 04/01/20 1215     Bri Olguin MD at 04/01/20 0749          [facility] Daily Note       LOS: 13 days   Patient Care Team:  Oren Engel DO as PCP - General (Family Medicine)  Oren Engel DO as PCP - Claims Attributed    Chief Complaint:  Afib; CHF    Subjective     Subjective: No complaints this morning.  Feeling better.  Remains on BiPAP at night and on high flow nasal cannula during the day.  Feels like she is doing better.      Review of Systems:   As above.    Medications:    apixaban 2.5 mg Oral Q12H   atorvastatin 80 mg Oral Nightly   cefdinir 300 mg Oral Q24H   citalopram 20 mg Oral QAM   digoxin 125 mcg Oral Every Other Day   doxycycline 100 mg Oral Q12H   famotidine 20 mg Oral Nightly   ferrous sulfate 325 mg Oral Daily With Breakfast   levETIRAcetam 125 mg Oral Q12H   metoprolol succinate  mg Oral Daily   mirtazapine 7.5 mg Oral Nightly   multivitamin with minerals 1 tablet Oral Daily   sodium chloride 10 mL Intravenous Q12H   verapamil 80 mg Oral Q8H       Objective     Vital Sign Min/Max for last 24 hours  Temp  Min: 97.9 °F (36.6 °C)  Max: 99.7 °F (37.6 °C)   BP  Min: 103/66  Max: 132/65   Pulse  Min: 58  Max: 98   Resp  Min: 21  Max: 24   SpO2  Min: 83 %  Max: 100 %   Flow (L/min)  Min: 6  Max: 50   Weight  Min: 52.2 kg (115 lb)  Max: 52.2 kg (115 lb)      Intake/Output Summary (Last 24 hours) at 4/1/2020 0792  Last data filed at 3/31/2020 0953  Gross  "per 24 hour   Intake 200 ml   Output --   Net 200 ml        Flowsheet Rows      First Filed Value   Admission Height  170.2 cm (67\") Documented at 03/19/2020 0652   Admission Weight  45.4 kg (100 lb) Documented at 03/19/2020 0652          Physical Exam:    General: Alert and oriented   Cardiovascular: Heart has a nondisplaced focal PMI. Regular rate and rhythm without murmur, gallop or rub.  Lungs: A few expiratory wheezes are heard.  Equal expansion is noted.  Extremities: Show no edema.  Skin: warm and dry.  Neurologic: nonfocal      Tele: Probable A. fib however the rate is slow and appears nearly regular.  Currently 64 bpm.       Results Review:    I reviewed the patient's new clinical results.    Labs:    Results from last 7 days   Lab Units 04/01/20  0614 03/31/20  0334 03/30/20  0613   SODIUM mmol/L 147* 145 143   POTASSIUM mmol/L 3.9 4.2 3.2*   CHLORIDE mmol/L 110* 108* 102   CO2 mmol/L 27.0 23.0 27.0   BUN mg/dL 39* 41* 39*   CREATININE mg/dL 1.56* 1.82* 1.91*   CALCIUM mg/dL 8.9 9.1 9.2   GLUCOSE mg/dL 113* 129* 108*     Results from last 7 days   Lab Units 03/29/20  0756 03/28/20  0406 03/27/20  1151   WBC 10*3/mm3 12.17* 13.56* 11.86*   HEMOGLOBIN g/dL 10.0* 9.9* 10.2*   HEMATOCRIT % 32.0* 31.8* 31.4*   PLATELETS 10*3/mm3 366 326 334     Lab Results   Component Value Date    TROPONINI 0.02 06/25/2015    TROPONINI <0.01 06/25/2015    TROPONINI 0.01 02/24/2014    TROPONINT 0.011 03/19/2020    TROPONINT <0.010 12/12/2019    TROPONINT <0.010 12/12/2019     Lab Results   Component Value Date    CHOL 165 12/19/2018    CHOL 182 12/17/2018    CHOL 164 09/13/2018     Lab Results   Component Value Date    TRIG 44 12/19/2018    TRIG 72 12/17/2018    TRIG 57 09/13/2018     Lab Results   Component Value Date    HDL 51 12/19/2018    HDL 58 12/17/2018    HDL 53 09/13/2018     No components found for: LDLCALC  Lab Results   Component Value Date    INR 1.68 (H) 03/30/2020    INR 1.38 12/22/2015    INR 1.3 (H) 11/03/2015 "    PROTIME 19.5 (H) 2020    PROTIME 14.8 (H) 2015    PROTIME 15.6 (H) 2015         Echo 3/20/20:  · Left ventricular systolic function is low normal.  · Estimated EF = 50%.  · Moderate mitral valve regurgitation is present  · Severe tricuspid valve regurgitation is present.  · Calculated right ventricular systolic pressure from tricuspid regurgitation is 48.6 mmHg.    Assessment   Assessment:  1) Chronic atrial fibrillation with RVR  - On Eliquis 2.5 mg bid (reduced owed to low BMI and crcl).     2) Acute on chronic diastolic hear failure  - h/o Takotsubo in  with recovery of EF.   - Echo 3/20/20: EF 50%.   - s/p thoracentesis, 3/30/20 with removal of 700 mL of fluid from the right chest.     3) Hypertension, controlled.     4) Malnourished, anorexia with BMI of 15, debility  - per medicine    5) Severe sepsis due to aspiration PNA  - per medicine    6) SABRINA on CKD    Plan:    Continue Toprol- mg daily  Continue verapamil at 80 mg every 8 hours with anticipate change to XR dosing at discharge  Continue Eliquis  Discontinue digoxin as her rate is better controlled  Continue pulmonary toilet.      We will sign off.  Call again if needed.  Rate appears well controlled.       CODE STATUS no CPR/no intubation.        Electronically signed by GREG Sosa, 20, 7:49 AM.    I have seen and examined the patient, case was discussed with the physician extender, reviewed the above note, necessary changes were made and I agree with the final note.  Bri Olguin MD, FACC            Electronically signed by Bri Olguin MD at 20 0808     Butch Bustillo MD at 20 1604              [facility] Medicine Services  PROGRESS NOTE    Patient Name: Mirian Sy  : 1940  MRN: [id number]    Date of Admission: 3/19/2020  Primary Care Physician: Oren Engel DO    Subjective   Subjective     CC:    F/u dyspnea    HPI:     Patient is resting in bed still on bipap. Tells me she is ok.  No fever or chills.  No chest pain, palpitation.  No cough or coryza.  No nausea, vomiting, diarrhea, abdominal pain.    Review of Systems    Gen- No fevers, chills  CV- No chest pain, palpitations  Resp- No cough,+ dyspnea, remains on high flow oxygen  GI- No N/V/D, abd pain    Objective   Objective     Vital Signs:   Temp:  [96.7 °F (35.9 °C)-98 °F (36.7 °C)] 98 °F (36.7 °C)  Heart Rate:  [] 92  Resp:  [20-24] 24  BP: (109-132)/(59-90) 130/90        Physical Exam:    Constitutional: No acute distress, looks comfortable.  HENT: NCAT,  PERRLA  Respiratory: decreased breath sounds throughout, breathing effort is normal  Cardiovascular: RRR, no murmur or gallop, rubs.  Gastrointestinal: Positive bowel sounds, soft, nontender, nondistended  Musculoskeletal: No bilateral ankle edema, very low muscle mass.  Psychiatric: Appropriate affect, cooperative  Neurologic: Awake, alert, follows commands, no focality.  Skin: no rash     Results Reviewed:  Results from last 7 days   Lab Units 03/30/20  0836 03/29/20  0756 03/28/20  0406 03/27/20  1151   WBC 10*3/mm3  --  12.17* 13.56* 11.86*   HEMOGLOBIN g/dL  --  10.0* 9.9* 10.2*   HEMATOCRIT %  --  32.0* 31.8* 31.4*   PLATELETS 10*3/mm3  --  366 326 334   INR  1.68*  --   --   --    PROCALCITONIN ng/mL  --   --  1.00*  --      Results from last 7 days   Lab Units 03/31/20  0334 03/30/20  0613 03/29/20  0756  03/27/20  2237   SODIUM mmol/L 145 143 143   < >  --    POTASSIUM mmol/L 4.2 3.2* 3.3*   < >  --    CHLORIDE mmol/L 108* 102 101   < >  --    CO2 mmol/L 23.0 27.0 27.0   < >  --    BUN mg/dL 41* 39* 38*   < >  --    CREATININE mg/dL 1.82* 1.91* 2.15*   < >  --    GLUCOSE mg/dL 129* 108* 111*   < >  --    CALCIUM mg/dL 9.1 9.2 9.2   < >  --    PROBNP pg/mL  --   --   --   --  6,547.0*    < > = values in this interval not displayed.     Estimated Creatinine Clearance: 18.7 mL/min (A) (by C-G formula based on  SCr of 1.82 mg/dL (H)).    Microbiology Results Abnormal     Procedure Component Value - Date/Time    AFB Culture - Body Fluid, Pleural Cavity [[id number]] Collected:  03/30/20 1347    Lab Status:  Preliminary result Specimen:  Body Fluid from Pleural Cavity Updated:  03/31/20 1125     AFB Stain No acid fast bacilli seen on concentrated smear    Blood Culture - Blood, Arm, Right [[id number]] Collected:  03/28/20 0902    Lab Status:  Preliminary result Specimen:  Blood from Arm, Right Updated:  03/31/20 0930     Blood Culture No growth at 3 days    Blood Culture - Blood, Arm, Left [[id number]] Collected:  03/28/20 0903    Lab Status:  Preliminary result Specimen:  Blood from Arm, Left Updated:  03/31/20 0930     Blood Culture No growth at 3 days    Body Fluid Culture - Body Fluid, Pleural Cavity [[id number]] Collected:  03/30/20 1347    Lab Status:  Preliminary result Specimen:  Body Fluid from Pleural Cavity Updated:  03/31/20 0851     Body Fluid Culture No growth     Gram Stain Many (4+) WBCs per low power field      No organisms seen    Blood Culture - Blood, Arm, Left [[id number]] Collected:  03/19/20 0717    Lab Status:  Final result Specimen:  Blood from Arm, Left Updated:  03/24/20 0745     Blood Culture No growth at 5 days    Blood Culture - Blood, Wrist, Left [[id number]] Collected:  03/19/20 0717    Lab Status:  Final result Specimen:  Blood from Wrist, Left Updated:  03/24/20 0745     Blood Culture No growth at 5 days    Respiratory Panel, PCR - Swab, Nasopharynx [[id number]]  (Normal) Collected:  03/22/20 1407    Lab Status:  Final result Specimen:  Swab from Nasopharynx Updated:  03/22/20 1532     ADENOVIRUS, PCR Not Detected     Coronavirus 229E Not Detected     Coronavirus HKU1 Not Detected     Coronavirus NL63 Not Detected     Coronavirus OC43 Not Detected     Human Metapneumovirus Not Detected     Human Rhinovirus/Enterovirus Not Detected     Influenza B PCR Not Detected     Parainfluenza Virus 1 Not  Detected     Parainfluenza Virus 2 Not Detected     Parainfluenza Virus 3 Not Detected     Parainfluenza Virus 4 Not Detected     Bordetella pertussis pcr Not Detected     Influenza A H1 2009 PCR Not Detected     Chlamydophila pneumoniae PCR Not Detected     Mycoplasma pneumo by PCR Not Detected     Influenza A PCR Not Detected     Influenza A H3 Not Detected     Influenza A H1 Not Detected     RSV, PCR Not Detected     Bordetella parapertussis PCR Not Detected    Narrative:       The coronavirus on the RVP is NOT COVID-19 and is NOT indicative of infection with COVID-19.     MRSA Screen, PCR - Swab, Nares [[id number]]  (Normal) Collected:  03/19/20 1132    Lab Status:  Final result Specimen:  Swab from Nares Updated:  03/19/20 1312     MRSA PCR Negative    Narrative:       MRSA Negative    Respiratory Panel, PCR - Swab, Nasopharynx [[id number]]  (Normal) Collected:  03/19/20 0716    Lab Status:  Final result Specimen:  Swab from Nasopharynx Updated:  03/19/20 1015     ADENOVIRUS, PCR Not Detected     Coronavirus 229E Not Detected     Coronavirus HKU1 Not Detected     Coronavirus NL63 Not Detected     Coronavirus OC43 Not Detected     Human Metapneumovirus Not Detected     Human Rhinovirus/Enterovirus Not Detected     Influenza B PCR Not Detected     Parainfluenza Virus 1 Not Detected     Parainfluenza Virus 2 Not Detected     Parainfluenza Virus 3 Not Detected     Parainfluenza Virus 4 Not Detected     Bordetella pertussis pcr Not Detected     Influenza A H1 2009 PCR Not Detected     Chlamydophila pneumoniae PCR Not Detected     Mycoplasma pneumo by PCR Not Detected     Influenza A PCR Not Detected     Influenza A H3 Not Detected     Influenza A H1 Not Detected     RSV, PCR Not Detected     Bordetella parapertussis PCR Not Detected    Narrative:       The coronavirus on the RVP is NOT COVID-19 and is NOT indicative of infection with COVID-19.           Imaging Results (Last 24 Hours)     Procedure Component Value  Units Date/Time    XR Chest 1 View [[id number]] Collected:  03/31/20 0755     Updated:  03/31/20 1032    Narrative:       EXAMINATION: XR CHEST 1 VW-03/31/2020:      INDICATION: Effusion; R50.9-Fever, unspecified; I48.91-Unspecified  atrial fibrillation; I10-Essential (primary) hypertension; Z74.09-Other  reduced mobility; R13.12-Dysphagia, oropharyngeal phase.      COMPARISON: Chest x-ray 03/29/2020.     FINDINGS: Persistent massive cardiomegaly with a trace volume right  pleural effusion similar to prior along with background interstitial  opacifications bilaterally.           Impression:       Persistent massive cardiomegaly with a trace volume right  pleural effusion similar to prior along with background interstitial  opacifications bilaterally.         D:  03/31/2020  E:  03/31/2020     This report was finalized on 3/31/2020 10:29 AM by Dr. Sadi Miller.       CT Guided Thoracentesis [[id number]] Collected:  03/30/20 1645     Updated:  03/30/20 1651    Narrative:       PROCEDURE: CT-guided thoracentesis     Procedural Personnel  Attending physician(s): CIARA Karimi M.D.  Fellow physician(s): None  Resident physician(s): None  Advanced practice provider(s): None     Pre-procedure diagnosis: Acute on chronic respiratory failure.  Aspiration related pneumonia. Symptomatic right side pleural fluid  collection  Post-procedure diagnosis: Same  Indication: Diagnostic/therapeutic  Additional clinical history: None     Complications: No immediate complications.       Impression:          CT-guided thoracentesis with drainage of 700 mL of serous fluid. Portion  sent for requested laboratory analysis.     Plan:      Resume care by clinical team.  _______________________________________________________________     PROCEDURE SUMMARY:  - Limited thoracic CT  - CT-guided thoracentesis  - Additional procedure(s): None     PROCEDURE DETAILS:     Pre-procedure  Consent: Informed consent for the procedure including risks,  benefits  and alternatives was obtained and time-out was performed prior to the  procedure.  Preparation: The site was prepared and draped using maximal sterile  barrier technique including cutaneous antisepsis.     Anesthesia/sedation  Level of anesthesia/sedation: None     Limited thoracic CT  Limited thoracic CT was performed. A safe window for thoracentesis was  identified.   Right hemithorax findings: Moderate-sized right side pleural fluid  collection     Thoracentesis  Local anesthesia was administered. The pleural space was accessed under  CT fluoroscopic guidance with a 6.5 Malagasy drainage catheter using  trocar technique. The centesis catheter was advanced through the trocar  removed. The fluid was drained. The catheter was removed, and a sterile  bandage was applied.  Catheter placed: 6.5 Malagasy  Post-drainage hemithorax findings: No immediate complication     Radiation Dose  CT dose length product (mGy-cm): 294     Additional Details  Additional description of procedure: None  Equipment details: None  Specimens removed: Pleural fluid  Estimated blood loss (mL): Less than 10  Standardized report: Thoracentesis     Attestation  I was present and scrubbed for the entire procedure. Imaging reviewed.  Agree with final report as written.        This report was finalized on 3/30/2020 4:48 PM by Shravan Karimi.             Results for orders placed during the hospital encounter of 03/19/20   Adult Transthoracic Echo Complete W/ Cont if Necessary Per Protocol    Narrative · Left ventricular systolic function is low normal.  · Estimated EF = 50%.  · Moderate mitral valve regurgitation is present  · Severe tricuspid valve regurgitation is present.  · Calculated right ventricular systolic pressure from tricuspid   regurgitation is 48.6 mmHg.          I have reviewed the medications:  Scheduled Meds:    apixaban 2.5 mg Oral Q12H   atorvastatin 80 mg Oral Nightly   [START ON 4/1/2020] cefdinir 300 mg Oral Q24H    citalopram 20 mg Oral QAM   digoxin 125 mcg Oral Every Other Day   doxycycline 100 mg Oral Q12H   famotidine 20 mg Oral Nightly   ferrous sulfate 325 mg Oral Daily With Breakfast   levETIRAcetam 125 mg Oral Q12H   metoprolol succinate  mg Oral Daily   mirtazapine 7.5 mg Oral Nightly   multivitamin with minerals 1 tablet Oral Daily   sodium chloride 10 mL Intravenous Q12H   verapamil 80 mg Oral Q8H     Continuous Infusions:     PRN Meds:.•  acetaminophen **OR** acetaminophen **OR** acetaminophen  •  docusate sodium  •  famotidine  •  melatonin  •  ondansetron **OR** ondansetron  •  sodium chloride    Assessment/Plan   Assessment & Plan     Active Hospital Problems    Diagnosis  POA   • **Aspiration pneumonia due to vomitus (CMS/[facility]) [J69.0]  Yes   • SABRINA (acute kidney injury) (CMS/[facility]) [N17.9]  Yes   • Chronic atrial fibrillation with RVR [I48.20]  Yes   • Severe sepsis (CMS/[facility]) [A41.9, R65.20]  Yes   • Dysphasia [R47.02]  Yes   • Lactic acidosis [E87.2]  Yes   • Acute and chronic respiratory failure with hypoxia (CMS/[facility]) [J96.21]  Yes   • Acute febrile illness [R50.9]  Yes   • Acute on chronic diastolic CHF (congestive heart failure) (CMS/[facility]) [I50.33]  Yes   • Underweight [R63.6]  Yes   • CKD (chronic kidney disease) stage 3, GFR 30-59 ml/min (CMS/[facility]) [N18.3]  Yes   • Chronic atrial fibrillation [I48.20]  Yes   • Anorexia [R63.0]  Yes   • Anxiety [F41.9]  Yes   • Debility [R53.81]  Yes   • Hypertension [I10]  Yes      Resolved Hospital Problems   No resolved problems to display.        Brief Hospital Course to date:  Mirian Sy is a 80 y.o. female who presented to the ER with fever and shortness of breath.  She was admitted to the hospital with severe sepsis present on admission and treated for aspiration pneumonia.  Additionally she was found to be in A. fib RVR.  She was seen by Dr. Olguin on admission due to acute on chronic diastolic heart failure and chronic atrial fibrillation with RVR.   She has been struggling with shortness of breath and has been intermittently on BiPAP and high flow nasal cannula.  She appears to be with acute on chronic respiratory failure and currently we are struggling to get her off high flow nasal cannula.  (I have copied the above from my colleagues note.  However, I have checked the accuracy of the information myself independently.)    Severe Sepsis (resolved)    Aspiration Pneumonia (resolved)    Acute on Chronic Respiratory failure  -No CT evidence of COVID-19  -Pulmonology is following   -High flow durign day and BiPap at night   - CT guided thoracentesis, done yesterday, labs are so far bland    Leukocytosis, much improved    Afib with RVR, rate is still not controlled.  Patient  on Eliquis.    Acute on Chronic CHF  -DR Olguin following     CKD III  -Monitor creatinine.           DVT Prophylaxis: Apixaban 2.5 mg oral every 12 hours, mechanincal    Disposition: I expect the patient to be discharged TBD    CODE STATUS:   Code Status and Medical Interventions:   Ordered at: 03/31/20 1435     Limited Support to NOT Include:    Intubation     Level Of Support Discussed With:    Patient    Next of Kin (If No Surrogate)     Code Status:    No CPR     Medical Interventions (Level of Support Prior to Arrest):    Limited         Electronically signed by Butch Bustillo MD, 03/31/20, 16:04.      Electronically signed by Butch Bustillo MD at 03/31/20 5406     Venkat Fay MD at 03/31/20 1238          Pulmonary Medicine Follow-up     Hospital:  LOS: 12 days   Ms. Mirian Sy, 80 y.o. female is followed for:     Aspiration pneumonia due to vomitus (CMS/HCC)    Hypertension    Debility    Anorexia    Anxiety    Chronic atrial fibrillation    CKD (chronic kidney disease) stage 3, GFR 30-59 ml/min (CMS/HCC)    Underweight    Acute on chronic diastolic CHF (congestive heart failure) (CMS/HCC)    SABRINA (acute kidney injury) (CMS/HCC)    Chronic atrial fibrillation  with RVR    Severe sepsis (CMS/HCC)    Dysphasia    Lactic acidosis    Acute and chronic respiratory failure with hypoxia (CMS/HCC)    Acute febrile illness       Subjective   Subjective   Interval History:  The chart has been reviewed.  I was able to visit with the patient this morning.  Family is not present currently.  She states that she is a little short of breath but otherwise feels very comfortable currently.  She is currently on high flow nasal cannula oxygen but is not distressed.  She underwent thoracentesis yesterday with approximately 700 mL of exudative fluid.  All culture data has been reviewed and is currently negative from this.  She denies any current chest pain.  She denies subjective fever.  He has not had any documented fevers.    The patient's relevant past medical, surgical and social history were reviewed and updated in Epic as appropriate.     Review of systems was completed and is negative except as detailed above.  Objective   Objective     Medications:    atorvastatin 80 mg Oral Nightly   cefTRIAXone 1 g Intravenous Q24H   citalopram 20 mg Oral QAM   digoxin 125 mcg Oral Every Other Day   doxycycline 100 mg Intravenous Q12H   famotidine 20 mg Oral Nightly   ferrous sulfate 325 mg Oral Daily With Breakfast   levETIRAcetam 125 mg Oral Q12H   magnesium oxide 400 mg Oral Daily   metoprolol succinate  mg Oral Daily   mirtazapine 7.5 mg Oral Nightly   multivitamin with minerals 1 tablet Oral Daily   sodium chloride 10 mL Intravenous Q12H   verapamil 80 mg Oral Q8H       Vital Sign Min/Max for last 24 hours  Temp  Min: 96.7 °F (35.9 °C)  Max: 97.9 °F (36.6 °C)   BP  Min: 109/59  Max: 149/99   Pulse  Min: 83  Max: 114   Resp  Min: 20  Max: 22   SpO2  Min: 89 %  Max: 99 %   Flow (L/min)  Min: 6  Max: 50       Input/Output for last 24 hour shift  03/30 0701 - 03/31 0700  In: 300 [P.O.:100]  Out: -      Objective:  General Appearance:  Comfortable, ill-appearing and in no acute distress.   "  Vital signs: (most recent): Blood pressure 132/65, pulse 86, temperature 97.9 °F (36.6 °C), temperature source Oral, resp. rate 22, height 170.2 cm (67\"), weight 47.3 kg (104 lb 3.2 oz), SpO2 96 %.    HEENT: (High flow nasal cannula oxygen)    Lungs:  Normal effort.  (Decreased air movement bilaterally.  There are mild crackles in the bilateral bases.  I do not hear any wheezes currently.  No rhonchi.)  Heart: Normal rate.  Regular rhythm.  S1 normal and S2 normal.  No murmur.   Abdomen: Abdomen is soft and non-distended.  Bowel sounds are normal.     Extremities: Normal range of motion.  There is no deformity.    Neurological: Patient is alert and oriented to person, place and time.    Pupils:  Pupils are equal, round, and reactive to light.  Pupils are equal.   Skin:  Warm.  No rash.             Results from last 7 days   Lab Units 03/29/20  0756 03/28/20  0406 03/27/20  1151   WBC 10*3/mm3 12.17* 13.56* 11.86*   HEMOGLOBIN g/dL 10.0* 9.9* 10.2*   PLATELETS 10*3/mm3 366 326 334     Results from last 7 days   Lab Units 03/31/20  0334 03/30/20  0613 03/29/20  0756  03/25/20  0409   SODIUM mmol/L 145 143 143   < > 140   POTASSIUM mmol/L 4.2 3.2* 3.3*   < > 3.8   CO2 mmol/L 23.0 27.0 27.0   < > 25.0   BUN mg/dL 41* 39* 38*   < > 20   CREATININE mg/dL 1.82* 1.91* 2.15*   < > 1.45*   MAGNESIUM mg/dL  --  2.4  --   --  2.5*   PHOSPHORUS mg/dL  --  3.6  --   --  3.1   GLUCOSE mg/dL 129* 108* 111*   < > 97    < > = values in this interval not displayed.     Estimated Creatinine Clearance: 18.7 mL/min (A) (by C-G formula based on SCr of 1.82 mg/dL (H)).    Results from last 7 days   Lab Units 03/27/20  2300   PH, ARTERIAL pH units 7.452*   PCO2, ARTERIAL mm Hg 38.4   PO2 ART mm Hg 80.0*          I reviewed the patient's results and images.     Assessment/Plan   Impression & Plan       Aspiration pneumonia due to vomitus (CMS/HCC)    Hypertension    Debility    Anorexia    Anxiety    Chronic atrial fibrillation    CKD " (chronic kidney disease) stage 3, GFR 30-59 ml/min (CMS/[facility])    Underweight    Acute on chronic diastolic CHF (congestive heart failure) (CMS/[facility])    SABRINA (acute kidney injury) (CMS/[facility])    Chronic atrial fibrillation with RVR    Severe sepsis (CMS/[facility])    Dysphasia    Lactic acidosis    Acute and chronic respiratory failure with hypoxia (CMS/[facility])    Acute febrile illness       Fluid results suggest an exudate.  I suspect that this is likely secondary to her aspiration pneumonia.  I would continue on with current antimicrobial therapy.  I have encouraged pulmonary hygiene.  Wean oxygen as tolerated.  She remains at high risk for worsening secondary to respiratory failure.  I will continue to see as needed.      I discussed the patient's findings and my recommendations with patient       Venkat Fay MD, [facility]  Pulmonary and Critical Care Medicine  03/31/20 12:39       Electronically signed by Venkat Fay MD at 03/31/20 1247     [facility] HospitalBri MD at 03/31/20 0735          [facility] Daily Note       LOS: 12 days   Patient Care Team:  Oren Engel DO as PCP - General (Family Medicine)  Oren Engel DO as PCP - Claims Attributed    Chief Complaint:  Afib; CHF    Subjective     Subjective: Sleeping soundly on BiPAP.  Underwent thoracentesis yesterday with removal of 700 mL.  Heart rates are better controlled.      Review of Systems:   As above.    Medications:    atorvastatin 80 mg Oral Nightly   cefTRIAXone 1 g Intravenous Q24H   citalopram 20 mg Oral QAM   digoxin 125 mcg Oral Every Other Day   doxycycline 100 mg Intravenous Q12H   famotidine 20 mg Oral Nightly   ferrous sulfate 325 mg Oral Daily With Breakfast   levETIRAcetam 125 mg Oral Q12H   magnesium oxide 400 mg Oral Daily   metoprolol succinate  mg Oral Daily   mirtazapine 7.5 mg Oral Nightly   multivitamin with minerals 1 tablet Oral Daily   sodium chloride 10 mL Intravenous Q12H   verapamil 80 mg Oral Q8H  "      Objective     Vital Sign Min/Max for last 24 hours  Temp  Min: 96.5 °F (35.8 °C)  Max: 97.8 °F (36.6 °C)   BP  Min: 109/59  Max: 149/99   Pulse  Min: 87  Max: 114   Resp  Min: 20  Max: 22   SpO2  Min: 89 %  Max: 99 %   Flow (L/min)  Min: 6  Max: 45   Weight  Min: 47.3 kg (104 lb 3.2 oz)  Max: 47.3 kg (104 lb 3.2 oz)      Intake/Output Summary (Last 24 hours) at 3/31/2020 0735  Last data filed at 3/30/2020 1800  Gross per 24 hour   Intake 300 ml   Output --   Net 300 ml        Flowsheet Rows      First Filed Value   Admission Height  170.2 cm (67\") Documented at 03/19/2020 0652   Admission Weight  45.4 kg (100 lb) Documented at 03/19/2020 0652          Physical Exam:    General: Sleeping soundly  Cardiovascular: Heart has a nondisplaced focal PMI. Irregular rate and rhythm without murmur, gallop or rub.  Lungs: Clear without rales or wheezes. Equal expansion is noted.  Slightly decreased bases  Extremities: Show no edema.  Skin: warm and dry.  Neurologic: Sleeping         Results Review:    I reviewed the patient's new clinical results.  EKG:  Tele:  Afib 90's at time of exam.     Labs:    Results from last 7 days   Lab Units 03/31/20  0334 03/30/20  0613 03/29/20  0756   SODIUM mmol/L 145 143 143   POTASSIUM mmol/L 4.2 3.2* 3.3*   CHLORIDE mmol/L 108* 102 101   CO2 mmol/L 23.0 27.0 27.0   BUN mg/dL 41* 39* 38*   CREATININE mg/dL 1.82* 1.91* 2.15*   CALCIUM mg/dL 9.1 9.2 9.2   GLUCOSE mg/dL 129* 108* 111*     Results from last 7 days   Lab Units 03/29/20  0756 03/28/20  0406 03/27/20  1151   WBC 10*3/mm3 12.17* 13.56* 11.86*   HEMOGLOBIN g/dL 10.0* 9.9* 10.2*   HEMATOCRIT % 32.0* 31.8* 31.4*   PLATELETS 10*3/mm3 366 326 334     Lab Results   Component Value Date    TROPONINI 0.02 06/25/2015    TROPONINI <0.01 06/25/2015    TROPONINI 0.01 02/24/2014    TROPONINT 0.011 03/19/2020    TROPONINT <0.010 12/12/2019    TROPONINT <0.010 12/12/2019     Lab Results   Component Value Date    CHOL 165 12/19/2018    CHOL 182 " 12/17/2018    CHOL 164 09/13/2018     Lab Results   Component Value Date    TRIG 44 12/19/2018    TRIG 72 12/17/2018    TRIG 57 09/13/2018     Lab Results   Component Value Date    HDL 51 12/19/2018    HDL 58 12/17/2018    HDL 53 09/13/2018     No components found for: LDLCALC  Lab Results   Component Value Date    INR 1.68 (H) 03/30/2020    INR 1.38 12/22/2015    INR 1.3 (H) 11/03/2015    PROTIME 19.5 (H) 03/30/2020    PROTIME 14.8 (H) 12/22/2015    PROTIME 15.6 (H) 11/03/2015         Echo 3/20/20:  · Left ventricular systolic function is low normal.  · Estimated EF = 50%.  · Moderate mitral valve regurgitation is present  · Severe tricuspid valve regurgitation is present.  · Calculated right ventricular systolic pressure from tricuspid regurgitation is 48.6 mmHg.    Assessment   Assessment:  1) Chronic atrial fibrillation with RVR  - On Eliquis 2.5 mg bid (reduced owed to low BMI and crcl).     2) Acute on chronic diastolic hear failure  - h/o Takotsubo in 2006 with recovery of EF.   - Echo 3/20/20: EF 50%.   - s/p thoracentesis, 3/30/20 with removal of 700 mL of fluid from the right chest.     3) Hypertension, controlled.     4) Malnourished, anorexia with BMI of 15, debility  - per medicine    5) Severe sepsis due to aspiration PNA  - per medicine    6) SABRINA on CKD    Plan:    Continue Toprol- mg daily  Continue verapamil at 80 mg every 8 hours anticipated change to XR dosing at discharge.  Resume Eliquis today if okay with interventional radiology  Continue digoxin at 0.125 mg every other day.  Digoxin level this morning is 0.133  Continue pulmonary toilet  Holding Lasix for SABRINA.    CODE STATUS no CPR/no intubation.        Electronically signed by GREG Sosa, 03/31/20, 7:36 AM.    I have seen and examined the patient, case was discussed with the physician extender, reviewed the above note, necessary changes were made and I agree with the final note.  Bri Olguin MD,  FACC          Electronically signed by Bri Olguin MD at 03/31/20 0844          Consult Notes (last 48 hours) (Notes from 03/30/20 1453 through 04/01/20 1453)      Calista Barry APRN at 03/31/20 1116      Consult Orders    1. Inpatient Palliative Care MD Consult [[id number]] ordered by Butch Bustillo MD at 03/30/20 1536                Oren Engel DO  Consulting physician: Butch Bustillo  Reason for referral: goc discussion, ACP  Chief Complaint   Patient presents with   • Shortness of Breath     HPI:   80 y.o. female  with history of CKD stage 3, A fib on chronic anticoagulation, diastolic heart failure, HTN, anxiety, Low BMI 15 who presented to the ED with fever, cough and shortness of breath on 3/19/2020.   Per admission history patient reported choking episode during lunch day before. She was admitted to the hospital with severe sepsis with bilateral effusions with consolidation per CT findings and treated for aspiration pneumonia.  She was seen by Dr. Olguin on admission due to acute on chronic diastolic heart failure and chronic atrial fibrillation with RVR.  She has been struggling with shortness of breath and has been intermittently on BiPAP and high flow nasal cannula.    On 3/28 patient with increase work of breathing overnight, CXR showed worsening pulmonary edema. Started on BiPAP overnight.  On 3/30 Right thoracentesis with serous 700mL removed.  Symptoms:   Patient awake with lunch tray, eating only few bites.   Denies any dyspnea, anxiety, nausea or pain.  When asked if her breathing is better today after procedure yesterday, agreed her effort is easier.    Patient's baseline is walking with walker at home, independent in all basic ADLs, makes her own bed per her report.  She has been living with her son, Dean, for last 3 months, because she was getting weaker and harder for her to take care of things at her home.  Advance care planning discussed: yes  Code Status:  Clarified with patient she would not want intubation with respiratory distress.  Changed to Limited interventions with no intubation.  Current Code Status     Date Active Code Status Order ID Comments User Context       3/30/2020 0950 No CPR [id number]  Butch Bustillo MD Inpatient       Questions for Current Code Status     Question Answer Comment    Code Status No CPR     Medical Interventions (Level of Support Prior to Arrest) Full     Level Of Support Discussed With Patient      Next of Kin (If No Surrogate)         Advance Directive: none on medical record  Surrogate decision maker: NOK - 4 children - Ty, Dean, Ke and Kayla  Past Medical History:   Diagnosis Date   • Atrial fibrillation (CMS/[facility]) 2005    Chronic anticoagulation and rate control   • Basal cell carcinoma 2007    Left leg and nose   • Cardiomyopathy (CMS/[facility]) 2006   • Cerebrovascular accident (CMS/[facility]) 01/2005    CT right parietal CVA. Carotid duplex -50% to 79% left ICS 15% right ICS stenosis. MRI of the neck showed no significant carotid bifurcations of these. Negative CT of the head, 09/10/2012. Carotid duplex, 02/24/2014:  Shelf-like plaque in the CECE without significant elevation and velocities.  Patent vertebral arteries.   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    With dilated cardiomyopathy   • Decubitus ulcer of buttock 2014    Transient during fracture rehabilitation   • Depression 2002    Good response to citalopram and mirtazapine   • DVT (deep venous thrombosis) (CMS/[facility]) 2014    Superficial - right lesser saphenus vein - after hip fracture    • Fracture of bone of forefoot 1972    Right foot   • Fracture of ilium, left (CMS/[facility]) 05/2018    Accidental fall - Uncomplicated recovery   • GERD (gastroesophageal reflux disease) 2014    Chronic superficial gastritis   • HTN (hypertension) 2005   • Iron deficiency anemia due to chronic blood loss 2018    Predisposed by chronic  anticoagulation and GERD   • Low body weight due to inadequate caloric intake Adulthood   • Mitral valve prolapse 1997    Mild MR   • Osteoarthritis    • Osteoporosis    • Patent foramen ovale 2005   • Pneumonia 1947   • SCC (squamous cell carcinoma), arm, right 2017   • Scoliosis    • Syncope 2014     undermined cause - [facility]   • Varicose veins 2005    Symptomatic     Past Surgical History:   Procedure Laterality Date   • ACHILLES TENDON SURGERY Left 1997    Repair of rupture left tendon with screws   • BREAST CYST EXCISION Left    • BREAST SURGERY Left 1982    Excision benign cyst   • CARDIAC CATHETERIZATION  2006    Severe acute coronary spasm   • CATARACT EXTRACTION WITH INTRAOCULAR LENS IMPLANT Bilateral 2015   • EYE CAPSULOTOMY WITH LASER Left 2016    Marked visual improvement   • FEMUR FRACTURE SURGERY Right 2015    Repair of shaft fracture   • HIP FRACTURE SURGERY Left 2014    left hip fracture with pin   • LUMBAR DISC SURGERY  1983   • OVARIAN CYST REMOVAL Left 1996   • SKIN CANCER EXCISION Right 2017    SCC - arm       Reviewed current scheduled and prn medications for route, type, dose and frequency.    Current Facility-Administered Medications   Medication Dose Route Frequency Provider Last Rate Last Dose   • acetaminophen (TYLENOL) tablet 650 mg  650 mg Oral Q4H PRN Darrell Springer MD   650 mg at 03/24/20 2109    Or   • acetaminophen (TYLENOL) 160 MG/5ML solution 650 mg  650 mg Oral Q4H PRN Darrell Springer MD        Or   • acetaminophen (TYLENOL) suppository 650 mg  650 mg Rectal Q4H PRN Darrell Springer MD       • atorvastatin (LIPITOR) tablet 80 mg  80 mg Oral Nightly Darrell Springer MD   80 mg at 03/30/20 2034   • cefTRIAXone (ROCEPHIN) 1 g/100 mL 0.9% NS (MBP)  1 g Intravenous Q24H Ness Purvis DO   1 g at 03/31/20 0952   • citalopram (CeleXA) tablet 20 mg  20 mg Oral QADarrell Diaz MD   20 mg at 03/31/20 0542   • digoxin (LANOXIN) tablet  125 mcg  125 mcg Oral Every Other Day Bri Olguin MD   125 mcg at 03/31/20 0953   • docusate sodium (COLACE) capsule 100 mg  100 mg Oral BID PRN Darrell Springer MD   100 mg at 03/31/20 0952   • doxycycline (VIBRAMYCIN) 100 mg/100 mL 0.9% NS MBP  100 mg Intravenous Q12H Ness Purvis DO   100 mg at 03/31/20 0952   • famotidine (PEPCID) tablet 20 mg  20 mg Oral Nightly Darrell Springer MD   20 mg at 03/30/20 2034   • famotidine (PEPCID) tablet 20 mg  20 mg Oral BID PRN Darrell Springer MD   20 mg at 03/24/20 2040   • ferrous sulfate tablet 325 mg  325 mg Oral Daily With Breakfast Darrell Springer MD   325 mg at 03/31/20 0953   • levETIRAcetam (KEPPRA) tablet 125 mg  125 mg Oral Q12H Darrell Springer MD   125 mg at 03/31/20 0952   • magnesium oxide (MAGOX) tablet 400 mg  400 mg Oral Daily Darrell Springer MD   400 mg at 03/31/20 0952   • melatonin tablet 5 mg  5 mg Oral Nightly PRN Darrell Springer MD   5 mg at 03/28/20 2128   • metoprolol succinate XL (TOPROL-XL) 24 hr tablet 100 mg  100 mg Oral Daily Genny Cabrera APRN   100 mg at 03/31/20 0953   • mirtazapine (REMERON) tablet 7.5 mg  7.5 mg Oral Nightly Darrell Springer MD   7.5 mg at 03/30/20 2034   • multivitamin with minerals 1 tablet  1 tablet Oral Daily Darrell Springer MD   1 tablet at 03/31/20 0952   • ondansetron (ZOFRAN) tablet 4 mg  4 mg Oral Q6H PRN Darrell Springer MD        Or   • ondansetron (ZOFRAN) injection 4 mg  4 mg Intravenous Q6H PRN Darrell Springer MD       • sodium chloride 0.9 % flush 10 mL  10 mL Intravenous Q12H Darrell Springer MD   10 mL at 03/31/20 0953   • sodium chloride 0.9 % flush 10 mL  10 mL Intravenous PRN Darrell Springer MD       • verapamil (CALAN) tablet 80 mg  80 mg Oral Q8H Genny Cabrera APRN   80 mg at 03/30/20 2034        •  acetaminophen **OR** acetaminophen **OR**  acetaminophen  •  docusate sodium  •  famotidine  •  melatonin  •  ondansetron **OR** ondansetron  •  sodium chloride  Allergies   Allergen Reactions   • Actonel [Risedronate Sodium] GI Intolerance   • Hctz [Hydrochlorothiazide]      hyponatremia   • Lisinopril      hyperkalemia     Family History   Problem Relation Age of Onset   • Breast cancer Mother          age 59   • Diabetes Mother    • Hypertension Mother    • Heart disease Father          age 80   • Other Sister         Arthrodesis cervical   • Basal cell carcinoma Sister    • Bone cancer Sister    • Breast cancer Sister 57   • Diabetes Sister          age 56   • Hypertension Sister    • Melanoma Sister    • Arrhythmia Sister         Sinus   • Stroke Sister    • Allergies Son         Hay fever   • Diabetes Maternal Uncle    • Breast cancer Sister 59   • Breast cancer Sister 66   • Ovarian cancer Sister         DX AGE UNKNOWN     Social History     Socioeconomic History   • Marital status:      Spouse name: Not on file   • Number of children: Not on file   • Years of education: Not on file   • Highest education level: Not on file   Tobacco Use   • Smoking status: Former Smoker     Packs/day: 1.00     Years: 20.00     Pack years: 20.00     Types: Cigarettes   • Smokeless tobacco: Never Used   • Tobacco comment: Remote history   Substance and Sexual Activity   • Alcohol use: No   • Drug use: No   • Sexual activity: Defer   Social History Narrative    Domestic life   lives in private home alone - good support from local children        [employer]    [employer]        Sleep hygiene  in bed 9 PM to 6 AM for 9 hours of sleep        Caffeine use   1 1/2 cups coffee daily        Exercise habits  walks most days of the week. - Stretching each morning.          Diet   well-balanced diet with protein supplementations        Occupation    retired         Hearing  no impairment        Vision    no glasses needed after cataract surgery.        "   Driving   daytime only - good weather - local traffic       Review of Systems - +/- per HPI and symptom review.    PPS: 40%  /65 (BP Location: Right arm, Patient Position: Lying)   Pulse 98   Temp 97.9 °F (36.6 °C) (Oral)   Resp 22   Ht 170.2 cm (67\")   Wt 47.3 kg (104 lb 3.2 oz)   LMP  (LMP Unknown)   SpO2 95%   BMI 16.32 kg/m²    47.3 kg (104 lb 3.2 oz) Body mass index is 16.32 kg/m².  Intake & Output (last day)       03/30 0701 - 03/31 0700 03/31 0701 - 04/01 0700    P.O. 100     IV Piggyback 200 200    Total Intake(mL/kg) 300 (6.3) 200 (4.2)    Urine (mL/kg/hr)      Total Output      Net +300 +200          Urine Unmeasured Occurrence 2 x         Physical Exam:  General Appearance: No acute distress, ill appearing  Head: Normocephalic without obvious abnormality, atraumatic  Eyes: Conjunctivae and sclerae normal, no icterus, ALICE  Throat: No oral lesions, no thrush, oral mucosa moist  Lungs: Clear to auscultation, respirations regular, even and non-labored, on HFNC  Heart: Regular rhythm and normal rate, normal S1  Abdomen: Normal bowel sounds, soft, non-distended, non-tender  Extremities: Moves all extremities, no redness, no cyanosis, no edema  Pulses: Distal pulses palpable and equal bilaterally  Skin: Warm and dry  Neurological: Alert, interactive, appropriate conversation, no myoclonus, equal hand  and moderate strength, able to lift lower extremities off bed    Reviewed labs and diagnostic results.  Lab Results   Component Value Date    HGBA1C 5.60 12/19/2018     Results from last 7 days   Lab Units 03/29/20  0756   WBC 10*3/mm3 12.17*   HEMOGLOBIN g/dL 10.0*   HEMATOCRIT % 32.0*   PLATELETS 10*3/mm3 366     Results from last 7 days   Lab Units 03/31/20  0334   SODIUM mmol/L 145   POTASSIUM mmol/L 4.2   CHLORIDE mmol/L 108*   CO2 mmol/L 23.0   BUN mg/dL 41*   CREATININE mg/dL 1.82*   CALCIUM mg/dL 9.1   GLUCOSE mg/dL 129*     Results from last 7 days   Lab Units 03/31/20  0334 "   SODIUM mmol/L 145   POTASSIUM mmol/L 4.2   CHLORIDE mmol/L 108*   CO2 mmol/L 23.0   BUN mg/dL 41*   CREATININE mg/dL 1.82*   GLUCOSE mg/dL 129*   CALCIUM mg/dL 9.1     Imaging Results (Last 72 Hours)     Procedure Component Value Units Date/Time    XR Chest 1 View [[id number]] Collected:  03/31/20 0755     Updated:  03/31/20 1032    Narrative:       EXAMINATION: XR CHEST 1 VW-03/31/2020:      INDICATION: Effusion; R50.9-Fever, unspecified; I48.91-Unspecified  atrial fibrillation; I10-Essential (primary) hypertension; Z74.09-Other  reduced mobility; R13.12-Dysphagia, oropharyngeal phase.      COMPARISON: Chest x-ray 03/29/2020.     FINDINGS: Persistent massive cardiomegaly with a trace volume right  pleural effusion similar to prior along with background interstitial  opacifications bilaterally.           Impression:       Persistent massive cardiomegaly with a trace volume right  pleural effusion similar to prior along with background interstitial  opacifications bilaterally.         D:  03/31/2020  E:  03/31/2020     This report was finalized on 3/31/2020 10:29 AM by Dr. Sadi Miller.       CT Guided Thoracentesis [[id number]] Collected:  03/30/20 1645     Updated:  03/30/20 1651    Narrative:       PROCEDURE: CT-guided thoracentesis     Procedural Personnel  Attending physician(s): CIARA Karimi M.D.  Fellow physician(s): None  Resident physician(s): None  Advanced practice provider(s): None     Pre-procedure diagnosis: Acute on chronic respiratory failure.  Aspiration related pneumonia. Symptomatic right side pleural fluid  collection  Post-procedure diagnosis: Same  Indication: Diagnostic/therapeutic  Additional clinical history: None     Complications: No immediate complications.       Impression:          CT-guided thoracentesis with drainage of 700 mL of serous fluid. Portion  sent for requested laboratory analysis.     Plan:      Resume care by clinical  team.  _______________________________________________________________     PROCEDURE SUMMARY:  - Limited thoracic CT  - CT-guided thoracentesis  - Additional procedure(s): None     PROCEDURE DETAILS:     Pre-procedure  Consent: Informed consent for the procedure including risks, benefits  and alternatives was obtained and time-out was performed prior to the  procedure.  Preparation: The site was prepared and draped using maximal sterile  barrier technique including cutaneous antisepsis.     Anesthesia/sedation  Level of anesthesia/sedation: None     Limited thoracic CT  Limited thoracic CT was performed. A safe window for thoracentesis was  identified.   Right hemithorax findings: Moderate-sized right side pleural fluid  collection     Thoracentesis  Local anesthesia was administered. The pleural space was accessed under  CT fluoroscopic guidance with a 6.5 Mongolian drainage catheter using  trocar technique. The centesis catheter was advanced through the trocar  removed. The fluid was drained. The catheter was removed, and a sterile  bandage was applied.  Catheter placed: 6.5 Mongolian  Post-drainage hemithorax findings: No immediate complication     Radiation Dose  CT dose length product (mGy-cm): 294     Additional Details  Additional description of procedure: None  Equipment details: None  Specimens removed: Pleural fluid  Estimated blood loss (mL): Less than 10  Standardized report: Thoracentesis     Attestation  I was present and scrubbed for the entire procedure. Imaging reviewed.  Agree with final report as written.        This report was finalized on 3/30/2020 4:48 PM by Shravan Karimi.       XR Chest 1 View [[id number]] Collected:  03/29/20 0834     Updated:  03/30/20 0829    Narrative:       EXAMINATION: XR CHEST 1 VW - 03/29/2020     INDICATION: Evaluate pulmonary edema.     R50.9-Fever, unspecified; I48.91-Unspecified atrial fibrillation;  I10-Essential (primary) hypertension; Z74.09-Other reduced  mobility;  R13.12-Dysphagia, oropharyngeal phase.     COMPARISON: 03/27/2020     FINDINGS: Heart remains markedly enlarged. There is still a mild diffuse  interstitial disease pattern present, but improved. Left basilar  atelectasis and effusion and very small right effusion appear stable.       Impression:       Marked cardiomegaly, but with improved pulmonary  interstitial edema. Persistent left basilar atelectasis and very small  right effusion. No pneumothorax or other new chest disease is seen.      DICTATED:   03/29/2020  EDITED/ls :   03/29/2020      This report was finalized on 3/30/2020 8:26 AM by Dr. Justin Aj MD.         CT CHEST w/o contrast:  3/19/2020  IMPRESSION:  Small bilateral pleural effusions with consolidation seen at  the lung bases bilaterally and some interseptal thickening suggesting bibasilar infiltrate such as aspiration. Only minimal interseptal thickening and increased markings seen within the right middle lobe and lingula. Cardiac chambers are enlarged.    ECHO:  3/19/2020  Interpretation Summary     · Left ventricular systolic function is low normal.  · Estimated EF = 50%.  · Moderate mitral valve regurgitation is present  · Severe tricuspid valve regurgitation is present.  · Calculated right ventricular systolic pressure from tricuspid regurgitation is 48.6 mmHg.       Impression: 80 y.o. female with A fib with RVR, acute on chronic diastolic heart failure, CKD stage 3, acute on chronic hypoxic, hypercapnic respiratory failure, bilateral pleural effusions s/p Right thoracentesis 3/30.  Plan:   Dyspnea - improved after thoracentesis, talked about assessing as she works with therapy.   Reviewed with nursing about very slow taper with HFNC as patient tolerates.    Patient is okay with BiPAP use overnight, tolerating.     Constipation - prune juice on breakfast tray.    Plan - patient is wanting to return to her son, Dean, home.  Patient seems to have good understanding about will need  to be off of HFNC.  Encouraged participation with therapy and slow taper.   Noted  note with suggestion of acute rehab.   Will follow up with family.     GREG Carrasco  [phone]  03/31/20  11:16      Time: 60  minutes spent reviewing medical and medication records, assessing and examining patient, discussing with patient and nursing staff, answering questions, formulating a plan and documentation of care. > 50% time spent face to face       Electronically signed by Calista Barry APRN at 03/31/20 9237

## 2020-04-01 NOTE — PROGRESS NOTES
"Palliative Care Progress Note    Date of Admission: 3/19/2020    Code Status:   Current Code Status     Date Active Code Status Order ID Comments User Context       3/31/2020 1435 No CPR [id number]  Calista Barry, GREG Inpatient       Questions for Current Code Status     Question Answer Comment    Code Status No CPR     Medical Interventions (Level of Support Prior to Arrest) Limited     Limited Support to NOT Include Intubation     Level Of Support Discussed With Patient      Next of Kin (If No Surrogate)         Subjective:  Patient denies any pain, dyspnea, anxiety, nausea.  Eating only very few bites, taking sips of Boost.  Patient up in chair, seems to be tolerating regular flow nasal cannula.  Reviewed current scheduled and prn medications for route, type, dose, and frequency.  Reviewed medical record.     •  acetaminophen **OR** acetaminophen **OR** acetaminophen  •  docusate sodium  •  famotidine  •  melatonin  •  ondansetron **OR** ondansetron  •  sodium chloride    Objective:  PPS 50%    /66 (BP Location: Left arm, Patient Position: Lying)   Pulse 71   Temp 98 °F (36.7 °C) (Axillary)   Resp 22   Ht 170.2 cm (67\")   Wt 52.2 kg (115 lb)   LMP  (LMP Unknown)   SpO2 92%   BMI 18.01 kg/m²    Intake & Output (last day)       03/31 0701 - 04/01 0700 04/01 0701 - 04/02 0700    P.O.  150    IV Piggyback 200     Total Intake(mL/kg) 200 (3.8) 150 (2.9)    Net +200 +150          Urine Unmeasured Occurrence 2 x         Lab Results (last 24 hours)     Procedure Component Value Units Date/Time    Fungus Smear - Body Fluid, Pleural Cavity [[id number]] Collected:  03/30/20 1347    Specimen:  Body Fluid from Pleural Cavity Updated:  04/01/20 1408     Fungal Stain No fungal elements seen    Blood Culture - Blood, Arm, Right [[id number]] Collected:  03/28/20 0902    Specimen:  Blood from Arm, Right Updated:  04/01/20 0930     Blood Culture No growth at 4 days    Blood Culture - Blood, Arm, Left [[id number]] " Collected:  03/28/20 0903    Specimen:  Blood from Arm, Left Updated:  04/01/20 0930     Blood Culture No growth at 4 days    Basic Metabolic Panel [[id number]]  (Abnormal) Collected:  04/01/20 0614    Specimen:  Blood Updated:  04/01/20 0716     Glucose 113 mg/dL      BUN 39 mg/dL      Creatinine 1.56 mg/dL      Sodium 147 mmol/L      Potassium 3.9 mmol/L      Chloride 110 mmol/L      CO2 27.0 mmol/L      Calcium 8.9 mg/dL      eGFR Non African Amer 32 mL/min/1.73      BUN/Creatinine Ratio 25.0     Anion Gap 10.0 mmol/L     Narrative:       GFR Normal >60  Chronic Kidney Disease <60  Kidney Failure <15      Body Fluid Culture - Body Fluid, Pleural Cavity [[id number]] Collected:  03/30/20 1347    Specimen:  Body Fluid from Pleural Cavity Updated:  04/01/20 0655     Body Fluid Culture No growth at 2 days     Gram Stain Many (4+) WBCs per low power field      No organisms seen        Imaging Results (Last 24 Hours)     ** No results found for the last 24 hours. **        Physical Exam:  Gen: NAD, up in chair  Skin: warm, dry   Eyes: ALICE, conjunctiva clear and moist   HEENT: oropharynx clear, moist  Resp/thorax: even effort, non labored, CTA   CV: RRR   ABD: soft, bowel sounds +, nontender  Ext: no edema, no redness   Neuro: alert, interactive, no myoclonus   Psych: appropriate conversation and mood     Reviewed labs and diagnostic results.   Lab Results   Component Value Date    HGBA1C 5.60 12/19/2018     Results from last 7 days   Lab Units 03/29/20  0756   WBC 10*3/mm3 12.17*   HEMOGLOBIN g/dL 10.0*   HEMATOCRIT % 32.0*   PLATELETS 10*3/mm3 366     Results from last 7 days   Lab Units 04/01/20  0614   SODIUM mmol/L 147*   POTASSIUM mmol/L 3.9   CHLORIDE mmol/L 110*   CO2 mmol/L 27.0   BUN mg/dL 39*   CREATININE mg/dL 1.56*   CALCIUM mg/dL 8.9   GLUCOSE mg/dL 113*       Impression: 80 y.o. female with A fib with RVR, acute on chronic diastolic heart failure, CKD stage 3, acute on chronic hypoxic, hypercapnic  respiratory failure, bilateral pleural effusions s/p Right thoracentesis 3/30    Plan:   Dyspnea - continue to monitor, patient is tolerating high flow.     Constipation - one time dose senna/docusate today.   Prune juice on breakfast tray.     Goals of care - spoke with son, Ke, over phone this morning about hospice vs home health vs inpatient rehab plan of care.   Family has made decision especially with COVID-19 to return patient to her home in [place] with 24 hours family care giver support.  Family will be ready to have the patient go home on Saturday.     Time: 50 minutes   > 50% time spent in counseling about establishing plan of care for home with hospice care vs home health vs inpatient rehab    Calista Barry, APRN  [phone]  04/01/20  15:00

## 2020-04-01 NOTE — PLAN OF CARE
Problem: Patient Care Overview  Goal: Plan of Care Review  Outcome: Ongoing (interventions implemented as appropriate)  Flowsheets (Taken 4/1/2020 1050)  Outcome Summary: Pt max x 2 with standing attempts this date, unable to fully extend knees, feet slide forward. Max assist to change gown, set up wash face and comb hair. Will need SNF at discharge.

## 2020-04-01 NOTE — PROGRESS NOTES
Continued Stay Note  [facility]     Patient Name: Mirian Sy  MRN: [id number]  Today's Date: 4/1/2020    Admit Date: 3/19/2020    Discharge Plan     Row Name 04/01/20 0938       Plan    Plan  [facility]    Patient/Family in Agreement with Plan  yes    Plan Comments  Spoke with Maritza Merrill with Palliative Care and per patient's son Ke they are interested in taking the patient home with Hospice, if she is unable to go to [facility]. CM will continue to follow    Final Discharge Disposition Code  62 - inpatient rehab facility        Discharge Codes    No documentation.       Expected Discharge Date and Time     Expected Discharge Date Expected Discharge Time    Apr 3, 2020             Abdiaziz Hinojosa RN

## 2020-04-01 NOTE — THERAPY PROGRESS REPORT/RE-CERT
Patient Name: Mirian Sy  : 1940    MRN: [id number]                              Today's Date: 2020       Admit Date: 3/19/2020    Visit Dx:     ICD-10-CM ICD-9-CM   1. Oropharyngeal dysphagia R13.12 787.22   2. Acute febrile illness R50.9 780.60   3. Atrial fibrillation with rapid ventricular response (CMS/[facility]) I48.91 427.31   4. Elevated blood pressure reading with diagnosis of hypertension I10 401.9   5. Impaired mobility and ADLs Z74.09 799.89     Patient Active Problem List   Diagnosis   • Abnormal gait   • Takotsubo syndrome   • Carpal tunnel syndrome   • Complex partial epilepsy (CMS/[facility])   • Depression   • Chronic gastritis   • HLD (hyperlipidemia)   • Hypertension   • Hyponatremia   • Nutritional anemia   • Dyssomnia   • Diplopia   • Xeroderma   • Preventative health care   • Frailty   • Debility   • Tricuspid regurgitation   • Acute UTI (urinary tract infection)   • Cerebrovascular accident (CMS/[facility])   • Syncope   • DVT (deep venous thrombosis) (CMS/[facility])   • Anorexia   • Anxiety   • Patent foramen ovale   • Mitral regurgitation   • Low weight   • Senile osteoporosis   • Iron deficiency   • Chronic atrial fibrillation   • Closed fracture of left ilium (CMS/[facility])   • GERD (gastroesophageal reflux disease)   • Fall at home   • Vertigo   • Internuclear ophthalmoplegia, right eye   • Atrial fibrillation with RVR (CMS/[facility])   • Bradycardia   • Chronic atrial fibrillation   • SABRINA (acute kidney injury) (CMS/[facility])   • Hyperkalemia   • Confusion   • Acute exacerbation of CHF (congestive heart failure) (CMS/[facility])   • CKD (chronic kidney disease) stage 3, GFR 30-59 ml/min (CMS/HCC)   • CKD (chronic kidney disease) stage 3, GFR 30-59 ml/min (CMS/[facility])   • Underweight   • Acute on chronic diastolic CHF (congestive heart failure) (CMS/[facility])   • SABRINA (acute kidney injury) (CMS/[facility])   • Chronic atrial fibrillation with RVR   • Severe sepsis (CMS/[facility])   • Aspiration pneumonia due to vomitus (CMS/[facility])   •  Dysphasia   • Lactic acidosis   • Acute and chronic respiratory failure with hypoxia (CMS/[facility])   • Acute febrile illness     Past Medical History:   Diagnosis Date   • Atrial fibrillation (CMS/HCC) 2005    Chronic anticoagulation and rate control   • Basal cell carcinoma 2007    Left leg and nose   • Cardiomyopathy (CMS/[facility]) 2006   • Cerebrovascular accident (CMS/[facility]) 01/2005    CT right parietal CVA. Carotid duplex -50% to 79% left ICS 15% right ICS stenosis. MRI of the neck showed no significant carotid bifurcations of these. Negative CT of the head, 09/10/2012. Carotid duplex, 02/24/2014:  Shelf-like plaque in the CECE without significant elevation and velocities.  Patent vertebral arteries.   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    With dilated cardiomyopathy   • Decubitus ulcer of buttock 2014    Transient during fracture rehabilitation   • Depression 2002    Good response to citalopram and mirtazapine   • DVT (deep venous thrombosis) (CMS/[facility]) 2014    Superficial - right lesser saphenus vein - after hip fracture    • Fracture of bone of forefoot 1972    Right foot   • Fracture of ilium, left (CMS/[facility]) 05/2018    Accidental fall - Uncomplicated recovery   • GERD (gastroesophageal reflux disease) 2014    Chronic superficial gastritis   • HTN (hypertension) 2005   • Iron deficiency anemia due to chronic blood loss 2018    Predisposed by chronic anticoagulation and GERD   • Low body weight due to inadequate caloric intake Adulthood   • Mitral valve prolapse 1997    Mild MR   • Osteoarthritis    • Osteoporosis    • Patent foramen ovale 2005   • Pneumonia 1947   • SCC (squamous cell carcinoma), arm, right 2017   • Scoliosis    • Syncope 2014     undermined cause - [facility]   • Varicose veins 2005    Symptomatic     Past Surgical History:   Procedure Laterality Date   • ACHILLES TENDON SURGERY Left 1997    Repair of rupture left tendon with screws   • BREAST CYST EXCISION  Left    • BREAST SURGERY Left 1982    Excision benign cyst   • CARDIAC CATHETERIZATION  2006    Severe acute coronary spasm   • CATARACT EXTRACTION WITH INTRAOCULAR LENS IMPLANT Bilateral 2015   • EYE CAPSULOTOMY WITH LASER Left 2016    Marked visual improvement   • FEMUR FRACTURE SURGERY Right 2015    Repair of shaft fracture   • HIP FRACTURE SURGERY Left 2014    left hip fracture with pin   • LUMBAR DISC SURGERY  1983   • OVARIAN CYST REMOVAL Left 1996   • SKIN CANCER EXCISION Right 2017    SCC - arm     General Information     Row Name 04/01/20 1442 04/01/20 1419       PT Evaluation Time/Intention    Document Type  progress note/recertification  -LO  therapy note (daily note)  -LO    Mode of Treatment  --  individual therapy  -    Row Name 04/01/20 1419          General Information    Patient Profile Reviewed?  yes  -LO     Existing Precautions/Restrictions  oxygen therapy device and L/min  -     Row Name 04/01/20 1419          Cognitive Assessment/Intervention- PT/OT    Orientation Status (Cognition)  oriented x 3  -LO     Cognitive Assessment/Intervention Comment  Patient able to follow al commands without difficulty.   -     Row Name 04/01/20 1419          Safety Issues, Functional Mobility    Impairments Affecting Function (Mobility)  balance;endurance/activity tolerance;postural/trunk control;shortness of breath;strength  -LO     Comment, Safety Issues/Impairments (Mobility)  Patient requires more assistance for transfers this session.   -LO       User Key  (r) = Recorded By, (t) = Taken By, (c) = Cosigned By    Initials Name Provider Type    Maritza Kumar, PT Physical Therapist        Mobility     Row Name 04/01/20 1421          Transfer Assessment/Treatment    Comment (Transfers)  Patient requiring maxA of 2 for sit<>stand transfers this session which demonstrates a decline from previous session. Patient has begun to push into an extensor pattern with sit<>stand transfer and unable to achieve full  stand position at this time. SPO2 levels unable to be obtained during session as is not reading on monitor. Nsg notified and is aware. Continue to recommend SNF at [place] if appropriate.   -LO     Row Name 04/01/20 1421          Sit-Stand Transfer    Sit-Stand [place] (Transfers)  maximum assist (25% patient effort);2 person assist  -LO     Assistive Device (Sit-Stand Transfers)  walker, front-wheeled  -LO       User Key  (r) = Recorded By, (t) = Taken By, (c) = Cosigned By    Initials Name Provider Type    LO Maritza Kat, PT Physical Therapist        Obj/Interventions     Row Name 04/01/20 1426          Therapeutic Exercise    Upper Extremity (Therapeutic Exercise)  -- BUE general elevation 2x5   -LO     Lower Extremity (Therapeutic Exercise)  LAQ (long arc quad), bilateral;marching while seated  -LO     Lower Extremity Range of Motion (Therapeutic Exercise)  ankle dorsiflexion/plantar flexion, bilateral  -LO     Exercise Type (Therapeutic Exercise)  AROM (active range of motion)  -LO     Position (Therapeutic Exercise)  seated  -LO     Sets/Reps (Therapeutic Exercise)  10  -LO     Expected Outcome (Therapeutic Exercise)  facilitate normal movement patterns;improve functional tolerance, self-care activity;improve motor control;improve neuromuscular control  -LO     Comment (Therapeutic Exercise)  Patient able to perform all ther ex with 40% VC for correct technique.   -LO     Row Name 04/01/20 1426          Static Sitting Balance    Level of [place] (Unsupported Sitting, Static Balance)  contact guard assist  -LO     Sitting Position (Unsupported Sitting, Static Balance)  sitting in chair  -LO     Time Able to Maintain Position (Unsupported Sitting, Static Balance)  1 to 2 minutes  -LO     Comment (Unsupported Sitting, Static Balance)  Patient able to maintain supported sitting in chair with CGA for 2-3 min at a time before becomes fatigued and reclines to rest.   -LO     Row Name 04/01/20 1426           Dynamic Sitting Balance    Level of [place], Reaches Outside Midline (Sitting, Dynamic Balance)  contact guard assist  -LO     Sitting Position, Reaches Outside Midline (Sitting, Dynamic Balance)  sitting in chair  -LO     Comment, Reaches Outside Midline (Sitting, Dynamic Balance)  Patient able to performs weight shifting anterior/posterior, medial/lateral with 25% VC without loss of balance.   -LO     Row Name 04/01/20 1426          Static Standing Balance    Level of [place] (Supported Standing, Static Balance)  2 person assist;maximal assist, 25 to 49% patient effort  -LO     Time Able to Maintain Position (Supported Standing, Static Balance)  less than 15 seconds  -LO     Assistive Device Utilized (Supported Standing, Static Balance)  walker, rolling  -LO     Comment (Supported Standing, Static Balance)  Patient unable to obtain full standing position this session with maxA of 2, due to extensor strategy  -LO       User Key  (r) = Recorded By, (t) = Taken By, (c) = Cosigned By    Initials Name Provider Type    LO Maritza Kat, PT Physical Therapist        Goals/Plan     Row Name 04/01/20 1442 04/01/20 1440       Bed Mobility Goal 1 (PT)    Activity/Assistive Device (Bed Mobility Goal 1, PT)  bed mobility activities, all  -LO  supine to sit  -LO    [place] Level/Cues Needed (Bed Mobility Goal 1, PT)  minimum assist (75% or more patient effort)  -LO  --    Time Frame (Bed Mobility Goal 1, PT)  2 weeks  -LO  long term goal (LTG);2 weeks  -LO    Barriers (Bed Mobility Goal 1, PT)  extensor strategies, SPO2 levels  -LO  --    Progress/Outcomes (Bed Mobility Goal 1, PT)  goal revised this date  -LO  --    Row Name 04/01/20 1442          Transfer Goal 1 (PT)    Activity/Assistive Device (Transfer Goal 1, PT)  sit-to-stand/stand-to-sit;bed-to-chair/chair-to-bed;walker, rolling  -LO     [place] Level/Cues Needed (Transfer Goal 1, PT)  minimum assist (75% or more patient effort)  -LO     Time Frame  (Transfer Goal 1, PT)  2 weeks  -LO     Progress/Outcome (Transfer Goal 1, PT)  goal revised this date  -LO     Row Name 04/01/20 1442          Gait Training Goal 1 (PT)    Activity/Assistive Device (Gait Training Goal 1, PT)  gait (walking locomotion);walker, rolling  -LO     [place] Level (Gait Training Goal 1, PT)  minimum assist (75% or more patient effort)  -LO     Distance (Gait Goal 1, PT)  50 feet  -LO     Time Frame (Gait Training Goal 1, PT)  2 weeks  -LO     Progress/Outcome (Gait Training Goal 1, PT)  goal revised this date  -LO       User Key  (r) = Recorded By, (t) = Taken By, (c) = Cosigned By    Initials Name Provider Type    Maritza Kumar, PT Physical Therapist        Clinical Impression     Row Name 04/01/20 1432          Pain Assessment    Additional Documentation  Pain Scale: FACES Pre/Post-Treatment (Group)  -LO     Row Name 04/01/20 1432          Pain Scale: FACES Pre/Post-Treatment    Pain: FACES Scale, Pretreatment  0-->no hurt  -LO     Pain: FACES Scale, Post-Treatment  0-->no hurt  -LO     Row Name 04/01/20 1432          Plan of Care Review    Plan of Care Reviewed With  patient  -LO     Progress  improving  -LO     Outcome Summary  Patient requires increased assistance level for sit<>Stand transfers this session requiring maxA x2 due to extensor strategies. Patient able to perform ther ex in sitting with frequent rest breaks due to fatigue. Continue to recommend SNF at [place].   -     Row Name 04/01/20 1432          Physical Therapy Clinical Impression    Patient/Family Goals Statement (PT Clinical Impression)  To feel better  -LO     Criteria for Skilled Interventions Met (PT Clinical Impression)  yes;treatment indicated  -LO     Rehab Potential (PT Clinical Summary)  good, to achieve stated therapy goals  -LO     Row Name 04/01/20 1432          Vital Signs    O2 Delivery Pre Treatment  supplemental O2 6 liters O2  -LO     O2 Delivery Intra Treatment  supplemental O2  -LO     O2  Delivery Post Treatment  supplemental O2  -LO     Pre Patient Position  Sitting  -LO     Intra Patient Position  Standing  -LO     Post Patient Position  Sitting  -LO     Rest Breaks   4  -LO     Row Name 04/01/20 1432          Positioning and Restraints    Pre-Treatment Position  sitting in chair/recliner  -LO     Post Treatment Position  chair  -LO     In Chair  notified nsg;reclined;call light within reach;encouraged to call for assist;exit alarm on;waffle cushion;heels elevated  -LO       User Key  (r) = Recorded By, (t) = Taken By, (c) = Cosigned By    Initials Name Provider Type    Maritza Kumar, PT Physical Therapist        Outcome Measures     Row Name 04/01/20 1436          How much help from another person do you currently need...    Turning from your back to your side while in flat bed without using bedrails?  2  -LO     Moving from lying on back to sitting on the side of a flat bed without bedrails?  2  -LO     Moving to and from a bed to a chair (including a wheelchair)?  2  -LO     Standing up from a chair using your arms (e.g., wheelchair, bedside chair)?  2  -LO     Climbing 3-5 steps with a railing?  1  -LO     To walk in hospital room?  1  -LO     AM-PAC 6 Clicks Score (PT)  10  -LO       User Key  (r) = Recorded By, (t) = Taken By, (c) = Cosigned By    Initials Name Provider Type    Maritza Kumar, PT Physical Therapist          PT Recommendation and Plan  Planned Therapy Interventions (PT Eval): balance training, bed mobility training, gait training, home exercise program, patient/family education, neuromuscular re-education, motor coordination training, strengthening, transfer training  Outcome Summary/Treatment Plan (PT)  Anticipated Discharge Disposition (PT): skilled nursing facility  Plan of Care Reviewed With: patient  Progress: improving  Outcome Summary: Patient requires increased assistance level for sit<>Stand transfers this session requiring maxA x2 due to extensor strategies.  Patient able to perform ther ex in sitting with frequent rest breaks due to fatigue. Continue to recommend SNF at [place].      Time Calculation:   PT Charges     Row Name 04/01/20 1439             Time Calculation    Start Time  1355  -LO      PT Received On  04/01/20  -LO      PT Goal Re-Cert Due Date  04/11/20  -LO         Timed Charges    99018 - PT Therapeutic Activity Minutes  20  -LO        User Key  (r) = Recorded By, (t) = Taken By, (c) = Cosigned By    Initials Name Provider Type     Maritza Kat, PT Physical Therapist        Therapy Charges for Today     Code Description Service Date Service Provider Modifiers Qty    [id number]  PT THERAPEUTIC ACT EA 15 MIN 4/1/2020 Maritza Kat, PT GP 1          PT G-Codes  Outcome Measure Options: AM-PAC 6 Clicks Basic Mobility (PT)  AM-PAC 6 Clicks Score (PT): 10  AM-PAC 6 Clicks Score (OT): 15    Maritza Kat PT  4/1/2020

## 2020-04-01 NOTE — PROGRESS NOTES
"                  Clinical Nutrition     Reason for Visit:   Follow-up protocol    Patient Name: Mirian yS  YOB: 1940  MRN: [id number]  Date of Encounter: 04/01/20 09:24  Admission date: 3/19/2020    Nutrition Assessment   Assessment     Admission diagnosis  Severe sepsis, resolved    Additional diagnosis/conditions/procedures this admission  Aspiration pneumonia, resolved  Afib w/ RVR  Acute hypoxic respiratory failure  A/C CHF  Leukocytosis, improving  SABRINA/CKD  Constipation    (3/30) s/p thoracentesis    (3/19) SLP eval - regular textures, thin liquids  (3/20) SLP MBS - rec, soft textures, chopped, thin liquids    Additional PMH/procedures:  PAF  MVP  HTN  Cardiomyopathy  PFO  GERD  DVT  Dysphagia  Depression  Epilepsy  CKD3  Tobacco use  CVA    Achilles surgery  Femur fracture  Hip fracture    Reported/Observed/Food/Nutrition Related History:       Patient eating lunch tray at visit. Reports tolerating soft textures with no difficulty chewing/swallowing. Drinking Boost supplements well during the day. Patient preferences communicated in message to kitchen staff.      Anthropometrics     Height: 170.2 cm (67\")  Last filed wt: Weight: 52.2 kg (115 lb) (04/01/20 0629)  Weight Method: Bed scale    BMI: BMI (Calculated): 18  Underweight:<18.5kg/m2    Ideal Body Weight (IBW) (kg): 61.86  Admission wt: 100 lb  Method obtained: stated weight per charting 3/19    Weight Change   UBW: 100 - 105 lbs per pt report to RD at previous admission (2/2/20)  Weight change:   % wt change:   Time frame of weight loss:     Last 15 Recorded Weights   View Complete Flowsheet   Weight Weight (kg) Weight (lbs) Weight Method VISIT REPORT   3/19/2020 45.36 kg 100 lb - -   3/19/2020 45.36 kg 100 lb Stated -   3/5/2020 46.72 kg 103 lb - Report   2/5/2020 46.125 kg 101 lb 11 oz Bed scale -   2/4/2020 46.267 kg 102 lb Bed scale -   2/3/2020 46.437 kg 102 lb 6 oz Bed scale -   2/2/2020 46.72 kg 103 lb Bed scale -   2/1/2020 " 45.813 kg 101 lb Stated -   1/6/2020 47.628 kg 105 lb - Report   12/16/2019 47.174 kg 104 lb - Report   12/14/2019 51.256 kg 113 lb Bed scale -   12/13/2019 50.259 kg 110 lb 12.8 oz - -   12/13/2019 52.436 kg 115 lb 9.6 oz - -   12/12/2019 47.628 kg 105 lb Stated -   10/23/2019 47.174 kg 104 lb - Report     Labs reviewed     Results from last 7 days   Lab Units 04/01/20  0614 03/31/20  0334 03/30/20  0613   GLUCOSE mg/dL 113* 129* 108*   BUN mg/dL 39* 41* 39*   CREATININE mg/dL 1.56* 1.82* 1.91*   SODIUM mmol/L 147* 145 143   CHLORIDE mmol/L 110* 108* 102   POTASSIUM mmol/L 3.9 4.2 3.2*   PHOSPHORUS mg/dL  --   --  3.6   MAGNESIUM mg/dL  --   --  2.4           Invalid input(s): PLAT      Lab Results   Lab Value Date/Time    HGBA1C 5.60 12/19/2018 0544    HGBA1C 5.3 11/04/2015 0610    HGBA1C 5.5 03/12/2015 1520       Medications reviewed   Pertinent: eliquis, pepcid, iron, lasix, keppra, magox, remeron, MVI, zosyn IV, pericolace      Intake/Output 24 hrs (7:00AM - 6:59 AM)     Intake & Output (last day)       03/31 0701 - 04/01 0700 04/01 0701 - 04/02 0700    P.O.      IV Piggyback 200     Total Intake(mL/kg) 200 (3.8)     Net +200           Urine Unmeasured Occurrence 2 x           Current Nutrition Prescription     PO: Diet Soft Texture; Chopped; Thin; Cardiac  Oral Nutrition Supplement: Foster / Strawberry Boost Plus x2  Intake: 17% x 6 meals    Nutrition Diagnosis     4/1  Problem Inadequate oral intake   Etiology Decreased appetite / respiratory status   Signs/Symptoms PO Intake (17% x 6 meals)     3/20 updated 3/24, 3/30  Problem Swallowing difficulty   Etiology Hx dysphagia / recent aspiration event   Signs/Symptoms [place] [facility] [place] chopped   Status: resolved per MBS    3/20 updated 3/24, 3/30  Problem Underweight   Etiology ?etiology   Signs/Symptoms BMI = 15.66 kg/m²   Status: ongoing    Nutrition Intervention     1. Follow treatment progress, Care plan reviewed  2. Advised alternate selections,  interviewed for preferences  3. Encouraged oral / supplemental intake  4. Preferences communicated in message to kitchen staff  5. RD to continue to monitor PO/supplemental intake and adequacy    Goal:   General: Nutrition support treatment / Palliative care following  PO: Increase intake       Monitoring/Evaluation:   Per protocol, PO intake, Supplement intake, Pertinent labs, Weight, Symptoms, Swallow function    Will Continue to follow per protocol    Aliza Madden, VIKTORIAN, LD  Time Spent: 30 minutes

## 2020-04-01 NOTE — PLAN OF CARE
Problem: Patient Care Overview  Goal: Interprofessional Rounds/Family Conf  Outcome: Ongoing (interventions implemented as appropriate)  Flowsheets (Taken 3/31/2020 1300)  Participants: advanced practice nurse;nursing;physician  Note:   Patient states she is doing better today. I informed her that her son Ke called and wants to take her home with Hospice. I consulted Hospice and she will meet with son, Ke. Adrian will be coming to visit for family to make sure she has what she needs for discharge. Patient asking for prayer.  consulted. Palliative will continue to follow for support, symptom management and discharge needs.

## 2020-04-01 NOTE — PLAN OF CARE
Problem: Fall Risk (Adult)  Goal: Absence of Fall  Outcome: Ongoing (interventions implemented as appropriate)  Flowsheets (Taken 3/30/2020 1622 by Aliza Ortega RN)  Absence of Fall: making progress toward outcome     Problem: Patient Care Overview  Goal: Plan of Care Review  Outcome: Ongoing (interventions implemented as appropriate)  Flowsheets  Taken 4/1/2020 0125  Progress: improving  Outcome Summary: Pt able to maintain O2 sats on 6L O2 NC. AFib on the monitor. Resting well at this time. Will continue to monitor. 0130 4/1/2020  Taken 3/31/2020 2000  Plan of Care Reviewed With: patient

## 2020-04-01 NOTE — PLAN OF CARE
Problem: Patient Care Overview  Goal: Plan of Care Review  Flowsheets (Taken 4/1/2020 1432)  Progress: improving  Plan of Care Reviewed With: patient  Outcome Summary: Patient requires increased assistance level for sit<>Stand transfers this session requiring maxA x2 due to extensor strategies. Patient able to perform ther ex in sitting with frequent rest breaks due to fatigue. Continue to recommend SNF at [place].

## 2020-04-01 NOTE — PROGRESS NOTES
[facility] Medicine Services  PROGRESS NOTE    Patient Name: Mirian Sy  : 1940  MRN: [id number]    Date of Admission: 3/19/2020  Primary Care Physician: Oren Engel DO    Subjective   Subjective     CC:    F/u dyspnea    HPI:    Resting in bed in no acute distress and eating her breakfast. Tells me she is better today.  On 5 L of nasal cannula and not on high flow.  no fever or chills.  No chest pain, palpitation.  No cough or coryza.  No nausea, vomiting, diarrhea, abdominal pain.    Review of Systems    Gen- No fevers, chills  CV- No chest pain, palpitations  Resp- No cough, dyspnea at rest  GI- No N/V/D, abd pain    Objective   Objective     Vital Signs:   Temp:  [98 °F (36.7 °C)-99.7 °F (37.6 °C)] 98 °F (36.7 °C)  Heart Rate:  [58-97] 71  Resp:  [21-24] 22  BP: (103-130)/(65-90) 103/66        Physical Exam:    Constitutional: No acute distress, looks comfortable.  HENT: NCAT,  PERRLA  Respiratory: decreased breath sounds throughout, breathing effort is normal.  This morning she is only on nasal cannula and  Cardiovascular: Irregular, no murmur or gallop, rubs.  Gastrointestinal: Positive bowel sounds, soft, nontender, nondistended  Musculoskeletal: No bilateral ankle edema, very low muscle mass.  Psychiatric: Appropriate affect, cooperative  Neurologic: Awake, alert, follows commands, no focality.  Skin: no rash     Results Reviewed:  Results from last 7 days   Lab Units 20  0836 20  0756 20  0406 20  1151   WBC 10*3/mm3  --  12.17* 13.56* 11.86*   HEMOGLOBIN g/dL  --  10.0* 9.9* 10.2*   HEMATOCRIT %  --  32.0* 31.8* 31.4*   PLATELETS 10*3/mm3  --  366 326 334   INR  1.68*  --   --   --    PROCALCITONIN ng/mL  --   --  1.00*  --      Results from last 7 days   Lab Units 20  0614 20  0334 20  0613  20  2237   SODIUM mmol/L 147* 145 143   < >  --    POTASSIUM mmol/L 3.9 4.2 3.2*   < >  --    CHLORIDE mmol/L 110* 108*  102   < >  --    CO2 mmol/L 27.0 23.0 27.0   < >  --    BUN mg/dL 39* 41* 39*   < >  --    CREATININE mg/dL 1.56* 1.82* 1.91*   < >  --    GLUCOSE mg/dL 113* 129* 108*   < >  --    CALCIUM mg/dL 8.9 9.1 9.2   < >  --    PROBNP pg/mL  --   --   --   --  6,547.0*    < > = values in this interval not displayed.     Estimated Creatinine Clearance: 24.1 mL/min (A) (by C-G formula based on SCr of 1.56 mg/dL (H)).    Microbiology Results Abnormal     Procedure Component Value - Date/Time    Blood Culture - Blood, Arm, Right [[id number]] Collected:  03/28/20 0902    Lab Status:  Preliminary result Specimen:  Blood from Arm, Right Updated:  04/01/20 0930     Blood Culture No growth at 4 days    Blood Culture - Blood, Arm, Left [[id number]] Collected:  03/28/20 0903    Lab Status:  Preliminary result Specimen:  Blood from Arm, Left Updated:  04/01/20 0930     Blood Culture No growth at 4 days    Body Fluid Culture - Body Fluid, Pleural Cavity [[id number]] Collected:  03/30/20 1347    Lab Status:  Preliminary result Specimen:  Body Fluid from Pleural Cavity Updated:  04/01/20 0655     Body Fluid Culture No growth at 2 days     Gram Stain Many (4+) WBCs per low power field      No organisms seen    AFB Culture - Body Fluid, Pleural Cavity [[id number]] Collected:  03/30/20 1347    Lab Status:  Preliminary result Specimen:  Body Fluid from Pleural Cavity Updated:  03/31/20 1125     AFB Stain No acid fast bacilli seen on concentrated smear    Blood Culture - Blood, Arm, Left [[id number]] Collected:  03/19/20 0717    Lab Status:  Final result Specimen:  Blood from Arm, Left Updated:  03/24/20 0745     Blood Culture No growth at 5 days    Blood Culture - Blood, Wrist, Left [[id number]] Collected:  03/19/20 0717    Lab Status:  Final result Specimen:  Blood from Wrist, Left Updated:  03/24/20 0745     Blood Culture No growth at 5 days    Respiratory Panel, PCR - Swab, Nasopharynx [[id number]]  (Normal) Collected:  03/22/20 1407    Lab  Status:  Final result Specimen:  Swab from Nasopharynx Updated:  03/22/20 1532     ADENOVIRUS, PCR Not Detected     Coronavirus 229E Not Detected     Coronavirus HKU1 Not Detected     Coronavirus NL63 Not Detected     Coronavirus OC43 Not Detected     Human Metapneumovirus Not Detected     Human Rhinovirus/Enterovirus Not Detected     Influenza B PCR Not Detected     Parainfluenza Virus 1 Not Detected     Parainfluenza Virus 2 Not Detected     Parainfluenza Virus 3 Not Detected     Parainfluenza Virus 4 Not Detected     Bordetella pertussis pcr Not Detected     Influenza A H1 2009 PCR Not Detected     Chlamydophila pneumoniae PCR Not Detected     Mycoplasma pneumo by PCR Not Detected     Influenza A PCR Not Detected     Influenza A H3 Not Detected     Influenza A H1 Not Detected     RSV, PCR Not Detected     Bordetella parapertussis PCR Not Detected    Narrative:       The coronavirus on the RVP is NOT COVID-19 and is NOT indicative of infection with COVID-19.     MRSA Screen, PCR - Swab, Nares [[id number]]  (Normal) Collected:  03/19/20 1132    Lab Status:  Final result Specimen:  Swab from Nares Updated:  03/19/20 1312     MRSA PCR Negative    Narrative:       MRSA Negative    Respiratory Panel, PCR - Swab, Nasopharynx [[id number]]  (Normal) Collected:  03/19/20 0716    Lab Status:  Final result Specimen:  Swab from Nasopharynx Updated:  03/19/20 1015     ADENOVIRUS, PCR Not Detected     Coronavirus 229E Not Detected     Coronavirus HKU1 Not Detected     Coronavirus NL63 Not Detected     Coronavirus OC43 Not Detected     Human Metapneumovirus Not Detected     Human Rhinovirus/Enterovirus Not Detected     Influenza B PCR Not Detected     Parainfluenza Virus 1 Not Detected     Parainfluenza Virus 2 Not Detected     Parainfluenza Virus 3 Not Detected     Parainfluenza Virus 4 Not Detected     Bordetella pertussis pcr Not Detected     Influenza A H1 2009 PCR Not Detected     Chlamydophila pneumoniae PCR Not Detected      Mycoplasma pneumo by PCR Not Detected     Influenza A PCR Not Detected     Influenza A H3 Not Detected     Influenza A H1 Not Detected     RSV, PCR Not Detected     Bordetella parapertussis PCR Not Detected    Narrative:       The coronavirus on the RVP is NOT COVID-19 and is NOT indicative of infection with COVID-19.           Imaging Results (Last 24 Hours)     ** No results found for the last 24 hours. **          Results for orders placed during the hospital encounter of 03/19/20   Adult Transthoracic Echo Complete W/ Cont if Necessary Per Protocol    Narrative · Left ventricular systolic function is low normal.  · Estimated EF = 50%.  · Moderate mitral valve regurgitation is present  · Severe tricuspid valve regurgitation is present.  · Calculated right ventricular systolic pressure from tricuspid   regurgitation is 48.6 mmHg.          I have reviewed the medications:  Scheduled Meds:    apixaban 2.5 mg Oral Q12H   atorvastatin 80 mg Oral Nightly   cefdinir 300 mg Oral Q24H   citalopram 20 mg Oral QAM   doxycycline 100 mg Oral Q12H   famotidine 20 mg Oral Nightly   ferrous sulfate 325 mg Oral Daily With Breakfast   levETIRAcetam 125 mg Oral Q12H   metoprolol succinate  mg Oral Daily   mirtazapine 7.5 mg Oral Nightly   multivitamin with minerals 1 tablet Oral Daily   sodium chloride 10 mL Intravenous Q12H   verapamil 80 mg Oral Q8H     Continuous Infusions:     PRN Meds:.•  acetaminophen **OR** acetaminophen **OR** acetaminophen  •  docusate sodium  •  famotidine  •  melatonin  •  ondansetron **OR** ondansetron  •  sodium chloride    Assessment/Plan   Assessment & Plan     Active Hospital Problems    Diagnosis  POA   • **Aspiration pneumonia due to vomitus (CMS/[facility]) [J69.0]  Yes   • SABRINA (acute kidney injury) (CMS/[facility]) [N17.9]  Yes   • Chronic atrial fibrillation with RVR [I48.20]  Yes   • Severe sepsis (CMS/[facility]) [A41.9, R65.20]  Yes   • Dysphagia [R13.10]  Yes   • Lactic acidosis [E87.2]  Yes   • Acute and  chronic respiratory failure with hypoxia (CMS/[facility]) [J96.21]  Yes   • Acute febrile illness [R50.9]  Yes   • Acute on chronic diastolic CHF (congestive heart failure) (CMS/[facility]) [I50.33]  Yes   • Underweight [R63.6]  Yes   • CKD (chronic kidney disease) stage 3, GFR 30-59 ml/min (CMS/[facility]) [N18.3]  Yes   • Chronic atrial fibrillation [I48.20]  Yes   • Anorexia [R63.0]  Yes   • Anxiety [F41.9]  Yes   • Debility [R53.81]  Yes   • Hypertension [I10]  Yes      Resolved Hospital Problems   No resolved problems to display.        Brief Hospital Course to date:  Mirian Sy is a 80 y.o. female who presented to the ER with fever and shortness of breath.  She was admitted to the hospital with severe sepsis present on admission and treated for aspiration pneumonia.  Additionally she was found to be in A. fib RVR.  She was seen by Dr. Olguin on admission due to acute on chronic diastolic heart failure and chronic atrial fibrillation with RVR.  She has been struggling with shortness of breath and has been intermittently on BiPAP and high flow nasal cannula.  She appears to be with acute on chronic respiratory failure and currently we are struggling to get her off high flow nasal cannula.  (I have copied the above from my colleagues note.  However, I have checked the accuracy of the information myself independently.)    Severe Sepsis (resolved)    Aspiration Pneumonia (resolved)    Acute on Chronic Respiratory failure  -No CT evidence of COVID-19  -Pulmonology is following   - CT guided thoracentesis done , labs are so far bland    Leukocytosis, much improved    Afib with RVR, rate is better controlled.  Patient  on Eliquis.    Acute on Chronic CHF  -DR Olguin following     CKD III  -Monitor creatinine.           DVT Prophylaxis: Apixaban 2.5 mg oral every 12 hours, mechanincal    Disposition: I expect the patient to be discharged TBD    CODE STATUS:  DNR  Code Status and Medical Interventions:   Ordered at:  03/31/20 1435     Limited Support to NOT Include:    Intubation     Level Of Support Discussed With:    Patient    Next of Kin (If No Surrogate)     Code Status:    No CPR     Medical Interventions (Level of Support Prior to Arrest):    Limited         Electronically signed by Butch Bustillo MD, 04/01/20, 12:11.

## 2020-04-01 NOTE — PROGRESS NOTES
Continued Stay Note  [facility]     Patient Name: Mirian Sy  MRN: [id number]  Today's Date: 4/1/2020    Admit Date: 3/19/2020    Discharge Plan     Row Name 04/01/20 1358       Plan    Plan  Home with [facility]    Plan Comments  Hospice referral received, chart reviewed. Met with pt's son Ke and daughter-in-law in hospital lobby regarding the hospice referral. Ke faced time sister (pt's daughter) to be involved with the meeting. Ke stated the family members all have a medical background. Ke stated the plan is to take pt home with family members taking turns staying with pt. Teaching done on hospice services, goals of care. Ke stated pt nor family want any further hospitalization, focus is on quality of life with comfort measures. Ke stated does want hospice services at home.  Visit made to pt with Ke Dempsey informed pt of hospice services and family wanting hospice to be involved with pt's care, pt agreeable to this plan. Discussed equipment needs, will need hospital bed, overbed table, oxygen, bipap machine and transport w/c. Equipment will be delivered to pt's home prior to hospital discharge. Ke stating family does need time to arrange schedules to be able to cover 24 hr care for pt. XENA MALONE present in pt's room upon this writer's arrival to the room. Jhonny stated pt's high flow oxygen has been changed to nasal cannula, pt will need time to adjust to the change and be assessed for effectiveness prior to discharge. Will continue to follow.         Discharge Codes    No documentation.       Expected Discharge Date and Time     Expected Discharge Date Expected Discharge Time    Apr 3, 2020             Sheela Alcocer RN

## 2020-04-01 NOTE — TELEPHONE ENCOUNTER
Kristin with [facility] said they received a referral for Pt and is wanting to know if Dr. Engel would follow Pt as her PCP.  Kristin is requesting a call back.

## 2020-04-02 LAB
BACTERIA FLD CULT: NORMAL
BACTERIA SPEC AEROBE CULT: NORMAL
BACTERIA SPEC AEROBE CULT: NORMAL
GRAM STN SPEC: NORMAL
GRAM STN SPEC: NORMAL

## 2020-04-02 PROCEDURE — 99232 SBSQ HOSP IP/OBS MODERATE 35: CPT | Performed by: INTERNAL MEDICINE

## 2020-04-02 PROCEDURE — 94660 CPAP INITIATION&MGMT: CPT

## 2020-04-02 PROCEDURE — 92526 ORAL FUNCTION THERAPY: CPT

## 2020-04-02 PROCEDURE — 97530 THERAPEUTIC ACTIVITIES: CPT

## 2020-04-02 PROCEDURE — 97110 THERAPEUTIC EXERCISES: CPT

## 2020-04-02 PROCEDURE — 94799 UNLISTED PULMONARY SVC/PX: CPT

## 2020-04-02 RX ORDER — FUROSEMIDE 20 MG/1
20 TABLET ORAL DAILY
Status: DISCONTINUED | OUTPATIENT
Start: 2020-04-02 | End: 2020-04-04 | Stop reason: HOSPADM

## 2020-04-02 RX ORDER — BISACODYL 10 MG
10 SUPPOSITORY, RECTAL RECTAL ONCE
Status: COMPLETED | OUTPATIENT
Start: 2020-04-03 | End: 2020-04-03

## 2020-04-02 RX ADMIN — APIXABAN 2.5 MG: 2.5 TABLET, FILM COATED ORAL at 08:57

## 2020-04-02 RX ADMIN — MULTIPLE VITAMINS W/ MINERALS TAB 1 TABLET: TAB at 08:56

## 2020-04-02 RX ADMIN — FAMOTIDINE 20 MG: 20 TABLET ORAL at 20:24

## 2020-04-02 RX ADMIN — DOXYCYCLINE 100 MG: 100 CAPSULE ORAL at 20:24

## 2020-04-02 RX ADMIN — DOXYCYCLINE 100 MG: 100 CAPSULE ORAL at 09:00

## 2020-04-02 RX ADMIN — VERAPAMIL HYDROCHLORIDE 80 MG: 80 TABLET, FILM COATED ORAL at 20:25

## 2020-04-02 RX ADMIN — FUROSEMIDE 20 MG: 20 TABLET ORAL at 10:23

## 2020-04-02 RX ADMIN — LEVETIRACETAM 125 MG: 250 TABLET, FILM COATED ORAL at 08:57

## 2020-04-02 RX ADMIN — LEVETIRACETAM 125 MG: 250 TABLET, FILM COATED ORAL at 20:24

## 2020-04-02 RX ADMIN — FERROUS SULFATE TAB 325 MG (65 MG ELEMENTAL FE) 325 MG: 325 (65 FE) TAB at 08:57

## 2020-04-02 RX ADMIN — SODIUM CHLORIDE, PRESERVATIVE FREE 10 ML: 5 INJECTION INTRAVENOUS at 20:23

## 2020-04-02 RX ADMIN — APIXABAN 2.5 MG: 2.5 TABLET, FILM COATED ORAL at 20:25

## 2020-04-02 RX ADMIN — VERAPAMIL HYDROCHLORIDE 80 MG: 80 TABLET, FILM COATED ORAL at 08:57

## 2020-04-02 RX ADMIN — ATORVASTATIN CALCIUM 80 MG: 40 TABLET, FILM COATED ORAL at 20:24

## 2020-04-02 RX ADMIN — METOPROLOL SUCCINATE 100 MG: 100 TABLET, EXTENDED RELEASE ORAL at 08:57

## 2020-04-02 RX ADMIN — VERAPAMIL HYDROCHLORIDE 80 MG: 80 TABLET, FILM COATED ORAL at 15:33

## 2020-04-02 RX ADMIN — CEFDINIR 300 MG: 300 CAPSULE ORAL at 08:58

## 2020-04-02 RX ADMIN — MIRTAZAPINE 7.5 MG: 15 TABLET, FILM COATED ORAL at 20:24

## 2020-04-02 RX ADMIN — CITALOPRAM HYDROBROMIDE 20 MG: 20 TABLET ORAL at 08:58

## 2020-04-02 RX ADMIN — SODIUM CHLORIDE, PRESERVATIVE FREE 10 ML: 5 INJECTION INTRAVENOUS at 08:56

## 2020-04-02 NOTE — THERAPY TREATMENT NOTE
Acute Care - Speech Language Pathology NICU/PEDS Progress Note  [facility]       Patient Name: Mirian Sy  : 1940  MRN: [id number]  Today's Date: 2020  Onset of Illness/Injury or Date of Surgery: 20        Referring Physician: MD Racheal        Admit Date: 3/19/2020      Visit Dx:      ICD-10-CM ICD-9-CM   1. Oropharyngeal dysphagia R13.12 787.22   2. Acute febrile illness R50.9 780.60   3. Atrial fibrillation with rapid ventricular response (CMS/[facility]) I48.91 427.31   4. Elevated blood pressure reading with diagnosis of hypertension I10 401.9   5. Impaired mobility and ADLs Z74.09 799.89       Patient Active Problem List   Diagnosis   • Abnormal gait   • Takotsubo syndrome   • Carpal tunnel syndrome   • Complex partial epilepsy (CMS/[facility])   • Depression   • Chronic gastritis   • HLD (hyperlipidemia)   • Hypertension   • Hyponatremia   • Nutritional anemia   • Dyssomnia   • Diplopia   • Xeroderma   • Preventative health care   • Frailty   • Debility   • Tricuspid regurgitation   • Acute UTI (urinary tract infection)   • Cerebrovascular accident (CMS/[facility])   • Syncope   • DVT (deep venous thrombosis) (CMS/[facility])   • Anorexia   • Anxiety   • Patent foramen ovale   • Mitral regurgitation   • Low weight   • Senile osteoporosis   • Iron deficiency   • Chronic atrial fibrillation   • Closed fracture of left ilium (CMS/[facility])   • GERD (gastroesophageal reflux disease)   • Fall at home   • Vertigo   • Internuclear ophthalmoplegia, right eye   • Atrial fibrillation with RVR (CMS/[facility])   • Bradycardia   • Chronic atrial fibrillation   • SABRINA (acute kidney injury) (CMS/[facility])   • Hyperkalemia   • Confusion   • Acute exacerbation of CHF (congestive heart failure) (CMS/[facility])   • CKD (chronic kidney disease) stage 3, GFR 30-59 ml/min (CMS/[facility])   • CKD (chronic kidney disease) stage 3, GFR 30-59 ml/min (CMS/[facility])   • Underweight   • Acute on chronic diastolic CHF (congestive heart failure) (CMS/[facility])   • SABRINA (acute  kidney injury) (CMS/HCC)   • Chronic atrial fibrillation with RVR   • Severe sepsis (CMS/HCC)   • Aspiration pneumonia due to vomitus (CMS/HCC)   • Dysphasia   • Lactic acidosis   • Acute and chronic respiratory failure with hypoxia (CMS/HCC)   • Acute febrile illness                Therapy Treatment  Rehabilitation Treatment Summary     Row Name 04/02/20 1445 04/02/20 1040          Treatment Time/Intention    Discipline  speech language pathologist  -VO  occupational therapist  -SG     Document Type  therapy note (daily note)  -VO  therapy note (daily note)  -SG     Subjective Information  no complaints  -VO  no complaints  -SG     Mode of Treatment  speech-language pathology  -VO  occupational therapy  -SG     Patient/Family Observations  --  Pt alert, cooperative, upright in bed, high amilcar nasal cannula in place  -SG     Care Plan Review  care plan/treatment goals reviewed;risks/benefits reviewed;current/potential barriers reviewed;patient/other agree to care plan  -VO  care plan/treatment goals reviewed  -SG     Therapy Frequency (OT Eval)  --  daily  -SG     Therapy Frequency (Swallow)  3 days per week  -VO  --     Patient Effort  good  -VO  good  -SG     Comment  --  Nsg reports that the only thing she will be able to tolerate today is sitting EOB  -SG     Existing Precautions/Restrictions  --  oxygen therapy device and L/min  -SG     Recorded by [VO] Nilsa Marie MA,CCC-SLP 04/02/20 1530 [SG] Jackie Jose OTR/L 04/02/20 1148     Row Name 04/02/20 1040             Vital Signs    Pre Systolic BP Rehab  117 VSS; nsg cleared for therapy  -SG      Pre Treatment Diastolic BP  57  -SG      Pre SpO2 (%)  89  -SG      O2 Delivery Pre Treatment  supplemental O2 6 liters of O2  -SG      Intra SpO2 (%)  66  -SG      O2 Delivery Intra Treatment  supplemental O2  -SG      Post SpO2 (%)  88  -SG      O2 Delivery Post Treatment  supplemental O2  -SG      Pre Patient Position  Supine  -SG      Intra Patient Position   Sitting  -SG      Post Patient Position  Supine  -SG      Recorded by [SG] Jackie Jose OTR/L 04/02/20 1148      Row Name 04/02/20 1040             Cognitive Assessment/Intervention- PT/OT    Affect/Mental Status (Cognitive)  WFL  -SG      Orientation Status (Cognition)  oriented x 3  -SG      Follows Commands (Cognition)  WFL  -SG      Recorded by [SG] Jackie Jose OTR/L 04/02/20 1148      Row Name 04/02/20 1040             Safety Issues, Functional Mobility    Safety Issues Affecting Function (Mobility)  awareness of need for assistance;insight into deficits/self awareness;safety precaution awareness  -SG      Impairments Affecting Function (Mobility)  balance;endurance/activity tolerance;postural/trunk control;shortness of breath;strength  -SG      Recorded by [SG] Jackie Jose OTR/L 04/02/20 1148      Row Name 04/02/20 1040             Bed Mobility Assessment/Treatment    Bed Mobility Assessment/Treatment  supine-sit;sit-supine;rolling right;rolling left;scooting/bridging  -SG      Rolling Left [place] (Bed Mobility)  minimum assist (75% patient effort)  -SG      Rolling Right [place] (Bed Mobility)  minimum assist (75% patient effort)  -SG      Scooting/Bridging [place] (Bed Mobility)  minimum assist (75% patient effort)  -SG      Supine-Sit [place] (Bed Mobility)  minimum assist (75% patient effort)  -SG      Sit-Supine [place] (Bed Mobility)  minimum assist (75% patient effort)  -SG      Bed Mobility, Safety Issues  decreased use of arms for pushing/pulling;decreased use of legs for bridging/pushing;impaired trunk control for bed mobility  -SG      Assistive Device (Bed Mobility)  bed rails;draw sheet;head of bed elevated  -SG      Recorded by [SG] Jackie Jose OTR/L 04/02/20 1148      Row Name 04/02/20 1040             ADL Assessment/Intervention    Additional Documentation  Comment, IADL Assessment/Training (Row)  -SG      Recorded by [SG] Jackie Jose OTR/L 04/02/20 1148       Row Name 04/02/20 1040             BADL Safety/Performance    Impairments, BADL Safety/Performance  balance;endurance/activity tolerance;shortness of breath;strength;trunk/postural control  -SG      Recorded by [SG] Jackie Jose OTR/L 04/02/20 1148      Row Name 04/02/20 1040             Motor Skills Assessment/Interventions    Additional Documentation  Balance (Group);Therapeutic Exercise (Group)  -SG      Recorded by [SG] Jackie Jose OTR/L 04/02/20 1148      Row Name 04/02/20 1040             Therapeutic Exercise    Therapeutic Exercise  seated, upper extremities  -SG      Additional Documentation  Therapeutic Exercise (Row)  -SG      04246 - OT Therapeutic Exercise Minutes  10  -SG      10982 - OT Therapeutic Activity Minutes  16  -SG      Recorded by [SG] Jackie Jose OTR/L 04/02/20 1148      Row Name 04/02/20 1040             Upper Extremity Seated Therapeutic Exercise    Performed, Seated Upper Extremity (Therapeutic Exercise)  shoulder flexion/extension;shoulder abduction/adduction;elbow flexion/extension  -SG      Exercise Type, Seated Upper Extremity (Therapeutic Exercise)  AROM (active range of motion)  -SG      Sets/Reps Detail, Seated Upper Extremity (Therapeutic Exercise)  1/10  -SG      Comment, Seated Upper Extremity (Therapeutic Exercise)  Pt sitting EOB to complete ther ex to promote postural control and trunk stability/core strength. Required many rest breaks to incorporate deep breathing techniques to bring o2 sats back up to WNL  -SG      Recorded by [SG] Jackie Jsoe OTR/L 04/02/20 1148      Row Name 04/02/20 1040             Balance    Balance  static sitting balance;dynamic sitting balance  -SG      Recorded by [SG] Jackie Jose OTR/L 04/02/20 1148      Row Name 04/02/20 1040             Static Sitting Balance    Level of [place] (Unsupported Sitting, Static Balance)  contact guard assist  -SG      Sitting Position (Unsupported Sitting, Static Balance)  sitting on edge of  bed  -SG      Time Able to Maintain Position (Unsupported Sitting, Static Balance)  more than 5 minutes  -SG      Recorded by [SG] Jackie Jose OTR/L 04/02/20 1148      Row Name 04/02/20 1040             Dynamic Sitting Balance    Level of [place], Reaches Outside Midline (Sitting, Dynamic Balance)  contact guard assist  -SG      Sitting Position, Reaches Outside Midline (Sitting, Dynamic Balance)  sitting on edge of bed  -SG      Comment, Reaches Outside Midline (Sitting, Dynamic Balance)  Pt able to complete AROM exercises sitting EOB, leaning to left d/t LOB and rest breaks between sets, but able to self correct  -SG      Recorded by [SG] Jackie Jose OTR/L 04/02/20 1148      Row Name 04/02/20 1040             Positioning and Restraints    Pre-Treatment Position  in bed  -SG      Post Treatment Position  bed  -SG      In Bed  notified nsg;supine;fowlers;call light within reach;encouraged to call for assist;exit alarm on  -SG      Recorded by [SG] Jackie Jose OTR/L 04/02/20 1148      Row Name 04/02/20 1040             Pain Assessment    Additional Documentation  Pain Scale: Numbers Pre/Post-Treatment (Group)  -SG      Recorded by [SG] Jackie Jose OTR/L 04/02/20 1148      Row Name 04/02/20 1040             Pain Scale: Numbers Pre/Post-Treatment    Pain Scale: Numbers, Pretreatment  0/10 - no pain  -SG      Pain Scale: Numbers, Post-Treatment  0/10 - no pain  -SG      Recorded by [SG] Jackie Jose OTR/L 04/02/20 1148      Row Name 04/02/20 1445             Pain Scale: FACES Pre/Post-Treatment    Pain: FACES Scale, Pretreatment  0-->no hurt  -VO      Pain: FACES Scale, Post-Treatment  0-->no hurt  -VO      Recorded by [VO] Nilsa Marie MA,CCC-SLP 04/02/20 1530      Row Name 04/02/20 1040             Plan of Care Review    Plan of Care Reviewed With  patient  -SG      Recorded by [SG] Jackie Jose OTR/L 04/02/20 1148      Row Name 04/02/20 1445             Outcome Summary/Treatment Plan  (SLP)    Daily Summary of Progress (SLP)  progress toward functional goals as expected  -VO      Plan for Continued Treatment (SLP)  Tolerating small sips w/o s/sxs aspiration. Impulsive w/o cues taking large sips w/ a cough after. Does well w/ adaptive cup.   -VO      Anticipated Dischage Disposition  skilled nursing facility;anticipate therapy at next level of care  -VO      Recorded by [VO] Nilsa Marie MA,CCC-SLP 04/02/20 1530        User Key  (r) = Recorded By, (t) = Taken By, (c) = Cosigned By    Initials Name Effective Dates Discipline    VO Nilsa Marie MA,CCC-SLP 10/24/18 -  SLP    SG Jackie Jose, OTR/L 01/20/20 -  OT          SLP GOALS     Row Name 04/02/20 1445 03/31/20 1115          Oral Nutrition/Hydration Goal 1 (SLP)    Oral Nutrition/Hydration Goal 1, SLP  LTG: Pt will return to regular diet & thin liquids w/ no overt s/sxs aspiration/distress w/ 100% acc and no cues  -VO  LTG: Pt will return to regular diet & thin liquids w/ no overt s/sxs aspiration/distress w/ 100% acc and no cues  -HG     Time Frame (Oral Nutrition/Hydration Goal 1, SLP)  by discharge  -VO  by discharge  -HG     Progress/Outcomes (Oral Nutrition/Hydration Goal 1, SLP)  good progress toward goal  -VO  good progress toward goal  -HG        Oral Nutrition/Hydration Goal 2 (SLP)    Oral Nutrition/Hydration Goal 2, SLP  Pt will tolerate soft solids & thin liquids w/ no overt s/sxs aspiration/distress w/ 100% acc and no cues  -VO  Pt will tolerate soft solids & thin liquids w/ no overt s/sxs aspiration/distress w/ 100% acc and no cues  -HG     Time Frame (Oral Nutrition/Hydration Goal 2, SLP)  short term goal (STG);by discharge  -VO  short term goal (STG);by discharge  -HG     Barriers (Oral Nutrition/Hydration Goal 2, SLP)  cough w/ large cup sip of water, none w/ small sips   -VO  Trial of water via regular cup and pt took sip with no s/s of aspiration. Trialed boost with skinny straw provided with product and pt  tolerated with no s/s of aspiration.   -HG     Progress/Outcomes (Oral Nutrition/Hydration Goal 2, SLP)  good progress toward goal  -VO  good progress toward goal  -HG        Oral Nutrition/Hydration Goal (SLP)    Oral Nutrition/Hydration Goal, SLP  Pt will tolerate trials of regular solid (when dentures available) w/ no overt s/sxs aspiration/distress/discomfort w/ 100% acc and no cues in order to determine appropriateness for diet upgrade.  -VO  Pt will tolerate trials of regular solid (when dentures available) w/ no overt s/sxs aspiration/distress/discomfort w/ 100% acc and no cues in order to determine appropriateness for diet upgrade.  -HG     Time Frame (Oral Nutrition/Hydration Goal, SLP)  short term goal (STG);by discharge  -VO  short term goal (STG);by discharge  -HG     Barriers (Oral Nutrition/Hydration Goal, SLP)  Mild incr'd prep w/ cracker.   -VO  Pt trialed with small bite of josé cracker with PB with no dentures and pt tolerated with no s/s of aspiration.   -HG     Progress/Outcomes (Oral Nutrition/Hydration Goal, SLP)  good progress toward goal  -VO  good progress toward goal  -HG        Lingual Strengthening Goal 1 (SLP)    Activity (Lingual Strengthening Goal 1, SLP)  increase lingual tone/sensation/control/coordination/movement;increase tongue back strength  -VO  increase lingual tone/sensation/control/coordination/movement;increase tongue back strength  -HG     Increase Lingual Tone/Sensation/Control/Coordination/Movement  lingual movement exercises;swallow trials  -VO  lingual movement exercises;swallow trials  -HG     Increase Tongue Back Strength  swallow trials;lingual resistance exercises  -VO  swallow trials;lingual resistance exercises  -HG     [place]/Accuracy (Lingual Strengthening Goal 1, SLP)  with minimal cues (75-90% accuracy)  -VO  with minimal cues (75-90% accuracy)  -HG     Time Frame (Lingual Strengthening Goal 1, SLP)  short term goal (STG);by discharge  -VO  short term  goal (STG);by discharge  -HG     Barriers (Lingual Strengthening Goal 1, SLP)  --  Back of tongue x 5. Other lingual exercises and pt exhibited significant weakness.   -HG     Progress/Outcomes (Lingual Strengthening Goal 1, SLP)  good progress toward goal  -VO  good progress toward goal  -HG        Pharyngeal Strengthening Exercise Goal 1 (SLP)    Activity (Pharyngeal Strengthening Goal 1, SLP)  increase timing;increase superior movement of the hyolaryngeal complex;increase anterior movement of the hyolaryngeal complex;increase closure at entrance to airway/closure of airway at glottis;increase squeeze/positive pressure generation  -VO  increase timing;increase superior movement of the hyolaryngeal complex;increase anterior movement of the hyolaryngeal complex;increase closure at entrance to airway/closure of airway at glottis;increase squeeze/positive pressure generation  -HG     Increase Timing  prepping - 3 second prep or suck swallow or 3-step swallow  -VO  prepping - 3 second prep or suck swallow or 3-step swallow  -HG     Increase Superior Movement of the Hyolaryngeal Complex  effortful pitch glide (falsetto + pharyngeal squeeze)  -VO  effortful pitch glide (falsetto + pharyngeal squeeze)  -HG     Increase Anterior Movement of the Hyolaryngeal Complex  chin tuck against resistance (CTAR)  -VO  chin tuck against resistance (CTAR)  -HG     Increase Closure at Entrance to Airway/Closure of Airway at Glottis  supraglottic swallow  -VO  supraglottic swallow  -HG     Increase Squeeze/Positive Pressure Generation  hard effortful swallow  -VO  hard effortful swallow  -HG     [place]/Accuracy (Pharyngeal Strengthening Goal 1, SLP)  with minimal cues (75-90% accuracy)  -VO  with minimal cues (75-90% accuracy)  -HG     Time Frame (Pharyngeal Strengthening Goal 1, SLP)  short term goal (STG);by discharge  -VO  short term goal (STG);by discharge  -HG     Barriers (Pharyngeal Strengthening Goal 1, SLP)  Focused on  simple excs this date, completed w/ cues  -VO  All exercises reviewed and demonstrated and completed with pt x 2 each.  -HG     Progress/Outcomes (Pharyngeal Strengthening Goal 1, SLP)  continuing progress toward goal  -VO  continuing progress toward goal  -HG        Swallow Management Recall Goal 1 (SLP)    Activity (Swallow Management Recall Goal 1, SLP)  recall of;safe diet/liquid level;safe diet level/texture;compensatory swallow strategies/techniques  -VO  recall of;safe diet/liquid level;safe diet level/texture;compensatory swallow strategies/techniques  -HG     [place]/Accuracy (Swallow Management Recall Goal 1, SLP)  with minimal cues (75-90% accuracy)  -VO  with minimal cues (75-90% accuracy)  -HG     Time Frame (Swallow Management Recall Goal 1, SLP)  short term goal (STG);by discharge  -VO  short term goal (STG);by discharge  -HG     Barriers (Swallow Management Recall Goal 1, SLP)  --  Pt aware of her current liquids and safety cup.  -HG     Progress/Outcomes (Swallow Management Recall Goal 1, SLP)  goal met  -VO  goal met  -HG        Swallow Compensatory Strategies Goal 1 (SLP)    Activity (Swallow Compensatory Strategies/Techniques Goal 1, SLP)  compensatory strategies;small cup sips;small straw sips;other (see comments);alternate food/liquid intake;during p.o. trials;during meal intake  -VO  compensatory strategies;small cup sips;small straw sips;other (see comments);alternate food/liquid intake;during p.o. trials;during meal intake  -HG     [place]/Accuracy (Swallow Compensatory Strategies/Techniques Goal 1, SLP)  with minimal cues (75-90% accuracy)  -VO  with minimal cues (75-90% accuracy)  -HG     Time Frame (Swallow Compensatory Strategies/Techniques Goal 1, SLP)  short term goal (STG);by discharge  -VO  short term goal (STG);by discharge  -HG     Barriers (Swallow Compensatory Strategies/Techniques Goal 1, SLP)  --  Patient recalled safe diet and strategies with 100% accuracy and no  cues  -HG     Progress/Outcomes (Swallow Compensatory Strategies/Techniques Goal 1, SLP)  goal met  -VO  goal met  -HG       User Key  (r) = Recorded By, (t) = Taken By, (c) = Cosigned By    Initials Name Provider Type     Raeann Stauffer MS CCC-SLP Speech and Language Pathologist    Nilsa Aleman MA,CCC-SLP Speech and Language Pathologist          EDUCATION  The patient has been educated in the following areas:   Dysphagia (Swallowing Impairment).      SLP Recommendation and Plan                                      Plan for Continued Treatment (SLP): Tolerating small sips w/o s/sxs aspiration. Impulsive w/o cues taking large sips w/ a cough after. Does well w/ adaptive cup.   Plan of Care Review  Plan of Care Reviewed With: patient  Daily Summary of Progress (SLP): progress toward functional goals as expected  Plan of Care Reviewed With: patient             Time Calculation:   Time Calculation- SLP     Row Name 04/02/20 1537             Time Calculation- SLP    SLP Start Time  1445  -VO      SLP Received On  04/02/20  -VO        User Key  (r) = Recorded By, (t) = Taken By, (c) = Cosigned By    Initials Name Provider Type    Nilsa Aleman MA,CCC-SLP Speech and Language Pathologist             Therapy Charges for Today     Code Description Service Date Service Provider Modifiers Qty    [id number] HC ST TREATMENT SWALLOW 3 4/2/2020 Nilsa Marie MA,CCC-SLP GN 1                    Nilsa Marie MA,CCC-SLP  4/2/2020

## 2020-04-02 NOTE — PLAN OF CARE
Pt disoriented to time.  6L O2 NC throughout shift.  Up to chair with lift to decrease risk of O2 desat.  D/C plan includes home with Hospice this Saturday.  Spoke with son, Ke to update.  Palliative nurse will have O2 tank delivered to pt's room on Sat prior to d/c.  Remains pleasant and on-board with plan.  Will continue to monitor.

## 2020-04-02 NOTE — THERAPY DISCHARGE NOTE
Patient Name: Mirian Sy  : 1940    MRN: [id number]                              Today's Date: 2020       Admit Date: 3/19/2020    Visit Dx:     ICD-10-CM ICD-9-CM   1. Oropharyngeal dysphagia R13.12 787.22   2. Acute febrile illness R50.9 780.60   3. Atrial fibrillation with rapid ventricular response (CMS/[facility]) I48.91 427.31   4. Elevated blood pressure reading with diagnosis of hypertension I10 401.9   5. Impaired mobility and ADLs Z74.09 799.89     Patient Active Problem List   Diagnosis   • Abnormal gait   • Takotsubo syndrome   • Carpal tunnel syndrome   • Complex partial epilepsy (CMS/[facility])   • Depression   • Chronic gastritis   • HLD (hyperlipidemia)   • Hypertension   • Hyponatremia   • Nutritional anemia   • Dyssomnia   • Diplopia   • Xeroderma   • Preventative health care   • Frailty   • Debility   • Tricuspid regurgitation   • Acute UTI (urinary tract infection)   • Cerebrovascular accident (CMS/[facility])   • Syncope   • DVT (deep venous thrombosis) (CMS/[facility])   • Anorexia   • Anxiety   • Patent foramen ovale   • Mitral regurgitation   • Low weight   • Senile osteoporosis   • Iron deficiency   • Chronic atrial fibrillation   • Closed fracture of left ilium (CMS/[facility])   • GERD (gastroesophageal reflux disease)   • Fall at home   • Vertigo   • Internuclear ophthalmoplegia, right eye   • Atrial fibrillation with RVR (CMS/[facility])   • Bradycardia   • Chronic atrial fibrillation   • SABRINA (acute kidney injury) (CMS/[facility])   • Hyperkalemia   • Confusion   • Acute exacerbation of CHF (congestive heart failure) (CMS/[facility])   • CKD (chronic kidney disease) stage 3, GFR 30-59 ml/min (CMS/HCC)   • CKD (chronic kidney disease) stage 3, GFR 30-59 ml/min (CMS/[facility])   • Underweight   • Acute on chronic diastolic CHF (congestive heart failure) (CMS/[facility])   • SABRINA (acute kidney injury) (CMS/[facility])   • Chronic atrial fibrillation with RVR   • Severe sepsis (CMS/[facility])   • Aspiration pneumonia due to vomitus (CMS/[facility])   •  Dysphasia   • Lactic acidosis   • Acute and chronic respiratory failure with hypoxia (CMS/[facility])   • Acute febrile illness     Past Medical History:   Diagnosis Date   • Atrial fibrillation (CMS/HCC) 2005    Chronic anticoagulation and rate control   • Basal cell carcinoma 2007    Left leg and nose   • Cardiomyopathy (CMS/[facility]) 2006   • Cerebrovascular accident (CMS/[facility]) 01/2005    CT right parietal CVA. Carotid duplex -50% to 79% left ICS 15% right ICS stenosis. MRI of the neck showed no significant carotid bifurcations of these. Negative CT of the head, 09/10/2012. Carotid duplex, 02/24/2014:  Shelf-like plaque in the CECE without significant elevation and velocities.  Patent vertebral arteries.   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    With dilated cardiomyopathy   • Decubitus ulcer of buttock 2014    Transient during fracture rehabilitation   • Depression 2002    Good response to citalopram and mirtazapine   • DVT (deep venous thrombosis) (CMS/[facility]) 2014    Superficial - right lesser saphenus vein - after hip fracture    • Fracture of bone of forefoot 1972    Right foot   • Fracture of ilium, left (CMS/[facility]) 05/2018    Accidental fall - Uncomplicated recovery   • GERD (gastroesophageal reflux disease) 2014    Chronic superficial gastritis   • HTN (hypertension) 2005   • Iron deficiency anemia due to chronic blood loss 2018    Predisposed by chronic anticoagulation and GERD   • Low body weight due to inadequate caloric intake Adulthood   • Mitral valve prolapse 1997    Mild MR   • Osteoarthritis    • Osteoporosis    • Patent foramen ovale 2005   • Pneumonia 1947   • SCC (squamous cell carcinoma), arm, right 2017   • Scoliosis    • Syncope 2014     undermined cause - [facility]   • Varicose veins 2005    Symptomatic     Past Surgical History:   Procedure Laterality Date   • ACHILLES TENDON SURGERY Left 1997    Repair of rupture left tendon with screws   • BREAST CYST EXCISION  Left    • BREAST SURGERY Left 1982    Excision benign cyst   • CARDIAC CATHETERIZATION  2006    Severe acute coronary spasm   • CATARACT EXTRACTION WITH INTRAOCULAR LENS IMPLANT Bilateral 2015   • EYE CAPSULOTOMY WITH LASER Left 2016    Marked visual improvement   • FEMUR FRACTURE SURGERY Right 2015    Repair of shaft fracture   • HIP FRACTURE SURGERY Left 2014    left hip fracture with pin   • LUMBAR DISC SURGERY  1983   • OVARIAN CYST REMOVAL Left 1996   • SKIN CANCER EXCISION Right 2017    SCC - arm     General Information     Row Name 04/02/20 1400          PT Evaluation Time/Intention    Document Type  discharge treatment  -CD     Mode of Treatment  physical therapy  -CD     Row Name 04/02/20 1400          General Information    Patient Profile Reviewed?  yes  -CD     Existing Precautions/Restrictions  oxygen therapy device and L/min PROFOUND WEAKNESS AND SOA WITH ACTIVITY. DESATS WITH ACTIVITY.   -CD     Barriers to Rehab  medically complex;previous functional deficit  -CD     Row Name 04/02/20 1400          Cognitive Assessment/Intervention- PT/OT    Orientation Status (Cognition)  oriented to;person  -CD     Cognitive Assessment/Intervention Comment  FOLLOWS ALL COMMANDS.   -CD     Row Name 04/02/20 1400          Safety Issues, Functional Mobility    Safety Issues Affecting Function (Mobility)  awareness of need for assistance;insight into deficits/self awareness;safety precaution awareness;safety precautions follow-through/compliance  -CD     Impairments Affecting Function (Mobility)  endurance/activity tolerance;postural/trunk control;strength;shortness of breath  -CD     Comment, Safety Issues/Impairments (Mobility)  MONITOR O2 SATS CLOSELY, REQUIRES FREQUENT REST BREAKS AND CUES FOR PLB.   -CD       User Key  (r) = Recorded By, (t) = Taken By, (c) = Cosigned By    Initials Name Provider Type    CD Umu Natarajan, PT Physical Therapist        Mobility     Row Name 04/02/20 1404          Bed Mobility  Assessment/Treatment    Rolling Left Norco (Bed Mobility)  verbal cues;minimum assist (75% patient effort)  -CD     Rolling Right Norco (Bed Mobility)  verbal cues;minimum assist (75% patient effort)  -CD     Comment (Bed Mobility)  ROLLED L/R FOR PLACEMENT OF MECHANICAL LIFT SLING.   -CD     Row Name 04/02/20 1402          Transfer Assessment/Treatment    Comment (Transfers)  NOT APPROPRIATE TO ASSESS STS DUE TO RESPIRATORY STATUS. USED MECHANICAL LIFT FOR BED TO CHAIR.   -CD     Row Name 04/02/20 1402          Bed-Chair Transfer    Bed-Chair Norco (Transfers)  dependent (less than 25% patient effort);2 person assist  -CD     Assistive Device (Bed-Chair Transfers)  mechanical lift/aid  -CD       User Key  (r) = Recorded By, (t) = Taken By, (c) = Cosigned By    Initials Name Provider Type    Umu Eucead, PT Physical Therapist        Obj/Interventions     Row Name 04/02/20 1405          Therapeutic Exercise    Comment (Therapeutic Exercise)  DEFERRED DUE TO DECLINING RESPIRATORY STATUS. DESATS WITH MINIMAL EXERTION.   -CD       User Key  (r) = Recorded By, (t) = Taken By, (c) = Cosigned By    Initials Name Provider Type    Umu Euceda, PT Physical Therapist        Goals/Plan    No documentation.       Clinical Impression     Row Name 04/02/20 1409          Pain Scale: Numbers Pre/Post-Treatment    Pain Scale: Numbers, Pretreatment  0/10 - no pain  -     Pain Scale: Numbers, Post-Treatment  0/10 - no pain  -     Row Name 04/02/20 1401          Plan of Care Review    Outcome Summary  TREATMENT LIMITED DUE TO DECREASED RESPIRATORY STATUS. REQUIRED MECHANICAL LIFT FOR SAFETY WITH BED TO CHAIR TRANSFER. 02 SATS FLUCTUATED BETWEEN 90% AND 80% ON 6L O2NC. PLAN IS TO D/C HOME WITH SON AND HOSPICE ON SATURDAY. WILL D/C TO [facility] FOR UP TO CHAIR WITH LIFT.   -CD     Row Name 04/02/20 1400          Physical Therapy Clinical Impression    Criteria for Skilled Interventions Met (PT Clinical  Impression)  does not meet criteria for skilled intervention;no  -CD     Row Name 04/02/20 1409          Vital Signs    Pre SpO2 (%)  90  -CD     O2 Delivery Pre Treatment  supplemental O2  -CD     Intra SpO2 (%)  80  -CD     O2 Delivery Intra Treatment  supplemental O2  -CD     Post SpO2 (%)  87  -CD     O2 Delivery Post Treatment  supplemental O2  -CD     Pre Patient Position  Supine  -CD     Intra Patient Position  Supine  -CD     Post Patient Position  Sitting  -CD     Row Name 04/02/20 1409          Positioning and Restraints    Pre-Treatment Position  in bed  -CD     Post Treatment Position  chair  -CD     In Chair  reclined;call light within reach;encouraged to call for assist;exit alarm on;legs elevated;LUE elevated;RUE elevated;notified nsg;on mechanical lift sling;heels elevated;waffle cushion NOTIFIED NSG REC'S FOR MECHANICAL LIFT FOR BACK TO BED AND O2 SATS WITH THERAPY.   -CD       User Key  (r) = Recorded By, (t) = Taken By, (c) = Cosigned By    Initials Name Provider Type    Umu Euceda, PT Physical Therapist        Outcome Measures     Row Name 04/02/20 1416          How much help from another person do you currently need...    Turning from your back to your side while in flat bed without using bedrails?  2  -CD     Moving from lying on back to sitting on the side of a flat bed without bedrails?  2  -CD     Moving to and from a bed to a chair (including a wheelchair)?  1  -CD     Standing up from a chair using your arms (e.g., wheelchair, bedside chair)?  1  -CD     Climbing 3-5 steps with a railing?  1  -CD     To walk in hospital room?  1  -CD     AM-PAC 6 Clicks Score (PT)  8  -CD     Row Name 04/02/20 1416          Modified Engelhard Scale    Modified Emerson Scale  0 - No Symptoms at all.  -CD       User Key  (r) = Recorded By, (t) = Taken By, (c) = Cosigned By    Initials Name Provider Type    Umu Euceda, PT Physical Therapist          PT Recommendation and Plan  Planned Therapy  Interventions (PT Eval): balance training, bed mobility training, gait training, home exercise program, strengthening, transfer training, patient/family education  Outcome Summary/Treatment Plan (PT)  Anticipated Discharge Disposition (PT): home with assist, other (see comments)(HOSPICE)  Plan of Care Reviewed With: patient  Progress: declining  Outcome Summary: TREATMENT LIMITED DUE TO DECREASED RESPIRATORY STATUS. REQUIRED MECHANICAL LIFT FOR SAFETY WITH BED TO CHAIR TRANSFER. 02 SATS FLUCTUATED BETWEEN 90% AND 80% ON 6L O2NC. PLAN IS TO D/C HOME WITH SON AND HOSPICE ON SATURDAY. WILL D/C TO [facility] FOR UP TO CHAIR WITH LIFT.      Time Calculation:   PT Charges     Row Name 04/02/20 1418             Time Calculation- PT    Total Timed Code Minutes- PT  30 minute(s)  -CD         Timed Charges    72743 - PT Therapeutic Activity Minutes  30  -CD        User Key  (r) = Recorded By, (t) = Taken By, (c) = Cosigned By    Initials Name Provider Type    CD Umu Natarajan, PT Physical Therapist        Therapy Charges for Today     Code Description Service Date Service Provider Modifiers Qty    [id number]  PT THERAPEUTIC ACT EA 15 MIN 4/2/2020 Umu Natarajan PT GP 2          PT G-Codes  Outcome Measure Options: AM-PAC 6 Clicks Daily Activity (OT)  AM-PAC 6 Clicks Score (PT): 8  AM-PAC 6 Clicks Score (OT): 15  Modified Emerson Scale: 0 - No Symptoms at all.    PT Discharge Summary  Anticipated Discharge Disposition (PT): home with assist, other (see comments)(HOSPICE)    Umu Natarajan, MIGUELANGEL  4/2/2020

## 2020-04-02 NOTE — PLAN OF CARE
Problem: Patient Care Overview  Goal: Plan of Care Review  Flowsheets (Taken 4/2/2020 1040)  Outcome Summary: Pt continues to be limited by O2. She was able to complete bed mobility, and transferred from supine to sit with min A. Sitting EOB for core strength/stability, pt completed ther ex. She required frequent rest breaks d/t O2 drop, implementing deep breathing techniques. She required mod A to return to supine after tx. Pt will benefit from continuing with IP OT, progress as tolerated.

## 2020-04-02 NOTE — PROGRESS NOTES
Continued Stay Note  [facility]     Patient Name: Mirian Sy  MRN: [id number]  Today's Date: 4/2/2020    Admit Date: 3/19/2020    Discharge Plan     Row Name 04/02/20 1626       Plan    Plan  Home with [facility] care    Plan Comments  Visit made to pt, pt sitting up in chair reading a book, denies any pain or shortness of breath. Dr. Arriaza made visit. Discussed pt discharging home Sat. 4/4 with hospice. Dr. Arriaza agreeable to this plan. Spoke with pt's son Ke to follow up on any questions or other equipment needs for pt. Pt is receiving oxygen, bipap machine, hospital bed, overbed table, transport w/c from hospice, to be delivered tomorrow. Ke has hospice 24 hr number to call when pt arrives home. Will continue to follow.         Discharge Codes    No documentation.       Expected Discharge Date and Time     Expected Discharge Date Expected Discharge Time    Apr 3, 2020             Sheela Alcocer RN

## 2020-04-02 NOTE — PLAN OF CARE
Problem: Patient Care Overview  Goal: Plan of Care Review  Outcome: Ongoing (interventions implemented as appropriate)  Flowsheets (Taken 4/2/2020 7820)  Plan of Care Reviewed With: patient  Note:   SLP treatment completed. Will continue to address dysphagia Please see note for further details and recommendations.

## 2020-04-02 NOTE — TELEPHONE ENCOUNTER
Contacted Kristin and advised her that Dr. Engel will be out of the office for the next 2 weeks and we can follow up with her once he returns.     She stated she will let her team know and they have a doctor on staff who will continue care and if they need anything further from our office they will contact us.

## 2020-04-02 NOTE — THERAPY TREATMENT NOTE
Acute Care - Occupational Therapy Treatment Note  [facility]     Patient Name: Mirian Sy  : 1940  MRN: [id number]  Today's Date: 2020  Onset of Illness/Injury or Date of Surgery: 20  Date of Referral to OT: 20  Referring Physician: MD Racheal     Admit Date: 3/19/2020       ICD-10-CM ICD-9-CM   1. Oropharyngeal dysphagia R13.12 787.22   2. Acute febrile illness R50.9 780.60   3. Atrial fibrillation with rapid ventricular response (CMS/Formerly [facility]) I48.91 427.31   4. Elevated blood pressure reading with diagnosis of hypertension I10 401.9   5. Impaired mobility and ADLs Z74.09 799.89     Patient Active Problem List   Diagnosis   • Abnormal gait   • Takotsubo syndrome   • Carpal tunnel syndrome   • Complex partial epilepsy (CMS/[facility])   • Depression   • Chronic gastritis   • HLD (hyperlipidemia)   • Hypertension   • Hyponatremia   • Nutritional anemia   • Dyssomnia   • Diplopia   • Xeroderma   • Preventative health care   • Frailty   • Debility   • Tricuspid regurgitation   • Acute UTI (urinary tract infection)   • Cerebrovascular accident (CMS/[facility])   • Syncope   • DVT (deep venous thrombosis) (CMS/[facility])   • Anorexia   • Anxiety   • Patent foramen ovale   • Mitral regurgitation   • Low weight   • Senile osteoporosis   • Iron deficiency   • Chronic atrial fibrillation   • Closed fracture of left ilium (CMS/[facility])   • GERD (gastroesophageal reflux disease)   • Fall at home   • Vertigo   • Internuclear ophthalmoplegia, right eye   • Atrial fibrillation with RVR (CMS/[facility])   • Bradycardia   • Chronic atrial fibrillation   • SABRINA (acute kidney injury) (CMS/[facility])   • Hyperkalemia   • Confusion   • Acute exacerbation of CHF (congestive heart failure) (CMS/[facility])   • CKD (chronic kidney disease) stage 3, GFR 30-59 ml/min (CMS/[facility])   • CKD (chronic kidney disease) stage 3, GFR 30-59 ml/min (CMS/[facility])   • Underweight   • Acute on chronic diastolic CHF (congestive heart failure) (CMS/[facility])   • SABRINA (acute kidney  injury) (CMS/[facility])   • Chronic atrial fibrillation with RVR   • Severe sepsis (CMS/[facility])   • Aspiration pneumonia due to vomitus (CMS/[facility])   • Dysphasia   • Lactic acidosis   • Acute and chronic respiratory failure with hypoxia (CMS/[facility])   • Acute febrile illness     Past Medical History:   Diagnosis Date   • Atrial fibrillation (CMS/[facility]) 2005    Chronic anticoagulation and rate control   • Basal cell carcinoma 2007    Left leg and nose   • Cardiomyopathy (CMS/[facility]) 2006   • Cerebrovascular accident (CMS/HCC) 01/2005    CT right parietal CVA. Carotid duplex -50% to 79% left ICS 15% right ICS stenosis. MRI of the neck showed no significant carotid bifurcations of these. Negative CT of the head, 09/10/2012. Carotid duplex, 02/24/2014:  Shelf-like plaque in the CECE without significant elevation and velocities.  Patent vertebral arteries.   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    With dilated cardiomyopathy   • Decubitus ulcer of buttock 2014    Transient during fracture rehabilitation   • Depression 2002    Good response to citalopram and mirtazapine   • DVT (deep venous thrombosis) (CMS/[facility]) 2014    Superficial - right lesser saphenus vein - after hip fracture    • Fracture of bone of forefoot 1972    Right foot   • Fracture of ilium, left (CMS/[facility]) 05/2018    Accidental fall - Uncomplicated recovery   • GERD (gastroesophageal reflux disease) 2014    Chronic superficial gastritis   • HTN (hypertension) 2005   • Iron deficiency anemia due to chronic blood loss 2018    Predisposed by chronic anticoagulation and GERD   • Low body weight due to inadequate caloric intake Adulthood   • Mitral valve prolapse 1997    Mild MR   • Osteoarthritis    • Osteoporosis    • Patent foramen ovale 2005   • Pneumonia 1947   • SCC (squamous cell carcinoma), arm, right 2017   • Scoliosis    • Syncope 2014     undermined cause - [facility]   • Varicose veins 2005    Symptomatic     Past Surgical  History:   Procedure Laterality Date   • ACHILLES TENDON SURGERY Left 1997    Repair of rupture left tendon with screws   • BREAST CYST EXCISION Left    • BREAST SURGERY Left 1982    Excision benign cyst   • CARDIAC CATHETERIZATION  2006    Severe acute coronary spasm   • CATARACT EXTRACTION WITH INTRAOCULAR LENS IMPLANT Bilateral 2015   • EYE CAPSULOTOMY WITH LASER Left 2016    Marked visual improvement   • FEMUR FRACTURE SURGERY Right 2015    Repair of shaft fracture   • HIP FRACTURE SURGERY Left 2014    left hip fracture with pin   • LUMBAR DISC SURGERY  1983   • OVARIAN CYST REMOVAL Left 1996   • SKIN CANCER EXCISION Right 2017    SCC - arm       Therapy Treatment    Rehabilitation Treatment Summary     Row Name 04/02/20 1040             Treatment Time/Intention    Discipline  occupational therapist  -SG      Document Type  therapy note (daily note)  -SG      Subjective Information  no complaints  -SG      Mode of Treatment  occupational therapy  -SG      Patient/Family Observations  Pt alert, cooperative, upright in bed, high amilcar nasal cannula in place  -SG      Care Plan Review  care plan/treatment goals reviewed  -SG      Therapy Frequency (OT Eval)  daily  -SG      Patient Effort  good  -SG      Comment  Nsg reports that the only thing she will be able to tolerate today is sitting EOB  -SG      Existing Precautions/Restrictions  oxygen therapy device and L/min  -SG      Recorded by [SG] Jackie Jose OTR/L 04/02/20 1148      Row Name 04/02/20 1040             Vital Signs    Pre Systolic BP Rehab  117 VSS; nsg cleared for therapy  -SG      Pre Treatment Diastolic BP  57  -SG      Pre SpO2 (%)  89  -SG      O2 Delivery Pre Treatment  supplemental O2 6 liters of O2  -SG      Intra SpO2 (%)  66  -SG      O2 Delivery Intra Treatment  supplemental O2  -SG      Post SpO2 (%)  88  -SG      O2 Delivery Post Treatment  supplemental O2  -SG      Pre Patient Position  Supine  -SG      Intra Patient Position  Sitting   -SG      Post Patient Position  Supine  -SG      Recorded by [SG] Jackie Joes OTR/L 04/02/20 1148      Row Name 04/02/20 1040             Cognitive Assessment/Intervention- PT/OT    Affect/Mental Status (Cognitive)  WFL  -SG      Orientation Status (Cognition)  oriented x 3  -SG      Follows Commands (Cognition)  WFL  -SG      Recorded by [SG] Jakcie Jose OTR/L 04/02/20 1148      Row Name 04/02/20 1040             Safety Issues, Functional Mobility    Safety Issues Affecting Function (Mobility)  awareness of need for assistance;insight into deficits/self awareness;safety precaution awareness  -SG      Impairments Affecting Function (Mobility)  balance;endurance/activity tolerance;postural/trunk control;shortness of breath;strength  -SG      Recorded by [SG] Jackie Jose OTR/L 04/02/20 1148      Row Name 04/02/20 1040             Bed Mobility Assessment/Treatment    Bed Mobility Assessment/Treatment  supine-sit;sit-supine;rolling right;rolling left;scooting/bridging  -SG      Rolling Left [place] (Bed Mobility)  minimum assist (75% patient effort)  -SG      Rolling Right [place] (Bed Mobility)  minimum assist (75% patient effort)  -SG      Scooting/Bridging [place] (Bed Mobility)  minimum assist (75% patient effort)  -SG      Supine-Sit [place] (Bed Mobility)  minimum assist (75% patient effort)  -SG      Sit-Supine [place] (Bed Mobility)  minimum assist (75% patient effort)  -SG      Bed Mobility, Safety Issues  decreased use of arms for pushing/pulling;decreased use of legs for bridging/pushing;impaired trunk control for bed mobility  -SG      Assistive Device (Bed Mobility)  bed rails;draw sheet;head of bed elevated  -SG      Recorded by [SG] Jackie Jose OTR/L 04/02/20 1148      Row Name 04/02/20 1040             ADL Assessment/Intervention    Additional Documentation  Comment, IADL Assessment/Training (Row)  -SG      Recorded by [SG] Jackie Jose OTR/L 04/02/20 1148      Row  Name 04/02/20 1040             BADL Safety/Performance    Impairments, BADL Safety/Performance  balance;endurance/activity tolerance;shortness of breath;strength;trunk/postural control  -SG      Recorded by [SG] Jackie Jose OTR/L 04/02/20 1148      Row Name 04/02/20 1040             Motor Skills Assessment/Interventions    Additional Documentation  Balance (Group);Therapeutic Exercise (Group)  -SG      Recorded by [SG] Jackie Jose OTR/L 04/02/20 1148      Row Name 04/02/20 1040             Therapeutic Exercise    Therapeutic Exercise  seated, upper extremities  -SG      Additional Documentation  Therapeutic Exercise (Row)  -SG      04079 - OT Therapeutic Exercise Minutes  10  -SG      10664 - OT Therapeutic Activity Minutes  16  -SG      Recorded by [SG] Jackie Jose OTR/L 04/02/20 1148      Row Name 04/02/20 1040             Upper Extremity Seated Therapeutic Exercise    Performed, Seated Upper Extremity (Therapeutic Exercise)  shoulder flexion/extension;shoulder abduction/adduction;elbow flexion/extension  -SG      Exercise Type, Seated Upper Extremity (Therapeutic Exercise)  AROM (active range of motion)  -SG      Sets/Reps Detail, Seated Upper Extremity (Therapeutic Exercise)  1/10  -SG      Comment, Seated Upper Extremity (Therapeutic Exercise)  Pt sitting EOB to complete ther ex to promote postural control and trunk stability/core strength. Required many rest breaks to incorporate deep breathing techniques to bring o2 sats back up to WNL  -SG      Recorded by [SG] Jackie Jose OTR/L 04/02/20 1148      Row Name 04/02/20 1040             Balance    Balance  static sitting balance;dynamic sitting balance  -SG      Recorded by [SG] Jackie Jose OTR/L 04/02/20 1148      Row Name 04/02/20 1040             Static Sitting Balance    Level of [place] (Unsupported Sitting, Static Balance)  contact guard assist  -SG      Sitting Position (Unsupported Sitting, Static Balance)  sitting on edge of bed  -SG       Time Able to Maintain Position (Unsupported Sitting, Static Balance)  more than 5 minutes  -SG      Recorded by [SG] Jackie Jose OTR/L 04/02/20 1148      Row Name 04/02/20 1040             Dynamic Sitting Balance    Level of [place], Reaches Outside Midline (Sitting, Dynamic Balance)  contact guard assist  -SG      Sitting Position, Reaches Outside Midline (Sitting, Dynamic Balance)  sitting on edge of bed  -SG      Comment, Reaches Outside Midline (Sitting, Dynamic Balance)  Pt able to complete AROM exercises sitting EOB, leaning to left d/t LOB and rest breaks between sets, but able to self correct  -SG      Recorded by [SG] Jackie Jose OTR/L 04/02/20 1148      Row Name 04/02/20 1040             Positioning and Restraints    Pre-Treatment Position  in bed  -SG      Post Treatment Position  bed  -SG      In Bed  notified nsg;supine;fowlers;call light within reach;encouraged to call for assist;exit alarm on  -SG      Recorded by [] Jackie Jose OTR/L 04/02/20 1148      Row Name 04/02/20 1040             Pain Assessment    Additional Documentation  Pain Scale: Numbers Pre/Post-Treatment (Group)  -SG      Recorded by [] Jackie Jose OTR/L 04/02/20 1148      Row Name 04/02/20 1040             Pain Scale: Numbers Pre/Post-Treatment    Pain Scale: Numbers, Pretreatment  0/10 - no pain  -SG      Pain Scale: Numbers, Post-Treatment  0/10 - no pain  -SG      Recorded by [SG] Jackie Jose OTR/L 04/02/20 1148      Row Name 04/02/20 1040             Plan of Care Review    Plan of Care Reviewed With  patient  -SG      Recorded by [SG] Jackie Jose OTR/L 04/02/20 1148        User Key  (r) = Recorded By, (t) = Taken By, (c) = Cosigned By    Initials Name Effective Dates Discipline     Jackie Jose OTR/L 01/20/20 -  OT                 OT Recommendation and Plan  Therapy Frequency (OT Eval): daily  Plan of Care Review  Plan of Care Reviewed With: patient  Plan of Care Reviewed With: patient  Outcome  Summary: Pt continues to be limited by O2. She was able to complete bed mobility, and transferred from supine to sit with min A. Sitting EOB for core strength/stability, pt completed ther ex. She required frequent rest breaks d/t O2 drop, implementing deep breathing techniques. She required mod A to return to supine after tx. Pt will benefit from continuing with IP OT, progress as tolerated..  Outcome Measures     Row Name 04/02/20 1040          How much help from another is currently needed...    Putting on and taking off regular lower body clothing?  2  -SG     Bathing (including washing, rinsing, and drying)  2  -SG     Toileting (which includes using toilet bed pan or urinal)  2  -SG     Putting on and taking off regular upper body clothing  3  -SG     Taking care of personal grooming (such as brushing teeth)  3  -SG     Eating meals  3  -SG     AM-PAC 6 Clicks Score (OT)  15  -SG        Functional Assessment    Outcome Measure Options  AM-PAC 6 Clicks Daily Activity (OT)  -       User Key  (r) = Recorded By, (t) = Taken By, (c) = Cosigned By    Initials Name Provider Type    Jackie Landeros OTR/L Occupational Therapist           Time Calculation:   Time Calculation- OT     Row Name 04/02/20 1040             Time Calculation- OT    OT Start Time  1040  -      OT Received On  04/02/20  -      OT Goal Re-Cert Due Date  04/11/20  -         Timed Charges    16617 - OT Therapeutic Exercise Minutes  10  -      42527 - OT Therapeutic Activity Minutes  16  -        User Key  (r) = Recorded By, (t) = Taken By, (c) = Cosigned By    Initials Name Provider Type    Jackie Landeros OTR/L Occupational Therapist        Therapy Charges for Today     Code Description Service Date Service Provider Modifiers Qty    69573157543 HC OT THER PROC EA 15 MIN 4/2/2020 Jackie Jose OTR/L GO 1    02426198978 HC OT THERAPEUTIC ACT EA 15 MIN 4/2/2020 Jackie Jose OTR/L GO 1               STEPHANIE Torres/LUCINDA  4/2/2020

## 2020-04-02 NOTE — PROGRESS NOTES
Continued Stay Note  [facility]     Patient Name: Mirian Sy  MRN: [id number]  Today's Date: 4/2/2020    Admit Date: 3/19/2020    Discharge Plan     Row Name 04/02/20 0918       Plan    Plan  Home with [facility]    Patient/Family in Agreement with Plan  yes    Plan Comments  Spoke with patient at bedside. Plan is home with [facility] possibly on Saturday. CM will continue to follow.    Final Discharge Disposition Code  50 - home with hospice        Discharge Codes    No documentation.       Expected Discharge Date and Time     Expected Discharge Date Expected Discharge Time    Apr 3, 2020             Abdiaziz Hinojosa RN

## 2020-04-02 NOTE — PROGRESS NOTES
[facility] Medicine Services  PROGRESS NOTE    Patient Name: Mirian Sy  : 1940  MRN: [id number]    Date of Admission: 3/19/2020  Primary Care Physician: Oren Engel DO    Subjective   Subjective     CC:    F/u dyspnea    HPI:   On 6L nc today.  Comfortable, no pain.  Eating well.     Review of Systems    Gen- No fevers, chills  CV- No chest pain, palpitations  Resp- No cough, dyspnea at rest  GI- No N/V/D, abd pain    Objective   Objective     Vital Signs:   Temp:  [96.8 °F (36 °C)-98.9 °F (37.2 °C)] 98 °F (36.7 °C)  Heart Rate:  [60-84] 71  Resp:  [20-22] 20  BP: ()/(57-66) 118/62        Physical Exam:    Constitutional: No acute distress, looks comfortable. Sitting up, alert, very pleasant.   HENT: NCAT,  PERRLA  Respiratory: decreased breath sounds throughout, breathing effort is normal.  No wheeze and not labored.    Cardiovascular: Irregular, no murmur or gallop, rubs.  Gastrointestinal: Positive bowel sounds, soft, nontender, nondistended  Musculoskeletal: No bilateral ankle edema, very low muscle mass.  Psychiatric: Appropriate affect, cooperative  Neurologic: Awake, alert, follows commands CORDOVA   Skin: no rash     Results Reviewed:  Results from last 7 days   Lab Units 20  0836 20  0756 20  0406 20  1151   WBC 10*3/mm3  --  12.17* 13.56* 11.86*   HEMOGLOBIN g/dL  --  10.0* 9.9* 10.2*   HEMATOCRIT %  --  32.0* 31.8* 31.4*   PLATELETS 10*3/mm3  --  366 326 334   INR  1.68*  --   --   --    PROCALCITONIN ng/mL  --   --  1.00*  --      Results from last 7 days   Lab Units 20  0614 20  0334 20  0613  20  2237   SODIUM mmol/L 147* 145 143   < >  --    POTASSIUM mmol/L 3.9 4.2 3.2*   < >  --    CHLORIDE mmol/L 110* 108* 102   < >  --    CO2 mmol/L 27.0 23.0 27.0   < >  --    BUN mg/dL 39* 41* 39*   < >  --    CREATININE mg/dL 1.56* 1.82* 1.91*   < >  --    GLUCOSE mg/dL 113* 129* 108*   < >  --    CALCIUM mg/dL  8.9 9.1 9.2   < >  --    PROBNP pg/mL  --   --   --   --  6,547.0*    < > = values in this interval not displayed.     Estimated Creatinine Clearance: 24.1 mL/min (A) (by C-G formula based on SCr of 1.56 mg/dL (H)).    Microbiology Results Abnormal     Procedure Component Value - Date/Time    Body Fluid Culture - Body Fluid, Pleural Cavity [[id number]] Collected:  03/30/20 1347    Lab Status:  Final result Specimen:  Body Fluid from Pleural Cavity Updated:  04/02/20 0648     Body Fluid Culture No growth at 3 days     Gram Stain Many (4+) WBCs per low power field      No organisms seen    Fungus Smear - Body Fluid, Pleural Cavity [[id number]] Collected:  03/30/20 1347    Lab Status:  Final result Specimen:  Body Fluid from Pleural Cavity Updated:  04/01/20 1408     Fungal Stain No fungal elements seen    Blood Culture - Blood, Arm, Right [[id number]] Collected:  03/28/20 0902    Lab Status:  Preliminary result Specimen:  Blood from Arm, Right Updated:  04/01/20 0930     Blood Culture No growth at 4 days    Blood Culture - Blood, Arm, Left [[id number]] Collected:  03/28/20 0903    Lab Status:  Preliminary result Specimen:  Blood from Arm, Left Updated:  04/01/20 0930     Blood Culture No growth at 4 days    AFB Culture - Body Fluid, Pleural Cavity [[id number]] Collected:  03/30/20 1347    Lab Status:  Preliminary result Specimen:  Body Fluid from Pleural Cavity Updated:  03/31/20 1125     AFB Stain No acid fast bacilli seen on concentrated smear    Blood Culture - Blood, Arm, Left [[id number]] Collected:  03/19/20 0717    Lab Status:  Final result Specimen:  Blood from Arm, Left Updated:  03/24/20 0745     Blood Culture No growth at 5 days    Blood Culture - Blood, Wrist, Left [[id number]] Collected:  03/19/20 0717    Lab Status:  Final result Specimen:  Blood from Wrist, Left Updated:  03/24/20 0745     Blood Culture No growth at 5 days    Respiratory Panel, PCR - Swab, Nasopharynx [[id number]]  (Normal) Collected:  03/22/20  1407    Lab Status:  Final result Specimen:  Swab from Nasopharynx Updated:  03/22/20 1532     ADENOVIRUS, PCR Not Detected     Coronavirus 229E Not Detected     Coronavirus HKU1 Not Detected     Coronavirus NL63 Not Detected     Coronavirus OC43 Not Detected     Human Metapneumovirus Not Detected     Human Rhinovirus/Enterovirus Not Detected     Influenza B PCR Not Detected     Parainfluenza Virus 1 Not Detected     Parainfluenza Virus 2 Not Detected     Parainfluenza Virus 3 Not Detected     Parainfluenza Virus 4 Not Detected     Bordetella pertussis pcr Not Detected     Influenza A H1 2009 PCR Not Detected     Chlamydophila pneumoniae PCR Not Detected     Mycoplasma pneumo by PCR Not Detected     Influenza A PCR Not Detected     Influenza A H3 Not Detected     Influenza A H1 Not Detected     RSV, PCR Not Detected     Bordetella parapertussis PCR Not Detected    Narrative:       The coronavirus on the RVP is NOT COVID-19 and is NOT indicative of infection with COVID-19.     MRSA Screen, PCR - Swab, Nares [[id number]]  (Normal) Collected:  03/19/20 1132    Lab Status:  Final result Specimen:  Swab from Nares Updated:  03/19/20 1312     MRSA PCR Negative    Narrative:       MRSA Negative    Respiratory Panel, PCR - Swab, Nasopharynx [[id number]]  (Normal) Collected:  03/19/20 0716    Lab Status:  Final result Specimen:  Swab from Nasopharynx Updated:  03/19/20 1015     ADENOVIRUS, PCR Not Detected     Coronavirus 229E Not Detected     Coronavirus HKU1 Not Detected     Coronavirus NL63 Not Detected     Coronavirus OC43 Not Detected     Human Metapneumovirus Not Detected     Human Rhinovirus/Enterovirus Not Detected     Influenza B PCR Not Detected     Parainfluenza Virus 1 Not Detected     Parainfluenza Virus 2 Not Detected     Parainfluenza Virus 3 Not Detected     Parainfluenza Virus 4 Not Detected     Bordetella pertussis pcr Not Detected     Influenza A H1 2009 PCR Not Detected     Chlamydophila pneumoniae PCR Not  Detected     Mycoplasma pneumo by PCR Not Detected     Influenza A PCR Not Detected     Influenza A H3 Not Detected     Influenza A H1 Not Detected     RSV, PCR Not Detected     Bordetella parapertussis PCR Not Detected    Narrative:       The coronavirus on the RVP is NOT COVID-19 and is NOT indicative of infection with COVID-19.           Imaging Results (Last 24 Hours)     ** No results found for the last 24 hours. **          Results for orders placed during the hospital encounter of 03/19/20   Adult Transthoracic Echo Complete W/ Cont if Necessary Per Protocol    Narrative · Left ventricular systolic function is low normal.  · Estimated EF = 50%.  · Moderate mitral valve regurgitation is present  · Severe tricuspid valve regurgitation is present.  · Calculated right ventricular systolic pressure from tricuspid   regurgitation is 48.6 mmHg.          I have reviewed the medications:  Scheduled Meds:    apixaban 2.5 mg Oral Q12H   atorvastatin 80 mg Oral Nightly   cefdinir 300 mg Oral Q24H   citalopram 20 mg Oral QAM   doxycycline 100 mg Oral Q12H   famotidine 20 mg Oral Nightly   ferrous sulfate 325 mg Oral Daily With Breakfast   levETIRAcetam 125 mg Oral Q12H   metoprolol succinate  mg Oral Daily   mirtazapine 7.5 mg Oral Nightly   multivitamin with minerals 1 tablet Oral Daily   sodium chloride 10 mL Intravenous Q12H   verapamil 80 mg Oral Q8H     Continuous Infusions:     PRN Meds:.•  acetaminophen **OR** acetaminophen **OR** acetaminophen  •  docusate sodium  •  famotidine  •  melatonin  •  ondansetron **OR** ondansetron  •  sodium chloride    Assessment/Plan   Assessment & Plan     Active Hospital Problems    Diagnosis  POA   • **Aspiration pneumonia due to vomitus (CMS/[facility]) [J69.0]  Yes   • SABRINA (acute kidney injury) (CMS/[facility]) [N17.9]  Yes   • Chronic atrial fibrillation with RVR [I48.20]  Yes   • Severe sepsis (CMS/[facility]) [A41.9, R65.20]  Yes   • Dysphagia [R13.10]  Yes   • Lactic acidosis [E87.2]  Yes    • Acute and chronic respiratory failure with hypoxia (CMS/[facility]) [J96.21]  Yes   • Acute febrile illness [R50.9]  Yes   • Acute on chronic diastolic CHF (congestive heart failure) (CMS/[facility]) [I50.33]  Yes   • Underweight [R63.6]  Yes   • CKD (chronic kidney disease) stage 3, GFR 30-59 ml/min (CMS/[facility]) [N18.3]  Yes   • Chronic atrial fibrillation [I48.20]  Yes   • Anorexia [R63.0]  Yes   • Anxiety [F41.9]  Yes   • Debility [R53.81]  Yes   • Hypertension [I10]  Yes      Resolved Hospital Problems   No resolved problems to display.        Brief Hospital Course to date:  Mirian Sy is a 80 y.o. female who presented to the ER with fever and shortness of breath.  She was admitted to the hospital with severe sepsis present on admission and treated for aspiration pneumonia.  Additionally she was found to be in A. fib RVR.  She was seen by Dr. Olguin on admission due to acute on chronic diastolic heart failure and chronic atrial fibrillation with RVR.  She has been struggling with shortness of breath and has been intermittently on BiPAP and high flow nasal cannula.  She appears to be with acute on chronic respiratory failure and currently we are struggling to get her off high flow nasal cannula.  (I have copied the above from my colleagues note.  However, I have checked the accuracy of the information myself independently.)    Asp pna  Severe Sepsis (resolved)  - got 8 days of zosyn  - ongoing doxy/cef:  endopoint unclear.   - tapped by IR 3/30, right side, 700ml, exudate, cx neg to date   - repeat upright CXR tomorrow.     Acute on Chronic Respiratory failure  -No CT evidence of COVID-19  -Pulmonology is following     Afib with RVR  Ac on chron CHF, EF 50.  Mod MR, severe TR  - rate controlled.  Patient  on Eliquis, metoprolol verapamil dig  - lasix stopped for creat bump:  Restart lower dose    CKD III  - bumped recently but now improving.   - restart lasix    DVT Prophylaxis: Apixaban 2.5 mg oral every 12  hours,     Disposition: I expect the patient to be discharged TBD - home w hospice, possibly saturday    CODE STATUS:  DNR  Code Status and Medical Interventions:   Ordered at: 03/31/20 7146     Limited Support to NOT Include:    Intubation     Level Of Support Discussed With:    Patient    Next of Kin (If No Surrogate)     Code Status:    No CPR     Medical Interventions (Level of Support Prior to Arrest):    Limited         Electronically signed by La Arriaza MD, 04/02/20, 09:16.

## 2020-04-02 NOTE — PLAN OF CARE
Problem: Patient Care Overview  Goal: Plan of Care Review  Flowsheets  Taken 4/2/2020 1416 by Umu Natarajan, PT  Progress: declining  Taken 4/2/2020 1040 by Jackie Jose OTR/L  Plan of Care Reviewed With: patient  Taken 4/2/2020 1409 by Umu Natarajan, PT  Outcome Summary: TREATMENT LIMITED DUE TO DECREASED RESPIRATORY STATUS. REQUIRED MECHANICAL LIFT FOR SAFETY WITH BED TO CHAIR TRANSFER. 02 SATS FLUCTUATED BETWEEN 90% AND 80% ON 6L O2NC. PLAN IS TO D/C HOME WITH SON AND HOSPICE ON SATURDAY. [place] [facility] [place] WITH LIFT AND POSITIONING FOR COMFORT AND PRESSURE RELIEF UNTIL D/C HOME WITH HOSPICE. D/C P.T.

## 2020-04-03 ENCOUNTER — APPOINTMENT (OUTPATIENT)
Dept: GENERAL RADIOLOGY | Facility: HOSPITAL | Age: 80
End: 2020-04-03

## 2020-04-03 LAB
ANION GAP SERPL CALCULATED.3IONS-SCNC: 11 MMOL/L (ref 5–15)
BUN BLD-MCNC: 36 MG/DL (ref 8–23)
BUN/CREAT SERPL: 26.7 (ref 7–25)
CALCIUM SPEC-SCNC: 9.5 MG/DL (ref 8.6–10.5)
CHLORIDE SERPL-SCNC: 110 MMOL/L (ref 98–107)
CO2 SERPL-SCNC: 26 MMOL/L (ref 22–29)
CREAT BLD-MCNC: 1.35 MG/DL (ref 0.57–1)
GFR SERPL CREATININE-BSD FRML MDRD: 38 ML/MIN/1.73
GLUCOSE BLD-MCNC: 111 MG/DL (ref 65–99)
POTASSIUM BLD-SCNC: 4.1 MMOL/L (ref 3.5–5.2)
SODIUM BLD-SCNC: 147 MMOL/L (ref 136–145)

## 2020-04-03 PROCEDURE — 99232 SBSQ HOSP IP/OBS MODERATE 35: CPT | Performed by: INTERNAL MEDICINE

## 2020-04-03 PROCEDURE — 94799 UNLISTED PULMONARY SVC/PX: CPT

## 2020-04-03 PROCEDURE — 97530 THERAPEUTIC ACTIVITIES: CPT

## 2020-04-03 PROCEDURE — 94660 CPAP INITIATION&MGMT: CPT

## 2020-04-03 PROCEDURE — 80048 BASIC METABOLIC PNL TOTAL CA: CPT | Performed by: INTERNAL MEDICINE

## 2020-04-03 PROCEDURE — 71045 X-RAY EXAM CHEST 1 VIEW: CPT

## 2020-04-03 PROCEDURE — 97535 SELF CARE MNGMENT TRAINING: CPT

## 2020-04-03 PROCEDURE — 92526 ORAL FUNCTION THERAPY: CPT

## 2020-04-03 RX ORDER — MIDODRINE HYDROCHLORIDE 5 MG/1
5 TABLET ORAL
Status: DISCONTINUED | OUTPATIENT
Start: 2020-04-03 | End: 2020-04-04 | Stop reason: HOSPADM

## 2020-04-03 RX ADMIN — MULTIPLE VITAMINS W/ MINERALS TAB 1 TABLET: TAB at 08:32

## 2020-04-03 RX ADMIN — MIRTAZAPINE 7.5 MG: 15 TABLET, FILM COATED ORAL at 20:15

## 2020-04-03 RX ADMIN — DOXYCYCLINE 100 MG: 100 CAPSULE ORAL at 20:15

## 2020-04-03 RX ADMIN — APIXABAN 2.5 MG: 2.5 TABLET, FILM COATED ORAL at 20:16

## 2020-04-03 RX ADMIN — FAMOTIDINE 20 MG: 20 TABLET ORAL at 20:14

## 2020-04-03 RX ADMIN — VERAPAMIL HYDROCHLORIDE 80 MG: 80 TABLET, FILM COATED ORAL at 06:42

## 2020-04-03 RX ADMIN — MIDODRINE HYDROCHLORIDE 5 MG: 5 TABLET ORAL at 17:31

## 2020-04-03 RX ADMIN — CEFDINIR 300 MG: 300 CAPSULE ORAL at 10:18

## 2020-04-03 RX ADMIN — SODIUM CHLORIDE, PRESERVATIVE FREE 10 ML: 5 INJECTION INTRAVENOUS at 20:14

## 2020-04-03 RX ADMIN — MIDODRINE HYDROCHLORIDE 5 MG: 5 TABLET ORAL at 08:32

## 2020-04-03 RX ADMIN — Medication 10 MG: at 06:42

## 2020-04-03 RX ADMIN — MIDODRINE HYDROCHLORIDE 5 MG: 5 TABLET ORAL at 12:20

## 2020-04-03 RX ADMIN — LEVETIRACETAM 125 MG: 250 TABLET, FILM COATED ORAL at 08:32

## 2020-04-03 RX ADMIN — METOPROLOL SUCCINATE 100 MG: 100 TABLET, EXTENDED RELEASE ORAL at 08:32

## 2020-04-03 RX ADMIN — SODIUM CHLORIDE, PRESERVATIVE FREE 10 ML: 5 INJECTION INTRAVENOUS at 08:33

## 2020-04-03 RX ADMIN — ATORVASTATIN CALCIUM 80 MG: 40 TABLET, FILM COATED ORAL at 20:14

## 2020-04-03 RX ADMIN — LEVETIRACETAM 125 MG: 250 TABLET, FILM COATED ORAL at 20:15

## 2020-04-03 RX ADMIN — DOXYCYCLINE 100 MG: 100 CAPSULE ORAL at 08:31

## 2020-04-03 RX ADMIN — CITALOPRAM HYDROBROMIDE 20 MG: 20 TABLET ORAL at 06:43

## 2020-04-03 RX ADMIN — FUROSEMIDE 20 MG: 20 TABLET ORAL at 08:32

## 2020-04-03 RX ADMIN — APIXABAN 2.5 MG: 2.5 TABLET, FILM COATED ORAL at 08:32

## 2020-04-03 NOTE — PLAN OF CARE
A/O x3. Pt has gone between 6L NC and BiPAP all night, RRT consulted for SOA. Pt takes meds whole in applesauce. Pt denies complaints at this time. Pt was encouraged to call out for any needs she may have. Will continue to monitor.     Upon entering room to give report to oncoming RN, pt was found sliding out of bed, tangled up in BiPAP with stool in the floor and on pt. Pt was repositioned, cleaned up and is now resting comfortably in her bed.

## 2020-04-03 NOTE — THERAPY TREATMENT NOTE
Acute Care - Speech Language Pathology   Swallow Treatment Note [facility]     Patient Name: Mirian Sy  : 1940  MRN: [id number]  Today's Date: 4/3/2020  Onset of Illness/Injury or Date of Surgery: 20     Referring Physician: MD Racheal       Admit Date: 3/19/2020    Visit Dx:      ICD-10-CM ICD-9-CM   1. Oropharyngeal dysphagia R13.12 787.22   2. Acute febrile illness R50.9 780.60   3. Atrial fibrillation with rapid ventricular response (CMS/[facility]) I48.91 427.31   4. Elevated blood pressure reading with diagnosis of hypertension I10 401.9   5. Impaired mobility and ADLs Z74.09 799.89     Patient Active Problem List   Diagnosis   • Abnormal gait   • Takotsubo syndrome   • Carpal tunnel syndrome   • Complex partial epilepsy (CMS/[facility])   • Depression   • Chronic gastritis   • HLD (hyperlipidemia)   • Hypertension   • Hyponatremia   • Nutritional anemia   • Dyssomnia   • Diplopia   • Xeroderma   • Preventative health care   • Frailty   • Debility   • Tricuspid regurgitation   • Acute UTI (urinary tract infection)   • Cerebrovascular accident (CMS/[facility])   • Syncope   • DVT (deep venous thrombosis) (CMS/[facility])   • Anorexia   • Anxiety   • Patent foramen ovale   • Mitral regurgitation   • Low weight   • Senile osteoporosis   • Iron deficiency   • Chronic atrial fibrillation   • Closed fracture of left ilium (CMS/[facility])   • GERD (gastroesophageal reflux disease)   • Fall at home   • Vertigo   • Internuclear ophthalmoplegia, right eye   • Atrial fibrillation with RVR (CMS/[facility])   • Bradycardia   • Chronic atrial fibrillation   • SABRINA (acute kidney injury) (CMS/[facility])   • Hyperkalemia   • Confusion   • Acute exacerbation of CHF (congestive heart failure) (CMS/[facility])   • CKD (chronic kidney disease) stage 3, GFR 30-59 ml/min (CMS/[facility])   • CKD (chronic kidney disease) stage 3, GFR 30-59 ml/min (CMS/[facility])   • Underweight   • Acute on chronic diastolic CHF (congestive heart failure) (CMS/[facility])   • SABRINA (acute kidney injury)  (CMS/[facility])   • Chronic atrial fibrillation with RVR   • Severe sepsis (CMS/H[facility])   • Aspiration pneumonia due to vomitus (CMS/[facility])   • Dysphasia   • Lactic acidosis   • Acute and chronic respiratory failure with hypoxia (CMS/[facility])   • Acute febrile illness       Therapy Treatment  Rehabilitation Treatment Summary     Row Name 04/03/20 1430             Treatment Time/Intention    Discipline  speech language pathologist  -HG      Document Type  therapy note (daily note)  -HG      Subjective Information  no complaints  -HG      Mode of Treatment  individual therapy;speech-language pathology  -HG      Patient/Family Observations  Pt alert, cooperative, eager to go home.  -HG      Care Plan Review  care plan/treatment goals reviewed  -HG      Therapy Frequency (Swallow)  3 days per week  -HG      Patient Effort  excellent  -HG      Recorded by [HG] Raeann Stauffer MS CCC-SLP 04/03/20 1443      Row Name 04/03/20 1430             Pain Scale: Numbers Pre/Post-Treatment    Pain Scale: Numbers, Pretreatment  0/10 - no pain  -HG      Recorded by [HG] Raeann Stauffer MS CCC-SLP 04/03/20 1443        User Key  (r) = Recorded By, (t) = Taken By, (c) = Cosigned By    Initials Name Effective Dates Discipline    HG Raeann Stauffer MS CCC-SLP 06/22/15 -  SLP          Outcome Summary         SLP GOALS     Row Name 04/03/20 1430 04/02/20 1445          Oral Nutrition/Hydration Goal 1 (SLP)    Oral Nutrition/Hydration Goal 1, SLP  LTG: Pt will return to regular diet & thin liquids w/ no overt s/sxs aspiration/distress w/ 100% acc and no cues  -HG  LTG: Pt will return to regular diet & thin liquids w/ no overt s/sxs aspiration/distress w/ 100% acc and no cues  -VO     Time Frame (Oral Nutrition/Hydration Goal 1, SLP)  by discharge  -HG  by discharge  -VO     Barriers (Oral Nutrition/Hydration Goal 1, SLP)  Saltine cracker attempted due to pt having dentures this date and pt exhibited  -HG  --     Progress/Outcomes (Oral  Nutrition/Hydration Goal 1, SLP)  good progress toward goal  -HG  good progress toward goal  -VO        Oral Nutrition/Hydration Goal 2 (SLP)    Oral Nutrition/Hydration Goal 2, SLP  Pt will tolerate soft solids & thin liquids w/ no overt s/sxs aspiration/distress w/ 100% acc and no cues  -HG  Pt will tolerate soft solids & thin liquids w/ no overt s/sxs aspiration/distress w/ 100% acc and no cues  -VO     Time Frame (Oral Nutrition/Hydration Goal 2, SLP)  short term goal (STG);by discharge  -HG  short term goal (STG);by discharge  -VO     Barriers (Oral Nutrition/Hydration Goal 2, SLP)  No s/s with tea out of controlled cup.   -HG  cough w/ large cup sip of water, none w/ small sips   -VO     Progress/Outcomes (Oral Nutrition/Hydration Goal 2, SLP)  goal met  -HG  good progress toward goal  -VO        Oral Nutrition/Hydration Goal (SLP)    Oral Nutrition/Hydration Goal, SLP  Pt will tolerate trials of regular solid (when dentures available) w/ no overt s/sxs aspiration/distress/discomfort w/ 100% acc and no cues in order to determine appropriateness for diet upgrade.  -HG  Pt will tolerate trials of regular solid (when dentures available) w/ no overt s/sxs aspiration/distress/discomfort w/ 100% acc and no cues in order to determine appropriateness for diet upgrade.  -VO     Time Frame (Oral Nutrition/Hydration Goal, SLP)  short term goal (STG);by discharge  -HG  short term goal (STG);by discharge  -VO     Barriers (Oral Nutrition/Hydration Goal, SLP)  Pt tolerated bite of saltine cracker with no s/s of aspiration.   -HG  Mild incr'd prep w/ cracker.   -VO     Progress/Outcomes (Oral Nutrition/Hydration Goal, SLP)  good progress toward goal  -HG  good progress toward goal  -VO        Lingual Strengthening Goal 1 (SLP)    Activity (Lingual Strengthening Goal 1, SLP)  increase lingual tone/sensation/control/coordination/movement;increase tongue back strength  -HG  increase lingual  tone/sensation/control/coordination/movement;increase tongue back strength  -VO     Increase Lingual Tone/Sensation/Control/Coordination/Movement  lingual movement exercises;swallow trials  -HG  lingual movement exercises;swallow trials  -VO     Increase Tongue Back Strength  swallow trials;lingual resistance exercises  -HG  swallow trials;lingual resistance exercises  -VO     [place]/Accuracy (Lingual Strengthening Goal 1, SLP)  with minimal cues (75-90% accuracy)  -HG  with minimal cues (75-90% accuracy)  -VO     Time Frame (Lingual Strengthening Goal 1, SLP)  short term goal (STG);by discharge  -HG  short term goal (STG);by discharge  -VO     Progress/Outcomes (Lingual Strengthening Goal 1, SLP)  good progress toward goal  -HG  good progress toward goal  -VO        Pharyngeal Strengthening Exercise Goal 1 (SLP)    Activity (Pharyngeal Strengthening Goal 1, SLP)  increase timing;increase superior movement of the hyolaryngeal complex;increase anterior movement of the hyolaryngeal complex;increase closure at entrance to airway/closure of airway at glottis;increase squeeze/positive pressure generation  -HG  increase timing;increase superior movement of the hyolaryngeal complex;increase anterior movement of the hyolaryngeal complex;increase closure at entrance to airway/closure of airway at glottis;increase squeeze/positive pressure generation  -VO     Increase Timing  prepping - 3 second prep or suck swallow or 3-step swallow  -HG  prepping - 3 second prep or suck swallow or 3-step swallow  -VO     Increase Superior Movement of the Hyolaryngeal Complex  effortful pitch glide (falsetto + pharyngeal squeeze)  -HG  effortful pitch glide (falsetto + pharyngeal squeeze)  -VO     Increase Anterior Movement of the Hyolaryngeal Complex  chin tuck against resistance (CTAR)  -HG  chin tuck against resistance (CTAR)  -VO     Increase Closure at Entrance to Airway/Closure of Airway at Glottis  supraglottic swallow  -HG   supraglottic swallow  -VO     Increase Squeeze/Positive Pressure Generation  hard effortful swallow  -HG  hard effortful swallow  -VO     [place]/Accuracy (Pharyngeal Strengthening Goal 1, SLP)  with minimal cues (75-90% accuracy)  -HG  with minimal cues (75-90% accuracy)  -VO     Time Frame (Pharyngeal Strengthening Goal 1, SLP)  short term goal (STG);by discharge  -HG  short term goal (STG);by discharge  -VO     Barriers (Pharyngeal Strengthening Goal 1, SLP)  Reviewed all exercises with pt and she demonstrated at least x 2 each.  -HG  Focused on simple excs this date, completed w/ cues  -VO     Progress/Outcomes (Pharyngeal Strengthening Goal 1, SLP)  good progress toward goal  -HG  continuing progress toward goal  -VO        Swallow Management Recall Goal 1 (SLP)    Activity (Swallow Management Recall Goal 1, SLP)  recall of;safe diet/liquid level;safe diet level/texture;compensatory swallow strategies/techniques  -HG  recall of;safe diet/liquid level;safe diet level/texture;compensatory swallow strategies/techniques  -VO     [place]/Accuracy (Swallow Management Recall Goal 1, SLP)  with minimal cues (75-90% accuracy)  -HG  with minimal cues (75-90% accuracy)  -VO     Time Frame (Swallow Management Recall Goal 1, SLP)  short term goal (STG);by discharge  -HG  short term goal (STG);by discharge  -VO     Barriers (Swallow Management Recall Goal 1, SLP)  Pt aware of her current liquids and safety cup.  -HG  --     Progress/Outcomes (Swallow Management Recall Goal 1, SLP)  goal met  -HG  goal met  -VO        Swallow Compensatory Strategies Goal 1 (SLP)    Activity (Swallow Compensatory Strategies/Techniques Goal 1, SLP)  compensatory strategies;small cup sips;small straw sips;other (see comments);alternate food/liquid intake;during p.o. trials;during meal intake  -HG  compensatory strategies;small cup sips;small straw sips;other (see comments);alternate food/liquid intake;during p.o. trials;during meal  intake  -VO     [place]/Accuracy (Swallow Compensatory Strategies/Techniques Goal 1, SLP)  with minimal cues (75-90% accuracy)  -HG  with minimal cues (75-90% accuracy)  -VO     Time Frame (Swallow Compensatory Strategies/Techniques Goal 1, SLP)  short term goal (STG);by discharge  -HG  short term goal (STG);by discharge  -VO     Barriers (Swallow Compensatory Strategies/Techniques Goal 1, SLP)  Patient recalled safe diet and strategies with 100% accuracy and no cues  -HG  --     Progress/Outcomes (Swallow Compensatory Strategies/Techniques Goal 1, SLP)  goal met  -HG  goal met  -VO       User Key  (r) = Recorded By, (t) = Taken By, (c) = Cosigned By    Initials Name Provider Type     Raeann Stauffer MS CCC-SLP Speech and Language Pathologist    Nilsa Aleman MA,CCC-SLP Speech and Language Pathologist          EDUCATION  The patient has been educated in the following areas:   Dysphagia (Swallowing Impairment).    SLP Recommendation and Plan            Time Calculation:   Time Calculation- SLP     Row Name 04/03/20 1456             Time Calculation- SLP    SLP Start Time  1430  -      SLP Received On  04/03/20  -        User Key  (r) = Recorded By, (t) = Taken By, (c) = Cosigned By    Initials Name Provider Type     Raeann Stauffer MS CCC-SLP Speech and Language Pathologist          Therapy Charges for Today     Code Description Service Date Service Provider Modifiers Qty    [id number]  ST TREATMENT SWALLOW 2 4/3/2020 Raeann Stauffer MS CCC-SLP GN 1                 Raeann Stauffer MS CCC-LELO  4/3/2020

## 2020-04-03 NOTE — PLAN OF CARE
Problem: Patient Care Overview  Goal: Plan of Care Review  Outcome: Ongoing (interventions implemented as appropriate)  Flowsheets (Taken 4/3/2020 1025)  Plan of Care Reviewed With: patient  Note:   SLP treatment completed. Will continue to address for diet trials of solids if pt doesn't D/C on 4/4/20. Please see note for further details and recommendations.

## 2020-04-03 NOTE — PROGRESS NOTES
Continued Stay Note  [facility]     Patient Name: Mirian Sy  MRN: [id number]  Today's Date: 4/3/2020    Admit Date: 3/19/2020    Discharge Plan     Row Name 04/03/20 0951       Plan    Plan  Home with [facility]    Patient/Family in Agreement with Plan  yes    Plan Comments  Spoke with patient at bedside. Plan is home with [facility] on Sat. with family. CM will continue to follow.    Final Discharge Disposition Code  50 - home with hospice        Discharge Codes    No documentation.       Expected Discharge Date and Time     Expected Discharge Date Expected Discharge Time    Apr 4, 2020             Abdiaziz Hinojosa RN

## 2020-04-03 NOTE — PLAN OF CARE
Problem: Patient Care Overview  Goal: Plan of Care Review  Flowsheets (Taken 4/3/2020 1309)  Outcome Summary: Pt able to perform supine to sit bed mobility w/ spv assist. She sat EOB  to complete grooming tasks w/ CGA for balance and upright posture/core stability. Pt limited d/t decreased respiratory status. Nsg replaced 02 sensor and assisted w/ repositioning. Pt required frequent rest breaks during ADL tasks to perform pursed lip breathing techniques.

## 2020-04-03 NOTE — THERAPY TREATMENT NOTE
Acute Care - Occupational Therapy Treatment Note  [facility]     Patient Name: Mirian Sy  : 1940  MRN: [id number]  Today's Date: 4/3/2020  Onset of Illness/Injury or Date of Surgery: 20  Date of Referral to OT: 20  Referring Physician: MD Racheal     Admit Date: 3/19/2020       ICD-10-CM ICD-9-CM   1. Oropharyngeal dysphagia R13.12 787.22   2. Acute febrile illness R50.9 780.60   3. Atrial fibrillation with rapid ventricular response (CMS/[facility]) I48.91 427.31   4. Elevated blood pressure reading with diagnosis of hypertension I10 401.9   5. Impaired mobility and ADLs Z74.09 799.89     Patient Active Problem List   Diagnosis   • Abnormal gait   • Takotsubo syndrome   • Carpal tunnel syndrome   • Complex partial epilepsy (CMS/[facility])   • Depression   • Chronic gastritis   • HLD (hyperlipidemia)   • Hypertension   • Hyponatremia   • Nutritional anemia   • Dyssomnia   • Diplopia   • Xeroderma   • Preventative health care   • Frailty   • Debility   • Tricuspid regurgitation   • Acute UTI (urinary tract infection)   • Cerebrovascular accident (CMS/[facility])   • Syncope   • DVT (deep venous thrombosis) (CMS/[facility])   • Anorexia   • Anxiety   • Patent foramen ovale   • Mitral regurgitation   • Low weight   • Senile osteoporosis   • Iron deficiency   • Chronic atrial fibrillation   • Closed fracture of left ilium (CMS/[facility])   • GERD (gastroesophageal reflux disease)   • Fall at home   • Vertigo   • Internuclear ophthalmoplegia, right eye   • Atrial fibrillation with RVR (CMS/[facility])   • Bradycardia   • Chronic atrial fibrillation   • SABRINA (acute kidney injury) (CMS/[facility])   • Hyperkalemia   • Confusion   • Acute exacerbation of CHF (congestive heart failure) (CMS/[facility])   • CKD (chronic kidney disease) stage 3, GFR 30-59 ml/min (CMS/[facility])   • CKD (chronic kidney disease) stage 3, GFR 30-59 ml/min (CMS/[facility])   • Underweight   • Acute on chronic diastolic CHF (congestive heart failure) (CMS/[facility])   • SABRINA (acute kidney  injury) (CMS/[facility])   • Chronic atrial fibrillation with RVR   • Severe sepsis (CMS/[facility])   • Aspiration pneumonia due to vomitus (CMS/[facility])   • Dysphasia   • Lactic acidosis   • Acute and chronic respiratory failure with hypoxia (CMS/[facility])   • Acute febrile illness     Past Medical History:   Diagnosis Date   • Atrial fibrillation (CMS/[facility]) 2005    Chronic anticoagulation and rate control   • Basal cell carcinoma 2007    Left leg and nose   • Cardiomyopathy (CMS/[facility]) 2006   • Cerebrovascular accident (CMS/[facility]) 01/2005    CT right parietal CVA. Carotid duplex -50% to 79% left ICS 15% right ICS stenosis. MRI of the neck showed no significant carotid bifurcations of these. Negative CT of the head, 09/10/2012. Carotid duplex, 02/24/2014:  Shelf-like plaque in the CECE without significant elevation and velocities.  Patent vertebral arteries.   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    With dilated cardiomyopathy   • Decubitus ulcer of buttock 2014    Transient during fracture rehabilitation   • Depression 2002    Good response to citalopram and mirtazapine   • DVT (deep venous thrombosis) (CMS/[facility]) 2014    Superficial - right lesser saphenus vein - after hip fracture    • Fracture of bone of forefoot 1972    Right foot   • Fracture of ilium, left (CMS/[facility]) 05/2018    Accidental fall - Uncomplicated recovery   • GERD (gastroesophageal reflux disease) 2014    Chronic superficial gastritis   • HTN (hypertension) 2005   • Iron deficiency anemia due to chronic blood loss 2018    Predisposed by chronic anticoagulation and GERD   • Low body weight due to inadequate caloric intake Adulthood   • Mitral valve prolapse 1997    Mild MR   • Osteoarthritis    • Osteoporosis    • Patent foramen ovale 2005   • Pneumonia 1947   • SCC (squamous cell carcinoma), arm, right 2017   • Scoliosis    • Syncope 2014     undermined cause - [facility]   • Varicose veins 2005    Symptomatic     Past Surgical  History:   Procedure Laterality Date   • ACHILLES TENDON SURGERY Left 1997    Repair of rupture left tendon with screws   • BREAST CYST EXCISION Left    • BREAST SURGERY Left 1982    Excision benign cyst   • CARDIAC CATHETERIZATION  2006    Severe acute coronary spasm   • CATARACT EXTRACTION WITH INTRAOCULAR LENS IMPLANT Bilateral 2015   • EYE CAPSULOTOMY WITH LASER Left 2016    Marked visual improvement   • FEMUR FRACTURE SURGERY Right 2015    Repair of shaft fracture   • HIP FRACTURE SURGERY Left 2014    left hip fracture with pin   • LUMBAR DISC SURGERY  1983   • OVARIAN CYST REMOVAL Left 1996   • SKIN CANCER EXCISION Right 2017    SCC - arm       Therapy Treatment    Rehabilitation Treatment Summary     Row Name 04/03/20 1305          Treatment Time/Intention    Discipline  occupational therapist  -SG     Document Type  therapy note (daily note)  -SG     Subjective Information  no complaints  -SG     Mode of Treatment  occupational therapy  -SG     Patient/Family Observations  --     Care Plan Review  care plan/treatment goals reviewed  -SG     Therapy Frequency (OT Eval)  daily  -SG     Therapy Frequency (Swallow)  --     Patient Effort  good  -SG     Existing Precautions/Restrictions  oxygen therapy device and L/min  -SG     Recorded by [SG] Jackie Jose OTR/L 04/03/20 1545     Row Name 04/03/20 1305             Vital Signs    Pre Systolic BP Rehab  125 VSS, RN cleared for tx  -SG      Pre Treatment Diastolic BP  71  -SG      Pretreatment Heart Rate (beats/min)  65  -SG      Pre SpO2 (%)  68  -SG      O2 Delivery Pre Treatment  supplemental O2  -SG      Intra SpO2 (%)  65  -SG      O2 Delivery Intra Treatment  supplemental O2  -SG      Post SpO2 (%)  91  -SG      O2 Delivery Post Treatment  supplemental O2  -SG      Pre Patient Position  Supine  -SG      Intra Patient Position  Sitting  -SG      Post Patient Position  Supine  -SG      Recorded by [SG] Jackie Jose OTR/L 04/03/20 1542      Row Name 04/03/20  1305             Cognitive Assessment/Intervention- PT/OT    Affect/Mental Status (Cognitive)  WFL  -SG      Follows Commands (Cognition)  WFL  -SG      Cognitive Function (Cognitive)  safety deficit  -SG      Recorded by [SG] Jackie Jose OTR/L 04/03/20 1541      Row Name 04/03/20 1305             Safety Issues, Functional Mobility    Safety Issues Affecting Function (Mobility)  awareness of need for assistance;insight into deficits/self awareness;safety precaution awareness;safety precautions follow-through/compliance  -SG      Impairments Affecting Function (Mobility)  endurance/activity tolerance;postural/trunk control;strength;shortness of breath  -SG      Comment, Safety Issues/Impairments (Mobility)  Monitor 02 sats  -SG      Recorded by [SG] Jackie Jose OTR/L 04/03/20 1541      Row Name 04/03/20 1305             Bed Mobility Assessment/Treatment    Bed Mobility Assessment/Treatment  supine-sit;sit-supine  -SG      Supine-Sit [place] (Bed Mobility)  supervision;verbal cues  -SG      Sit-Supine [place] (Bed Mobility)  minimum assist (75% patient effort)  -SG      Bed Mobility, Safety Issues  decreased use of arms for pushing/pulling;decreased use of legs for bridging/pushing;impaired trunk control for bed mobility  -SG      Assistive Device (Bed Mobility)  bed rails;draw sheet;head of bed elevated  -SG      Recorded by [SG] Jackie Jose OTR/L 04/03/20 1541      Row Name 04/03/20 1305             ADL Assessment/Intervention    32980 - OT Self Care/Mgmt Minutes  12  -SG      BADL Assessment/Intervention  grooming  -SG      Additional Documentation  Comment, IADL Assessment/Training (Row)  -SG      Recorded by [SG] Jackie Jose OTR/L 04/03/20 1541      Row Name 04/03/20 1305             Grooming Assessment/Training    [place] Level (Grooming)  hair care, combing/brushing;wash face, hands;supervision;set up  -SG      Grooming Position  edge of bed sitting  -SG      Comment (Grooming)   Sitting EOB for core strength/stability, pt participated in grooming ADLs  -SG      Recorded by [SG] Jackie Jose OTR/L 04/03/20 1541      Row Name 04/03/20 1305             Balance    Balance  static sitting balance;dynamic sitting balance  -SG      Recorded by [SG] Jackie Jose OTR/L 04/03/20 1541      Row Name 04/03/20 1305             Static Sitting Balance    Level of [place] (Unsupported Sitting, Static Balance)  standby assist  -SG      Sitting Position (Unsupported Sitting, Static Balance)  sitting on edge of bed  -SG      Time Able to Maintain Position (Unsupported Sitting, Static Balance)  more than 5 minutes  -SG      Recorded by [SG] Jackie Jose OTR/L 04/03/20 1541      Row Name 04/03/20 1305             Dynamic Sitting Balance    Level of [place], Reaches Outside Midline (Sitting, Dynamic Balance)  contact guard assist  -SG      Sitting Position, Reaches Outside Midline (Sitting, Dynamic Balance)  sitting on edge of bed  -SG      Comment, Reaches Outside Midline (Sitting, Dynamic Balance)  Grooming EOB, w/ some LOB but able to self correct w/ min A  -SG      Recorded by [SG] Jackie Jose OTR/L 04/03/20 1541      Row Name 04/03/20 1305             Positioning and Restraints    Pre-Treatment Position  in bed  -SG      Post Treatment Position  bed  -SG      In Bed  notified nsg;supine;fowlers;call light within reach;encouraged to call for assist;exit alarm on  -SG      Recorded by [SG] Jackie Jose OTR/L 04/03/20 1541      Row Name 04/03/20 1305             Pain Assessment    Additional Documentation  Pain Scale: Numbers Pre/Post-Treatment (Group)  -SG      Recorded by [SG] Jackie Jose OTR/L 04/03/20 1541      Row Name 04/03/20 1305          Pain Scale: Numbers Pre/Post-Treatment    Pain Scale: Numbers, Pretreatment  0/10 - no pain  -SG     Pain Scale: Numbers, Post-Treatment  0/10 - no pain  -SG     Recorded by [SG] Jackie Jose OTR/L 04/03/20 1541     Row Name 04/03/20 1305              Plan of Care Review    Plan of Care Reviewed With  patient  -SG      Recorded by [SG] Jackie Jose OTR/L 04/03/20 1541      Row Name 04/03/20 1305             Outcome Summary/Treatment Plan (OT)    Anticipated Discharge Disposition (OT)  home with assist;other (see comments) hospice  -SG      Recorded by [SG] Jackie Jose OTR/L 04/03/20 1541        User Key  (r) = Recorded By, (t) = Taken By, (c) = Cosigned By    Initials Name Effective Dates Discipline    SG Jackie Jose OTR/L 01/20/20 -  OT                 OT Recommendation and Plan  Outcome Summary/Treatment Plan (OT)  Anticipated Discharge Disposition (OT): home with assist, other (see comments)(hospice)  Therapy Frequency (OT Eval): daily  Plan of Care Review  Plan of Care Reviewed With: patient  Plan of Care Reviewed With: patient  Outcome Summary: Pt able to perform supine to sit bed mobility w/ spv assist. She sat EOB  to complete grooming tasks w/ CGA for balance and upright posture/core stability. Pt limited d/t decreased respiratory status. Nsg replaced 02 sensor and assisted w/ repositioning. Pt required frequent rest breaks during ADL tasks to perform pursed lip breathing techniques. .  Outcome Measures     Row Name 04/03/20 1305          Putting on and taking off regular lower body clothing?  2  -SG    Bathing (including washing, rinsing, and drying)  2  -SG    Toileting (which includes using toilet bed pan or urinal)  2  -SG    Putting on and taking off regular upper body clothing  3  -SG    Taking care of personal grooming (such as brushing teeth)  3  -SG    Eating meals  3  -SG    AM-PAC 6 Clicks Score (OT)  15  -SG          Outcome Measure Options  AM-PAC 6 Clicks Daily Activity (OT)  -SG      User Key  (r) = Recorded By, (t) = Taken By, (c) = Cosigned By    Initials Name Provider Type    Jackie Landeros OTR/L Occupational Therapist           Time Calculation:   Time Calculation- OT     Row Name 04/03/20 1305             Time  Calculation- OT    OT Start Time  1305  -      OT Received On  04/03/20  -      OT Goal Re-Cert Due Date  04/11/20  -         Timed Charges    15728 - OT Therapeutic Activity Minutes  13  -      01972 - OT Self Care/Mgmt Minutes  12  -        User Key  (r) = Recorded By, (t) = Taken By, (c) = Cosigned By    Initials Name Provider Type     Jackie Jose OTR/L Occupational Therapist        Therapy Charges for Today     Code Description Service Date Service Provider Modifiers Qty    05966229494 HC OT THERAPEUTIC ACT EA 15 MIN 4/3/2020 Jackie Jose OTR/L GO 1    11665408206 HC OT SELF CARE/MGMT/TRAIN EA 15 MIN 4/3/2020 Jackie Jose OTR/L GO 1               STEPHANIE Torres/LUCINDA  4/3/2020

## 2020-04-03 NOTE — PROGRESS NOTES
"                  Clinical Nutrition     Reason for Visit:   Follow-up protocol    Patient Name: Mirian Sy  YOB: 1940  MRN: [id number]  Date of Encounter: 04/03/20 08:08  Admission date: 3/19/2020    Nutrition Assessment   Assessment     Admission diagnosis  Severe sepsis, resolved    Additional diagnosis/conditions/procedures this admission  Aspiration pneumonia, resolved  Afib w/ RVR  Acute hypoxic respiratory failure  A/C CHF  Leukocytosis, improving  SABRINA/CKD  Constipation    (3/30) s/p thoracentesis    (3/19) SLP eval - regular textures, thin liquids  (3/20) SLP MBS - rec, soft textures, chopped, thin liquids    Additional PMH/procedures:  PAF  MVP  HTN  Cardiomyopathy  PFO  GERD  DVT  Dysphagia  Depression  Epilepsy  CKD3  Tobacco use  CVA    Achilles surgery  Femur fracture  Hip fracture    Reported/Observed/Food/Nutrition Related History:       RD notes patient continues with fair to minimal PO intake. Patient drinks Boost Plus supplements well per previous visits. Noted per hospice discussion with family, patients son wanting to focus on quality of life with comfort measures, and wants hospice services following discharge.      Anthropometrics     Height: 170.2 cm (67\")  Last filed wt: Weight: 52.2 kg (115 lb) (04/01/20 0629)  Weight Method: Bed scale    BMI: BMI (Calculated): 18  Underweight:<18.5kg/m2    Ideal Body Weight (IBW) (kg): 61.86  Admission wt: 100 lb  Method obtained: stated weight per charting 3/19    Weight Change   UBW: 100 - 105 lbs per pt report to RD at previous admission (2/2/20)  Weight change:   % wt change:   Time frame of weight loss:     Last 15 Recorded Weights   View Complete Flowsheet   Weight Weight (kg) Weight (lbs) Weight Method VISIT REPORT   3/19/2020 45.36 kg 100 lb - -   3/19/2020 45.36 kg 100 lb Stated -   3/5/2020 46.72 kg 103 lb - Report   2/5/2020 46.125 kg 101 lb 11 oz Bed scale -   2/4/2020 46.267 kg 102 lb Bed scale -   2/3/2020 46.437 kg 102 lb " 6 oz Bed scale -   2/2/2020 46.72 kg 103 lb Bed scale -   2/1/2020 45.813 kg 101 lb Stated -   1/6/2020 47.628 kg 105 lb - Report   12/16/2019 47.174 kg 104 lb - Report   12/14/2019 51.256 kg 113 lb Bed scale -   12/13/2019 50.259 kg 110 lb 12.8 oz - -   12/13/2019 52.436 kg 115 lb 9.6 oz - -   12/12/2019 47.628 kg 105 lb Stated -   10/23/2019 47.174 kg 104 lb - Report     Labs reviewed     Results from last 7 days   Lab Units 04/03/20  0625 04/01/20  0614 03/31/20  0334 03/30/20  0613   GLUCOSE mg/dL 111* 113* 129* 108*   BUN mg/dL 36* 39* 41* 39*   CREATININE mg/dL 1.35* 1.56* 1.82* 1.91*   SODIUM mmol/L 147* 147* 145 143   CHLORIDE mmol/L 110* 110* 108* 102   POTASSIUM mmol/L 4.1 3.9 4.2 3.2*   PHOSPHORUS mg/dL  --   --   --  3.6   MAGNESIUM mg/dL  --   --   --  2.4           Invalid input(s): PLAT      Lab Results   Lab Value Date/Time    HGBA1C 5.60 12/19/2018 0544    HGBA1C 5.3 11/04/2015 0610    HGBA1C 5.5 03/12/2015 1520       Medications reviewed   Pertinent: eliquis, omnicef, doxycycline, pepcid, iron, lasix, keppra, remeron, MVI, pericolace      Intake/Output 24 hrs (7:00AM - 6:59 AM)     Intake & Output (last day)       04/02 0701 - 04/03 0700 04/03 0701 - 04/04 0700    P.O. 480     Total Intake(mL/kg) 480 (9.2)     Urine (mL/kg/hr) 300 (0.2)     Total Output 300     Net +180           Urine Unmeasured Occurrence 5 x           Current Nutrition Prescription     PO: Diet Soft Texture; Chopped; Thin; Cardiac  Oral Nutrition Supplement: [place] / Strawberry Boost Plus x2  Intake: 30% x 4 meals    Nutrition Diagnosis     4/1 updated 4/3  Problem Inadequate oral intake   Etiology Decreased appetite / respiratory status   Signs/Symptoms PO Intake (30% x 4 meals)   Status: ongoing    3/20 updated 3/24, 3/30  Problem Swallowing difficulty   Etiology Hx dysphagia / recent aspiration event   Signs/Symptoms SLP carmen, [facility] 3/20 soft, chopped   Status: resolved per [facility]    3/20 updated 3/24, 3/30, 4/3  Problem Underweight    Etiology ?etiology   Signs/Symptoms BMI = 15.66 kg/m²   Status: ongoing    Nutrition Intervention     1. Follow treatment progress, Care plan reviewed  2. RD notes plan for more comfort focus care per hospice discussion with family. Will continue to monitor PO/supplemental intake and adequacy    Goal:   General: Nutrition support treatment / Hospice following  PO: Increase intake  / Honor food preferences      Monitoring/Evaluation:   Per protocol, PO intake, Supplement intake, POC/GOC    Will Continue to follow per protocol    Aliza Madden RDN, LD  Time Spent: 25 minutes

## 2020-04-03 NOTE — PLAN OF CARE
Problem: Patient Care Overview  Goal: Interprofessional Rounds/Family Conf  Outcome: Ongoing (interventions implemented as appropriate)  Flowsheets (Taken 4/3/2020 1300)  Participants: nursing; physician;   Note:   Patient did have bm this am. Per nurse she was confused a little so they have left her on her BiPaP. Hospice Case Manager, Sheela Alcocer reports that patient is still schedule to go home tomorrow with Hospice. States she will call Ke and give him an update and make sure everything is arranged for tomorrow.Palliative will continue to follow for support, symptom and discharge needs.

## 2020-04-03 NOTE — PROGRESS NOTES
Continued Stay Note  [facility]     Patient Name: Mirian Sy  MRN: [id number]  Today's Date: 4/3/2020    Admit Date: 3/19/2020    Discharge Plan     Row Name 04/03/20 1411       Plan    Plan  Home with [facility]    Final Discharge Disposition Code  50 - home with hospice    Final Note  Telephone call to pt's son Ke regarding discharge tomorrow and update on pt's condition. Informed Ke that Dr. Arriaza made visit this morning, pt was resting comfortably with bipap on, per staff pt has been stable. Plan remains for pt to discharge home tomorrow with hospice. Ke stated was previously informed that pt had a chest X ray this morning.  Ke stated plans to pick pt up at 1300 tomorrow. Reinforced for Ke to call the hospice 24 hr number when pt arrives home, Ke verbalized understanding. Ke stated the equipment was delivered to pt's home earlier today. Please call [phone] if can be of further assistance.         Discharge Codes    No documentation.       Expected Discharge Date and Time     Expected Discharge Date Expected Discharge Time    Apr 4, 2020             Sheela Alcocer RN

## 2020-04-03 NOTE — PROGRESS NOTES
[facility] Medicine Services  PROGRESS NOTE    Patient Name: Mirian Sy  : 1940  MRN: [id number]    Date of Admission: 3/19/2020  Primary Care Physician: Oren Engel DO    Subjective   Subjective     CC:    F/u dyspnea    HPI:   On 6L nc since yesterday.  Currently she is sleeping on Bipap.   Has been stable and comfortable accordign to staff.     Review of Systems  -   Eating well  No pain  No dyspnea at rest.     Objective   Objective     Vital Signs:   Temp:  [97.8 °F (36.6 °C)-98.4 °F (36.9 °C)] 98 °F (36.7 °C)  Heart Rate:  [52-82] 82  Resp:  [18-20] 18  BP: (103-120)/(57-88) 103/73        Physical Exam:    Constitutional: No acute distress, looks comfortable. In bed asleep on bipap  HENT: NCAT,  PERRLA  Respiratory: decreased breath sounds throughout, not labored, no W R anteriorly  Cardiovascular: Irregular, no murmur or gallop, rubs.  Gastrointestinal: Positive bowel sounds, soft, nontender, nondistended  Musculoskeletal: No bilateral ankle edema, very low muscle mass.  Psychiatric: sleeping  Neurologic: sleeping  Skin: no rash     Results Reviewed:  Results from last 7 days   Lab Units 20  0836 20  0756 20  0406 20  1151   WBC 10*3/mm3  --  12.17* 13.56* 11.86*   HEMOGLOBIN g/dL  --  10.0* 9.9* 10.2*   HEMATOCRIT %  --  32.0* 31.8* 31.4*   PLATELETS 10*3/mm3  --  366 326 334   INR  1.68*  --   --   --    PROCALCITONIN ng/mL  --   --  1.00*  --      Results from last 7 days   Lab Units 20  0625 20  0614 20  0334  20  2237   SODIUM mmol/L 147* 147* 145   < >  --    POTASSIUM mmol/L 4.1 3.9 4.2   < >  --    CHLORIDE mmol/L 110* 110* 108*   < >  --    CO2 mmol/L 26.0 27.0 23.0   < >  --    BUN mg/dL 36* 39* 41*   < >  --    CREATININE mg/dL 1.35* 1.56* 1.82*   < >  --    GLUCOSE mg/dL 111* 113* 129*   < >  --    CALCIUM mg/dL 9.5 8.9 9.1   < >  --    PROBNP pg/mL  --   --   --   --  6,547.0*    < > = values in this  interval not displayed.     Estimated Creatinine Clearance: 27.8 mL/min (A) (by C-G formula based on SCr of 1.35 mg/dL (H)).    Microbiology Results Abnormal     Procedure Component Value - Date/Time    Blood Culture - Blood, Arm, Right [[id number]] Collected:  03/28/20 0902    Lab Status:  Final result Specimen:  Blood from Arm, Right Updated:  04/02/20 0930     Blood Culture No growth at 5 days    Blood Culture - Blood, Arm, Left [[id number]] Collected:  03/28/20 0903    Lab Status:  Final result Specimen:  Blood from Arm, Left Updated:  04/02/20 0930     Blood Culture No growth at 5 days    Body Fluid Culture - Body Fluid, Pleural Cavity [[id number]] Collected:  03/30/20 1347    Lab Status:  Final result Specimen:  Body Fluid from Pleural Cavity Updated:  04/02/20 0648     Body Fluid Culture No growth at 3 days     Gram Stain Many (4+) WBCs per low power field      No organisms seen    Fungus Smear - Body Fluid, Pleural Cavity [[id number]] Collected:  03/30/20 1347    Lab Status:  Final result Specimen:  Body Fluid from Pleural Cavity Updated:  04/01/20 1408     Fungal Stain No fungal elements seen    AFB Culture - Body Fluid, Pleural Cavity [[id number]] Collected:  03/30/20 1347    Lab Status:  Preliminary result Specimen:  Body Fluid from Pleural Cavity Updated:  03/31/20 1125     AFB Stain No acid fast bacilli seen on concentrated smear    Blood Culture - Blood, Arm, Left [[id number]] Collected:  03/19/20 0717    Lab Status:  Final result Specimen:  Blood from Arm, Left Updated:  03/24/20 0745     Blood Culture No growth at 5 days    Blood Culture - Blood, Wrist, Left [[id number]] Collected:  03/19/20 0717    Lab Status:  Final result Specimen:  Blood from Wrist, Left Updated:  03/24/20 0745     Blood Culture No growth at 5 days    Respiratory Panel, PCR - Swab, Nasopharynx [[id number]]  (Normal) Collected:  03/22/20 1407    Lab Status:  Final result Specimen:  Swab from Nasopharynx Updated:  03/22/20 1530      ADENOVIRUS, PCR Not Detected     Coronavirus 229E Not Detected     Coronavirus HKU1 Not Detected     Coronavirus NL63 Not Detected     Coronavirus OC43 Not Detected     Human Metapneumovirus Not Detected     Human Rhinovirus/Enterovirus Not Detected     Influenza B PCR Not Detected     Parainfluenza Virus 1 Not Detected     Parainfluenza Virus 2 Not Detected     Parainfluenza Virus 3 Not Detected     Parainfluenza Virus 4 Not Detected     Bordetella pertussis pcr Not Detected     Influenza A H1 2009 PCR Not Detected     Chlamydophila pneumoniae PCR Not Detected     Mycoplasma pneumo by PCR Not Detected     Influenza A PCR Not Detected     Influenza A H3 Not Detected     Influenza A H1 Not Detected     RSV, PCR Not Detected     Bordetella parapertussis PCR Not Detected    Narrative:       The coronavirus on the RVP is NOT COVID-19 and is NOT indicative of infection with COVID-19.     MRSA Screen, PCR - Swab, Nares [[id number]]  (Normal) Collected:  03/19/20 1132    Lab Status:  Final result Specimen:  Swab from Nares Updated:  03/19/20 1312     MRSA PCR Negative    Narrative:       MRSA Negative    Respiratory Panel, PCR - Swab, Nasopharynx [[id number]]  (Normal) Collected:  03/19/20 0716    Lab Status:  Final result Specimen:  Swab from Nasopharynx Updated:  03/19/20 1015     ADENOVIRUS, PCR Not Detected     Coronavirus 229E Not Detected     Coronavirus HKU1 Not Detected     Coronavirus NL63 Not Detected     Coronavirus OC43 Not Detected     Human Metapneumovirus Not Detected     Human Rhinovirus/Enterovirus Not Detected     Influenza B PCR Not Detected     Parainfluenza Virus 1 Not Detected     Parainfluenza Virus 2 Not Detected     Parainfluenza Virus 3 Not Detected     Parainfluenza Virus 4 Not Detected     Bordetella pertussis pcr Not Detected     Influenza A H1 2009 PCR Not Detected     Chlamydophila pneumoniae PCR Not Detected     Mycoplasma pneumo by PCR Not Detected     Influenza A PCR Not Detected      Influenza A H3 Not Detected     Influenza A H1 Not Detected     RSV, PCR Not Detected     Bordetella parapertussis PCR Not Detected    Narrative:       The coronavirus on the RVP is NOT COVID-19 and is NOT indicative of infection with COVID-19.           Imaging Results (Last 24 Hours)     Procedure Component Value Units Date/Time    XR Chest 1 View [[id number]] Collected:  04/03/20 0753     Updated:  04/03/20 0800    Narrative:       EXAMINATION: XR CHEST 1 VW-      INDICATION: CHF and effusion; R13.12-Dysphagia, oropharyngeal phase;  R50.9-Fever, unspecified; I48.91-Unspecified atrial fibrillation;  I10-Essential (primary) hypertension; Z74.09-Other reduced mobility.      COMPARISON: Chest x-ray 03/31/2020.     FINDINGS: Cardiomegaly with increasing bibasilar opacifications likely  increasing layering component or volume of small volume bilateral  pleural effusions left slightly greater than right with interstitial  lung markings increased throughout. No pneumothorax.           Impression:       Increasing bibasilar opacifications which are somewhat hazy  of layering may represent increased layering effusions or volume of  these pleural effusions with small volume bilateral pleural effusions  left greater than right as well as adjacent opacifications which also  appear potentially increased of increased airspace disease or  interstitial edema pattern.     D:  04/03/2020  E:  04/03/2020             Results for orders placed during the hospital encounter of 03/19/20   Adult Transthoracic Echo Complete W/ Cont if Necessary Per Protocol    Narrative · Left ventricular systolic function is low normal.  · Estimated EF = 50%.  · Moderate mitral valve regurgitation is present  · Severe tricuspid valve regurgitation is present.  · Calculated right ventricular systolic pressure from tricuspid   regurgitation is 48.6 mmHg.          I have reviewed the medications:  Scheduled Meds:    apixaban 2.5 mg Oral Q12H   atorvastatin  80 mg Oral Nightly   cefdinir 300 mg Oral Q24H   citalopram 20 mg Oral QAM   doxycycline 100 mg Oral Q12H   famotidine 20 mg Oral Nightly   ferrous sulfate 325 mg Oral Daily With Breakfast   furosemide 20 mg Oral Daily   levETIRAcetam 125 mg Oral Q12H   metoprolol succinate  mg Oral Daily   mirtazapine 7.5 mg Oral Nightly   multivitamin with minerals 1 tablet Oral Daily   sodium chloride 10 mL Intravenous Q12H   verapamil 80 mg Oral Q8H     Continuous Infusions:     PRN Meds:.•  acetaminophen **OR** acetaminophen **OR** acetaminophen  •  docusate sodium  •  famotidine  •  melatonin  •  ondansetron **OR** ondansetron  •  sodium chloride    Assessment/Plan   Assessment & Plan     Active Hospital Problems    Diagnosis  POA   • **Aspiration pneumonia due to vomitus (CMS/[facility]) [J69.0]  Yes   • SABRINA (acute kidney injury) (CMS/[facility]) [N17.9]  Yes   • Chronic atrial fibrillation with RVR [I48.20]  Yes   • Severe sepsis (CMS/[facility]) [A41.9, R65.20]  Yes   • Dysphagia [R13.10]  Yes   • Lactic acidosis [E87.2]  Yes   • Acute and chronic respiratory failure with hypoxia (CMS/[facility]) [J96.21]  Yes   • Acute febrile illness [R50.9]  Yes   • Acute on chronic diastolic CHF (congestive heart failure) (CMS/[facility]) [I50.33]  Yes   • Underweight [R63.6]  Yes   • CKD (chronic kidney disease) stage 3, GFR 30-59 ml/min (CMS/[facility]) [N18.3]  Yes   • Chronic atrial fibrillation [I48.20]  Yes   • Anorexia [R63.0]  Yes   • Anxiety [F41.9]  Yes   • Debility [R53.81]  Yes   • Hypertension [I10]  Yes      Resolved Hospital Problems   No resolved problems to display.        Brief Hospital Course to date:  Mirian Sy is a 80 y.o. female who presented to the ER with fever and shortness of breath.  She was admitted to the hospital with severe sepsis present on admission and treated for aspiration pneumonia.  Additionally she was found to be in A. fib RVR.  She was seen by Dr. Olguin on admission due to acute on chronic diastolic heart failure and  chronic atrial fibrillation with RVR.  She has been struggling with shortness of breath and has been intermittently on BiPAP and high flow nasal cannula.  She appears to be with acute on chronic respiratory failure and currently we are struggling to get her off high flow nasal cannula.  (I have copied the above from my colleagues note.  However, I have checked the accuracy of the information myself independently.)    Asp pna  Severe Sepsis (resolved)  - got 8 days of zosyn  - ongoing doxy/cef:  Stop at DC  - tapped by IR 3/30, right side, 700ml, exudate, cx neg to date     Acute on Chronic Respiratory failure  -No CT evidence of COVID-19  -Pulmonology is following     Afib with RVR  Ac on chron CHF, EF 50.  Mod MR, severe TR  - rate controlled.  Patient  on Eliquis, metoprolol verapamil dig  - lasix stopped for creat bump:  Restarted lower dose  - CXR indicates worsening edema.    - stop verapamil to support BP (has been low-normal) to assist diuresis and protect renal fxn.  Watch HR.      CKD III  - bumped recently but now improving.   - restarted lasix    DVT Prophylaxis: Apixaban 2.5 mg oral every 12 hours,     Disposition: I expect the patient to be discharged TBD - home w hospice, possibly saturday    CODE STATUS:  DNR  Code Status and Medical Interventions:   Ordered at: 03/31/20 8625     Limited Support to NOT Include:    Intubation     Level Of Support Discussed With:    Patient    Next of Kin (If No Surrogate)     Code Status:    No CPR     Medical Interventions (Level of Support Prior to Arrest):    Limited         Electronically signed by La Arriaza MD, 04/03/20, 08:15.

## 2020-04-04 ENCOUNTER — READMISSION MANAGEMENT (OUTPATIENT)
Dept: CALL CENTER | Facility: HOSPITAL | Age: 80
End: 2020-04-04

## 2020-04-04 VITALS
TEMPERATURE: 97.6 F | BODY MASS INDEX: 18.83 KG/M2 | SYSTOLIC BLOOD PRESSURE: 103 MMHG | WEIGHT: 120 LBS | DIASTOLIC BLOOD PRESSURE: 65 MMHG | HEART RATE: 85 BPM | RESPIRATION RATE: 20 BRPM | OXYGEN SATURATION: 90 % | HEIGHT: 67 IN

## 2020-04-04 PROCEDURE — 99239 HOSP IP/OBS DSCHRG MGMT >30: CPT | Performed by: NURSE PRACTITIONER

## 2020-04-04 PROCEDURE — 94660 CPAP INITIATION&MGMT: CPT

## 2020-04-04 PROCEDURE — 94799 UNLISTED PULMONARY SVC/PX: CPT

## 2020-04-04 PROCEDURE — 97535 SELF CARE MNGMENT TRAINING: CPT

## 2020-04-04 RX ORDER — METOPROLOL SUCCINATE 100 MG/1
100 TABLET, EXTENDED RELEASE ORAL DAILY
Qty: 30 TABLET | Refills: 0 | Status: SHIPPED | OUTPATIENT
Start: 2020-04-05

## 2020-04-04 RX ORDER — LEVETIRACETAM 250 MG/1
125 TABLET ORAL EVERY 12 HOURS SCHEDULED
Qty: 60 TABLET | Refills: 0 | Status: SHIPPED | OUTPATIENT
Start: 2020-04-04 | End: 2020-04-04

## 2020-04-04 RX ORDER — ONDANSETRON 4 MG/1
4 TABLET, FILM COATED ORAL EVERY 6 HOURS PRN
Qty: 90 TABLET | Refills: 0 | Status: SHIPPED | OUTPATIENT
Start: 2020-04-04 | End: 2020-04-04

## 2020-04-04 RX ORDER — METOPROLOL SUCCINATE 100 MG/1
100 TABLET, EXTENDED RELEASE ORAL DAILY
Qty: 30 TABLET | Refills: 0 | Status: SHIPPED | OUTPATIENT
Start: 2020-04-05 | End: 2020-04-04

## 2020-04-04 RX ORDER — ONDANSETRON 4 MG/1
4 TABLET, FILM COATED ORAL EVERY 6 HOURS PRN
Qty: 90 TABLET | Refills: 0 | Status: SHIPPED | OUTPATIENT
Start: 2020-04-04

## 2020-04-04 RX ORDER — PSEUDOEPHEDRINE HCL 30 MG
100 TABLET ORAL 2 TIMES DAILY PRN
Qty: 60 EACH | Refills: 0 | Status: SHIPPED | OUTPATIENT
Start: 2020-04-04

## 2020-04-04 RX ORDER — LEVETIRACETAM 250 MG/1
125 TABLET ORAL EVERY 12 HOURS SCHEDULED
Qty: 60 TABLET | Refills: 0 | Status: SHIPPED | OUTPATIENT
Start: 2020-04-04

## 2020-04-04 RX ORDER — MIRTAZAPINE 7.5 MG/1
7.5 TABLET, FILM COATED ORAL NIGHTLY
Qty: 30 TABLET | Refills: 0 | Status: SHIPPED | OUTPATIENT
Start: 2020-04-04 | End: 2020-04-04

## 2020-04-04 RX ORDER — PSEUDOEPHEDRINE HCL 30 MG
100 TABLET ORAL 2 TIMES DAILY PRN
Qty: 60 EACH | Refills: 0 | Status: SHIPPED | OUTPATIENT
Start: 2020-04-04 | End: 2020-04-04

## 2020-04-04 RX ORDER — MIDODRINE HYDROCHLORIDE 5 MG/1
5 TABLET ORAL
Qty: 90 TABLET | Refills: 0 | Status: SHIPPED | OUTPATIENT
Start: 2020-04-04

## 2020-04-04 RX ORDER — MIRTAZAPINE 7.5 MG/1
7.5 TABLET, FILM COATED ORAL NIGHTLY
Qty: 30 TABLET | Refills: 0 | Status: SHIPPED | OUTPATIENT
Start: 2020-04-04

## 2020-04-04 RX ORDER — MIDODRINE HYDROCHLORIDE 5 MG/1
5 TABLET ORAL
Qty: 90 TABLET | Refills: 0 | Status: SHIPPED | OUTPATIENT
Start: 2020-04-04 | End: 2020-04-04

## 2020-04-04 RX ADMIN — MIDODRINE HYDROCHLORIDE 5 MG: 5 TABLET ORAL at 08:04

## 2020-04-04 RX ADMIN — APIXABAN 2.5 MG: 2.5 TABLET, FILM COATED ORAL at 08:04

## 2020-04-04 RX ADMIN — METOPROLOL SUCCINATE 100 MG: 100 TABLET, EXTENDED RELEASE ORAL at 08:02

## 2020-04-04 RX ADMIN — Medication 5 MG: at 01:25

## 2020-04-04 RX ADMIN — MIDODRINE HYDROCHLORIDE 5 MG: 5 TABLET ORAL at 12:40

## 2020-04-04 RX ADMIN — FUROSEMIDE 20 MG: 20 TABLET ORAL at 08:04

## 2020-04-04 RX ADMIN — MULTIPLE VITAMINS W/ MINERALS TAB 1 TABLET: TAB at 08:05

## 2020-04-04 RX ADMIN — CITALOPRAM HYDROBROMIDE 20 MG: 20 TABLET ORAL at 08:05

## 2020-04-04 RX ADMIN — LEVETIRACETAM 125 MG: 250 TABLET, FILM COATED ORAL at 08:03

## 2020-04-04 RX ADMIN — SODIUM CHLORIDE, PRESERVATIVE FREE 10 ML: 5 INJECTION INTRAVENOUS at 08:06

## 2020-04-04 NOTE — PLAN OF CARE
Problem: Patient Care Overview  Goal: Plan of Care Review  Flowsheets (Taken 4/4/2020 1101)  Outcome Summary: Pt agreeable to get up and required mechanical lift due to low O2 reading with OT providing cues for breathing techniques.  Pt fatigued but willing to groom but dependent for toileting tasks.  Rolling with mod assist.  Anticipate returning home with hospice care.

## 2020-04-04 NOTE — DISCHARGE SUMMARY
[facility] Medicine Services  DISCHARGE SUMMARY    Patient Name: Mirian Sy  : 1940  MRN: [id number]    Date of Admission: 3/19/2020  6:47 AM  Date of Discharge:  20  Primary Care Physician: Oren Engel DO    Consults     Date and Time Order Name Status Description    3/30/2020 1536 Inpatient Palliative Care MD Consult Completed     3/27/2020 0030 Inpatient Pulmonology Consult Completed     3/26/2020 0434 Inpatient Cardiology Consult      3/19/2020 1308 Inpatient Cardiology Consult Completed           Hospital Course     Presenting Problem:   Acute febrile illness [R50.9]    Active Hospital Problems    Diagnosis  POA   • **Aspiration pneumonia due to vomitus (CMS/formerly [facility]) [J69.0]  Yes   • SABRINA (acute kidney injury) (CMS/formerly [facility]) [N17.9]  Yes   • Chronic atrial fibrillation with RVR [I48.20]  Yes   • Severe sepsis (CMS/formerly [facility]) [A41.9, R65.20]  Yes   • Dysphagia [R13.10]  Yes   • Lactic acidosis [E87.2]  Yes   • Acute and chronic respiratory failure with hypoxia (CMS/formerly [facility]) [J96.21]  Yes   • Acute febrile illness [R50.9]  Yes   • Acute on chronic diastolic CHF (congestive heart failure) (CMS/formerly [facility]) [I50.33]  Yes   • Underweight [R63.6]  Yes   • CKD (chronic kidney disease) stage 3, GFR 30-59 ml/min (CMS/formerly [facility]) [N18.3]  Yes   • Chronic atrial fibrillation [I48.20]  Yes   • Anorexia [R63.0]  Yes   • Anxiety [F41.9]  Yes   • Debility [R53.81]  Yes   • Hypertension [I10]  Yes      Resolved Hospital Problems   No resolved problems to display.          Hospital Course:  Mirian Sy is a 80 y.o. female who presented to the ER with fever and shortness of breath.  She was admitted to the hospital with severe sepsis present on admission and treated for aspiration pneumonia.  Additionally she was found to be in A. fib RVR.  She was seen by cardiologist Dr. Olguin on admission due to acute on chronic diastolic heart failure and chronic atrial fibrillation with RVR.  She will  continue on metoprolol. Initially on verapamil but that was stopped due to hypotension. She was started on Midodrine to help support BP and protect renal function. She was struggling with shortness of breath and had been intermittently on BiPAP and high flow nasal cannula.  She appears to have acute on chronic respiratory failure. It was difficult to wean her oxygen from high flow nasal cannula, but currently her SpO2 is 94% on 6L NC. After several talks with case management and the palliative care team, the patient and her son decided that the would rather her be discharged home with hospice than to pursue placement at a facility. Hospice was consulted and has been arranged at home. Hospice saw Ms. Sy at the hospital and spoke with her son on the telephone to discuss goals of care and home needs. Hospice  has already arranged to have necessary medical equipment delivered to her home, and has provided her son with the contact information for the hospice once they get home. She will be discharged home today with home hospice arranged. She will be transported home by her son.     Discharge Follow Up Recommendations for outpatient labs/diagnostics:   Follow up with PCP as needed.     Day of Discharge     HPI:   Patient seen sitting in bed eating breakfast. She appears to be in no apparent distress. No acute events overnight per nursing. Patient reports that her breathing has improved. She slept well overnight. No new complaints at this time.     Review of Systems  Gen- No fevers, chills  CV- No chest pain, palpitations  Resp- No cough, no dyspnea at rest  GI- No N/V/D, abd pain    Vital Signs:   Temp:  [97.4 °F (36.3 °C)-97.9 °F (36.6 °C)] 97.4 °F (36.3 °C)  Heart Rate:  [65-96] 85  Resp:  [18-20] 20  BP: (117-130)/(65-87) 117/69     Physical Exam:  Constitutional: No acute distress, awake, alert  HENT: NCAT, mucous membranes moist  Respiratory: decreased breath sounds , respiratory effort normal on 6L nc    Cardiovascular: irregular, no murmurs, palpable pedal pulses bilaterally  Gastrointestinal: Positive bowel sounds, soft, nontender, nondistended  Musculoskeletal: No bilateral ankle edema  Psychiatric: Appropriate affect, cooperative  Neurologic: Oriented to person, place, moves all extremities, speech clear  Skin: warm, dry, no visible     Pertinent  and/or Most Recent Results     Results from last 7 days   Lab Units 04/03/20  0625 04/01/20  0614 03/31/20  0334 03/30/20  0613 03/29/20  0756   WBC 10*3/mm3  --   --   --   --  12.17*   HEMOGLOBIN g/dL  --   --   --   --  10.0*   HEMATOCRIT %  --   --   --   --  32.0*   PLATELETS 10*3/mm3  --   --   --   --  366   SODIUM mmol/L 147* 147* 145 143 143   POTASSIUM mmol/L 4.1 3.9 4.2 3.2* 3.3*   CHLORIDE mmol/L 110* 110* 108* 102 101   CO2 mmol/L 26.0 27.0 23.0 27.0 27.0   BUN mg/dL 36* 39* 41* 39* 38*   CREATININE mg/dL 1.35* 1.56* 1.82* 1.91* 2.15*   GLUCOSE mg/dL 111* 113* 129* 108* 111*   CALCIUM mg/dL 9.5 8.9 9.1 9.2 9.2     Results from last 7 days   Lab Units 03/30/20  0836   PROTIME Seconds 19.5*   INR  1.68*   APTT seconds 40.6*           Invalid input(s): TG, LDLCALC, LDLREALC        Brief Urine Lab Results  (Last result in the past 365 days)      Color   Clarity   Blood   Leuk Est   Nitrite   Protein   CREAT   Urine HCG        03/28/20 0243 Yellow Cloudy Negative Small (1+) Negative 30 mg/dL (1+)               Microbiology Results Abnormal     Procedure Component Value - Date/Time    Blood Culture - Blood, Arm, Right [[id number]] Collected:  03/28/20 0902    Lab Status:  Final result Specimen:  Blood from Arm, Right Updated:  04/02/20 0930     Blood Culture No growth at 5 days    Blood Culture - Blood, Arm, Left [[id number]] Collected:  03/28/20 0903    Lab Status:  Final result Specimen:  Blood from Arm, Left Updated:  04/02/20 0930     Blood Culture No growth at 5 days    Body Fluid Culture - Body Fluid, Pleural Cavity [[id number]] Collected:  03/30/20 1342     Lab Status:  Final result Specimen:  Body Fluid from Pleural Cavity Updated:  04/02/20 0648     Body Fluid Culture No growth at 3 days     Gram Stain Many (4+) WBCs per low power field      No organisms seen    Fungus Smear - Body Fluid, Pleural Cavity [[id number]] Collected:  03/30/20 1347    Lab Status:  Final result Specimen:  Body Fluid from Pleural Cavity Updated:  04/01/20 1408     Fungal Stain No fungal elements seen    AFB Culture - Body Fluid, Pleural Cavity [[id number]] Collected:  03/30/20 1347    Lab Status:  Preliminary result Specimen:  Body Fluid from Pleural Cavity Updated:  03/31/20 1125     AFB Stain No acid fast bacilli seen on concentrated smear    Blood Culture - Blood, Arm, Left [[id number]] Collected:  03/19/20 0717    Lab Status:  Final result Specimen:  Blood from Arm, Left Updated:  03/24/20 0745     Blood Culture No growth at 5 days    Blood Culture - Blood, Wrist, Left [[id number]] Collected:  03/19/20 0717    Lab Status:  Final result Specimen:  Blood from Wrist, Left Updated:  03/24/20 0745     Blood Culture No growth at 5 days    Respiratory Panel, PCR - Swab, Nasopharynx [[id number]]  (Normal) Collected:  03/22/20 1407    Lab Status:  Final result Specimen:  Swab from Nasopharynx Updated:  03/22/20 1532     ADENOVIRUS, PCR Not Detected     Coronavirus 229E Not Detected     Coronavirus HKU1 Not Detected     Coronavirus NL63 Not Detected     Coronavirus OC43 Not Detected     Human Metapneumovirus Not Detected     Human Rhinovirus/Enterovirus Not Detected     Influenza B PCR Not Detected     Parainfluenza Virus 1 Not Detected     Parainfluenza Virus 2 Not Detected     Parainfluenza Virus 3 Not Detected     Parainfluenza Virus 4 Not Detected     Bordetella pertussis pcr Not Detected     Influenza A H1 2009 PCR Not Detected     Chlamydophila pneumoniae PCR Not Detected     Mycoplasma pneumo by PCR Not Detected     Influenza A PCR Not Detected     Influenza A H3 Not Detected     Influenza A H1  Not Detected     RSV, PCR Not Detected     Bordetella parapertussis PCR Not Detected    Narrative:       The coronavirus on the RVP is NOT COVID-19 and is NOT indicative of infection with COVID-19.     MRSA Screen, PCR - Swab, Nares [[id number]]  (Normal) Collected:  03/19/20 1132    Lab Status:  Final result Specimen:  Swab from Nares Updated:  03/19/20 1312     MRSA PCR Negative    Narrative:       MRSA Negative    Respiratory Panel, PCR - Swab, Nasopharynx [[id number]]  (Normal) Collected:  03/19/20 0716    Lab Status:  Final result Specimen:  Swab from Nasopharynx Updated:  03/19/20 1015     ADENOVIRUS, PCR Not Detected     Coronavirus 229E Not Detected     Coronavirus HKU1 Not Detected     Coronavirus NL63 Not Detected     Coronavirus OC43 Not Detected     Human Metapneumovirus Not Detected     Human Rhinovirus/Enterovirus Not Detected     Influenza B PCR Not Detected     Parainfluenza Virus 1 Not Detected     Parainfluenza Virus 2 Not Detected     Parainfluenza Virus 3 Not Detected     Parainfluenza Virus 4 Not Detected     Bordetella pertussis pcr Not Detected     Influenza A H1 2009 PCR Not Detected     Chlamydophila pneumoniae PCR Not Detected     Mycoplasma pneumo by PCR Not Detected     Influenza A PCR Not Detected     Influenza A H3 Not Detected     Influenza A H1 Not Detected     RSV, PCR Not Detected     Bordetella parapertussis PCR Not Detected    Narrative:       The coronavirus on the RVP is NOT COVID-19 and is NOT indicative of infection with COVID-19.           Imaging Results (All)     Procedure Component Value Units Date/Time    XR Chest 1 View [[id number]] Collected:  04/03/20 0753     Updated:  04/03/20 1343    Narrative:       EXAMINATION: XR CHEST 1 VW-      INDICATION: CHF and effusion; R13.12-Dysphagia, oropharyngeal phase;  R50.9-Fever, unspecified; I48.91-Unspecified atrial fibrillation;  I10-Essential (primary) hypertension; Z74.09-Other reduced mobility.      COMPARISON: Chest x-ray  03/31/2020.     FINDINGS: Cardiomegaly with increasing bibasilar opacifications likely  increasing layering component or volume of small volume bilateral  pleural effusions left slightly greater than right with interstitial  lung markings increased throughout. No pneumothorax.           Impression:       Increasing bibasilar opacifications which are somewhat hazy  of layering may represent increased layering effusions or volume of  these pleural effusions with small volume bilateral pleural effusions  left greater than right as well as adjacent opacifications which also  appear potentially increased of increased airspace disease or  interstitial edema pattern.     D:  04/03/2020  E:  04/03/2020     This report was finalized on 4/3/2020 1:40 PM by Dr. Sadi Miller.       XR Chest 1 View [[id number]] Collected:  03/31/20 0755     Updated:  03/31/20 1032    Narrative:       EXAMINATION: XR CHEST 1 VW-03/31/2020:      INDICATION: Effusion; R50.9-Fever, unspecified; I48.91-Unspecified  atrial fibrillation; I10-Essential (primary) hypertension; Z74.09-Other  reduced mobility; R13.12-Dysphagia, oropharyngeal phase.      COMPARISON: Chest x-ray 03/29/2020.     FINDINGS: Persistent massive cardiomegaly with a trace volume right  pleural effusion similar to prior along with background interstitial  opacifications bilaterally.           Impression:       Persistent massive cardiomegaly with a trace volume right  pleural effusion similar to prior along with background interstitial  opacifications bilaterally.         D:  03/31/2020  E:  03/31/2020     This report was finalized on 3/31/2020 10:29 AM by Dr. Sadi Miller.       CT Guided Thoracentesis [[id number]] Collected:  03/30/20 1645     Updated:  03/30/20 1651    Narrative:       PROCEDURE: CT-guided thoracentesis     Procedural Personnel  Attending physician(s): CIARA Karimi M.D.  Fellow physician(s): None  Resident physician(s): None  Advanced practice provider(s): None      Pre-procedure diagnosis: Acute on chronic respiratory failure.  Aspiration related pneumonia. Symptomatic right side pleural fluid  collection  Post-procedure diagnosis: Same  Indication: Diagnostic/therapeutic  Additional clinical history: None     Complications: No immediate complications.       Impression:          CT-guided thoracentesis with drainage of 700 mL of serous fluid. Portion  sent for requested laboratory analysis.     Plan:      Resume care by clinical team.  _______________________________________________________________     PROCEDURE SUMMARY:  - Limited thoracic CT  - CT-guided thoracentesis  - Additional procedure(s): None     PROCEDURE DETAILS:     Pre-procedure  Consent: Informed consent for the procedure including risks, benefits  and alternatives was obtained and time-out was performed prior to the  procedure.  Preparation: The site was prepared and draped using maximal sterile  barrier technique including cutaneous antisepsis.     Anesthesia/sedation  Level of anesthesia/sedation: None     Limited thoracic CT  Limited thoracic CT was performed. A safe window for thoracentesis was  identified.   Right hemithorax findings: Moderate-sized right side pleural fluid  collection     Thoracentesis  Local anesthesia was administered. The pleural space was accessed under  CT fluoroscopic guidance with a 6.5 Maltese drainage catheter using  trocar technique. The centesis catheter was advanced through the trocar  removed. The fluid was drained. The catheter was removed, and a sterile  bandage was applied.  Catheter placed: 6.5 Maltese  Post-drainage hemithorax findings: No immediate complication     Radiation Dose  CT dose length product (mGy-cm): 294     Additional Details  Additional description of procedure: None  Equipment details: None  Specimens removed: Pleural fluid  Estimated blood loss (mL): Less than 10  Standardized report: Thoracentesis     Attestation  I was present and scrubbed for the  entire procedure. Imaging reviewed.  Agree with final report as written.        This report was finalized on 3/30/2020 4:48 PM by Shravan Karimi.       XR Chest 1 View [[id number]] Collected:  03/29/20 0834     Updated:  03/30/20 0829    Narrative:       EXAMINATION: XR CHEST 1 VW - 03/29/2020     INDICATION: Evaluate pulmonary edema.     R50.9-Fever, unspecified; I48.91-Unspecified atrial fibrillation;  I10-Essential (primary) hypertension; Z74.09-Other reduced mobility;  R13.12-Dysphagia, oropharyngeal phase.     COMPARISON: 03/27/2020     FINDINGS: Heart remains markedly enlarged. There is still a mild diffuse  interstitial disease pattern present, but improved. Left basilar  atelectasis and effusion and very small right effusion appear stable.       Impression:       Marked cardiomegaly, but with improved pulmonary  interstitial edema. Persistent left basilar atelectasis and very small  right effusion. No pneumothorax or other new chest disease is seen.      DICTATED:   03/29/2020  EDITED/ls :   03/29/2020      This report was finalized on 3/30/2020 8:26 AM by Dr. Justin Aj MD.       XR Chest 1 View [[id number]] Collected:  03/27/20 2305     Updated:  03/27/20 2307    Narrative:       CR Chest 1 Vw    INDICATION:   Shortness of air and fever with hypertension.     COMPARISON:    3/25/2020    FINDINGS:  Single portable AP view(s) of the chest.    Marked cardiomegaly is stable. There is atherosclerotic disease in the aorta. There is pleural thickening at the right lung apex that is unchanged. There is pulmonary emphysema. Diffuse interstitial prominence likely reflects mild edema. There is  continued consolidation in both bases with small bilateral pleural effusions. No pneumothorax.       Impression:       Slight worsening of mild pulmonary edema, otherwise no significant change.    Signer Name: Tutu Melendez MD   Signed: 3/27/2020 11:05 PM   Work[facility]    [facility]    XR  Chest PA & Lateral [[id number]] Collected:  03/25/20 1407     Updated:  03/25/20 2151    Narrative:       EXAMINATION: XR CHEST PA AND LATERAL- 03/25/2020     INDICATION: hypoxemia; R50.9-Fever, unspecified; I48.91-Unspecified  atrial fibrillation; I10-Essential (primary) hypertension; Z74.09-Other  reduced mobility; R13.10-Dysphagia, unspecified     COMPARISON: 03/19/2020     FINDINGS: Heart remains markedly enlarged. Vasculature is cephalized.  There is still mild pulmonary vascular congestion although this appears  little improved. Bibasilar atelectasis and effusion appears new or at  least increased from 03/19/2020 although not very extensive. Effusions  are better seen on the lateral view, and appear relatively mild. No  pneumothorax is seen.       Impression:       1. Interval development of mild to moderate bibasilar atelectasis and  pleural effusion.  2. Cardiomegaly and improved pulmonary vascular congestion.      D:  03/25/2020  E:  03/25/2020     This report was finalized on 3/25/2020 9:48 PM by Dr. Justin Aj MD.       FL Video Swallow With Speech Single Contrast [[id number]] Collected:  03/20/20 1248     Updated:  03/20/20 1542    Narrative:       EXAMINATION: FL VIDEO SWALLOW W SPEECH SINGLE-CONTRAST-     INDICATION: PNA, hx dysphagia; R50.9-Fever, unspecified;  I48.91-Unspecified atrial fibrillation; I10-Essential (primary)  hypertension     TECHNIQUE: 1 minute and 18 seconds of fluoroscopic time was used for  this exam. 1 associated image was saved. The patient was evaluated in  the seated lateral position while taking a variety of consistencies of  barium by mouth under the direction of speech pathology.     COMPARISON: NONE     FINDINGS: There was no penetration and no aspiration with any of the  media ingested.          Impression:       Fluoroscopy provided for a modified barium swallow. Please  see speech therapy report for full details and recommendations.         This report was finalized on  3/20/2020 3:39 PM by Dr. Sadi Miller.       CT Chest Without Contrast [[id number]] Collected:  03/19/20 1030     Updated:  03/19/20 1138    Narrative:       EXAMINATION: CT CHEST WO CONTRAST-03/19/2020:      INDICATION: Febrile illness with SOA; R50.9-Fever, unspecified;  I48.91-Unspecified atrial fibrillation; I10-Essential (primary)  hypertension, shortness of air, febrile illness.     TECHNIQUE: Multiple axial CT imaging was obtained of the chest without  the administration of intravenous contrast.     The radiation dose reduction device was turned on for each scan per the  ALARA (As Low as Reasonably Achievable) protocol.     COMPARISON: NONE.     FINDINGS: The cardiac chambers are enlarged. Vascular calcification seen  within the ascending thoracic aorta. Largest AP dimension measures 3.6  cm with a transverse dimension of 3.9 cm. The remainder of the thoracic  aorta is normal in caliber. The cardiac chambers are within normal  limits.  No pericardial effusion. There are small bilateral pleural  effusions with patchy airspace disease seen within the lower lung fields  bilaterally. Severe emphysematous changes identified diffusely  throughout the lung fields. Aspiration pneumonia cannot be excluded.  There is minimal interseptal thickening seen in the right middle lobe  concerning for airspace disease as well. Bronchiectatic changes  centrally. No changes seen within the spine. The visualized upper  abdomen is grossly unremarkable.       Impression:       Small bilateral pleural effusions with consolidation seen at  the lung bases bilaterally and some interseptal thickening suggesting  bibasilar infiltrate such as aspiration. Only minimal interseptal  thickening and increased markings seen within the right middle lobe and  lingula. Cardiac chambers are enlarged.     D:  03/19/2020  E:  03/19/2020     This report was finalized on 3/19/2020 11:35 AM by Dr. Carla Hunter MD.       XR Chest 1 View [[id number]]  Collected:  03/19/20 0811     Updated:  03/19/20 0908    Narrative:       EXAMINATION: XR CHEST 1 VW-      INDICATION: Shortness of air triage protocol.      COMPARISON: 12/12/2019.     FINDINGS: Portable chest reveals heart to be enlarged. Increased  pulmonary vascularity bilaterally. Degenerative change is seen within  the spine.  Small right pleural effusion. Vascular calcification is seen  within the thoracic aorta.       Impression:       Heart is enlarged with increased pulmonary vascularity  bilaterally. Small right pleural effusion. Apical pleural thickening  bilaterally.     D:  03/19/2020  E:  03/19/2020     This report was finalized on 3/19/2020 9:04 AM by Dr. Carla Hunter MD.             Results for orders placed during the hospital encounter of 05/07/19   Duplex Carotid Ultrasound CAR    Narrative · Right internal carotid artery stenosis of 0-49%.  · Left internal carotid artery stenosis of 0-49%.  · Bilateral heterogeneous calcific carotid atherosclerosis without   flow-limiting stenosis          Results for orders placed during the hospital encounter of 05/07/19   Duplex Carotid Ultrasound CAR    Narrative · Right internal carotid artery stenosis of 0-49%.  · Left internal carotid artery stenosis of 0-49%.  · Bilateral heterogeneous calcific carotid atherosclerosis without   flow-limiting stenosis          Results for orders placed during the hospital encounter of 03/19/20   Adult Transthoracic Echo Complete W/ Cont if Necessary Per Protocol    Narrative · Left ventricular systolic function is low normal.  · Estimated EF = 50%.  · Moderate mitral valve regurgitation is present  · Severe tricuspid valve regurgitation is present.  · Calculated right ventricular systolic pressure from tricuspid   regurgitation is 48.6 mmHg.          Plan for Follow-up of Pending Labs/Results: Home with hospice   Order Current Status    Fungus Culture - Body Fluid, Pleural Cavity In process    AFB Culture - Body  Fluid, Pleural Cavity Preliminary result        Discharge Details        Discharge Medications      New Medications      Instructions Start Date   docusate sodium 100 MG capsule   100 mg, Oral, 2 Times Daily PRN      midodrine 5 MG tablet  Commonly known as:  PROAMATINE   5 mg, Oral, 3 Times Daily Before Meals      ondansetron 4 MG tablet  Commonly known as:  ZOFRAN   4 mg, Oral, Every 6 Hours PRN         Changes to Medications      Instructions Start Date   apixaban 2.5 MG tablet tablet  Commonly known as:  ELIQUIS  What changed:    · medication strength  · how much to take  · when to take this   2.5 mg, Oral, Every 12 Hours Scheduled      furosemide 20 MG tablet  Commonly known as:  LASIX  What changed:    · when to take this  · reasons to take this   20 mg, Oral, Daily      levETIRAcetam 250 MG tablet  Commonly known as:  KEPPRA  What changed:  when to take this   125 mg, Oral, Every 12 Hours Scheduled      metoprolol succinate  MG 24 hr tablet  Commonly known as:  TOPROL-XL  What changed:    · medication strength  · how much to take   100 mg, Oral, Daily   Start Date:  April 5, 2020     mirtazapine 7.5 MG tablet  Commonly known as:  REMERON  What changed:  when to take this   7.5 mg, Oral, Nightly         Continue These Medications      Instructions Start Date   acetaminophen 325 MG tablet  Commonly known as:  TYLENOL   650 mg, Oral, Every 4 Hours PRN      atorvastatin 80 MG tablet  Commonly known as:  LIPITOR   80 mg, Oral, Nightly      calcium-vitamin D 500-200 MG-UNIT per tablet  Commonly known as:  OSCAL-500   1 tablet, Oral, Every Morning      CENTRUM SILVER 50+WOMEN PO   1 tablet, Oral, Daily      citalopram 20 MG tablet  Commonly known as:  CeleXA   TAKE 1 TABLET EVERY MORNING      dronabinol 2.5 MG capsule  Commonly known as:  Marinol   2.5 mg, Oral, 2 Times Daily Before Meals, Take approximately 1 hour prior to lunch and dinner for appetite stimulation.      ferrous sulfate 325 (65 FE) MG tablet    TAKE 1 TABLET DAILY WITH BREAKFAST      magnesium oxide 400 (241.3 Mg) MG tablet tablet  Commonly known as:  MAGOX   400 mg, Oral, Daily      raNITIdine 150 MG capsule  Commonly known as:  ZANTAC   TAKE 1 CAPSULE EVERY NIGHT         Stop These Medications    Dilt- MG 24 hr capsule  Generic drug:  dilTIAZem XR            Allergies   Allergen Reactions   • Actonel [Risedronate Sodium] GI Intolerance   • Hctz [Hydrochlorothiazide]      hyponatremia   • Lisinopril      hyperkalemia         Discharge Disposition:  Home or Self Care    Diet:  Hospital:  Diet Order   Procedures   • Diet Soft Texture; Chopped; Thin; Cardiac       Activity:  Activity Instructions     Up WIth Assist             CODE STATUS:    Code Status and Medical Interventions:   Ordered at: 03/31/20 1435     Limited Support to NOT Include:    Intubation     Level Of Support Discussed With:    Patient    Next of Kin (If No Surrogate)     Code Status:    No CPR     Medical Interventions (Level of Support Prior to Arrest):    Limited       Future Appointments   Date Time Provider Department Center   4/29/2020  3:30 PM Bri Olguin MD [facility] None       Additional Instructions for the Follow-ups that You Need to Schedule     Discharge Follow-up with PCP   As directed       Currently Documented PCP:    Oren Engel DO    PCP Phone Number:    [phone]     Follow Up Details:  Follow up with PCP as needed               Time Spent on Discharge:  36 minutes    Electronically signed by GREG Simms, 04/04/20, 10:10 AM.

## 2020-04-04 NOTE — THERAPY DISCHARGE NOTE
Acute Care - Occupational Therapy Initial Eval/Discharge  [facility]     Patient Name: Mirian Sy  : 1940  MRN: [id number]  Today's Date: 2020  Onset of Illness/Injury or Date of Surgery: 20  Date of Referral to OT: 20  Referring Physician: MD Racheal       Admit Date: 3/19/2020       ICD-10-CM ICD-9-CM   1. Oropharyngeal dysphagia R13.12 787.22   2. Acute febrile illness R50.9 780.60   3. Atrial fibrillation with rapid ventricular response (CMS/[facility]) I48.91 427.31   4. Elevated blood pressure reading with diagnosis of hypertension I10 401.9   5. Impaired mobility and ADLs Z74.09 799.89     Patient Active Problem List   Diagnosis   • Abnormal gait   • Takotsubo syndrome   • Carpal tunnel syndrome   • Complex partial epilepsy (CMS/[facility])   • Depression   • Chronic gastritis   • HLD (hyperlipidemia)   • Hypertension   • Hyponatremia   • Nutritional anemia   • Dyssomnia   • Diplopia   • Xeroderma   • Preventative health care   • Frailty   • Debility   • Tricuspid regurgitation   • Acute UTI (urinary tract infection)   • Cerebrovascular accident (CMS/[facility])   • Syncope   • DVT (deep venous thrombosis) (CMS/[facility])   • Anorexia   • Anxiety   • Patent foramen ovale   • Mitral regurgitation   • Low weight   • Senile osteoporosis   • Iron deficiency   • Chronic atrial fibrillation   • Closed fracture of left ilium (CMS/[facility])   • GERD (gastroesophageal reflux disease)   • Fall at home   • Vertigo   • Internuclear ophthalmoplegia, right eye   • Atrial fibrillation with RVR (CMS/[facility])   • Bradycardia   • Chronic atrial fibrillation   • SABRINA (acute kidney injury) (CMS/[facility])   • Hyperkalemia   • Confusion   • Acute exacerbation of CHF (congestive heart failure) (CMS/[facility])   • CKD (chronic kidney disease) stage 3, GFR 30-59 ml/min (CMS/[facility])   • CKD (chronic kidney disease) stage 3, GFR 30-59 ml/min (CMS/[facility])   • Underweight   • Acute on chronic diastolic CHF (congestive heart failure) (CMS/[facility])   • SABRINA (acute  kidney injury) (CMS/[facility])   • Chronic atrial fibrillation with RVR   • Severe sepsis (CMS/[facility])   • Aspiration pneumonia due to vomitus (CMS/[facility])   • Dysphasia   • Lactic acidosis   • Acute and chronic respiratory failure with hypoxia (CMS/[facility])   • Acute febrile illness     Past Medical History:   Diagnosis Date   • Atrial fibrillation (CMS/[facility]) 2005    Chronic anticoagulation and rate control   • Basal cell carcinoma 2007    Left leg and nose   • Cardiomyopathy (CMS/[facility]) 2006   • Cerebrovascular accident (CMS/[facility]) 01/2005    CT right parietal CVA. Carotid duplex -50% to 79% left ICS 15% right ICS stenosis. MRI of the neck showed no significant carotid bifurcations of these. Negative CT of the head, 09/10/2012. Carotid duplex, 02/24/2014:  Shelf-like plaque in the CECE without significant elevation and velocities.  Patent vertebral arteries.   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    With dilated cardiomyopathy   • Decubitus ulcer of buttock 2014    Transient during fracture rehabilitation   • Depression 2002    Good response to citalopram and mirtazapine   • DVT (deep venous thrombosis) (CMS/[facility]) 2014    Superficial - right lesser saphenus vein - after hip fracture    • Fracture of bone of forefoot 1972    Right foot   • Fracture of ilium, left (CMS/[facility]) 05/2018    Accidental fall - Uncomplicated recovery   • GERD (gastroesophageal reflux disease) 2014    Chronic superficial gastritis   • HTN (hypertension) 2005   • Iron deficiency anemia due to chronic blood loss 2018    Predisposed by chronic anticoagulation and GERD   • Low body weight due to inadequate caloric intake Adulthood   • Mitral valve prolapse 1997    Mild MR   • Osteoarthritis    • Osteoporosis    • Patent foramen ovale 2005   • Pneumonia 1947   • SCC (squamous cell carcinoma), arm, right 2017   • Scoliosis    • Syncope 2014     undermined cause - BHL   • Varicose veins 2005    Symptomatic     Past  Surgical History:   Procedure Laterality Date   • ACHILLES TENDON SURGERY Left 1997    Repair of rupture left tendon with screws   • BREAST CYST EXCISION Left    • BREAST SURGERY Left 1982    Excision benign cyst   • CARDIAC CATHETERIZATION  2006    Severe acute coronary spasm   • CATARACT EXTRACTION WITH INTRAOCULAR LENS IMPLANT Bilateral 2015   • EYE CAPSULOTOMY WITH LASER Left 2016    Marked visual improvement   • FEMUR FRACTURE SURGERY Right 2015    Repair of shaft fracture   • HIP FRACTURE SURGERY Left 2014    left hip fracture with pin   • LUMBAR DISC SURGERY  1983   • OVARIAN CYST REMOVAL Left 1996   • SKIN CANCER EXCISION Right 2017    SCC - arm          OT ASSESSMENT FLOWSHEET (last 12 hours)      Occupational Therapy Evaluation    No documentation.             OT Recommendation and Plan  Outcome Summary/Treatment Plan (OT)  Daily Summary of Progress (OT): unable to show any progress toward functional goals(O2 saturation limited progress)  Anticipated Discharge Disposition (OT): home with assist  Reason for Discharge (OT Discharge Summary): no further expectation of functional progress  Daily Summary of Progress (OT): unable to show any progress toward functional goals(O2 saturation limited progress)  Plan of Care Review  Plan of Care Reviewed With: patient  Plan of Care Reviewed With: patient  Outcome Summary: Pt agreeable to get up and required mechanical lift due to low O2 reading with OT providing cues for breathing techniques.  Pt fatigued but willing to groom but dependent for toileting tasks.  Rolling with mod assist.  Anticipate returning home with hospice care.     Rehab Goal Summary     Row Name 04/04/20 1100             Bed Mobility Goal 1 (OT)    Activity/Assistive Device (Bed Mobility Goal 1, OT)  rolling to left;rolling to right  -SW      [place] Level/Cues Needed (Bed Mobility Goal 1, OT)  moderate assist (50-74% patient effort);other (see comments) O2 low with 6 L O2.  -SW       Progress/Outcomes (Bed Mobility Goal 1, OT)  goal not met  -SW         Transfer Goal 1 (OT)    Activity/Assistive Device (Transfer Goal 1, OT)  transfers, all  -SW      [place] Level/Cues Needed (Transfer Goal 1, OT)  other (see comments) mechanical lift  -SW      Progress/Outcome (Transfer Goal 1, OT)  goal not met  -SW         Toileting Goal 1 (OT)    Activity/Device (Toileting Goal 1, OT)  toileting skills, all  -SW      [place] Level/Cues Needed (Toileting Goal 1, OT)  other (see comments) Dependent   -SW      Progress/Outcome (Toileting Goal 1, OT)  goal not met  -SW         Grooming Goal 1 (OT)    Activity/Device (Grooming Goal 1, OT)  grooming skills, all  -SW      [place] (Grooming Goal 1, OT)  set-up required  -SW      Progress/Outcome (Grooming Goal 1, OT)  goal met  -SW         Cognitive Goals (OT)    Orientation Goal Selection (OT)  --  -SW        User Key  (r) = Recorded By, (t) = Taken By, (c) = Cosigned By    Initials Name Provider Type Discipline    Jackie Cash, OT Occupational Therapist OT          Outcome Measures     Row Name 04/04/20 1101 04/03/20 1305 04/02/20 1040       How much help from another is currently needed...    Putting on and taking off regular lower body clothing?  2  -SW  2  -SG  2  -SG    Bathing (including washing, rinsing, and drying)  2  -SW  2  -SG  2  -SG    Toileting (which includes using toilet bed pan or urinal)  1  -SW  2  -SG  2  -SG    Putting on and taking off regular upper body clothing  2  -SW  3  -SG  3  -SG    Taking care of personal grooming (such as brushing teeth)  2  -SW  3  -SG  3  -SG    Eating meals  3  -SW  3  -SG  3  -SG    AM-PAC 6 Clicks Score (OT)  12  -SW  15  -SG  15  -SG       Functional Assessment    Outcome Measure Options  AM-PAC 6 Clicks Daily Activity (OT)  -SW  AM-PAC 6 Clicks Daily Activity (OT)  -SG  AM-PAC 6 Clicks Daily Activity (OT)  -SG      User Key  (r) = Recorded By, (t) = Taken By, (c) = Cosigned By    Initials Name  Provider Type    Jackie Landeros, OTR/L Occupational Therapist    Jackie Cash OT Occupational Therapist          Time Calculation:   Time Calculation- OT     Row Name 04/04/20 1101             Time Calculation- OT    OT Start Time  1101  -      OT Stop Time  1127  -      OT Time Calculation (min)  26 min  -      Total Timed Code Minutes- OT  26 minute(s)  -      OT Received On  04/04/20  -         Timed Charges    98198 - OT Self Care/Mgmt Minutes  26  -        User Key  (r) = Recorded By, (t) = Taken By, (c) = Cosigned By    Initials Name Provider Type    Jackie Cash OT Occupational Therapist        Therapy Suggested Charges     Code   Minutes Charges    78187 (CPT®) Hc Ot Neuromusc Re Education Ea 15 Min      07398 (CPT®) Hc Ot Ther Proc Ea 15 Min      43250 (CPT®) Hc Ot Therapeutic Act Ea 15 Min      54486 (CPT®) Hc Ot Manual Therapy Ea 15 Min      77984 (CPT®) Hc Ot Iontophoresis Ea 15 Min      90488 (CPT®) Hc Ot Elec Stim Ea-Per 15 Min      41070 (CPT®) Hc Ot Ultrasound Ea 15 Min      40810 (CPT®) Hc Ot Self Care/Mgmt/Train Ea 15 Min 26 2    Total  26 2        Therapy Charges for Today     Code Description Service Date Service Provider Modifiers Qty    [id number] HC OT SELF CARE/MGMT/TRAIN EA 15 MIN 4/4/2020 Jackie Grier OT GO 2               OT Discharge Summary  Anticipated Discharge Disposition (OT): home with assist  Reason for Discharge: Discharge from facility  Outcomes Achieved: Unable to make functional progress toward goals at this time  Discharge Destination: Home with assist    Jackie Grier OT  4/4/2020

## 2020-04-04 NOTE — PROGRESS NOTES
Case Management Discharge Note      Final Note: Patient will be discharged home with hospice care today 4/4.  Son Ke will transport patient home today, and plans to pick patient up around 1300.  DME at home, and Son has 24 hour hospice number available when needed.           Destination      No service has been selected for the patient.      Durable Medical Equipment      No service has been selected for the patient.      Dialysis/Infusion      No service has been selected for the patient.      Home Medical Care      No service has been selected for the patient.      Therapy      No service has been selected for the patient.      Community Resources      No service has been selected for the patient.             Final Discharge Disposition Code: 50 - home with hospice

## 2020-04-04 NOTE — OUTREACH NOTE
Prep Survey      Responses   Orthodox facility patient discharged from?  [facility]   Is LACE score < 7 ?  No   Eligibility  Not Eligible   What are the reasons patient is not eligible?  Hospice/Pallative Care   Does the patient have one of the following disease processes/diagnoses(primary or secondary)?  Sepsis   Prep survey completed?  Yes          Susana Gould RN

## 2020-04-06 ENCOUNTER — EPISODE CHANGES (OUTPATIENT)
Dept: CASE MANAGEMENT | Facility: OTHER | Age: 80
End: 2020-04-06

## 2020-04-07 PROBLEM — R13.10 DYSPHAGIA: Status: ACTIVE | Noted: 2020-03-19

## 2020-05-11 LAB
FUNGUS WND CULT: NORMAL
MYCOBACTERIUM SPEC CULT: NORMAL
NIGHT BLUE STAIN TISS: NORMAL

## 2023-05-05 NOTE — TELEPHONE ENCOUNTER
Fax req recvd from [facility] for flecainide rx. RX sent.   Forms completed and signed and faxed to forms completion [phone].

## 2024-06-13 NOTE — PATIENT INSTRUCTIONS
1.  Continue same medications and supplements - as listed.    2.  Walk every day - maintain physical fitness.    3.  Go to "[employer] 2 days weekly - for exercise and socializing.    4.  Maintain a well-balanced diet - maintain weight over 100 pounds.    5.  Return in 6 weeks - women's clinic, schedule ovarian screen, and wellness exam.    6.  Return in 3 months - fasting checkup.  
Bradycardic cardiac arrest